# Patient Record
Sex: FEMALE | Race: BLACK OR AFRICAN AMERICAN | Employment: FULL TIME | URBAN - METROPOLITAN AREA
[De-identification: names, ages, dates, MRNs, and addresses within clinical notes are randomized per-mention and may not be internally consistent; named-entity substitution may affect disease eponyms.]

---

## 2018-08-16 PROBLEM — D64.9 SYMPTOMATIC ANEMIA: Status: ACTIVE | Noted: 2018-08-16

## 2018-08-16 PROBLEM — D50.9 MICROCYTIC ANEMIA: Status: ACTIVE | Noted: 2018-08-16

## 2018-08-16 PROBLEM — I10 ESSENTIAL HYPERTENSION: Status: ACTIVE | Noted: 2018-08-16

## 2018-10-05 ENCOUNTER — HOSPITAL ENCOUNTER (EMERGENCY)
Age: 43
Discharge: HOME | End: 2018-10-05

## 2018-10-05 ENCOUNTER — APPOINTMENT (OUTPATIENT)
Dept: GENERAL RADIOLOGY | Age: 43
End: 2018-10-05

## 2018-10-05 VITALS
BODY MASS INDEX: 42.44 KG/M2 | DIASTOLIC BLOOD PRESSURE: 89 MMHG | WEIGHT: 280 LBS | HEART RATE: 62 BPM | SYSTOLIC BLOOD PRESSURE: 146 MMHG | OXYGEN SATURATION: 98 % | HEIGHT: 68 IN | RESPIRATION RATE: 19 BRPM | TEMPERATURE: 98.4 F

## 2018-10-05 DIAGNOSIS — R05.9 COUGH: ICD-10-CM

## 2018-10-05 DIAGNOSIS — R04.2 HEMOPTYSIS: ICD-10-CM

## 2018-10-05 DIAGNOSIS — J06.9 ACUTE UPPER RESPIRATORY INFECTION: Primary | ICD-10-CM

## 2018-10-05 LAB
ALBUMIN SERPL-MCNC: 3.8 GM/DL (ref 3.4–5)
ALP BLD-CCNC: 93 IU/L (ref 40–129)
ALT SERPL-CCNC: 10 U/L (ref 10–40)
ANION GAP SERPL CALCULATED.3IONS-SCNC: 11 MMOL/L (ref 4–16)
ANISOCYTOSIS: ABNORMAL
AST SERPL-CCNC: 18 IU/L (ref 15–37)
BASOPHILS ABSOLUTE: 0.1 K/CU MM
BASOPHILS RELATIVE PERCENT: 1 % (ref 0–1)
BILIRUB SERPL-MCNC: 0.2 MG/DL (ref 0–1)
BUN BLDV-MCNC: 14 MG/DL (ref 6–23)
CALCIUM SERPL-MCNC: 8.6 MG/DL (ref 8.3–10.6)
CHLORIDE BLD-SCNC: 103 MMOL/L (ref 99–110)
CO2: 21 MMOL/L (ref 21–32)
CREAT SERPL-MCNC: 0.9 MG/DL (ref 0.6–1.1)
D DIMER: <200 NG/ML(DDU)
DIFFERENTIAL TYPE: ABNORMAL
DIFFERENTIAL TYPE: ABNORMAL
EOSINOPHILS ABSOLUTE: 0.5 K/CU MM
EOSINOPHILS RELATIVE PERCENT: 5.5 % (ref 0–3)
GFR AFRICAN AMERICAN: >60 ML/MIN/1.73M2
GFR NON-AFRICAN AMERICAN: >60 ML/MIN/1.73M2
GLUCOSE BLD-MCNC: 87 MG/DL (ref 70–99)
HCG QUALITATIVE: NEGATIVE
HCT VFR BLD CALC: 38.2 % (ref 37–47)
HEMOGLOBIN: 10.8 GM/DL (ref 12.5–16)
IMMATURE NEUTROPHIL %: 0.2 % (ref 0–0.43)
LYMPHOCYTES ABSOLUTE: 1.4 K/CU MM
LYMPHOCYTES RELATIVE PERCENT: 16 % (ref 24–44)
MCH RBC QN AUTO: 22.1 PG (ref 27–31)
MCHC RBC AUTO-ENTMCNC: 28.3 % (ref 32–36)
MCV RBC AUTO: 78.3 FL (ref 78–100)
MICROCYTES: ABNORMAL
MONOCYTES ABSOLUTE: 0.8 K/CU MM
MONOCYTES RELATIVE PERCENT: 9.2 % (ref 0–4)
OVALOCYTES: ABNORMAL
PLATELET # BLD: 338 K/CU MM (ref 140–440)
PLT MORPHOLOGY: ABNORMAL
PMV BLD AUTO: 10.4 FL (ref 7.5–11.1)
POLYCHROMASIA: ABNORMAL
POTASSIUM SERPL-SCNC: 4.2 MMOL/L (ref 3.5–5.1)
RBC # BLD: 4.88 M/CU MM (ref 4.2–5.4)
RBC # BLD: ABNORMAL 10*6/UL
SEGMENTED NEUTROPHILS ABSOLUTE COUNT: 6 K/CU MM
SEGMENTED NEUTROPHILS RELATIVE PERCENT: 68.1 % (ref 36–66)
SODIUM BLD-SCNC: 135 MMOL/L (ref 135–145)
TOTAL IMMATURE NEUTOROPHIL: 0.02 K/CU MM
TOTAL PROTEIN: 7.3 GM/DL (ref 6.4–8.2)
WBC # BLD: 8.8 K/CU MM (ref 4–10.5)

## 2018-10-05 PROCEDURE — 85379 FIBRIN DEGRADATION QUANT: CPT

## 2018-10-05 PROCEDURE — 85025 COMPLETE CBC W/AUTO DIFF WBC: CPT

## 2018-10-05 PROCEDURE — 80053 COMPREHEN METABOLIC PANEL: CPT

## 2018-10-05 PROCEDURE — 99283 EMERGENCY DEPT VISIT LOW MDM: CPT

## 2018-10-05 PROCEDURE — 84703 CHORIONIC GONADOTROPIN ASSAY: CPT

## 2018-10-05 PROCEDURE — 87081 CULTURE SCREEN ONLY: CPT

## 2018-10-05 PROCEDURE — 87430 STREP A AG IA: CPT

## 2018-10-05 PROCEDURE — 6370000000 HC RX 637 (ALT 250 FOR IP): Performed by: PHYSICIAN ASSISTANT

## 2018-10-05 PROCEDURE — 71046 X-RAY EXAM CHEST 2 VIEWS: CPT

## 2018-10-05 RX ORDER — ACETAMINOPHEN 500 MG
1000 TABLET ORAL ONCE
Status: COMPLETED | OUTPATIENT
Start: 2018-10-05 | End: 2018-10-05

## 2018-10-05 RX ADMIN — ACETAMINOPHEN 1000 MG: 500 TABLET, FILM COATED ORAL at 09:56

## 2018-10-05 ASSESSMENT — PAIN DESCRIPTION - LOCATION: LOCATION: THROAT;EAR

## 2018-10-05 ASSESSMENT — PAIN DESCRIPTION - PAIN TYPE: TYPE: ACUTE PAIN

## 2018-10-05 ASSESSMENT — PAIN SCALES - GENERAL
PAINLEVEL_OUTOF10: 10
PAINLEVEL_OUTOF10: 10

## 2018-10-05 ASSESSMENT — PAIN DESCRIPTION - ORIENTATION: ORIENTATION: RIGHT;LEFT

## 2018-10-05 NOTE — ED PROVIDER NOTES
cause. Patient denies any recent travel or surgery. Patient is not on blood thinners at this time. With patient's history additional workup was initiated. D-dimer is less than 200. Chest x-ray shows no acute cardiopulmonary abnormalities. Strep is negative. Pregnancy is negative. CMP within normal limits. CBC shows hemoglobin of 10.8 otherwise within normal limits. I discussed imaging and lab results with patient today. I suspect patient's symptoms are viral in nature. I recommend symptomatically treatment with increased rest, fluids. Recommend warm salt water gargles, T with honey. Recommend follow-up with primary care provider in 3-5 days for recheck. Clinical  IMPRESSION    1. Acute upper respiratory infection    2. Cough    3. Hemoptysis          Diagnosis and plan discussed in detail with patient who understands and agrees. Patient agrees to return emergency department if symptoms worsen or any new symptoms develop. Comment: Please note this report has been produced using speech recognition software and may contain errors related to that system including errors in grammar, punctuation, and spelling, as well as words and phrases that may be inappropriate. If there are any questions or concerns please feel free to contact the dictating provider for clarification.      Robert Morales PA-C  10/05/18 [id number]

## 2018-10-07 LAB
CULTURE: NORMAL
Lab: NORMAL
REPORT STATUS: NORMAL
SPECIMEN: NORMAL
STREP A DIRECT SCREEN: NEGATIVE

## 2018-10-25 ENCOUNTER — TELEPHONE (OUTPATIENT)
Dept: INTERNAL MEDICINE CLINIC | Age: 43
End: 2018-10-25

## 2018-10-25 NOTE — TELEPHONE ENCOUNTER
Medical records request received from [address] [place] [address] and was sent to [facility] for processing.

## 2019-03-02 ENCOUNTER — APPOINTMENT (OUTPATIENT)
Dept: CT IMAGING | Age: 44
End: 2019-03-02

## 2019-03-02 ENCOUNTER — HOSPITAL ENCOUNTER (OUTPATIENT)
Age: 44
Setting detail: OBSERVATION
Discharge: HOME | End: 2019-03-04
Attending: EMERGENCY MEDICINE | Admitting: INTERNAL MEDICINE

## 2019-03-02 ENCOUNTER — APPOINTMENT (OUTPATIENT)
Dept: GENERAL RADIOLOGY | Age: 44
End: 2019-03-02

## 2019-03-02 DIAGNOSIS — R07.9 CHEST PAIN, UNSPECIFIED TYPE: Primary | ICD-10-CM

## 2019-03-02 LAB
ALBUMIN SERPL-MCNC: 3.8 GM/DL (ref 3.4–5)
ALP BLD-CCNC: 93 IU/L (ref 40–129)
ALT SERPL-CCNC: 12 U/L (ref 10–40)
AMPHETAMINES: NEGATIVE
ANION GAP SERPL CALCULATED.3IONS-SCNC: 10 MMOL/L (ref 4–16)
AST SERPL-CCNC: 14 IU/L (ref 15–37)
BACTERIA: NEGATIVE /HPF
BARBITURATE SCREEN URINE: NEGATIVE
BASOPHILS ABSOLUTE: 0.1 K/CU MM
BASOPHILS RELATIVE PERCENT: 0.6 % (ref 0–1)
BENZODIAZEPINE SCREEN, URINE: NEGATIVE
BILIRUB SERPL-MCNC: 0.1 MG/DL (ref 0–1)
BILIRUBIN URINE: NEGATIVE MG/DL
BLOOD, URINE: ABNORMAL
BUN BLDV-MCNC: 11 MG/DL (ref 6–23)
CALCIUM SERPL-MCNC: 8 MG/DL (ref 8.3–10.6)
CANNABINOID SCREEN URINE: NEGATIVE
CHLORIDE BLD-SCNC: 108 MMOL/L (ref 99–110)
CLARITY: ABNORMAL
CO2: 22 MMOL/L (ref 21–32)
COCAINE METABOLITE: NEGATIVE
COLOR: YELLOW
CREAT SERPL-MCNC: 0.9 MG/DL (ref 0.6–1.1)
DIFFERENTIAL TYPE: ABNORMAL
EKG ATRIAL RATE: 77 BPM
EKG DIAGNOSIS: NORMAL
EKG P AXIS: 49 DEGREES
EKG P-R INTERVAL: 146 MS
EKG Q-T INTERVAL: 394 MS
EKG QRS DURATION: 88 MS
EKG QTC CALCULATION (BAZETT): 445 MS
EKG R AXIS: 58 DEGREES
EKG T AXIS: 24 DEGREES
EKG VENTRICULAR RATE: 77 BPM
EOSINOPHILS ABSOLUTE: 0.2 K/CU MM
EOSINOPHILS RELATIVE PERCENT: 2.1 % (ref 0–3)
GFR AFRICAN AMERICAN: >60 ML/MIN/1.73M2
GFR NON-AFRICAN AMERICAN: >60 ML/MIN/1.73M2
GLUCOSE BLD-MCNC: 118 MG/DL (ref 70–99)
GLUCOSE, URINE: NEGATIVE MG/DL
GONADOTROPIN, CHORIONIC (HCG) QUANT: <0.5 UIU/ML
HCT VFR BLD CALC: 34.9 % (ref 37–47)
HEMOGLOBIN: 10.2 GM/DL (ref 12.5–16)
IMMATURE NEUTROPHIL %: 0.2 % (ref 0–0.43)
KETONES, URINE: NEGATIVE MG/DL
LEUKOCYTE ESTERASE, URINE: NEGATIVE
LYMPHOCYTES ABSOLUTE: 1.8 K/CU MM
LYMPHOCYTES RELATIVE PERCENT: 20.5 % (ref 24–44)
MCH RBC QN AUTO: 22.5 PG (ref 27–31)
MCHC RBC AUTO-ENTMCNC: 29.2 % (ref 32–36)
MCV RBC AUTO: 77 FL (ref 78–100)
MONOCYTES ABSOLUTE: 0.7 K/CU MM
MONOCYTES RELATIVE PERCENT: 8 % (ref 0–4)
NITRITE URINE, QUANTITATIVE: NEGATIVE
NUCLEATED RBC %: 0 %
OPIATES, URINE: NEGATIVE
OXYCODONE: NEGATIVE
PDW BLD-RTO: 16.1 % (ref 11.7–14.9)
PH, URINE: 6 (ref 5–8)
PHENCYCLIDINE, URINE: NEGATIVE
PLATELET # BLD: 412 K/CU MM (ref 140–440)
PMV BLD AUTO: 10.5 FL (ref 7.5–11.1)
POTASSIUM SERPL-SCNC: 3.9 MMOL/L (ref 3.5–5.1)
PROTEIN UA: 100 MG/DL
RBC # BLD: 4.53 M/CU MM (ref 4.2–5.4)
RBC URINE: ABNORMAL /HPF (ref 0–6)
SEGMENTED NEUTROPHILS ABSOLUTE COUNT: 6 K/CU MM
SEGMENTED NEUTROPHILS RELATIVE PERCENT: 68.6 % (ref 36–66)
SODIUM BLD-SCNC: 140 MMOL/L (ref 135–145)
SPECIFIC GRAVITY UA: >1.06 (ref 1–1.03)
SQUAMOUS EPITHELIAL: 78 /HPF
TOTAL IMMATURE NEUTOROPHIL: 0.02 K/CU MM
TOTAL NUCLEATED RBC: 0 K/CU MM
TOTAL PROTEIN: 6.9 GM/DL (ref 6.4–8.2)
TRICHOMONAS: ABNORMAL /HPF
TROPONIN T: <0.01 NG/ML
UROBILINOGEN, URINE: NORMAL MG/DL (ref 0.2–1)
WBC # BLD: 8.7 K/CU MM (ref 4–10.5)
WBC UA: ABNORMAL /HPF (ref 0–5)

## 2019-03-02 PROCEDURE — 6360000002 HC RX W HCPCS: Performed by: NURSE PRACTITIONER

## 2019-03-02 PROCEDURE — 6370000000 HC RX 637 (ALT 250 FOR IP): Performed by: EMERGENCY MEDICINE

## 2019-03-02 PROCEDURE — 6370000000 HC RX 637 (ALT 250 FOR IP): Performed by: NURSE PRACTITIONER

## 2019-03-02 PROCEDURE — G0378 HOSPITAL OBSERVATION PER HR: HCPCS

## 2019-03-02 PROCEDURE — 93005 ELECTROCARDIOGRAM TRACING: CPT | Performed by: EMERGENCY MEDICINE

## 2019-03-02 PROCEDURE — 81001 URINALYSIS AUTO W/SCOPE: CPT

## 2019-03-02 PROCEDURE — 71275 CT ANGIOGRAPHY CHEST: CPT

## 2019-03-02 PROCEDURE — 99285 EMERGENCY DEPT VISIT HI MDM: CPT

## 2019-03-02 PROCEDURE — 93005 ELECTROCARDIOGRAM TRACING: CPT | Performed by: NURSE PRACTITIONER

## 2019-03-02 PROCEDURE — 6360000004 HC RX CONTRAST MEDICATION: Performed by: EMERGENCY MEDICINE

## 2019-03-02 PROCEDURE — 71046 X-RAY EXAM CHEST 2 VIEWS: CPT

## 2019-03-02 PROCEDURE — 80053 COMPREHEN METABOLIC PANEL: CPT

## 2019-03-02 PROCEDURE — 6360000002 HC RX W HCPCS: Performed by: EMERGENCY MEDICINE

## 2019-03-02 PROCEDURE — 96375 TX/PRO/DX INJ NEW DRUG ADDON: CPT

## 2019-03-02 PROCEDURE — 93010 ELECTROCARDIOGRAM REPORT: CPT | Performed by: INTERNAL MEDICINE

## 2019-03-02 PROCEDURE — 96374 THER/PROPH/DIAG INJ IV PUSH: CPT

## 2019-03-02 PROCEDURE — 93005 ELECTROCARDIOGRAM TRACING: CPT

## 2019-03-02 PROCEDURE — 85025 COMPLETE CBC W/AUTO DIFF WBC: CPT

## 2019-03-02 PROCEDURE — 80307 DRUG TEST PRSMV CHEM ANLYZR: CPT

## 2019-03-02 PROCEDURE — 84484 ASSAY OF TROPONIN QUANT: CPT

## 2019-03-02 PROCEDURE — 84702 CHORIONIC GONADOTROPIN TEST: CPT

## 2019-03-02 PROCEDURE — 96372 THER/PROPH/DIAG INJ SC/IM: CPT

## 2019-03-02 PROCEDURE — 2500000003 HC RX 250 WO HCPCS: Performed by: NURSE PRACTITIONER

## 2019-03-02 PROCEDURE — 2580000003 HC RX 258: Performed by: NURSE PRACTITIONER

## 2019-03-02 PROCEDURE — 36415 COLL VENOUS BLD VENIPUNCTURE: CPT

## 2019-03-02 RX ORDER — SODIUM CHLORIDE 0.9 % (FLUSH) 0.9 %
10 SYRINGE (ML) INJECTION EVERY 12 HOURS SCHEDULED
Status: DISCONTINUED | OUTPATIENT
Start: 2019-03-02 | End: 2019-03-04 | Stop reason: HOSPADM

## 2019-03-02 RX ORDER — SODIUM CHLORIDE 0.9 % (FLUSH) 0.9 %
10 SYRINGE (ML) INJECTION PRN
Status: DISCONTINUED | OUTPATIENT
Start: 2019-03-02 | End: 2019-03-02

## 2019-03-02 RX ORDER — ACETAMINOPHEN 500 MG
1000 TABLET ORAL ONCE
Status: COMPLETED | OUTPATIENT
Start: 2019-03-02 | End: 2019-03-02

## 2019-03-02 RX ORDER — SODIUM CHLORIDE 9 MG/ML
INJECTION, SOLUTION INTRAVENOUS CONTINUOUS
Status: DISCONTINUED | OUTPATIENT
Start: 2019-03-02 | End: 2019-03-02

## 2019-03-02 RX ORDER — SODIUM CHLORIDE 9 MG/ML
INJECTION, SOLUTION INTRAVENOUS CONTINUOUS
Status: DISCONTINUED | OUTPATIENT
Start: 2019-03-02 | End: 2019-03-04 | Stop reason: HOSPADM

## 2019-03-02 RX ORDER — LORAZEPAM 2 MG/ML
1 INJECTION INTRAMUSCULAR ONCE
Status: COMPLETED | OUTPATIENT
Start: 2019-03-02 | End: 2019-03-02

## 2019-03-02 RX ORDER — ONDANSETRON 2 MG/ML
4 INJECTION INTRAMUSCULAR; INTRAVENOUS EVERY 6 HOURS PRN
Status: DISCONTINUED | OUTPATIENT
Start: 2019-03-02 | End: 2019-03-04 | Stop reason: HOSPADM

## 2019-03-02 RX ORDER — MORPHINE SULFATE 4 MG/ML
4 INJECTION, SOLUTION INTRAMUSCULAR; INTRAVENOUS EVERY 30 MIN PRN
Status: DISCONTINUED | OUTPATIENT
Start: 2019-03-02 | End: 2019-03-02

## 2019-03-02 RX ORDER — ATORVASTATIN CALCIUM 20 MG/1
20 TABLET, FILM COATED ORAL NIGHTLY
Status: DISCONTINUED | OUTPATIENT
Start: 2019-03-02 | End: 2019-03-04 | Stop reason: HOSPADM

## 2019-03-02 RX ORDER — NITROGLYCERIN 0.4 MG/1
0.4 TABLET SUBLINGUAL EVERY 5 MIN PRN
Status: DISCONTINUED | OUTPATIENT
Start: 2019-03-02 | End: 2019-03-04 | Stop reason: HOSPADM

## 2019-03-02 RX ORDER — CARVEDILOL 12.5 MG/1
12.5 TABLET ORAL 2 TIMES DAILY WITH MEALS
Status: CANCELLED | OUTPATIENT
Start: 2019-03-02

## 2019-03-02 RX ORDER — SODIUM CHLORIDE 0.9 % (FLUSH) 0.9 %
10 SYRINGE (ML) INJECTION PRN
Status: DISCONTINUED | OUTPATIENT
Start: 2019-03-02 | End: 2019-03-04 | Stop reason: HOSPADM

## 2019-03-02 RX ADMIN — NITROGLYCERIN 0.4 MG: 0.4 TABLET, ORALLY DISINTEGRATING SUBLINGUAL at 22:57

## 2019-03-02 RX ADMIN — METOPROLOL TARTRATE 25 MG: 25 TABLET ORAL at 20:23

## 2019-03-02 RX ADMIN — ATORVASTATIN CALCIUM 20 MG: 20 TABLET, FILM COATED ORAL at 20:23

## 2019-03-02 RX ADMIN — FAMOTIDINE 20 MG: 10 INJECTION, SOLUTION INTRAVENOUS at 20:24

## 2019-03-02 RX ADMIN — SODIUM CHLORIDE, PRESERVATIVE FREE 10 ML: 5 INJECTION INTRAVENOUS at 20:25

## 2019-03-02 RX ADMIN — ACETAMINOPHEN 1000 MG: 500 TABLET ORAL at 13:52

## 2019-03-02 RX ADMIN — NITROGLYCERIN 0.5 INCH: 20 OINTMENT TOPICAL at 20:24

## 2019-03-02 RX ADMIN — ENOXAPARIN SODIUM 40 MG: 40 INJECTION SUBCUTANEOUS at 20:24

## 2019-03-02 RX ADMIN — LORAZEPAM 1 MG: 2 INJECTION INTRAMUSCULAR; INTRAVENOUS at 16:06

## 2019-03-02 RX ADMIN — SODIUM CHLORIDE: 9 INJECTION, SOLUTION INTRAVENOUS at 20:24

## 2019-03-02 RX ADMIN — IOPAMIDOL 100 ML: 755 INJECTION, SOLUTION INTRAVENOUS at 17:53

## 2019-03-02 ASSESSMENT — PAIN SCALES - GENERAL
PAINLEVEL_OUTOF10: 8

## 2019-03-02 ASSESSMENT — PAIN DESCRIPTION - LOCATION
LOCATION: CHEST
LOCATION: CHEST

## 2019-03-02 ASSESSMENT — PAIN DESCRIPTION - PAIN TYPE: TYPE: ACUTE PAIN

## 2019-03-02 ASSESSMENT — PAIN DESCRIPTION - DESCRIPTORS: DESCRIPTORS: SQUEEZING;PRESSURE

## 2019-03-03 LAB
ALBUMIN SERPL-MCNC: 4 GM/DL (ref 3.4–5)
ALP BLD-CCNC: 92 IU/L (ref 40–128)
ALT SERPL-CCNC: 12 U/L (ref 10–40)
ANION GAP SERPL CALCULATED.3IONS-SCNC: 11 MMOL/L (ref 4–16)
AST SERPL-CCNC: 14 IU/L (ref 15–37)
BILIRUB SERPL-MCNC: 0.2 MG/DL (ref 0–1)
BUN BLDV-MCNC: 12 MG/DL (ref 6–23)
CALCIUM SERPL-MCNC: 8.8 MG/DL (ref 8.3–10.6)
CHLORIDE BLD-SCNC: 109 MMOL/L (ref 99–110)
CHOLESTEROL: 140 MG/DL
CO2: 19 MMOL/L (ref 21–32)
CREAT SERPL-MCNC: 0.9 MG/DL (ref 0.6–1.1)
EKG ATRIAL RATE: 73 BPM
EKG DIAGNOSIS: NORMAL
EKG P AXIS: 13 DEGREES
EKG P-R INTERVAL: 162 MS
EKG Q-T INTERVAL: 400 MS
EKG QRS DURATION: 84 MS
EKG QTC CALCULATION (BAZETT): 440 MS
EKG R AXIS: 57 DEGREES
EKG T AXIS: -16 DEGREES
EKG VENTRICULAR RATE: 73 BPM
GFR AFRICAN AMERICAN: >60 ML/MIN/1.73M2
GFR NON-AFRICAN AMERICAN: >60 ML/MIN/1.73M2
GLUCOSE BLD-MCNC: 99 MG/DL (ref 70–99)
HCT VFR BLD CALC: 37.8 % (ref 37–47)
HDLC SERPL-MCNC: 50 MG/DL
HEMOGLOBIN: 10.8 GM/DL (ref 12.5–16)
LDL CHOLESTEROL DIRECT: 89 MG/DL
MCH RBC QN AUTO: 22.6 PG (ref 27–31)
MCHC RBC AUTO-ENTMCNC: 28.6 % (ref 32–36)
MCV RBC AUTO: 79.2 FL (ref 78–100)
PDW BLD-RTO: 16.3 % (ref 11.7–14.9)
PLATELET # BLD: 418 K/CU MM (ref 140–440)
PMV BLD AUTO: 10.9 FL (ref 7.5–11.1)
POTASSIUM SERPL-SCNC: 4.7 MMOL/L (ref 3.5–5.1)
RBC # BLD: 4.77 M/CU MM (ref 4.2–5.4)
SODIUM BLD-SCNC: 139 MMOL/L (ref 135–145)
TOTAL PROTEIN: 6.9 GM/DL (ref 6.4–8.2)
TRIGL SERPL-MCNC: 78 MG/DL
WBC # BLD: 8.5 K/CU MM (ref 4–10.5)

## 2019-03-03 PROCEDURE — 96375 TX/PRO/DX INJ NEW DRUG ADDON: CPT

## 2019-03-03 PROCEDURE — 6360000002 HC RX W HCPCS: Performed by: NURSE PRACTITIONER

## 2019-03-03 PROCEDURE — 6370000000 HC RX 637 (ALT 250 FOR IP)

## 2019-03-03 PROCEDURE — 36415 COLL VENOUS BLD VENIPUNCTURE: CPT

## 2019-03-03 PROCEDURE — 85027 COMPLETE CBC AUTOMATED: CPT

## 2019-03-03 PROCEDURE — 96376 TX/PRO/DX INJ SAME DRUG ADON: CPT

## 2019-03-03 PROCEDURE — 83721 ASSAY OF BLOOD LIPOPROTEIN: CPT

## 2019-03-03 PROCEDURE — G0378 HOSPITAL OBSERVATION PER HR: HCPCS

## 2019-03-03 PROCEDURE — 6360000002 HC RX W HCPCS: Performed by: HOSPITALIST

## 2019-03-03 PROCEDURE — 99253 IP/OBS CNSLTJ NEW/EST LOW 45: CPT | Performed by: INTERNAL MEDICINE

## 2019-03-03 PROCEDURE — 80053 COMPREHEN METABOLIC PANEL: CPT

## 2019-03-03 PROCEDURE — 80061 LIPID PANEL: CPT

## 2019-03-03 PROCEDURE — 6370000000 HC RX 637 (ALT 250 FOR IP): Performed by: NURSE PRACTITIONER

## 2019-03-03 PROCEDURE — 93010 ELECTROCARDIOGRAM REPORT: CPT | Performed by: INTERNAL MEDICINE

## 2019-03-03 PROCEDURE — 93005 ELECTROCARDIOGRAM TRACING: CPT

## 2019-03-03 PROCEDURE — 96372 THER/PROPH/DIAG INJ SC/IM: CPT

## 2019-03-03 PROCEDURE — 2580000003 HC RX 258: Performed by: NURSE PRACTITIONER

## 2019-03-03 PROCEDURE — 2500000003 HC RX 250 WO HCPCS: Performed by: NURSE PRACTITIONER

## 2019-03-03 RX ORDER — ACETAMINOPHEN 325 MG/1
TABLET ORAL
Status: COMPLETED
Start: 2019-03-03 | End: 2019-03-03

## 2019-03-03 RX ORDER — MORPHINE SULFATE 4 MG/ML
2 INJECTION, SOLUTION INTRAMUSCULAR; INTRAVENOUS EVERY 6 HOURS PRN
Status: DISCONTINUED | OUTPATIENT
Start: 2019-03-03 | End: 2019-03-04 | Stop reason: HOSPADM

## 2019-03-03 RX ORDER — ACETAMINOPHEN 325 MG/1
650 TABLET ORAL EVERY 4 HOURS PRN
Status: DISCONTINUED | OUTPATIENT
Start: 2019-03-03 | End: 2019-03-04 | Stop reason: HOSPADM

## 2019-03-03 RX ADMIN — FAMOTIDINE 20 MG: 10 INJECTION, SOLUTION INTRAVENOUS at 10:18

## 2019-03-03 RX ADMIN — SODIUM CHLORIDE: 9 INJECTION, SOLUTION INTRAVENOUS at 10:18

## 2019-03-03 RX ADMIN — NITROGLYCERIN 0.5 INCH: 20 OINTMENT TOPICAL at 01:56

## 2019-03-03 RX ADMIN — ENOXAPARIN SODIUM 40 MG: 40 INJECTION SUBCUTANEOUS at 21:40

## 2019-03-03 RX ADMIN — ACETAMINOPHEN 325 MG: 325 TABLET ORAL at 15:53

## 2019-03-03 RX ADMIN — METOPROLOL TARTRATE 25 MG: 25 TABLET ORAL at 21:40

## 2019-03-03 RX ADMIN — MORPHINE SULFATE 2 MG: 4 INJECTION INTRAVENOUS at 02:44

## 2019-03-03 RX ADMIN — METOPROLOL TARTRATE 25 MG: 25 TABLET ORAL at 10:18

## 2019-03-03 RX ADMIN — ATORVASTATIN CALCIUM 20 MG: 20 TABLET, FILM COATED ORAL at 21:40

## 2019-03-03 RX ADMIN — FAMOTIDINE 20 MG: 10 INJECTION, SOLUTION INTRAVENOUS at 21:40

## 2019-03-03 RX ADMIN — SODIUM CHLORIDE, PRESERVATIVE FREE 10 ML: 5 INJECTION INTRAVENOUS at 10:19

## 2019-03-03 ASSESSMENT — PAIN SCALES - GENERAL
PAINLEVEL_OUTOF10: 8
PAINLEVEL_OUTOF10: 9
PAINLEVEL_OUTOF10: 3
PAINLEVEL_OUTOF10: 8
PAINLEVEL_OUTOF10: 8
PAINLEVEL_OUTOF10: 0
PAINLEVEL_OUTOF10: 0

## 2019-03-03 ASSESSMENT — PAIN DESCRIPTION - LOCATION
LOCATION: CHEST

## 2019-03-03 ASSESSMENT — PAIN DESCRIPTION - PAIN TYPE
TYPE: ACUTE PAIN

## 2019-03-04 ENCOUNTER — APPOINTMENT (OUTPATIENT)
Dept: NUCLEAR MEDICINE | Age: 44
End: 2019-03-04

## 2019-03-04 VITALS
BODY MASS INDEX: 44.41 KG/M2 | TEMPERATURE: 98.1 F | SYSTOLIC BLOOD PRESSURE: 140 MMHG | WEIGHT: 293 LBS | HEART RATE: 73 BPM | HEIGHT: 68 IN | RESPIRATION RATE: 18 BRPM | DIASTOLIC BLOOD PRESSURE: 99 MMHG | OXYGEN SATURATION: 99 %

## 2019-03-04 LAB
LV EF: 43 %
LV EF: 53 %
LVEF MODALITY: NORMAL
LVEF MODALITY: NORMAL

## 2019-03-04 PROCEDURE — 3430000000 HC RX DIAGNOSTIC RADIOPHARMACEUTICAL: Performed by: INTERNAL MEDICINE

## 2019-03-04 PROCEDURE — 94150 VITAL CAPACITY TEST: CPT

## 2019-03-04 PROCEDURE — G0378 HOSPITAL OBSERVATION PER HR: HCPCS

## 2019-03-04 PROCEDURE — 93306 TTE W/DOPPLER COMPLETE: CPT

## 2019-03-04 PROCEDURE — 94761 N-INVAS EAR/PLS OXIMETRY MLT: CPT

## 2019-03-04 PROCEDURE — 78452 HT MUSCLE IMAGE SPECT MULT: CPT

## 2019-03-04 PROCEDURE — 99233 SBSQ HOSP IP/OBS HIGH 50: CPT | Performed by: INTERNAL MEDICINE

## 2019-03-04 PROCEDURE — 94010 BREATHING CAPACITY TEST: CPT

## 2019-03-04 PROCEDURE — 93017 CV STRESS TEST TRACING ONLY: CPT

## 2019-03-04 PROCEDURE — 96376 TX/PRO/DX INJ SAME DRUG ADON: CPT

## 2019-03-04 PROCEDURE — 6360000002 HC RX W HCPCS: Performed by: INTERNAL MEDICINE

## 2019-03-04 PROCEDURE — 2500000003 HC RX 250 WO HCPCS: Performed by: NURSE PRACTITIONER

## 2019-03-04 PROCEDURE — 2580000003 HC RX 258: Performed by: NURSE PRACTITIONER

## 2019-03-04 PROCEDURE — A9500 TC99M SESTAMIBI: HCPCS | Performed by: INTERNAL MEDICINE

## 2019-03-04 RX ORDER — AMLODIPINE BESYLATE 5 MG/1
5 TABLET ORAL DAILY
Qty: 30 TABLET | Refills: 1 | Status: ON HOLD | OUTPATIENT
Start: 2019-03-04 | End: 2019-11-17 | Stop reason: ALTCHOICE

## 2019-03-04 RX ORDER — PANTOPRAZOLE SODIUM 40 MG/1
40 TABLET, DELAYED RELEASE ORAL DAILY
Qty: 30 TABLET | Refills: 1 | Status: SHIPPED | OUTPATIENT
Start: 2019-03-04 | End: 2019-03-04 | Stop reason: HOSPADM

## 2019-03-04 RX ADMIN — SODIUM CHLORIDE, PRESERVATIVE FREE 10 ML: 5 INJECTION INTRAVENOUS at 11:14

## 2019-03-04 RX ADMIN — Medication 10 MILLICURIE: at 07:30

## 2019-03-04 RX ADMIN — SODIUM CHLORIDE: 9 INJECTION, SOLUTION INTRAVENOUS at 02:24

## 2019-03-04 RX ADMIN — REGADENOSON 0.4 MG: 0.08 INJECTION, SOLUTION INTRAVENOUS at 09:28

## 2019-03-04 RX ADMIN — Medication 30 MILLICURIE: at 09:00

## 2019-03-04 RX ADMIN — FAMOTIDINE 20 MG: 10 INJECTION, SOLUTION INTRAVENOUS at 11:14

## 2019-03-04 ASSESSMENT — PAIN DESCRIPTION - DESCRIPTORS
DESCRIPTORS: PRESSURE
DESCRIPTORS: PRESSURE

## 2019-03-04 ASSESSMENT — PAIN DESCRIPTION - PAIN TYPE
TYPE: ACUTE PAIN
TYPE: ACUTE PAIN

## 2019-03-04 ASSESSMENT — PAIN DESCRIPTION - LOCATION
LOCATION: CHEST
LOCATION: CHEST

## 2019-03-04 ASSESSMENT — PAIN DESCRIPTION - ORIENTATION
ORIENTATION: MID
ORIENTATION: MID

## 2019-03-04 ASSESSMENT — PAIN SCALES - GENERAL
PAINLEVEL_OUTOF10: 8
PAINLEVEL_OUTOF10: 7

## 2019-07-14 ENCOUNTER — APPOINTMENT (OUTPATIENT)
Dept: CT IMAGING | Age: 44
End: 2019-07-14

## 2019-07-14 ENCOUNTER — HOSPITAL ENCOUNTER (EMERGENCY)
Age: 44
Discharge: HOME | End: 2019-07-14
Attending: EMERGENCY MEDICINE

## 2019-07-14 VITALS
OXYGEN SATURATION: 96 % | SYSTOLIC BLOOD PRESSURE: 180 MMHG | BODY MASS INDEX: 44.85 KG/M2 | DIASTOLIC BLOOD PRESSURE: 92 MMHG | TEMPERATURE: 98 F | HEART RATE: 62 BPM | RESPIRATION RATE: 15 BRPM | WEIGHT: 293 LBS

## 2019-07-14 DIAGNOSIS — R03.0 ELEVATED BLOOD PRESSURE READING: ICD-10-CM

## 2019-07-14 DIAGNOSIS — L03.213 PRESEPTAL CELLULITIS: Primary | ICD-10-CM

## 2019-07-14 LAB
ALBUMIN SERPL-MCNC: 4.1 G/DL (ref 3.5–5.2)
ALP BLD-CCNC: 95 U/L (ref 35–104)
ALT SERPL-CCNC: 12 U/L (ref 0–32)
ANION GAP SERPL CALCULATED.3IONS-SCNC: 10 MMOL/L (ref 7–16)
AST SERPL-CCNC: 15 U/L (ref 0–31)
BASOPHILS ABSOLUTE: 0.05 E9/L (ref 0–0.2)
BASOPHILS RELATIVE PERCENT: 0.7 % (ref 0–2)
BILIRUB SERPL-MCNC: 0.2 MG/DL (ref 0–1.2)
BUN BLDV-MCNC: 10 MG/DL (ref 6–20)
CALCIUM SERPL-MCNC: 9.1 MG/DL (ref 8.6–10.2)
CHLORIDE BLD-SCNC: 105 MMOL/L (ref 98–107)
CO2: 25 MMOL/L (ref 22–29)
CREAT SERPL-MCNC: 1 MG/DL (ref 0.5–1)
EKG ATRIAL RATE: 68 BPM
EKG P AXIS: 43 DEGREES
EKG P-R INTERVAL: 126 MS
EKG Q-T INTERVAL: 430 MS
EKG QRS DURATION: 78 MS
EKG QTC CALCULATION (BAZETT): 457 MS
EKG R AXIS: 48 DEGREES
EKG T AXIS: 36 DEGREES
EKG VENTRICULAR RATE: 68 BPM
EOSINOPHILS ABSOLUTE: 0.17 E9/L (ref 0.05–0.5)
EOSINOPHILS RELATIVE PERCENT: 2.4 % (ref 0–6)
GFR AFRICAN AMERICAN: >60
GFR NON-AFRICAN AMERICAN: >60 ML/MIN/1.73
GLUCOSE BLD-MCNC: 98 MG/DL (ref 74–99)
HCG(URINE) PREGNANCY TEST: NEGATIVE
HCT VFR BLD CALC: 39.6 % (ref 34–48)
HEMOGLOBIN: 12 G/DL (ref 11.5–15.5)
IMMATURE GRANULOCYTES #: 0.03 E9/L
IMMATURE GRANULOCYTES %: 0.4 % (ref 0–5)
LACTIC ACID: 0.8 MMOL/L (ref 0.5–2.2)
LYMPHOCYTES ABSOLUTE: 1.3 E9/L (ref 1.5–4)
LYMPHOCYTES RELATIVE PERCENT: 18.1 % (ref 20–42)
MCH RBC QN AUTO: 23 PG (ref 26–35)
MCHC RBC AUTO-ENTMCNC: 30.3 % (ref 32–34.5)
MCV RBC AUTO: 76 FL (ref 80–99.9)
MONOCYTES ABSOLUTE: 0.64 E9/L (ref 0.1–0.95)
MONOCYTES RELATIVE PERCENT: 8.9 % (ref 2–12)
NEUTROPHILS ABSOLUTE: 5.01 E9/L (ref 1.8–7.3)
NEUTROPHILS RELATIVE PERCENT: 69.5 % (ref 43–80)
PDW BLD-RTO: 19.3 FL (ref 11.5–15)
PLATELET # BLD: 345 E9/L (ref 130–450)
PMV BLD AUTO: 11.6 FL (ref 7–12)
POTASSIUM SERPL-SCNC: 3.5 MMOL/L (ref 3.5–5)
RBC # BLD: 5.21 E12/L (ref 3.5–5.5)
REASON FOR REJECTION: NORMAL
REJECTED TEST: NORMAL
SEDIMENTATION RATE, ERYTHROCYTE: 6 MM/HR (ref 0–20)
SODIUM BLD-SCNC: 140 MMOL/L (ref 132–146)
TOTAL PROTEIN: 7.8 G/DL (ref 6.4–8.3)
TROPONIN: <0.01 NG/ML (ref 0–0.03)
WBC # BLD: 7.2 E9/L (ref 4.5–11.5)

## 2019-07-14 PROCEDURE — 83605 ASSAY OF LACTIC ACID: CPT

## 2019-07-14 PROCEDURE — 84484 ASSAY OF TROPONIN QUANT: CPT

## 2019-07-14 PROCEDURE — 96375 TX/PRO/DX INJ NEW DRUG ADDON: CPT

## 2019-07-14 PROCEDURE — 2500000003 HC RX 250 WO HCPCS: Performed by: EMERGENCY MEDICINE

## 2019-07-14 PROCEDURE — 70450 CT HEAD/BRAIN W/O DYE: CPT

## 2019-07-14 PROCEDURE — 70481 CT ORBIT/EAR/FOSSA W/DYE: CPT

## 2019-07-14 PROCEDURE — 85651 RBC SED RATE NONAUTOMATED: CPT

## 2019-07-14 PROCEDURE — 6360000004 HC RX CONTRAST MEDICATION: Performed by: RADIOLOGY

## 2019-07-14 PROCEDURE — 81025 URINE PREGNANCY TEST: CPT

## 2019-07-14 PROCEDURE — 96374 THER/PROPH/DIAG INJ IV PUSH: CPT

## 2019-07-14 PROCEDURE — 6370000000 HC RX 637 (ALT 250 FOR IP): Performed by: EMERGENCY MEDICINE

## 2019-07-14 PROCEDURE — 93005 ELECTROCARDIOGRAM TRACING: CPT | Performed by: PHYSICIAN ASSISTANT

## 2019-07-14 PROCEDURE — 93010 ELECTROCARDIOGRAM REPORT: CPT | Performed by: INTERNAL MEDICINE

## 2019-07-14 PROCEDURE — 99284 EMERGENCY DEPT VISIT MOD MDM: CPT

## 2019-07-14 PROCEDURE — 85025 COMPLETE CBC W/AUTO DIFF WBC: CPT

## 2019-07-14 PROCEDURE — 80053 COMPREHEN METABOLIC PANEL: CPT

## 2019-07-14 PROCEDURE — 36415 COLL VENOUS BLD VENIPUNCTURE: CPT

## 2019-07-14 PROCEDURE — 6360000002 HC RX W HCPCS: Performed by: EMERGENCY MEDICINE

## 2019-07-14 RX ORDER — DIPHENHYDRAMINE HYDROCHLORIDE 50 MG/ML
25 INJECTION INTRAMUSCULAR; INTRAVENOUS ONCE
Status: COMPLETED | OUTPATIENT
Start: 2019-07-14 | End: 2019-07-14

## 2019-07-14 RX ORDER — CEPHALEXIN 500 MG/1
500 CAPSULE ORAL ONCE
Status: COMPLETED | OUTPATIENT
Start: 2019-07-14 | End: 2019-07-14

## 2019-07-14 RX ORDER — CEPHALEXIN 500 MG/1
500 CAPSULE ORAL 4 TIMES DAILY
Qty: 56 CAPSULE | Refills: 0 | Status: SHIPPED | OUTPATIENT
Start: 2019-07-14 | End: 2019-07-28

## 2019-07-14 RX ORDER — MORPHINE SULFATE 4 MG/ML
6 INJECTION, SOLUTION INTRAMUSCULAR; INTRAVENOUS ONCE
Status: COMPLETED | OUTPATIENT
Start: 2019-07-14 | End: 2019-07-14

## 2019-07-14 RX ORDER — METHYLPREDNISOLONE SODIUM SUCCINATE 125 MG/2ML
125 INJECTION, POWDER, LYOPHILIZED, FOR SOLUTION INTRAMUSCULAR; INTRAVENOUS ONCE
Status: COMPLETED | OUTPATIENT
Start: 2019-07-14 | End: 2019-07-14

## 2019-07-14 RX ORDER — ONDANSETRON 2 MG/ML
4 INJECTION INTRAMUSCULAR; INTRAVENOUS ONCE
Status: COMPLETED | OUTPATIENT
Start: 2019-07-14 | End: 2019-07-14

## 2019-07-14 RX ADMIN — FLUORESCEIN SODIUM 1 MG: 1 STRIP OPHTHALMIC at 14:54

## 2019-07-14 RX ADMIN — CEPHALEXIN 500 MG: 500 CAPSULE ORAL at 14:54

## 2019-07-14 RX ADMIN — IOPAMIDOL 90 ML: 755 INJECTION, SOLUTION INTRAVENOUS at 13:16

## 2019-07-14 RX ADMIN — DIPHENHYDRAMINE HYDROCHLORIDE 25 MG: 50 INJECTION, SOLUTION INTRAMUSCULAR; INTRAVENOUS at 12:34

## 2019-07-14 RX ADMIN — METHYLPREDNISOLONE SODIUM SUCCINATE 125 MG: 125 INJECTION, POWDER, FOR SOLUTION INTRAMUSCULAR; INTRAVENOUS at 12:34

## 2019-07-14 RX ADMIN — ONDANSETRON HYDROCHLORIDE 4 MG: 2 SOLUTION INTRAMUSCULAR; INTRAVENOUS at 12:34

## 2019-07-14 RX ADMIN — MORPHINE SULFATE 6 MG: 4 INJECTION, SOLUTION INTRAMUSCULAR; INTRAVENOUS at 12:35

## 2019-07-14 RX ADMIN — FAMOTIDINE 20 MG: 10 INJECTION INTRAVENOUS at 13:50

## 2019-07-14 ASSESSMENT — VISUAL ACUITY
OS: 20/30
OD: 20/13

## 2019-07-14 ASSESSMENT — PAIN SCALES - GENERAL
PAINLEVEL_OUTOF10: 9
PAINLEVEL_OUTOF10: 8
PAINLEVEL_OUTOF10: 4

## 2019-07-14 NOTE — ED PROVIDER NOTES
right 11. Eyes: Pupils are equal, round, and reactive to light. EOM are normal. No scleral icterus. Neck: Normal range of motion. Neck supple. No JVD present. Cardiovascular: Normal rate, regular rhythm, S1 normal, S2 normal and normal heart sounds. No murmur heard. Pulmonary/Chest: Effort normal and breath sounds normal. No accessory muscle usage. No respiratory distress. She has no wheezes. She has no rhonchi. She has no rales. Abdominal: Soft. Normal appearance and bowel sounds are normal. She exhibits no distension. There is no tenderness. Musculoskeletal: Normal range of motion. She exhibits no edema. Lymphadenopathy:     She has no cervical adenopathy. Neurological: She is alert and oriented to person, place, and time. Skin: Skin is warm and dry. No rash noted. She is not diaphoretic. No pallor. Nursing note and vitals reviewed. Procedures    MDM    ED Course as of Jul 14 1553   Sun Jul 14, 2019   1315 Patient reassessed while awaiting CT, notes significant improvement in her symptoms. Discussed prior reaction to contrast, confirmed no edema, sob, states she has tolerated contrast previously with premedication. [RU]      ED Course User Index  [RU] Kemar Carey, DO       --------------------------------------------- PAST HISTORY ---------------------------------------------  Past Medical History:  has a past medical history of Anxiety, Blood transfusion, Chronic fatigue, Hypertension, Iron deficiency anemia due to chronic blood loss, Knee pain, bilateral, Low back pain, Migraine headache without aura, Morbid obesity (HCC), Muscle cramps, Perennial allergic rhinitis, Superficial thrombophlebitis of right leg, Ulcerative colitis ([address] and Vertigo. Past Surgical History:  has a past surgical history that includes Adenoidectomy. Social History:  reports that she has never smoked. She has never used smokeless tobacco. She reports that she drinks alcohol.  She reports that she does not use drugs. Family History: family history includes Cancer (age of onset: 35) in her sister; High Blood Pressure in her father and mother; Stroke in her mother. The patients home medications have been reviewed. Allergies: Aspirin; Coconut oil; Iodides; Motrin [ibuprofen micronized]; Ibuprofen; Robitussin (alcohol free) [guaifenesin]; Codeine;  Iodine; and Tramadol    -------------------------------------------------- RESULTS -------------------------------------------------    Lab  Results for orders placed or performed during the hospital encounter of 07/14/19   CBC Auto Differential   Result Value Ref Range    WBC 7.2 4.5 - 11.5 E9/L    RBC 5.21 3.50 - 5.50 E12/L    Hemoglobin 12.0 11.5 - 15.5 g/dL    Hematocrit 39.6 34.0 - 48.0 %    MCV 76.0 (L) 80.0 - 99.9 fL    MCH 23.0 (L) 26.0 - 35.0 pg    MCHC 30.3 (L) 32.0 - 34.5 %    RDW 19.3 (H) 11.5 - 15.0 fL    Platelets 154 340 - 959 E9/L    MPV 11.6 7.0 - 12.0 fL    Neutrophils % 69.5 43.0 - 80.0 %    Immature Granulocytes % 0.4 0.0 - 5.0 %    Lymphocytes % 18.1 (L) 20.0 - 42.0 %    Monocytes % 8.9 2.0 - 12.0 %    Eosinophils % 2.4 0.0 - 6.0 %    Basophils % 0.7 0.0 - 2.0 %    Neutrophils # 5.01 1.80 - 7.30 E9/L    Immature Granulocytes # 0.03 E9/L    Lymphocytes # 1.30 (L) 1.50 - 4.00 E9/L    Monocytes # 0.64 0.10 - 0.95 E9/L    Eosinophils # 0.17 0.05 - 0.50 E9/L    Basophils # 0.05 0.00 - 0.20 E9/L   Comprehensive Metabolic Panel   Result Value Ref Range    Sodium 140 132 - 146 mmol/L    Potassium 3.5 3.5 - 5.0 mmol/L    Chloride 105 98 - 107 mmol/L    CO2 25 22 - 29 mmol/L    Anion Gap 10 7 - 16 mmol/L    Glucose 98 74 - 99 mg/dL    BUN 10 6 - 20 mg/dL    CREATININE 1.0 0.5 - 1.0 mg/dL    GFR Non-African American >60 >=60 mL/min/1.73    GFR African American >60     Calcium 9.1 8.6 - 10.2 mg/dL    Total Protein 7.8 6.4 - 8.3 g/dL    Alb 4.1 3.5 - 5.2 g/dL    Total Bilirubin 0.2 0.0 - 1.2 mg/dL    Alkaline Phosphatase 95 35 - 104 U/L    ALT 12 0 -

## 2019-07-14 NOTE — ED NOTES
Bed: 18B-18  Expected date:   Expected time:   Means of arrival:   Comments:  El Lennox, RN  07/14/19 9311

## 2019-07-16 ASSESSMENT — ENCOUNTER SYMPTOMS
VOMITING: 0
COUGH: 0
SHORTNESS OF BREATH: 0
NAUSEA: 0
EYE PAIN: 1
EYE DISCHARGE: 0
BLOOD IN STOOL: 0
PHOTOPHOBIA: 1
COLOR CHANGE: 0
EYE ITCHING: 0
CHEST TIGHTNESS: 0
BACK PAIN: 0
EYE REDNESS: 1
DIARRHEA: 0
ABDOMINAL PAIN: 0

## 2019-08-07 ENCOUNTER — HOSPITAL ENCOUNTER (EMERGENCY)
Age: 44
Discharge: HOME | End: 2019-08-07

## 2019-08-07 ENCOUNTER — APPOINTMENT (OUTPATIENT)
Dept: ULTRASOUND IMAGING | Age: 44
End: 2019-08-07

## 2019-08-07 VITALS
HEART RATE: 74 BPM | OXYGEN SATURATION: 98 % | SYSTOLIC BLOOD PRESSURE: 181 MMHG | DIASTOLIC BLOOD PRESSURE: 99 MMHG | HEIGHT: 68 IN | BODY MASS INDEX: 44.41 KG/M2 | TEMPERATURE: 97.6 F | RESPIRATION RATE: 14 BRPM | WEIGHT: 293 LBS

## 2019-08-07 DIAGNOSIS — M79.604 RIGHT LEG PAIN: Primary | ICD-10-CM

## 2019-08-07 PROCEDURE — 6360000002 HC RX W HCPCS: Performed by: PHYSICIAN ASSISTANT

## 2019-08-07 PROCEDURE — 99283 EMERGENCY DEPT VISIT LOW MDM: CPT

## 2019-08-07 PROCEDURE — 6370000000 HC RX 637 (ALT 250 FOR IP): Performed by: PHYSICIAN ASSISTANT

## 2019-08-07 PROCEDURE — 96372 THER/PROPH/DIAG INJ SC/IM: CPT

## 2019-08-07 PROCEDURE — 93971 EXTREMITY STUDY: CPT

## 2019-08-07 RX ORDER — CYCLOBENZAPRINE HCL 10 MG
10 TABLET ORAL 3 TIMES DAILY PRN
Qty: 15 TABLET | Refills: 0 | Status: SHIPPED | OUTPATIENT
Start: 2019-08-07 | End: 2019-08-12

## 2019-08-07 RX ORDER — ONDANSETRON 4 MG/1
4 TABLET, ORALLY DISINTEGRATING ORAL ONCE
Status: COMPLETED | OUTPATIENT
Start: 2019-08-07 | End: 2019-08-07

## 2019-08-07 RX ORDER — MORPHINE SULFATE 4 MG/ML
4 INJECTION, SOLUTION INTRAMUSCULAR; INTRAVENOUS ONCE
Status: COMPLETED | OUTPATIENT
Start: 2019-08-07 | End: 2019-08-07

## 2019-08-07 RX ORDER — METHYLPREDNISOLONE 4 MG/1
TABLET ORAL
Qty: 1 KIT | Status: SHIPPED | OUTPATIENT
Start: 2019-08-07 | End: 2019-08-13

## 2019-08-07 RX ADMIN — ONDANSETRON 4 MG: 4 TABLET, ORALLY DISINTEGRATING ORAL at 17:00

## 2019-08-07 RX ADMIN — MORPHINE SULFATE 4 MG: 4 INJECTION, SOLUTION INTRAMUSCULAR; INTRAVENOUS at 17:01

## 2019-08-07 ASSESSMENT — PAIN SCALES - GENERAL: PAINLEVEL_OUTOF10: 8

## 2019-11-16 ENCOUNTER — HOSPITAL ENCOUNTER (OUTPATIENT)
Age: 44
Setting detail: OBSERVATION
Discharge: HOME | End: 2019-11-21
Attending: EMERGENCY MEDICINE | Admitting: INTERNAL MEDICINE

## 2019-11-16 ENCOUNTER — APPOINTMENT (OUTPATIENT)
Dept: ULTRASOUND IMAGING | Age: 44
End: 2019-11-16

## 2019-11-16 ENCOUNTER — APPOINTMENT (OUTPATIENT)
Dept: GENERAL RADIOLOGY | Age: 44
End: 2019-11-16

## 2019-11-16 DIAGNOSIS — I10 HYPERTENSION, UNSPECIFIED TYPE: ICD-10-CM

## 2019-11-16 DIAGNOSIS — R06.00 DYSPNEA, UNSPECIFIED TYPE: Primary | ICD-10-CM

## 2019-11-16 DIAGNOSIS — R60.0 BILATERAL EDEMA OF LOWER EXTREMITY: ICD-10-CM

## 2019-11-16 PROCEDURE — 93970 EXTREMITY STUDY: CPT

## 2019-11-16 PROCEDURE — 71045 X-RAY EXAM CHEST 1 VIEW: CPT

## 2019-11-16 PROCEDURE — 99285 EMERGENCY DEPT VISIT HI MDM: CPT

## 2019-11-16 PROCEDURE — 93005 ELECTROCARDIOGRAM TRACING: CPT | Performed by: NURSE PRACTITIONER

## 2019-11-16 ASSESSMENT — PAIN DESCRIPTION - PAIN TYPE: TYPE: ACUTE PAIN

## 2019-11-16 ASSESSMENT — ENCOUNTER SYMPTOMS
CONSTIPATION: 0
VOMITING: 0
ABDOMINAL PAIN: 0
WHEEZING: 0
NAUSEA: 0
SHORTNESS OF BREATH: 1
SORE THROAT: 0
PHOTOPHOBIA: 0
BACK PAIN: 0
COUGH: 0
APNEA: 0
TROUBLE SWALLOWING: 0
RHINORRHEA: 0
EYE PAIN: 0
DIARRHEA: 0
CHEST TIGHTNESS: 0

## 2019-11-16 ASSESSMENT — PAIN DESCRIPTION - ORIENTATION: ORIENTATION: LEFT;RIGHT

## 2019-11-16 ASSESSMENT — PAIN DESCRIPTION - LOCATION: LOCATION: LEG

## 2019-11-17 PROBLEM — R06.00 DYSPNEA: Status: ACTIVE | Noted: 2019-11-17

## 2019-11-17 PROBLEM — R06.09 EXERTIONAL DYSPNEA: Status: ACTIVE | Noted: 2019-11-17

## 2019-11-17 LAB
ALBUMIN SERPL-MCNC: 3.7 G/DL (ref 3.5–5.2)
ALP BLD-CCNC: 88 U/L (ref 35–104)
ALT SERPL-CCNC: 12 U/L (ref 0–32)
ANION GAP SERPL CALCULATED.3IONS-SCNC: 9 MMOL/L (ref 7–16)
AST SERPL-CCNC: 18 U/L (ref 0–31)
BASOPHILS ABSOLUTE: 0.05 E9/L (ref 0–0.2)
BASOPHILS RELATIVE PERCENT: 0.9 % (ref 0–2)
BILIRUB SERPL-MCNC: <0.2 MG/DL (ref 0–1.2)
BILIRUBIN URINE: NEGATIVE
BLOOD, URINE: NEGATIVE
BUN BLDV-MCNC: 10 MG/DL (ref 6–20)
CALCIUM SERPL-MCNC: 8.5 MG/DL (ref 8.6–10.2)
CHLORIDE BLD-SCNC: 104 MMOL/L (ref 98–107)
CHLORIDE URINE RANDOM: 164 MMOL/L
CK MB: 3.6 NG/ML (ref 0–4.3)
CK MB: 3.7 NG/ML (ref 0–4.3)
CK MB: 3.7 NG/ML (ref 0–4.3)
CK MB: 3.9 NG/ML (ref 0–4.3)
CLARITY: CLEAR
CO2: 24 MMOL/L (ref 22–29)
COLOR: YELLOW
CREAT SERPL-MCNC: 1.1 MG/DL (ref 0.5–1)
CREATININE URINE: 52 MG/DL (ref 29–226)
EKG ATRIAL RATE: 84 BPM
EKG P AXIS: 66 DEGREES
EKG P-R INTERVAL: 164 MS
EKG Q-T INTERVAL: 402 MS
EKG QRS DURATION: 86 MS
EKG QTC CALCULATION (BAZETT): 475 MS
EKG R AXIS: 43 DEGREES
EKG T AXIS: 84 DEGREES
EKG VENTRICULAR RATE: 84 BPM
EOSINOPHILS ABSOLUTE: 0.12 E9/L (ref 0.05–0.5)
EOSINOPHILS RELATIVE PERCENT: 2.2 % (ref 0–6)
GFR AFRICAN AMERICAN: >60
GFR NON-AFRICAN AMERICAN: >60 ML/MIN/1.73
GLUCOSE BLD-MCNC: 111 MG/DL (ref 74–99)
GLUCOSE URINE: NEGATIVE MG/DL
HBA1C MFR BLD: 5.3 % (ref 4–5.6)
HCG, URINE, POC: NEGATIVE
HCT VFR BLD CALC: 35.6 % (ref 34–48)
HEMOGLOBIN: 10.4 G/DL (ref 11.5–15.5)
IMMATURE GRANULOCYTES #: 0.03 E9/L
IMMATURE GRANULOCYTES %: 0.5 % (ref 0–5)
KETONES, URINE: NEGATIVE MG/DL
LEUKOCYTE ESTERASE, URINE: NEGATIVE
LYMPHOCYTES ABSOLUTE: 1.38 E9/L (ref 1.5–4)
LYMPHOCYTES RELATIVE PERCENT: 24.9 % (ref 20–42)
Lab: NORMAL
MCH RBC QN AUTO: 22.3 PG (ref 26–35)
MCHC RBC AUTO-ENTMCNC: 29.2 % (ref 32–34.5)
MCV RBC AUTO: 76.4 FL (ref 80–99.9)
MONOCYTES ABSOLUTE: 0.62 E9/L (ref 0.1–0.95)
MONOCYTES RELATIVE PERCENT: 11.2 % (ref 2–12)
NEGATIVE QC PASS/FAIL: NORMAL
NEUTROPHILS ABSOLUTE: 3.35 E9/L (ref 1.8–7.3)
NEUTROPHILS RELATIVE PERCENT: 60.3 % (ref 43–80)
NITRITE, URINE: NEGATIVE
PDW BLD-RTO: 17.3 FL (ref 11.5–15)
PH UA: 6 (ref 5–9)
PLATELET # BLD: 330 E9/L (ref 130–450)
PMV BLD AUTO: 10.8 FL (ref 7–12)
POSITIVE QC PASS/FAIL: NORMAL
POTASSIUM SERPL-SCNC: 3.4 MMOL/L (ref 3.5–5)
POTASSIUM, UR: 21.6 MMOL/L
PRO-BNP: 95 PG/ML (ref 0–125)
PROCALCITONIN: 0.04 NG/ML (ref 0–0.08)
PROTEIN UA: NEGATIVE MG/DL
RBC # BLD: 4.66 E12/L (ref 3.5–5.5)
SODIUM BLD-SCNC: 137 MMOL/L (ref 132–146)
SODIUM URINE: 194 MMOL/L
SPECIFIC GRAVITY UA: 1.02 (ref 1–1.03)
TOTAL CK: 332 U/L (ref 20–180)
TOTAL PROTEIN: 7 G/DL (ref 6.4–8.3)
TROPONIN: <0.01 NG/ML (ref 0–0.03)
TROPONIN: <0.01 NG/ML (ref 0–0.03)
UROBILINOGEN, URINE: 0.2 E.U./DL
WBC # BLD: 5.6 E9/L (ref 4.5–11.5)

## 2019-11-17 PROCEDURE — 93010 ELECTROCARDIOGRAM REPORT: CPT | Performed by: INTERNAL MEDICINE

## 2019-11-17 PROCEDURE — 6360000002 HC RX W HCPCS: Performed by: INTERNAL MEDICINE

## 2019-11-17 PROCEDURE — 82553 CREATINE MB FRACTION: CPT

## 2019-11-17 PROCEDURE — 84484 ASSAY OF TROPONIN QUANT: CPT

## 2019-11-17 PROCEDURE — 96366 THER/PROPH/DIAG IV INF ADDON: CPT

## 2019-11-17 PROCEDURE — 6370000000 HC RX 637 (ALT 250 FOR IP): Performed by: INTERNAL MEDICINE

## 2019-11-17 PROCEDURE — G0378 HOSPITAL OBSERVATION PER HR: HCPCS

## 2019-11-17 PROCEDURE — 83036 HEMOGLOBIN GLYCOSYLATED A1C: CPT

## 2019-11-17 PROCEDURE — 82550 ASSAY OF CK (CPK): CPT

## 2019-11-17 PROCEDURE — 82570 ASSAY OF URINE CREATININE: CPT

## 2019-11-17 PROCEDURE — 84300 ASSAY OF URINE SODIUM: CPT

## 2019-11-17 PROCEDURE — 83880 ASSAY OF NATRIURETIC PEPTIDE: CPT

## 2019-11-17 PROCEDURE — 99222 1ST HOSP IP/OBS MODERATE 55: CPT | Performed by: INTERNAL MEDICINE

## 2019-11-17 PROCEDURE — 96365 THER/PROPH/DIAG IV INF INIT: CPT

## 2019-11-17 PROCEDURE — 96372 THER/PROPH/DIAG INJ SC/IM: CPT

## 2019-11-17 PROCEDURE — 2580000003 HC RX 258: Performed by: INTERNAL MEDICINE

## 2019-11-17 PROCEDURE — 85025 COMPLETE CBC W/AUTO DIFF WBC: CPT

## 2019-11-17 PROCEDURE — 36415 COLL VENOUS BLD VENIPUNCTURE: CPT

## 2019-11-17 PROCEDURE — 82436 ASSAY OF URINE CHLORIDE: CPT

## 2019-11-17 PROCEDURE — 81003 URINALYSIS AUTO W/O SCOPE: CPT

## 2019-11-17 PROCEDURE — 84133 ASSAY OF URINE POTASSIUM: CPT

## 2019-11-17 PROCEDURE — 84145 PROCALCITONIN (PCT): CPT

## 2019-11-17 PROCEDURE — 80053 COMPREHEN METABOLIC PANEL: CPT

## 2019-11-17 PROCEDURE — 87040 BLOOD CULTURE FOR BACTERIA: CPT

## 2019-11-17 RX ORDER — ONDANSETRON 2 MG/ML
4 INJECTION INTRAMUSCULAR; INTRAVENOUS EVERY 6 HOURS PRN
Status: DISCONTINUED | OUTPATIENT
Start: 2019-11-17 | End: 2019-11-21 | Stop reason: HOSPADM

## 2019-11-17 RX ORDER — POLYETHYLENE GLYCOL 3350 17 G/17G
17 POWDER, FOR SOLUTION ORAL DAILY PRN
Status: DISCONTINUED | OUTPATIENT
Start: 2019-11-17 | End: 2019-11-21 | Stop reason: HOSPADM

## 2019-11-17 RX ORDER — BUMETANIDE 1 MG/1
2 TABLET ORAL ONCE
Status: COMPLETED | OUTPATIENT
Start: 2019-11-17 | End: 2019-11-17

## 2019-11-17 RX ORDER — TRAMADOL HYDROCHLORIDE 50 MG/1
25 TABLET ORAL ONCE
Status: DISCONTINUED | OUTPATIENT
Start: 2019-11-17 | End: 2019-11-21 | Stop reason: HOSPADM

## 2019-11-17 RX ORDER — AMLODIPINE BESYLATE 5 MG/1
5 TABLET ORAL DAILY
Status: DISCONTINUED | OUTPATIENT
Start: 2019-11-17 | End: 2019-11-17

## 2019-11-17 RX ORDER — SODIUM CHLORIDE 0.9 % (FLUSH) 0.9 %
10 SYRINGE (ML) INJECTION EVERY 12 HOURS SCHEDULED
Status: DISCONTINUED | OUTPATIENT
Start: 2019-11-17 | End: 2019-11-21 | Stop reason: HOSPADM

## 2019-11-17 RX ORDER — SULFAMETHOXAZOLE AND TRIMETHOPRIM 800; 160 MG/1; MG/1
1 TABLET ORAL EVERY 12 HOURS SCHEDULED
Status: DISCONTINUED | OUTPATIENT
Start: 2019-11-17 | End: 2019-11-17

## 2019-11-17 RX ORDER — SODIUM CHLORIDE 0.9 % (FLUSH) 0.9 %
10 SYRINGE (ML) INJECTION PRN
Status: DISCONTINUED | OUTPATIENT
Start: 2019-11-17 | End: 2019-11-21 | Stop reason: HOSPADM

## 2019-11-17 RX ORDER — HYDROCHLOROTHIAZIDE 12.5 MG/1
12.5 TABLET ORAL DAILY
Status: DISCONTINUED | OUTPATIENT
Start: 2019-11-17 | End: 2019-11-18

## 2019-11-17 RX ORDER — LISINOPRIL 10 MG/1
10 TABLET ORAL DAILY
Status: DISCONTINUED | OUTPATIENT
Start: 2019-11-17 | End: 2019-11-17

## 2019-11-17 RX ORDER — POTASSIUM CHLORIDE 20 MEQ/1
40 TABLET, EXTENDED RELEASE ORAL ONCE
Status: COMPLETED | OUTPATIENT
Start: 2019-11-17 | End: 2019-11-17

## 2019-11-17 RX ORDER — FERROUS SULFATE 325(65) MG
325 TABLET ORAL 2 TIMES DAILY WITH MEALS
Status: DISCONTINUED | OUTPATIENT
Start: 2019-11-17 | End: 2019-11-21 | Stop reason: HOSPADM

## 2019-11-17 RX ORDER — LISINOPRIL 10 MG/1
10 TABLET ORAL DAILY
Status: DISCONTINUED | OUTPATIENT
Start: 2019-11-17 | End: 2019-11-18

## 2019-11-17 RX ORDER — ACETAMINOPHEN 325 MG/1
650 TABLET ORAL EVERY 6 HOURS PRN
Status: DISCONTINUED | OUTPATIENT
Start: 2019-11-17 | End: 2019-11-21 | Stop reason: HOSPADM

## 2019-11-17 RX ADMIN — SODIUM CHLORIDE 3 G: 900 INJECTION INTRAVENOUS at 21:45

## 2019-11-17 RX ADMIN — FERROUS SULFATE TAB 325 MG (65 MG ELEMENTAL FE) 325 MG: 325 (65 FE) TAB at 08:09

## 2019-11-17 RX ADMIN — FERROUS SULFATE TAB 325 MG (65 MG ELEMENTAL FE) 325 MG: 325 (65 FE) TAB at 16:21

## 2019-11-17 RX ADMIN — Medication 10 ML: at 21:45

## 2019-11-17 RX ADMIN — SODIUM CHLORIDE 3 G: 900 INJECTION INTRAVENOUS at 10:08

## 2019-11-17 RX ADMIN — LISINOPRIL 10 MG: 10 TABLET ORAL at 11:21

## 2019-11-17 RX ADMIN — Medication 10 ML: at 08:09

## 2019-11-17 RX ADMIN — SULFAMETHOXAZOLE AND TRIMETHOPRIM 1 TABLET: 800; 160 TABLET ORAL at 08:08

## 2019-11-17 RX ADMIN — ENOXAPARIN SODIUM 40 MG: 40 INJECTION SUBCUTANEOUS at 08:09

## 2019-11-17 RX ADMIN — AMLODIPINE BESYLATE 5 MG: 5 TABLET ORAL at 08:09

## 2019-11-17 RX ADMIN — Medication 10 ML: at 16:21

## 2019-11-17 RX ADMIN — POTASSIUM CHLORIDE 40 MEQ: 20 TABLET, EXTENDED RELEASE ORAL at 08:09

## 2019-11-17 RX ADMIN — BUMETANIDE 2 MG: 1 TABLET ORAL at 05:38

## 2019-11-17 RX ADMIN — HYDROCHLOROTHIAZIDE 12.5 MG: 12.5 TABLET ORAL at 11:21

## 2019-11-17 RX ADMIN — ACETAMINOPHEN 650 MG: 325 TABLET, FILM COATED ORAL at 18:02

## 2019-11-17 RX ADMIN — POTASSIUM BICARBONATE 40 MEQ: 782 TABLET, EFFERVESCENT ORAL at 08:08

## 2019-11-17 RX ADMIN — SODIUM CHLORIDE 3 G: 900 INJECTION INTRAVENOUS at 16:21

## 2019-11-17 RX ADMIN — Medication 10 ML: at 10:08

## 2019-11-17 ASSESSMENT — PAIN SCALES - GENERAL
PAINLEVEL_OUTOF10: 0
PAINLEVEL_OUTOF10: 0
PAINLEVEL_OUTOF10: 4

## 2019-11-18 LAB
ANION GAP SERPL CALCULATED.3IONS-SCNC: 15 MMOL/L (ref 7–16)
BASOPHILS ABSOLUTE: 0.03 E9/L (ref 0–0.2)
BASOPHILS RELATIVE PERCENT: 0.5 % (ref 0–2)
BUN BLDV-MCNC: 10 MG/DL (ref 6–20)
C-REACTIVE PROTEIN: 2.3 MG/DL (ref 0–0.4)
CALCIUM SERPL-MCNC: 8.7 MG/DL (ref 8.6–10.2)
CHLORIDE BLD-SCNC: 102 MMOL/L (ref 98–107)
CO2: 21 MMOL/L (ref 22–29)
CREAT SERPL-MCNC: 1 MG/DL (ref 0.5–1)
EOSINOPHILS ABSOLUTE: 0.19 E9/L (ref 0.05–0.5)
EOSINOPHILS RELATIVE PERCENT: 3 % (ref 0–6)
FERRITIN: 26 NG/ML
GFR AFRICAN AMERICAN: >60
GFR NON-AFRICAN AMERICAN: >60 ML/MIN/1.73
GLUCOSE BLD-MCNC: 108 MG/DL (ref 74–99)
HCT VFR BLD CALC: 40.4 % (ref 34–48)
HEMOGLOBIN: 12 G/DL (ref 11.5–15.5)
IMMATURE GRANULOCYTES #: 0.02 E9/L
IMMATURE GRANULOCYTES %: 0.3 % (ref 0–5)
IRON SATURATION: 7 % (ref 15–50)
IRON: 23 MCG/DL (ref 37–145)
LYMPHOCYTES ABSOLUTE: 1.2 E9/L (ref 1.5–4)
LYMPHOCYTES RELATIVE PERCENT: 18.9 % (ref 20–42)
MCH RBC QN AUTO: 22.4 PG (ref 26–35)
MCHC RBC AUTO-ENTMCNC: 29.7 % (ref 32–34.5)
MCV RBC AUTO: 75.5 FL (ref 80–99.9)
MONOCYTES ABSOLUTE: 0.65 E9/L (ref 0.1–0.95)
MONOCYTES RELATIVE PERCENT: 10.2 % (ref 2–12)
NEUTROPHILS ABSOLUTE: 4.26 E9/L (ref 1.8–7.3)
NEUTROPHILS RELATIVE PERCENT: 67.1 % (ref 43–80)
PDW BLD-RTO: 17.3 FL (ref 11.5–15)
PLATELET # BLD: 367 E9/L (ref 130–450)
PMV BLD AUTO: 11 FL (ref 7–12)
POTASSIUM REFLEX MAGNESIUM: 4 MMOL/L (ref 3.5–5)
PROCALCITONIN: 0.05 NG/ML (ref 0–0.08)
RBC # BLD: 5.35 E12/L (ref 3.5–5.5)
SEDIMENTATION RATE, ERYTHROCYTE: 10 MM/HR (ref 0–20)
SODIUM BLD-SCNC: 138 MMOL/L (ref 132–146)
T4 TOTAL: 6 MCG/DL (ref 4.5–11.7)
TOTAL CK: 278 U/L (ref 20–180)
TOTAL IRON BINDING CAPACITY: 332 MCG/DL (ref 250–450)
TSH SERPL DL<=0.05 MIU/L-ACNC: 1.48 UIU/ML (ref 0.27–4.2)
URIC ACID, SERUM: 5.4 MG/DL (ref 2.4–5.7)
WBC # BLD: 6.4 E9/L (ref 4.5–11.5)

## 2019-11-18 PROCEDURE — 86039 ANTINUCLEAR ANTIBODIES (ANA): CPT

## 2019-11-18 PROCEDURE — 99232 SBSQ HOSP IP/OBS MODERATE 35: CPT | Performed by: INTERNAL MEDICINE

## 2019-11-18 PROCEDURE — 6370000000 HC RX 637 (ALT 250 FOR IP): Performed by: INTERNAL MEDICINE

## 2019-11-18 PROCEDURE — 96372 THER/PROPH/DIAG INJ SC/IM: CPT

## 2019-11-18 PROCEDURE — 85651 RBC SED RATE NONAUTOMATED: CPT

## 2019-11-18 PROCEDURE — 84436 ASSAY OF TOTAL THYROXINE: CPT

## 2019-11-18 PROCEDURE — 86140 C-REACTIVE PROTEIN: CPT

## 2019-11-18 PROCEDURE — 84550 ASSAY OF BLOOD/URIC ACID: CPT

## 2019-11-18 PROCEDURE — 84443 ASSAY THYROID STIM HORMONE: CPT

## 2019-11-18 PROCEDURE — 6360000002 HC RX W HCPCS: Performed by: INTERNAL MEDICINE

## 2019-11-18 PROCEDURE — 36415 COLL VENOUS BLD VENIPUNCTURE: CPT

## 2019-11-18 PROCEDURE — 80048 BASIC METABOLIC PNL TOTAL CA: CPT

## 2019-11-18 PROCEDURE — 83550 IRON BINDING TEST: CPT

## 2019-11-18 PROCEDURE — 82550 ASSAY OF CK (CPK): CPT

## 2019-11-18 PROCEDURE — 2580000003 HC RX 258: Performed by: INTERNAL MEDICINE

## 2019-11-18 PROCEDURE — G0378 HOSPITAL OBSERVATION PER HR: HCPCS

## 2019-11-18 PROCEDURE — 84145 PROCALCITONIN (PCT): CPT

## 2019-11-18 PROCEDURE — 82728 ASSAY OF FERRITIN: CPT

## 2019-11-18 PROCEDURE — 96366 THER/PROPH/DIAG IV INF ADDON: CPT

## 2019-11-18 PROCEDURE — 85025 COMPLETE CBC W/AUTO DIFF WBC: CPT

## 2019-11-18 PROCEDURE — 83540 ASSAY OF IRON: CPT

## 2019-11-18 PROCEDURE — 86038 ANTINUCLEAR ANTIBODIES: CPT

## 2019-11-18 RX ORDER — LISINOPRIL 20 MG/1
20 TABLET ORAL DAILY
Status: DISCONTINUED | OUTPATIENT
Start: 2019-11-19 | End: 2019-11-21 | Stop reason: HOSPADM

## 2019-11-18 RX ORDER — HYDROCHLOROTHIAZIDE 25 MG/1
25 TABLET ORAL DAILY
Status: DISCONTINUED | OUTPATIENT
Start: 2019-11-19 | End: 2019-11-21 | Stop reason: HOSPADM

## 2019-11-18 RX ORDER — HYDRALAZINE HYDROCHLORIDE 20 MG/ML
10 INJECTION INTRAMUSCULAR; INTRAVENOUS EVERY 6 HOURS PRN
Status: DISCONTINUED | OUTPATIENT
Start: 2019-11-18 | End: 2019-11-21 | Stop reason: HOSPADM

## 2019-11-18 RX ADMIN — FERROUS SULFATE TAB 325 MG (65 MG ELEMENTAL FE) 325 MG: 325 (65 FE) TAB at 08:23

## 2019-11-18 RX ADMIN — Medication 10 ML: at 04:35

## 2019-11-18 RX ADMIN — FERROUS SULFATE TAB 325 MG (65 MG ELEMENTAL FE) 325 MG: 325 (65 FE) TAB at 17:15

## 2019-11-18 RX ADMIN — ENOXAPARIN SODIUM 40 MG: 40 INJECTION SUBCUTANEOUS at 08:23

## 2019-11-18 RX ADMIN — SODIUM CHLORIDE 3 G: 900 INJECTION INTRAVENOUS at 21:07

## 2019-11-18 RX ADMIN — Medication 10 ML: at 21:07

## 2019-11-18 RX ADMIN — SODIUM CHLORIDE 3 G: 900 INJECTION INTRAVENOUS at 10:44

## 2019-11-18 RX ADMIN — Medication 10 ML: at 08:23

## 2019-11-18 RX ADMIN — ACETAMINOPHEN 650 MG: 325 TABLET, FILM COATED ORAL at 17:15

## 2019-11-18 RX ADMIN — Medication 10 ML: at 17:15

## 2019-11-18 RX ADMIN — HYDROCHLOROTHIAZIDE 12.5 MG: 12.5 TABLET ORAL at 08:33

## 2019-11-18 RX ADMIN — ACETAMINOPHEN 650 MG: 325 TABLET, FILM COATED ORAL at 02:51

## 2019-11-18 RX ADMIN — LISINOPRIL 10 MG: 10 TABLET ORAL at 08:33

## 2019-11-18 RX ADMIN — SODIUM CHLORIDE 3 G: 900 INJECTION INTRAVENOUS at 17:15

## 2019-11-18 RX ADMIN — SODIUM CHLORIDE 3 G: 900 INJECTION INTRAVENOUS at 04:35

## 2019-11-18 ASSESSMENT — PAIN DESCRIPTION - LOCATION
LOCATION: HEAD
LOCATION: HEAD

## 2019-11-18 ASSESSMENT — PAIN DESCRIPTION - ONSET: ONSET: ON-GOING

## 2019-11-18 ASSESSMENT — PAIN DESCRIPTION - PROGRESSION: CLINICAL_PROGRESSION: GRADUALLY WORSENING

## 2019-11-18 ASSESSMENT — PAIN SCALES - GENERAL
PAINLEVEL_OUTOF10: 8
PAINLEVEL_OUTOF10: 4
PAINLEVEL_OUTOF10: 3
PAINLEVEL_OUTOF10: 0
PAINLEVEL_OUTOF10: 6
PAINLEVEL_OUTOF10: 0
PAINLEVEL_OUTOF10: 0

## 2019-11-18 ASSESSMENT — PAIN DESCRIPTION - PAIN TYPE
TYPE: ACUTE PAIN

## 2019-11-18 ASSESSMENT — PAIN DESCRIPTION - FREQUENCY
FREQUENCY: INTERMITTENT
FREQUENCY: INTERMITTENT

## 2019-11-18 ASSESSMENT — PAIN DESCRIPTION - DESCRIPTORS
DESCRIPTORS: ACHING;HEADACHE;NAGGING
DESCRIPTORS: ACHING;HEADACHE;SORE

## 2019-11-18 ASSESSMENT — PAIN DESCRIPTION - ORIENTATION: ORIENTATION: POSTERIOR

## 2019-11-19 ENCOUNTER — APPOINTMENT (OUTPATIENT)
Dept: GENERAL RADIOLOGY | Age: 44
End: 2019-11-19

## 2019-11-19 LAB
ANION GAP SERPL CALCULATED.3IONS-SCNC: 14 MMOL/L (ref 7–16)
BASOPHILS ABSOLUTE: 0.05 E9/L (ref 0–0.2)
BASOPHILS RELATIVE PERCENT: 0.8 % (ref 0–2)
BUN BLDV-MCNC: 13 MG/DL (ref 6–20)
CALCIUM SERPL-MCNC: 9 MG/DL (ref 8.6–10.2)
CHLORIDE BLD-SCNC: 101 MMOL/L (ref 98–107)
CO2: 22 MMOL/L (ref 22–29)
CREAT SERPL-MCNC: 0.9 MG/DL (ref 0.5–1)
EOSINOPHILS ABSOLUTE: 0.17 E9/L (ref 0.05–0.5)
EOSINOPHILS RELATIVE PERCENT: 2.6 % (ref 0–6)
GFR AFRICAN AMERICAN: >60
GFR NON-AFRICAN AMERICAN: >60 ML/MIN/1.73
GLUCOSE BLD-MCNC: 105 MG/DL (ref 74–99)
HCT VFR BLD CALC: 39.9 % (ref 34–48)
HEMOGLOBIN: 12.1 G/DL (ref 11.5–15.5)
IMMATURE GRANULOCYTES #: 0.01 E9/L
IMMATURE GRANULOCYTES %: 0.2 % (ref 0–5)
LYMPHOCYTES ABSOLUTE: 1.2 E9/L (ref 1.5–4)
LYMPHOCYTES RELATIVE PERCENT: 18.5 % (ref 20–42)
MCH RBC QN AUTO: 22.9 PG (ref 26–35)
MCHC RBC AUTO-ENTMCNC: 30.3 % (ref 32–34.5)
MCV RBC AUTO: 75.6 FL (ref 80–99.9)
MONOCYTES ABSOLUTE: 0.83 E9/L (ref 0.1–0.95)
MONOCYTES RELATIVE PERCENT: 12.8 % (ref 2–12)
NEUTROPHILS ABSOLUTE: 4.24 E9/L (ref 1.8–7.3)
NEUTROPHILS RELATIVE PERCENT: 65.1 % (ref 43–80)
PDW BLD-RTO: 17.1 FL (ref 11.5–15)
PLATELET # BLD: 379 E9/L (ref 130–450)
PMV BLD AUTO: 10.9 FL (ref 7–12)
POTASSIUM REFLEX MAGNESIUM: 4 MMOL/L (ref 3.5–5)
RBC # BLD: 5.28 E12/L (ref 3.5–5.5)
SODIUM BLD-SCNC: 137 MMOL/L (ref 132–146)
WBC # BLD: 6.5 E9/L (ref 4.5–11.5)

## 2019-11-19 PROCEDURE — 6360000002 HC RX W HCPCS: Performed by: INTERNAL MEDICINE

## 2019-11-19 PROCEDURE — 96372 THER/PROPH/DIAG INJ SC/IM: CPT

## 2019-11-19 PROCEDURE — 80048 BASIC METABOLIC PNL TOTAL CA: CPT

## 2019-11-19 PROCEDURE — 96366 THER/PROPH/DIAG IV INF ADDON: CPT

## 2019-11-19 PROCEDURE — 2580000003 HC RX 258: Performed by: INTERNAL MEDICINE

## 2019-11-19 PROCEDURE — 73610 X-RAY EXAM OF ANKLE: CPT

## 2019-11-19 PROCEDURE — G0378 HOSPITAL OBSERVATION PER HR: HCPCS

## 2019-11-19 PROCEDURE — 36415 COLL VENOUS BLD VENIPUNCTURE: CPT

## 2019-11-19 PROCEDURE — 6370000000 HC RX 637 (ALT 250 FOR IP): Performed by: INTERNAL MEDICINE

## 2019-11-19 PROCEDURE — 96375 TX/PRO/DX INJ NEW DRUG ADDON: CPT

## 2019-11-19 PROCEDURE — 99233 SBSQ HOSP IP/OBS HIGH 50: CPT | Performed by: INTERNAL MEDICINE

## 2019-11-19 PROCEDURE — 85025 COMPLETE CBC W/AUTO DIFF WBC: CPT

## 2019-11-19 RX ORDER — METHYLPREDNISOLONE SODIUM SUCCINATE 125 MG/2ML
40 INJECTION, POWDER, LYOPHILIZED, FOR SOLUTION INTRAMUSCULAR; INTRAVENOUS ONCE
Status: COMPLETED | OUTPATIENT
Start: 2019-11-19 | End: 2019-11-19

## 2019-11-19 RX ORDER — COLCHICINE 0.6 MG/1
1.2 TABLET ORAL ONCE
Status: COMPLETED | OUTPATIENT
Start: 2019-11-19 | End: 2019-11-19

## 2019-11-19 RX ORDER — AMOXICILLIN AND CLAVULANATE POTASSIUM 875; 125 MG/1; MG/1
1 TABLET, FILM COATED ORAL EVERY 12 HOURS SCHEDULED
Status: DISCONTINUED | OUTPATIENT
Start: 2019-11-19 | End: 2019-11-19

## 2019-11-19 RX ORDER — AMOXICILLIN AND CLAVULANATE POTASSIUM 875; 125 MG/1; MG/1
1 TABLET, FILM COATED ORAL EVERY 12 HOURS SCHEDULED
Status: COMPLETED | OUTPATIENT
Start: 2019-11-19 | End: 2019-11-20

## 2019-11-19 RX ADMIN — ACETAMINOPHEN 650 MG: 325 TABLET, FILM COATED ORAL at 23:25

## 2019-11-19 RX ADMIN — ACETAMINOPHEN 650 MG: 325 TABLET, FILM COATED ORAL at 15:48

## 2019-11-19 RX ADMIN — METHYLPREDNISOLONE SODIUM SUCCINATE 40 MG: 125 INJECTION, POWDER, FOR SOLUTION INTRAMUSCULAR; INTRAVENOUS at 14:14

## 2019-11-19 RX ADMIN — AMOXICILLIN AND CLAVULANATE POTASSIUM 1 TABLET: 875; 125 TABLET, FILM COATED ORAL at 20:43

## 2019-11-19 RX ADMIN — HYDROCHLOROTHIAZIDE 25 MG: 25 TABLET ORAL at 09:55

## 2019-11-19 RX ADMIN — SODIUM CHLORIDE 3 G: 900 INJECTION INTRAVENOUS at 09:55

## 2019-11-19 RX ADMIN — FERROUS SULFATE TAB 325 MG (65 MG ELEMENTAL FE) 325 MG: 325 (65 FE) TAB at 09:55

## 2019-11-19 RX ADMIN — SODIUM CHLORIDE 3 G: 900 INJECTION INTRAVENOUS at 04:37

## 2019-11-19 RX ADMIN — Medication 10 ML: at 09:55

## 2019-11-19 RX ADMIN — Medication 10 ML: at 14:14

## 2019-11-19 RX ADMIN — ENOXAPARIN SODIUM 40 MG: 40 INJECTION SUBCUTANEOUS at 09:55

## 2019-11-19 RX ADMIN — COLCHICINE 1.2 MG: 0.6 TABLET, FILM COATED ORAL at 15:48

## 2019-11-19 RX ADMIN — LISINOPRIL 20 MG: 20 TABLET ORAL at 09:55

## 2019-11-19 RX ADMIN — Medication 10 ML: at 20:43

## 2019-11-19 RX ADMIN — ACETAMINOPHEN 650 MG: 325 TABLET, FILM COATED ORAL at 04:41

## 2019-11-19 RX ADMIN — FERROUS SULFATE TAB 325 MG (65 MG ELEMENTAL FE) 325 MG: 325 (65 FE) TAB at 18:06

## 2019-11-19 ASSESSMENT — PAIN DESCRIPTION - LOCATION: LOCATION: ABDOMEN

## 2019-11-19 ASSESSMENT — PAIN SCALES - GENERAL
PAINLEVEL_OUTOF10: 0
PAINLEVEL_OUTOF10: 6
PAINLEVEL_OUTOF10: 0
PAINLEVEL_OUTOF10: 5
PAINLEVEL_OUTOF10: 0
PAINLEVEL_OUTOF10: 6

## 2019-11-19 ASSESSMENT — PAIN DESCRIPTION - PROGRESSION: CLINICAL_PROGRESSION: GRADUALLY IMPROVING

## 2019-11-19 ASSESSMENT — PAIN DESCRIPTION - DESCRIPTORS: DESCRIPTORS: CRAMPING;ACHING;DISCOMFORT

## 2019-11-19 ASSESSMENT — PAIN DESCRIPTION - PAIN TYPE: TYPE: ACUTE PAIN

## 2019-11-20 LAB
ANA PATTERN: ABNORMAL
ANA TITER: 1.32
ANION GAP SERPL CALCULATED.3IONS-SCNC: 16 MMOL/L (ref 7–16)
ANTI-NUCLEAR ANTIBODY (ANA): POSITIVE
BASOPHILS ABSOLUTE: 0.01 E9/L (ref 0–0.2)
BASOPHILS RELATIVE PERCENT: 0.1 % (ref 0–2)
BUN BLDV-MCNC: 16 MG/DL (ref 6–20)
CALCIUM SERPL-MCNC: 9.6 MG/DL (ref 8.6–10.2)
CHLORIDE BLD-SCNC: 97 MMOL/L (ref 98–107)
CO2: 21 MMOL/L (ref 22–29)
CREAT SERPL-MCNC: 0.8 MG/DL (ref 0.5–1)
EOSINOPHILS ABSOLUTE: 0 E9/L (ref 0.05–0.5)
EOSINOPHILS RELATIVE PERCENT: 0 % (ref 0–6)
GFR AFRICAN AMERICAN: >60
GFR NON-AFRICAN AMERICAN: >60 ML/MIN/1.73
GLUCOSE BLD-MCNC: 127 MG/DL (ref 74–99)
HCT VFR BLD CALC: 41.8 % (ref 34–48)
HEMOGLOBIN: 12.3 G/DL (ref 11.5–15.5)
IMMATURE GRANULOCYTES #: 0.06 E9/L
IMMATURE GRANULOCYTES %: 0.5 % (ref 0–5)
LYMPHOCYTES ABSOLUTE: 1.08 E9/L (ref 1.5–4)
LYMPHOCYTES RELATIVE PERCENT: 8.7 % (ref 20–42)
MCH RBC QN AUTO: 22.2 PG (ref 26–35)
MCHC RBC AUTO-ENTMCNC: 29.4 % (ref 32–34.5)
MCV RBC AUTO: 75.6 FL (ref 80–99.9)
MONOCYTES ABSOLUTE: 0.77 E9/L (ref 0.1–0.95)
MONOCYTES RELATIVE PERCENT: 6.2 % (ref 2–12)
NEUTROPHILS ABSOLUTE: 10.43 E9/L (ref 1.8–7.3)
NEUTROPHILS RELATIVE PERCENT: 84.5 % (ref 43–80)
PDW BLD-RTO: 17.4 FL (ref 11.5–15)
PLATELET # BLD: 450 E9/L (ref 130–450)
PMV BLD AUTO: 11.1 FL (ref 7–12)
POTASSIUM REFLEX MAGNESIUM: 4.2 MMOL/L (ref 3.5–5)
RBC # BLD: 5.53 E12/L (ref 3.5–5.5)
SODIUM BLD-SCNC: 134 MMOL/L (ref 132–146)
WBC # BLD: 12.4 E9/L (ref 4.5–11.5)

## 2019-11-20 PROCEDURE — 96376 TX/PRO/DX INJ SAME DRUG ADON: CPT

## 2019-11-20 PROCEDURE — 6370000000 HC RX 637 (ALT 250 FOR IP): Performed by: INTERNAL MEDICINE

## 2019-11-20 PROCEDURE — 6360000002 HC RX W HCPCS: Performed by: ANESTHESIOLOGY

## 2019-11-20 PROCEDURE — 96372 THER/PROPH/DIAG INJ SC/IM: CPT

## 2019-11-20 PROCEDURE — 6360000002 HC RX W HCPCS: Performed by: INTERNAL MEDICINE

## 2019-11-20 PROCEDURE — 36415 COLL VENOUS BLD VENIPUNCTURE: CPT

## 2019-11-20 PROCEDURE — 80048 BASIC METABOLIC PNL TOTAL CA: CPT

## 2019-11-20 PROCEDURE — 85025 COMPLETE CBC W/AUTO DIFF WBC: CPT

## 2019-11-20 PROCEDURE — 99233 SBSQ HOSP IP/OBS HIGH 50: CPT | Performed by: INTERNAL MEDICINE

## 2019-11-20 PROCEDURE — G0378 HOSPITAL OBSERVATION PER HR: HCPCS

## 2019-11-20 PROCEDURE — 2580000003 HC RX 258: Performed by: INTERNAL MEDICINE

## 2019-11-20 RX ORDER — METHYLPREDNISOLONE SODIUM SUCCINATE 125 MG/2ML
40 INJECTION, POWDER, LYOPHILIZED, FOR SOLUTION INTRAMUSCULAR; INTRAVENOUS ONCE
Status: COMPLETED | OUTPATIENT
Start: 2019-11-20 | End: 2019-11-20

## 2019-11-20 RX ADMIN — FERROUS SULFATE TAB 325 MG (65 MG ELEMENTAL FE) 325 MG: 325 (65 FE) TAB at 16:23

## 2019-11-20 RX ADMIN — FERROUS SULFATE TAB 325 MG (65 MG ELEMENTAL FE) 325 MG: 325 (65 FE) TAB at 09:19

## 2019-11-20 RX ADMIN — ENOXAPARIN SODIUM 40 MG: 40 INJECTION SUBCUTANEOUS at 09:19

## 2019-11-20 RX ADMIN — LISINOPRIL 20 MG: 20 TABLET ORAL at 09:19

## 2019-11-20 RX ADMIN — ACETAMINOPHEN 650 MG: 325 TABLET, FILM COATED ORAL at 19:40

## 2019-11-20 RX ADMIN — AMOXICILLIN AND CLAVULANATE POTASSIUM 1 TABLET: 875; 125 TABLET, FILM COATED ORAL at 09:19

## 2019-11-20 RX ADMIN — HYDROCHLOROTHIAZIDE 25 MG: 25 TABLET ORAL at 09:20

## 2019-11-20 RX ADMIN — Medication 10 ML: at 21:14

## 2019-11-20 RX ADMIN — METHYLPREDNISOLONE SODIUM SUCCINATE 40 MG: 125 INJECTION, POWDER, FOR SOLUTION INTRAMUSCULAR; INTRAVENOUS at 21:14

## 2019-11-20 RX ADMIN — METHYLPREDNISOLONE SODIUM SUCCINATE 40 MG: 125 INJECTION, POWDER, FOR SOLUTION INTRAMUSCULAR; INTRAVENOUS at 13:05

## 2019-11-20 RX ADMIN — ACETAMINOPHEN 650 MG: 325 TABLET, FILM COATED ORAL at 09:19

## 2019-11-20 ASSESSMENT — PAIN DESCRIPTION - DESCRIPTORS: DESCRIPTORS: CRAMPING

## 2019-11-20 ASSESSMENT — PAIN SCALES - GENERAL
PAINLEVEL_OUTOF10: 5
PAINLEVEL_OUTOF10: 4
PAINLEVEL_OUTOF10: 0

## 2019-11-20 ASSESSMENT — PAIN DESCRIPTION - PAIN TYPE
TYPE: ACUTE PAIN
TYPE: ACUTE PAIN

## 2019-11-20 ASSESSMENT — PAIN DESCRIPTION - LOCATION
LOCATION: ABDOMEN
LOCATION: ABDOMEN;HEAD

## 2019-11-20 ASSESSMENT — PAIN DESCRIPTION - ORIENTATION: ORIENTATION: LOWER

## 2019-11-20 ASSESSMENT — PAIN DESCRIPTION - PROGRESSION: CLINICAL_PROGRESSION: GRADUALLY IMPROVING

## 2019-11-20 ASSESSMENT — PAIN DESCRIPTION - FREQUENCY: FREQUENCY: INTERMITTENT

## 2019-11-20 ASSESSMENT — PAIN - FUNCTIONAL ASSESSMENT: PAIN_FUNCTIONAL_ASSESSMENT: ACTIVITIES ARE NOT PREVENTED

## 2019-11-21 VITALS
HEIGHT: 68 IN | TEMPERATURE: 98.2 F | DIASTOLIC BLOOD PRESSURE: 91 MMHG | WEIGHT: 293 LBS | RESPIRATION RATE: 18 BRPM | BODY MASS INDEX: 44.41 KG/M2 | OXYGEN SATURATION: 95 % | HEART RATE: 90 BPM | SYSTOLIC BLOOD PRESSURE: 146 MMHG

## 2019-11-21 LAB
ANION GAP SERPL CALCULATED.3IONS-SCNC: 17 MMOL/L (ref 7–16)
BASOPHILS ABSOLUTE: 0.02 E9/L (ref 0–0.2)
BASOPHILS RELATIVE PERCENT: 0.1 % (ref 0–2)
BUN BLDV-MCNC: 23 MG/DL (ref 6–20)
CALCIUM SERPL-MCNC: 9.4 MG/DL (ref 8.6–10.2)
CHLORIDE BLD-SCNC: 102 MMOL/L (ref 98–107)
CO2: 21 MMOL/L (ref 22–29)
CREAT SERPL-MCNC: 0.9 MG/DL (ref 0.5–1)
EKG ATRIAL RATE: 81 BPM
EKG P AXIS: 54 DEGREES
EKG P-R INTERVAL: 136 MS
EKG Q-T INTERVAL: 356 MS
EKG QRS DURATION: 82 MS
EKG QTC CALCULATION (BAZETT): 413 MS
EKG R AXIS: 18 DEGREES
EKG T AXIS: 84 DEGREES
EKG VENTRICULAR RATE: 81 BPM
EOSINOPHILS ABSOLUTE: 0 E9/L (ref 0.05–0.5)
EOSINOPHILS RELATIVE PERCENT: 0 % (ref 0–6)
GFR AFRICAN AMERICAN: >60
GFR NON-AFRICAN AMERICAN: >60 ML/MIN/1.73
GLUCOSE BLD-MCNC: 125 MG/DL (ref 74–99)
HCT VFR BLD CALC: 39.9 % (ref 34–48)
HEMOGLOBIN: 11.7 G/DL (ref 11.5–15.5)
IMMATURE GRANULOCYTES #: 0.12 E9/L
IMMATURE GRANULOCYTES %: 0.6 % (ref 0–5)
LYMPHOCYTES ABSOLUTE: 1.43 E9/L (ref 1.5–4)
LYMPHOCYTES RELATIVE PERCENT: 7.7 % (ref 20–42)
MCH RBC QN AUTO: 22.1 PG (ref 26–35)
MCHC RBC AUTO-ENTMCNC: 29.3 % (ref 32–34.5)
MCV RBC AUTO: 75.4 FL (ref 80–99.9)
MONOCYTES ABSOLUTE: 0.46 E9/L (ref 0.1–0.95)
MONOCYTES RELATIVE PERCENT: 2.5 % (ref 2–12)
NEUTROPHILS ABSOLUTE: 16.51 E9/L (ref 1.8–7.3)
NEUTROPHILS RELATIVE PERCENT: 89.1 % (ref 43–80)
PDW BLD-RTO: 17.5 FL (ref 11.5–15)
PLATELET # BLD: 443 E9/L (ref 130–450)
PMV BLD AUTO: 11 FL (ref 7–12)
POTASSIUM REFLEX MAGNESIUM: 4.2 MMOL/L (ref 3.5–5)
RBC # BLD: 5.29 E12/L (ref 3.5–5.5)
SODIUM BLD-SCNC: 140 MMOL/L (ref 132–146)
WBC # BLD: 18.5 E9/L (ref 4.5–11.5)

## 2019-11-21 PROCEDURE — 99238 HOSP IP/OBS DSCHRG MGMT 30/<: CPT | Performed by: INTERNAL MEDICINE

## 2019-11-21 PROCEDURE — 36415 COLL VENOUS BLD VENIPUNCTURE: CPT

## 2019-11-21 PROCEDURE — 93005 ELECTROCARDIOGRAM TRACING: CPT | Performed by: INTERNAL MEDICINE

## 2019-11-21 PROCEDURE — G0378 HOSPITAL OBSERVATION PER HR: HCPCS

## 2019-11-21 PROCEDURE — 6360000002 HC RX W HCPCS: Performed by: INTERNAL MEDICINE

## 2019-11-21 PROCEDURE — 93010 ELECTROCARDIOGRAM REPORT: CPT | Performed by: INTERNAL MEDICINE

## 2019-11-21 PROCEDURE — 85025 COMPLETE CBC W/AUTO DIFF WBC: CPT

## 2019-11-21 PROCEDURE — 96372 THER/PROPH/DIAG INJ SC/IM: CPT

## 2019-11-21 PROCEDURE — 80048 BASIC METABOLIC PNL TOTAL CA: CPT

## 2019-11-21 PROCEDURE — 6370000000 HC RX 637 (ALT 250 FOR IP): Performed by: INTERNAL MEDICINE

## 2019-11-21 PROCEDURE — 2580000003 HC RX 258: Performed by: INTERNAL MEDICINE

## 2019-11-21 RX ORDER — LISINOPRIL 20 MG/1
20 TABLET ORAL DAILY
Qty: 30 TABLET | Refills: 2 | Status: SHIPPED | OUTPATIENT
Start: 2019-11-22 | End: 2020-09-03

## 2019-11-21 RX ORDER — AMOXICILLIN AND CLAVULANATE POTASSIUM 875; 125 MG/1; MG/1
1 TABLET, FILM COATED ORAL EVERY 12 HOURS SCHEDULED
Status: DISCONTINUED | OUTPATIENT
Start: 2019-11-21 | End: 2019-11-21 | Stop reason: HOSPADM

## 2019-11-21 RX ORDER — HYDROCHLOROTHIAZIDE 25 MG/1
25 TABLET ORAL DAILY
Qty: 30 TABLET | Refills: 2 | Status: SHIPPED | OUTPATIENT
Start: 2019-11-22 | End: 2020-09-03

## 2019-11-21 RX ORDER — AMOXICILLIN AND CLAVULANATE POTASSIUM 875; 125 MG/1; MG/1
1 TABLET, FILM COATED ORAL EVERY 12 HOURS SCHEDULED
Qty: 6 TABLET | Refills: 0 | Status: SHIPPED | OUTPATIENT
Start: 2019-11-21 | End: 2019-11-24

## 2019-11-21 RX ORDER — PREDNISONE 1 MG/1
5 TABLET ORAL DAILY
Qty: 7 TABLET | Refills: 0 | Status: SHIPPED | OUTPATIENT
Start: 2019-11-21 | End: 2020-11-14 | Stop reason: SDUPTHER

## 2019-11-21 RX ADMIN — FERROUS SULFATE TAB 325 MG (65 MG ELEMENTAL FE) 325 MG: 325 (65 FE) TAB at 09:46

## 2019-11-21 RX ADMIN — ACETAMINOPHEN 650 MG: 325 TABLET, FILM COATED ORAL at 09:49

## 2019-11-21 RX ADMIN — Medication 10 ML: at 09:46

## 2019-11-21 RX ADMIN — LISINOPRIL 20 MG: 20 TABLET ORAL at 09:45

## 2019-11-21 RX ADMIN — ENOXAPARIN SODIUM 40 MG: 40 INJECTION SUBCUTANEOUS at 09:46

## 2019-11-21 RX ADMIN — HYDROCHLOROTHIAZIDE 25 MG: 25 TABLET ORAL at 09:45

## 2019-11-21 ASSESSMENT — PAIN DESCRIPTION - LOCATION: LOCATION: ABDOMEN;HEAD

## 2019-11-21 ASSESSMENT — PAIN SCALES - GENERAL
PAINLEVEL_OUTOF10: 0
PAINLEVEL_OUTOF10: 0
PAINLEVEL_OUTOF10: 6
PAINLEVEL_OUTOF10: 6

## 2019-11-21 ASSESSMENT — PAIN DESCRIPTION - FREQUENCY: FREQUENCY: INTERMITTENT

## 2019-11-21 ASSESSMENT — PAIN DESCRIPTION - DESCRIPTORS: DESCRIPTORS: HEADACHE;CRAMPING

## 2019-11-21 ASSESSMENT — PAIN DESCRIPTION - PAIN TYPE: TYPE: ACUTE PAIN

## 2019-11-21 ASSESSMENT — PAIN DESCRIPTION - PROGRESSION: CLINICAL_PROGRESSION: GRADUALLY IMPROVING

## 2019-11-21 ASSESSMENT — PAIN DESCRIPTION - ORIENTATION: ORIENTATION: RIGHT;LEFT

## 2019-11-22 LAB
BLOOD CULTURE, ROUTINE: NORMAL
CULTURE, BLOOD 2: NORMAL

## 2019-12-04 ENCOUNTER — OFFICE VISIT (OUTPATIENT)
Dept: INTERNAL MEDICINE | Age: 44
End: 2019-12-04

## 2019-12-04 VITALS
HEIGHT: 68 IN | TEMPERATURE: 98.3 F | SYSTOLIC BLOOD PRESSURE: 129 MMHG | WEIGHT: 293 LBS | HEART RATE: 79 BPM | BODY MASS INDEX: 44.41 KG/M2 | RESPIRATION RATE: 16 BRPM | DIASTOLIC BLOOD PRESSURE: 86 MMHG

## 2019-12-04 DIAGNOSIS — M19.90 INFLAMMATORY ARTHRITIS: ICD-10-CM

## 2019-12-04 DIAGNOSIS — R06.09 DOE (DYSPNEA ON EXERTION): ICD-10-CM

## 2019-12-04 DIAGNOSIS — Z12.4 SCREENING FOR CERVICAL CANCER: ICD-10-CM

## 2019-12-04 DIAGNOSIS — E61.1 IRON DEFICIENCY: ICD-10-CM

## 2019-12-04 DIAGNOSIS — I10 ESSENTIAL HYPERTENSION: Primary | ICD-10-CM

## 2019-12-04 DIAGNOSIS — R60.1 GENERALIZED EDEMA: ICD-10-CM

## 2019-12-04 PROCEDURE — 99212 OFFICE O/P EST SF 10 MIN: CPT | Performed by: INTERNAL MEDICINE

## 2019-12-04 PROCEDURE — 99213 OFFICE O/P EST LOW 20 MIN: CPT | Performed by: INTERNAL MEDICINE

## 2019-12-18 ENCOUNTER — TELEPHONE (OUTPATIENT)
Dept: OBGYN | Age: 44
End: 2019-12-18

## 2020-04-05 ENCOUNTER — TELEPHONE (OUTPATIENT)
Dept: PRIMARY CARE CLINIC | Age: 45
End: 2020-04-05

## 2020-09-03 ENCOUNTER — HOSPITAL ENCOUNTER (EMERGENCY)
Age: 45
Discharge: HOME | End: 2020-09-03
Attending: EMERGENCY MEDICINE

## 2020-09-03 VITALS
HEART RATE: 65 BPM | RESPIRATION RATE: 17 BRPM | DIASTOLIC BLOOD PRESSURE: 93 MMHG | TEMPERATURE: 97.5 F | SYSTOLIC BLOOD PRESSURE: 147 MMHG | OXYGEN SATURATION: 98 %

## 2020-09-03 PROCEDURE — 6360000002 HC RX W HCPCS: Performed by: STUDENT IN AN ORGANIZED HEALTH CARE EDUCATION/TRAINING PROGRAM

## 2020-09-03 PROCEDURE — 99283 EMERGENCY DEPT VISIT LOW MDM: CPT

## 2020-09-03 PROCEDURE — 93005 ELECTROCARDIOGRAM TRACING: CPT | Performed by: EMERGENCY MEDICINE

## 2020-09-03 PROCEDURE — 96374 THER/PROPH/DIAG INJ IV PUSH: CPT

## 2020-09-03 PROCEDURE — 96375 TX/PRO/DX INJ NEW DRUG ADDON: CPT

## 2020-09-03 PROCEDURE — 99284 EMERGENCY DEPT VISIT MOD MDM: CPT

## 2020-09-03 RX ORDER — METOCLOPRAMIDE HYDROCHLORIDE 5 MG/ML
10 INJECTION INTRAMUSCULAR; INTRAVENOUS ONCE
Status: COMPLETED | OUTPATIENT
Start: 2020-09-03 | End: 2020-09-03

## 2020-09-03 RX ORDER — DIPHENHYDRAMINE HYDROCHLORIDE 50 MG/ML
25 INJECTION INTRAMUSCULAR; INTRAVENOUS ONCE
Status: COMPLETED | OUTPATIENT
Start: 2020-09-03 | End: 2020-09-03

## 2020-09-03 RX ORDER — MORPHINE SULFATE 4 MG/ML
4 INJECTION, SOLUTION INTRAMUSCULAR; INTRAVENOUS ONCE
Status: COMPLETED | OUTPATIENT
Start: 2020-09-03 | End: 2020-09-03

## 2020-09-03 RX ORDER — ACETAMINOPHEN 500 MG
1000 TABLET ORAL EVERY 6 HOURS PRN
Status: ON HOLD | COMMUNITY
End: 2022-10-20

## 2020-09-03 RX ADMIN — MORPHINE SULFATE 4 MG: 4 INJECTION, SOLUTION INTRAMUSCULAR; INTRAVENOUS at 13:35

## 2020-09-03 RX ADMIN — DIPHENHYDRAMINE HYDROCHLORIDE 25 MG: 50 INJECTION, SOLUTION INTRAMUSCULAR; INTRAVENOUS at 12:42

## 2020-09-03 RX ADMIN — METOCLOPRAMIDE HYDROCHLORIDE 10 MG: 5 INJECTION INTRAMUSCULAR; INTRAVENOUS at 12:42

## 2020-09-03 ASSESSMENT — ENCOUNTER SYMPTOMS
SHORTNESS OF BREATH: 0
COUGH: 0
ABDOMINAL PAIN: 0
PHOTOPHOBIA: 1
COLOR CHANGE: 0
EYE PAIN: 1
EYE DISCHARGE: 0

## 2020-09-03 ASSESSMENT — PAIN SCALES - GENERAL
PAINLEVEL_OUTOF10: 10
PAINLEVEL_OUTOF10: 10

## 2020-09-03 ASSESSMENT — PAIN DESCRIPTION - LOCATION: LOCATION: HEAD

## 2020-09-03 ASSESSMENT — PAIN DESCRIPTION - PAIN TYPE: TYPE: ACUTE PAIN

## 2020-09-03 NOTE — ED PROVIDER NOTES
Negative for agitation, behavioral problems and confusion.       --------------------------------------------- PAST HISTORY ---------------------------------------------  Past Medical History:  has a past medical history of Anxiety, Blood transfusion, Chronic fatigue, Hypertension, Iron deficiency anemia due to chronic blood loss, Knee pain, bilateral, Low back pain, Migraine headache without aura, Morbid obesity (HCC), Muscle cramps, Perennial allergic rhinitis, Superficial thrombophlebitis of right leg, Ulcerative colitis ([facility] [address] and Vertigo. Past Surgical History:  has a past surgical history that includes Adenoidectomy. Social History:  reports that she has never smoked. She has never used smokeless tobacco. She reports current alcohol use. She reports that she does not use drugs. Family History: family history includes Cancer (age of onset: 35) in her sister; High Blood Pressure in her father and mother; Stroke in her mother. . Unless otherwise noted, family history is non contributory    The patients home medications have been reviewed. Allergies: Aspirin; Coconut oil; Ibuprofen; Iodides; Motrin [ibuprofen micronized]; Robitussin (alcohol free) [guaifenesin]; Codeine; Iodine; and Tramadol    ---------------------------------------------------PHYSICAL EXAM--------------------------------------  Physical Exam  Constitutional:       Appearance: Normal appearance. HENT:      Head: Normocephalic and atraumatic. Nose: Nose normal.      Mouth/Throat:      Mouth: Mucous membranes are moist.   Eyes:      Extraocular Movements: Extraocular movements intact. Conjunctiva/sclera: Conjunctivae normal.      Pupils: Pupils are equal, round, and reactive to light. Neck:      Musculoskeletal: Normal range of motion and neck supple. No neck rigidity or muscular tenderness. Vascular: No carotid bruit. Cardiovascular:      Rate and Rhythm: Normal rate and regular rhythm. Pulses: Normal pulses. Heart sounds: Normal heart sounds. Pulmonary:      Effort: Pulmonary effort is normal.      Breath sounds: Normal breath sounds. Abdominal:      General: Bowel sounds are normal. There is no distension. Palpations: Abdomen is soft. Musculoskeletal: Normal range of motion. General: No swelling. Skin:     General: Skin is warm. Coloration: Skin is not pale. Findings: No rash. Neurological:      General: No focal deficit present. Mental Status: She is alert and oriented to person, place, and time. Cranial Nerves: No cranial nerve deficit. Sensory: No sensory deficit. Motor: No weakness. Deep Tendon Reflexes: Reflexes normal.   Psychiatric:         Mood and Affect: Mood normal.       ------------------------------------------------- RESULTS -------------------------------------------------  I have personally reviewed all laboratory and imaging results for this patient. Results are listed below. LABS: (Lab results interpreted by me)  No results found for this visit on 09/03/20.,       RADIOLOGY:  Interpreted by Radiologist unless otherwise specified  No orders to display       ------------------------- NURSING NOTES AND VITALS REVIEWED ---------------------------   The nursing notes within the ED encounter and vital signs as below have been reviewed by myself  BP (!) 158/105   Pulse 64   Temp 97.5 °F (36.4 °C) (Temporal)   Resp 18   LMP 09/02/2020   SpO2 95%     Oxygen Saturation Interpretation: Normal    The patients available past medical records and past encounters were reviewed.         ------------------------------ ED COURSE/MEDICAL DECISION MAKING----------------------  Medications   morphine sulfate (PF) injection 4 mg (has no administration in time range)   diphenhydrAMINE (BENADRYL) injection 25 mg (25 mg Intravenous Given 9/3/20 1242)   metoclopramide (REGLAN) injection 10 mg (10 mg Intravenous Given 9/3/20 1242)        Medical Decision Making:   Re-Evaluations:     - patient reports improvement   - BP went down    This patient's ED course included: a personal history and physicial examination, re-evaluation prior to disposition, IV medications and continuous pulse oximetry. Received 10 mg Reglan, 25 mg Benadryl and 4 mg Morphine    This patient has improved during their ED course. Consultations:  None    Counseling: The emergency provider has spoken with the patient and discussed todays results, in addition to providing specific details for the plan of care and counseling regarding the diagnosis and prognosis. Questions are answered at this time and they are agreeable with the plan.       --------------------------------- IMPRESSION AND DISPOSITION ---------------------------------    IMPRESSION  1. Intractable migraine without aura and without status migrainosus    2. Essential hypertension        DISPOSITION  Disposition: Discharge to home  Patient condition is stable  Follow up with PCP for HTN       Patient seen and examined with Dr. Pina Boothe and discussed plan of care. NOTE: This report was transcribed using voice recognition software.  Every effort was made to ensure accuracy; however, inadvertent computerized transcription errors may be present      Yunier Mcqueen MD  Resident  09/03/20 [address], [place]  Resident  09/03/20 4560

## 2020-09-03 NOTE — ED PROVIDER NOTES
ATTENDING PROVIDER ATTESTATION:     Kota Mullen presented to the emergency department for evaluation of Migraine (migraine that goes around top of head for several days.)   and was initially evaluated by the Medical Resident. See Original ED Note for H&P and ED course above. I have reviewed and discussed the case, including pertinent history (medical, surgical, family and social) and exam findings with the Medical Resident assigned to Kota Mullen. I have personally performed and/or participated in the history, exam, medical decision making, and procedures and agree with all pertinent clinical information and any additional changes or corrections are noted below in my assessment and plan. I have discussed this patient in detail with the resident, and provided the instruction and education,       I have reviewed my findings and recommendations with the assigned Medical Resident, Kota Mullen and members of family present at the time of disposition. I have performed a history and physical examination of this patient and directly supervised the resident caring for this patient       Department of Emergency Medicine   ED  Provider Note  Admit Date/RoomTime: 9/3/2020 12:14 PM  ED Room: 01/01                  History of Present Illness:  9/3/20, Time: 2:13 PM EDT         Kota Mullen is a 39 y.o. female presenting to the ED for headache , beginning a few days ago. The complaint has been persistent, moderate in severity, and worsened by light. Presents with c/o headache. Has a history of migraines. Headache is similar to prior headaches. was gradual in onset, not worst headache of life, not maximal at onset, not thunderclap. No fever, chills, neck pain, neck stiffness, trauma, recent illness, speech changes, or any neurologic symptoms or deficits. No household contacts with headache. Headache, similar to prior migraines. Unable to get relief at home. Nothing new or different with this headache. No infectious sx. and counseling regarding the diagnosis and prognosis. Questions are answered at this time and they are agreeable with the plan.       --------------------------------- IMPRESSION AND DISPOSITION ---------------------------------    IMPRESSION  1. Intractable migraine without aura and without status migrainosus    2. Essential hypertension        DISPOSITION  Disposition: Discharge to home  Patient condition is stable        NOTE: This report was transcribed using voice recognition software.  Every effort was made to ensure accuracy; however, inadvertent computerized transcription errors may be present         Caleb Vizcaino MD  09/03/20 5415

## 2020-09-05 LAB
EKG ATRIAL RATE: 73 BPM
EKG P AXIS: 52 DEGREES
EKG P-R INTERVAL: 140 MS
EKG Q-T INTERVAL: 426 MS
EKG QRS DURATION: 76 MS
EKG QTC CALCULATION (BAZETT): 469 MS
EKG R AXIS: 10 DEGREES
EKG T AXIS: 69 DEGREES
EKG VENTRICULAR RATE: 73 BPM

## 2020-09-05 PROCEDURE — 93010 ELECTROCARDIOGRAM REPORT: CPT | Performed by: INTERNAL MEDICINE

## 2020-10-07 ENCOUNTER — HOSPITAL ENCOUNTER (EMERGENCY)
Age: 45
Discharge: HOME | End: 2020-10-08
Attending: EMERGENCY MEDICINE

## 2020-10-07 ENCOUNTER — APPOINTMENT (OUTPATIENT)
Dept: GENERAL RADIOLOGY | Age: 45
End: 2020-10-07

## 2020-10-07 LAB
ALBUMIN SERPL-MCNC: 4 G/DL (ref 3.5–5.2)
ALP BLD-CCNC: 108 U/L (ref 35–104)
ALT SERPL-CCNC: 15 U/L (ref 0–32)
ANION GAP SERPL CALCULATED.3IONS-SCNC: 11 MMOL/L (ref 7–16)
AST SERPL-CCNC: 18 U/L (ref 0–31)
BASOPHILS ABSOLUTE: 0.05 E9/L (ref 0–0.2)
BASOPHILS RELATIVE PERCENT: 0.6 % (ref 0–2)
BILIRUB SERPL-MCNC: <0.2 MG/DL (ref 0–1.2)
BUN BLDV-MCNC: 13 MG/DL (ref 6–20)
CALCIUM SERPL-MCNC: 9.1 MG/DL (ref 8.6–10.2)
CHLORIDE BLD-SCNC: 101 MMOL/L (ref 98–107)
CO2: 25 MMOL/L (ref 22–29)
CREAT SERPL-MCNC: 1.3 MG/DL (ref 0.5–1)
EOSINOPHILS ABSOLUTE: 0.1 E9/L (ref 0.05–0.5)
EOSINOPHILS RELATIVE PERCENT: 1.1 % (ref 0–6)
GFR AFRICAN AMERICAN: 53
GFR NON-AFRICAN AMERICAN: 53 ML/MIN/1.73
GLUCOSE BLD-MCNC: 163 MG/DL (ref 74–99)
HCT VFR BLD CALC: 35.7 % (ref 34–48)
HEMOGLOBIN: 11.1 G/DL (ref 11.5–15.5)
IMMATURE GRANULOCYTES #: 0.04 E9/L
IMMATURE GRANULOCYTES %: 0.4 % (ref 0–5)
INFLUENZA A BY PCR: NOT DETECTED
INFLUENZA B BY PCR: NOT DETECTED
LYMPHOCYTES ABSOLUTE: 0.89 E9/L (ref 1.5–4)
LYMPHOCYTES RELATIVE PERCENT: 9.8 % (ref 20–42)
MCH RBC QN AUTO: 23.6 PG (ref 26–35)
MCHC RBC AUTO-ENTMCNC: 31.1 % (ref 32–34.5)
MCV RBC AUTO: 75.8 FL (ref 80–99.9)
MONOCYTES ABSOLUTE: 0.82 E9/L (ref 0.1–0.95)
MONOCYTES RELATIVE PERCENT: 9.1 % (ref 2–12)
NEUTROPHILS ABSOLUTE: 7.15 E9/L (ref 1.8–7.3)
NEUTROPHILS RELATIVE PERCENT: 79 % (ref 43–80)
PDW BLD-RTO: 15.8 FL (ref 11.5–15)
PLATELET # BLD: 382 E9/L (ref 130–450)
PMV BLD AUTO: 10.6 FL (ref 7–12)
POTASSIUM SERPL-SCNC: 3.7 MMOL/L (ref 3.5–5)
RBC # BLD: 4.71 E12/L (ref 3.5–5.5)
SARS-COV-2, NAAT: DETECTED
SODIUM BLD-SCNC: 137 MMOL/L (ref 132–146)
STREP GRP A PCR: NEGATIVE
TOTAL PROTEIN: 7.5 G/DL (ref 6.4–8.3)
WBC # BLD: 9.1 E9/L (ref 4.5–11.5)

## 2020-10-07 PROCEDURE — 87880 STREP A ASSAY W/OPTIC: CPT

## 2020-10-07 PROCEDURE — 99283 EMERGENCY DEPT VISIT LOW MDM: CPT

## 2020-10-07 PROCEDURE — 36415 COLL VENOUS BLD VENIPUNCTURE: CPT

## 2020-10-07 PROCEDURE — 71045 X-RAY EXAM CHEST 1 VIEW: CPT

## 2020-10-07 PROCEDURE — 80053 COMPREHEN METABOLIC PANEL: CPT

## 2020-10-07 PROCEDURE — 87502 INFLUENZA DNA AMP PROBE: CPT

## 2020-10-07 PROCEDURE — 85025 COMPLETE CBC W/AUTO DIFF WBC: CPT

## 2020-10-07 PROCEDURE — 6370000000 HC RX 637 (ALT 250 FOR IP): Performed by: EMERGENCY MEDICINE

## 2020-10-07 PROCEDURE — U0002 COVID-19 LAB TEST NON-CDC: HCPCS

## 2020-10-07 PROCEDURE — 2580000003 HC RX 258: Performed by: EMERGENCY MEDICINE

## 2020-10-07 RX ORDER — 0.9 % SODIUM CHLORIDE 0.9 %
1000 INTRAVENOUS SOLUTION INTRAVENOUS ONCE
Status: COMPLETED | OUTPATIENT
Start: 2020-10-07 | End: 2020-10-08

## 2020-10-07 RX ORDER — ACETAMINOPHEN 500 MG
1000 TABLET ORAL ONCE
Status: COMPLETED | OUTPATIENT
Start: 2020-10-07 | End: 2020-10-07

## 2020-10-07 RX ADMIN — ACETAMINOPHEN 1000 MG: 500 TABLET ORAL at 22:54

## 2020-10-07 RX ADMIN — SODIUM CHLORIDE 1000 ML: 9 INJECTION, SOLUTION INTRAVENOUS at 22:30

## 2020-10-07 ASSESSMENT — PAIN DESCRIPTION - PAIN TYPE: TYPE: ACUTE PAIN

## 2020-10-07 ASSESSMENT — PAIN DESCRIPTION - DESCRIPTORS: DESCRIPTORS: ACHING

## 2020-10-07 ASSESSMENT — PAIN SCALES - GENERAL
PAINLEVEL_OUTOF10: 10
PAINLEVEL_OUTOF10: 10

## 2020-10-07 ASSESSMENT — PAIN DESCRIPTION - LOCATION: LOCATION: GENERALIZED

## 2020-10-08 VITALS
SYSTOLIC BLOOD PRESSURE: 134 MMHG | RESPIRATION RATE: 18 BRPM | HEART RATE: 86 BPM | HEIGHT: 68 IN | DIASTOLIC BLOOD PRESSURE: 83 MMHG | BODY MASS INDEX: 44.41 KG/M2 | TEMPERATURE: 98.8 F | WEIGHT: 293 LBS | OXYGEN SATURATION: 98 %

## 2020-10-08 LAB
BILIRUBIN URINE: NEGATIVE
BLOOD, URINE: NEGATIVE
CLARITY: CLEAR
COLOR: YELLOW
GLUCOSE URINE: NEGATIVE MG/DL
KETONES, URINE: NEGATIVE MG/DL
LEUKOCYTE ESTERASE, URINE: NEGATIVE
NITRITE, URINE: NEGATIVE
PH UA: 7.5 (ref 5–9)
PROTEIN UA: NEGATIVE MG/DL
SPECIFIC GRAVITY UA: 1.02 (ref 1–1.03)
UROBILINOGEN, URINE: 0.2 E.U./DL

## 2020-10-08 PROCEDURE — 81003 URINALYSIS AUTO W/O SCOPE: CPT

## 2020-10-08 NOTE — ED PROVIDER NOTES
Department of Emergency Medicine   ED  Provider Note  Admit Date/RoomTime: 10/7/2020  9:28 PM  ED Room: 05/05  Chief Complaint:   Generalized Body Aches (body wide aches and pains with URI symptoms (nasal congestion, headache, dry cough), since yesterday. no PCP. attempted \"some\" tylenol with no relief)    History of Present Illness   Source of history provided by:  patient. History/Exam Limitations: none. Neto Sinha is a 39 y.o. old female with a past medical history of:   Past Medical History:   Diagnosis Date    Anxiety 2009    Blood transfusion     Chronic fatigue 2007    Hypertension 2009    not treated    Iron deficiency anemia due to chronic blood loss 2007    non-compliant with iron replacement    Knee pain, bilateral 2011    Low back pain 2015    Pain is getting worse    Migraine headache without aura 2009    Morbid obesity ([address] 2007    Muscle cramps 2011    Perennial allergic rhinitis 2007    Superficial thrombophlebitis of right leg 2009 and 2011    Ulcerative colitis ([address]     Vertigo      Patient presents to the ED for a 2-day history of generalized body aches, URI symptoms including nasal congestion, dry cough and headache. She has had some mild shortness of breath and chest tightness. She denies nausea, vomiting, diarrhea or abdominal pain. She states she took Tylenol but it did not help. She did have a recent sick contact but states they had a negative COVID-19 test.  She denies any known COVID-19 exposures. ROS   Pertinent positives and negatives are stated within HPI, all other systems reviewed and are negative. Past Surgical History:  has a past surgical history that includes Adenoidectomy. Social History:  reports that she has never smoked. She has never used smokeless tobacco. She reports current alcohol use. She reports that she does not use drugs.   Family History: family history includes Cancer (age of onset: 35) in her sister; High Blood Pressure in her father and mother; Stroke in her mother. Allergies: Aspirin; Coconut oil; Ibuprofen; Iodides; Motrin [ibuprofen micronized]; Robitussin (alcohol free) [guaifenesin]; Codeine; Iodine; and Tramadol    Physical Exam           ED Triage Vitals   BP Temp Temp Source Pulse Resp SpO2 Height Weight   10/07/20 2125 10/07/20 2105 10/07/20 2105 10/07/20 2105 10/07/20 2125 10/07/20 2105 10/07/20 2125 10/07/20 2125   (!) 195/111 98.2 °F (36.8 °C) Infrared 106 18 96 % 5' 8\" (1.727 m) (!) 305 lb (138.3 kg)      Oxygen Saturation Interpretation: Normal.    · Constitutional:  Alert, development consistent with age. · Ears:  External Ears: Bilateral normal.    · Nose:   There is clear rhinorrhea. · Sinuses: no Bilateral maxillary sinus tenderness. no Bilateral frontal sinus tenderness. · Mouth:  normal tongue and buccal mucosa. · Throat: moderate erythema. Airway Patent. · Neck:  Supple. There is no  anterior cervical node tenderness. · Respiratory:   Breath sounds: Bilateral normal.  Lung sounds: normal.   · CV:  Regular rhythm, mildly tachycardic, normal heart sounds, without pathological murmurs, ectopy, gallops, or rubs. · GI:  Abdomen Soft, nontender, good bowel sounds. No firm or pulsatile mass. · Integument:  Normal turgor. Warm, dry, without visible rash. · Neurological:  Oriented. Motor functions intact.        Lab / Imaging Results   (All laboratory and radiology results have been personally reviewed by myself)  Labs:  Results for orders placed or performed during the hospital encounter of 10/07/20   Strep Screen Group A Throat    Specimen: Throat   Result Value Ref Range    Strep Grp A PCR Negative Negative   Rapid influenza A/B antigens    Specimen: Nares   Result Value Ref Range    Influenza A by PCR Not Detected Not Detected    Influenza B by PCR Not Detected Not Detected   CBC Auto Differential   Result Value Ref Range    WBC 9.1 4.5 - 11.5 E9/L    RBC 4.71 3.50 - 5.50 E12/L Hemoglobin 11.1 (L) 11.5 - 15.5 g/dL    Hematocrit 35.7 34.0 - 48.0 %    MCV 75.8 (L) 80.0 - 99.9 fL    MCH 23.6 (L) 26.0 - 35.0 pg    MCHC 31.1 (L) 32.0 - 34.5 %    RDW 15.8 (H) 11.5 - 15.0 fL    Platelets 187 587 - 427 E9/L    MPV 10.6 7.0 - 12.0 fL    Neutrophils % 79.0 43.0 - 80.0 %    Immature Granulocytes % 0.4 0.0 - 5.0 %    Lymphocytes % 9.8 (L) 20.0 - 42.0 %    Monocytes % 9.1 2.0 - 12.0 %    Eosinophils % 1.1 0.0 - 6.0 %    Basophils % 0.6 0.0 - 2.0 %    Neutrophils Absolute 7.15 1.80 - 7.30 E9/L    Immature Granulocytes # 0.04 E9/L    Lymphocytes Absolute 0.89 (L) 1.50 - 4.00 E9/L    Monocytes Absolute 0.82 0.10 - 0.95 E9/L    Eosinophils Absolute 0.10 0.05 - 0.50 E9/L    Basophils Absolute 0.05 0.00 - 0.20 E9/L   Comprehensive Metabolic Panel   Result Value Ref Range    Sodium 137 132 - 146 mmol/L    Potassium 3.7 3.5 - 5.0 mmol/L    Chloride 101 98 - 107 mmol/L    CO2 25 22 - 29 mmol/L    Anion Gap 11 7 - 16 mmol/L    Glucose 163 (H) 74 - 99 mg/dL    BUN 13 6 - 20 mg/dL    CREATININE 1.3 (H) 0.5 - 1.0 mg/dL    GFR Non-African American 53 >=60 mL/min/1.73    GFR African American 53     Calcium 9.1 8.6 - 10.2 mg/dL    Total Protein 7.5 6.4 - 8.3 g/dL    Alb 4.0 3.5 - 5.2 g/dL    Total Bilirubin <0.2 0.0 - 1.2 mg/dL    Alkaline Phosphatase 108 (H) 35 - 104 U/L    ALT 15 0 - 32 U/L    AST 18 0 - 31 U/L   Urinalysis   Result Value Ref Range    Color, UA Yellow Straw/Yellow    Clarity, UA Clear Clear    Glucose, Ur Negative Negative mg/dL    Bilirubin Urine Negative Negative    Ketones, Urine Negative Negative mg/dL    Specific Gravity, UA 1.020 1.005 - 1.030    Blood, Urine Negative Negative    pH, UA 7.5 5.0 - 9.0    Protein, UA Negative Negative mg/dL    Urobilinogen, Urine 0.2 <2.0 E.U./dL    Nitrite, Urine Negative Negative    Leukocyte Esterase, Urine Negative Negative   COVID-19   Result Value Ref Range    SARS-CoV-2, NAAT DETECTED (A) Not Detected       Imaging:   All Radiology results interpreted by Radiologist unless otherwise noted. XR CHEST PORTABLE   Final Result   No evidence of pneumonia or pleural effusion. ED Course / Medical Decision Making     Medications   0.9 % sodium chloride bolus (0 mLs Intravenous Stopped 10/8/20 0040)   acetaminophen (TYLENOL) tablet 1,000 mg (1,000 mg Oral Given 10/7/20 6817)        Re-examination:  10/7/20        Patients symptoms are improving. Consults:   None    Procedures:   none    Medical Decision Making: Patient presents to the ED for evaluation of URI symptoms. She was given fluids and Tylenol with improvement of her symptoms. Chest x-ray was clear. She was found to be positive for COVID-19 infection. She was counseled on isolation and return precautions. She will take over-the-counter medications as needed. Discharged home in stable condition. Plan of Care: Normal progression of disease discussed. All questions answered. Explained the rationale for symptomatic treatment rather than use of an antibiotic. Instruction provided in the use of fluids, vaporizer, acetaminophen, and other OTC medication for symptom control. Extra fluids  Analgesics as needed, dose reviewed. Follow up as needed should symptoms fail to improve. Counseling: The emergency provider has spoken with the patient and discussed todays results, in addition to providing specific details for the plan of care and counseling regarding the diagnosis and prognosis. Questions are answered at this time and they are agreeable with the plan. Assessment     1. COVID-19 virus infection      Plan   Discharge to home  Patient condition is stable    New Medications     Discharge Medication List as of 10/8/2020  1:00 AM        Electronically signed by Grabiel Beth DO   DD: 10/7/20  **This report was transcribed using voice recognition software. Every effort was made to ensure accuracy; however, inadvertent computerized transcription errors may be present.   END OF ED PROVIDER NOTE          Shalini Adame,   10/08/20 1544

## 2020-10-08 NOTE — ED NOTES
Pt alert oriented skin warm dry resp easy lungs cta pt c/o body aches     Masoud Lipscomb RN  10/08/20 [id number]

## 2020-10-08 NOTE — ED NOTES
Bed: 05  Expected date:   Expected time:   Means of arrival:   Comments:  triage     Nikky Cheung RN  10/07/20 2964

## 2020-10-08 NOTE — ED NOTES
Pt alert oriented skin warm dry resp easy  Discharge instructions given to pt verbalized understanding ambulates with steady gait     Cj Lara RN  10/08/20 [id number]

## 2020-10-09 ENCOUNTER — CARE COORDINATION (OUTPATIENT)
Dept: CARE COORDINATION | Age: 45
End: 2020-10-09

## 2020-10-09 NOTE — CARE COORDINATION
Unable to leave a message for patient to return call re: ED Follow-up for Initial Screening COVID-19  Plan to offer Loop Enrollment     Invalid number.

## 2020-10-12 ENCOUNTER — VIRTUAL VISIT (OUTPATIENT)
Dept: INTERNAL MEDICINE | Age: 45
End: 2020-10-12

## 2020-10-12 PROCEDURE — 99442 PR PHYS/QHP TELEPHONE EVALUATION 11-20 MIN: CPT | Performed by: INTERNAL MEDICINE

## 2020-10-12 RX ORDER — ALBUTEROL SULFATE 90 MCG
2 HFA AEROSOL WITH ADAPTER (GRAM) INHALATION 4 TIMES DAILY PRN
Qty: 1 INHALER | Refills: 0 | COMMUNITY
Start: 2020-10-12 | End: 2020-10-20

## 2020-10-12 RX ORDER — ALBUTEROL SULFATE 90 UG/1
2 AEROSOL, METERED RESPIRATORY (INHALATION) 4 TIMES DAILY PRN
Qty: 1 INHALER | Refills: 0 | Status: SHIPPED
Start: 2020-10-12 | End: 2020-12-21 | Stop reason: ALTCHOICE

## 2020-10-12 RX ORDER — DEXAMETHASONE 6 MG/1
6 TABLET ORAL DAILY
Qty: 7 TABLET | Refills: 0 | Status: SHIPPED | OUTPATIENT
Start: 2020-10-12 | End: 2020-10-19

## 2020-10-12 RX ORDER — BENZONATATE 100 MG/1
100 CAPSULE ORAL 3 TIMES DAILY PRN
Qty: 30 CAPSULE | Refills: 0 | Status: SHIPPED | OUTPATIENT
Start: 2020-10-12 | End: 2020-10-19

## 2020-10-12 ASSESSMENT — ENCOUNTER SYMPTOMS
COUGH: 1
VOMITING: 0
RHINORRHEA: 1
WHEEZING: 1
NAUSEA: 0
SHORTNESS OF BREATH: 1
DIARRHEA: 0
CHEST TIGHTNESS: 1
VOICE CHANGE: 1
GASTROINTESTINAL NEGATIVE: 1
SORE THROAT: 1

## 2020-10-12 NOTE — PROGRESS NOTES
[phone]    Attending Physician Statement:  Arnaud Fragoso M.D., F.A.C.P. I have discussed the case, including pertinent history and exam findings with the resident. I agree with the assessment, plan and orders as documented by the resident. Patient--for fu visit today. -- telephone fu visit  Last office notes reviewed, relative labs and imaging. Health maintenance issues of vaccinations, depression screening, tobacco cessation etc... covered  ER visit  +URI symptoms cough severe  Onset 10/6--body wide aches and pains with URI symptoms (nasal congestion, headache, dry cough  Wbc 9.1 with lymphopenia 9.8%  Cr. 1.3  cxr-- No evidence of pneumonia or pleural effusion. + covid testing  +bronchospasm, hx of asthma- plan steroids, albuterol, ?tessalon perles  Anemia hgb 11.1, mcv 76, rdw 15.8    Telephone 11-20min  Also iron def -- ferritin 26- high UIBC   Low vit D  Remainder of medical problems as per resident note.

## 2020-10-12 NOTE — PATIENT INSTRUCTIONS
Thank you for coming to your appointment today. Please make sure to do the following:  - Take DECADRON 6 mg daily for 7 days  - Take Tessalon Perles 100 mg 3 times a day as needed for cough  - Use your Albuterol inhaler 2 puffs 4 times a day as needed for wheezing or shortness of breath      Follow up on 10/20/2020 at 10 AM.     If your symptoms worsen or do not improve, call [phone] for a sooner appointment or visit the emergency department    James Luna MD      Patient Education        Learning About Coronavirus (COVID-19)  Coronavirus (COVID-19): Overview  What is coronavirus (IYRYN-42)? The coronavirus disease (COVID-19) is caused by a virus. It is an illness that was first found in December 2019. It has since spread worldwide. The virus can cause fever, cough, and trouble breathing. In severe cases, it can cause pneumonia and make it hard to breathe without help. It can cause death. This virus spreads person-to-person through droplets from coughing and sneezing. It can also spread when you are close to someone who is infected. And it can spread when you touch something that has the virus on it, such as a doorknob or a tabletop. Coronaviruses are a large group of viruses. They cause the common cold. They also cause more serious illnesses like Middle East respiratory syndrome (MERS) and severe acute respiratory syndrome (SARS). COVID-19 is caused by a novel coronavirus. That means it's a new type that has not been seen in people before. How is COVID-19 treated? Mild illness can be treated at home, but more serious illness needs to be treated in the hospital. Treatment may include medicines to reduce symptoms, plus breathing support such as oxygen therapy or a ventilator. Other treatments, such as antiviral medicines, may help people who have COVID-19. What can you do to protect yourself from COVID-19?   The best way to protect yourself from getting sick is to:  · Avoid areas where there is an outbreak. · Avoid contact with people who may be infected. · Avoid crowds and try to stay at least 6 feet away from other people. · Wash your hands often, especially after you cough or sneeze. Use soap and water, and scrub for at least 20 seconds. If soap and water aren't available, use an alcohol-based hand . · Avoid touching your mouth, nose, and eyes. What can you do to avoid spreading the virus to others? To help avoid spreading the virus to others:  · Wash your hands often with soap or alcohol-based hand sanitizers. · Cover your mouth with a tissue when you cough or sneeze. Then throw the tissue in the trash. · Use a disinfectant to clean things that you touch often. These include doorknobs, remote controls, phones, and handles on your refrigerator and microwave. And don't forget countertops, tabletops, bathrooms, and computer keyboards. · Wear a cloth face cover if you have to go to public areas. If you know or suspect that you have COVID-19:  · Stay home. Don't go to school, work, or public areas. And don't use public transportation, ride-shares, or taxis unless you have no choice. · Leave your home only if you need to get medical care or testing. But call the doctor's office first so they know you're coming. And wear a face cover. · Limit contact with people in your home. If possible, stay in a separate bedroom and use a separate bathroom. · Wear a face cover whenever you're around other people. It can help stop the spread of the virus when you cough or sneeze. · Clean and disinfect your home every day. Use household  and disinfectant wipes or sprays. Take special care to clean things that you grab with your hands. · Self-isolate until it's safe to be around others again. ? If you have symptoms, it's safe when you haven't had a fever for 3 days and your symptoms have improved and it's been at least 10 days since your symptoms started.   ? If you were exposed to the virus but don't have symptoms, it's safe to be around others 14 days after exposure. ? Talk to your doctor about whether you also need testing, especially if you have a weakened immune system. When to call for help  Call 911 anytime you think you may need emergency care. For example, call if:  · You have severe trouble breathing. (You can't talk at all.)  · You have constant chest pain or pressure. · You are severely dizzy or lightheaded. · You are confused or can't think clearly. · Your face and lips have a blue color. · You passed out (lost consciousness) or are very hard to wake up. Call your doctor now if you develop symptoms such as:  · Shortness of breath. · Fever. · Cough. If you need to get care, call ahead to the doctor's office for instructions before you go. Make sure you wear a face cover to prevent exposing other people to the virus. Where can you get the latest information? The following health organizations are tracking and studying this virus. Their websites contain the most up-to-date information. Silas Sepulveda also learn what to do if you think you may have been exposed to the virus. · U.S. Centers for Disease Control and Prevention (CDC): The CDC provides updated news about the disease and travel advice. The website also tells you how to prevent the spread of infection. www.cdc.gov  · World Health Organization [facility]): WHO offers information about the virus outbreaks. WHO also has travel advice. www.who.int  Current as of: July 10, 2020               Content Version: 12.6  © 2006-2020 [employer]. Care instructions adapted under license by [facility]. If you have questions about a medical condition or this instruction, always ask your healthcare professional. [place] [address] any warranty or liability for your use of this information.

## 2020-10-12 NOTE — PROGRESS NOTES
Skylar Hoover is a 39 y.o. female evaluated via telephone on 10/12/2020. Consent:  She and/or health care decision maker is aware that that she may receive a bill for this telephone service, depending on her insurance coverage, and has provided verbal consent to proceed: Yes      Documentation:  I communicated with the patient and/or health care decision maker about patient's recent diagnosis of COVID-19 and change in symptoms. Details of this discussion including any medical advice provided:     Patient is a 40year old female with PMH of ulcerative colitis, SAH in 2014, HTN, and history of DVT. Patient was seen in ED on 10/7/2020 for 2-day history of generalized body aches, URI symptoms including nasal congestion, dry cough and headache. Workup in ED show patient to be positive for COVID-19 infection. Patient states that she has been having increase in cough, blood-tinged sputum production, and diaphoresis. She is also experiencing shortness of breath and wheezing. She states she has history of mild intermittent asthma and does have an inhaler at home but unsure where. She states she does not carry a pulse oximeter at home. Review of Systems   Constitutional: Positive for appetite change, diaphoresis and fatigue. Negative for chills and fever. HENT: Positive for rhinorrhea, sore throat and voice change. Respiratory: Positive for cough, chest tightness, shortness of breath and wheezing. Cardiovascular: Negative for chest pain. Gastrointestinal: Negative. Negative for diarrhea, nausea and vomiting. Genitourinary: Negative. Skin: Negative for rash. Neurological: Positive for dizziness and headaches. COVID-19 infection  - start dexamethasone 6 mg daily for 7 days  - tessalon perles 100 mg TID as needed for cough  - proventil sample provided to patient.  Instructed to use 2 puffs 4 times daily as needed for wheezing and SOB  - informed patient to visit ED right away if difficulty breathing or wheezing worsens    Follow up on 10/20/2020 (telephone encounter)      I affirm this is a Patient Initiated Episode with a Patient who has not had a related appointment within my department in the past 7 days or scheduled within the next 24 hours.     Patient identification was verified at the start of the visit: Yes    Total Time: minutes: 11-20 minutes    Note: not billable if this call serves to triage the patient into an appointment for the relevant concern      [facility] PGY-3  Internal medicine resident

## 2020-10-20 ENCOUNTER — HOSPITAL ENCOUNTER (INPATIENT)
Age: 45
LOS: 2 days | Discharge: HOME | DRG: 871 | End: 2020-10-22
Attending: EMERGENCY MEDICINE | Admitting: HOSPITALIST

## 2020-10-20 ENCOUNTER — APPOINTMENT (OUTPATIENT)
Dept: GENERAL RADIOLOGY | Age: 45
DRG: 871 | End: 2020-10-20

## 2020-10-20 ENCOUNTER — APPOINTMENT (OUTPATIENT)
Dept: CT IMAGING | Age: 45
DRG: 871 | End: 2020-10-20

## 2020-10-20 ENCOUNTER — VIRTUAL VISIT (OUTPATIENT)
Dept: INTERNAL MEDICINE | Age: 45
End: 2020-10-20

## 2020-10-20 PROBLEM — U07.1 COVID-19 VIRUS INFECTION: Status: ACTIVE | Noted: 2020-10-20

## 2020-10-20 LAB
ALBUMIN SERPL-MCNC: 3.4 G/DL (ref 3.5–5.2)
ALP BLD-CCNC: 65 U/L (ref 35–104)
ALT SERPL-CCNC: 22 U/L (ref 0–32)
ANION GAP SERPL CALCULATED.3IONS-SCNC: 14 MMOL/L (ref 7–16)
APTT: 24.2 SEC (ref 24.5–35.1)
AST SERPL-CCNC: 18 U/L (ref 0–31)
BASOPHILS ABSOLUTE: 0.02 E9/L (ref 0–0.2)
BASOPHILS RELATIVE PERCENT: 0.1 % (ref 0–2)
BILIRUB SERPL-MCNC: <0.2 MG/DL (ref 0–1.2)
BUN BLDV-MCNC: 16 MG/DL (ref 6–20)
CALCIUM SERPL-MCNC: 8.9 MG/DL (ref 8.6–10.2)
CHLORIDE BLD-SCNC: 101 MMOL/L (ref 98–107)
CO2: 25 MMOL/L (ref 22–29)
CREAT SERPL-MCNC: 0.9 MG/DL (ref 0.5–1)
D DIMER: 325 NG/ML DDU
EOSINOPHILS ABSOLUTE: 0 E9/L (ref 0.05–0.5)
EOSINOPHILS RELATIVE PERCENT: 0 % (ref 0–6)
FERRITIN: 100 NG/ML
FIBRINOGEN: 426 MG/DL (ref 225–540)
GFR AFRICAN AMERICAN: >60
GFR NON-AFRICAN AMERICAN: >60 ML/MIN/1.73
GLUCOSE BLD-MCNC: 106 MG/DL (ref 74–99)
HCG, URINE, POC: NEGATIVE
HCT VFR BLD CALC: 39.1 % (ref 34–48)
HEMOGLOBIN: 12 G/DL (ref 11.5–15.5)
IMMATURE GRANULOCYTES #: 0.4 E9/L
IMMATURE GRANULOCYTES %: 2.8 % (ref 0–5)
INR BLD: 1.1
LACTIC ACID: 2.4 MMOL/L (ref 0.5–2.2)
LYMPHOCYTES ABSOLUTE: 1.6 E9/L (ref 1.5–4)
LYMPHOCYTES RELATIVE PERCENT: 11.4 % (ref 20–42)
Lab: NORMAL
MCH RBC QN AUTO: 23 PG (ref 26–35)
MCHC RBC AUTO-ENTMCNC: 30.7 % (ref 32–34.5)
MCV RBC AUTO: 74.9 FL (ref 80–99.9)
MONOCYTES ABSOLUTE: 0.92 E9/L (ref 0.1–0.95)
MONOCYTES RELATIVE PERCENT: 6.5 % (ref 2–12)
NEGATIVE QC PASS/FAIL: NORMAL
NEUTROPHILS ABSOLUTE: 11.15 E9/L (ref 1.8–7.3)
NEUTROPHILS RELATIVE PERCENT: 79.2 % (ref 43–80)
PDW BLD-RTO: 15.5 FL (ref 11.5–15)
PLATELET # BLD: 510 E9/L (ref 130–450)
PMV BLD AUTO: 11.4 FL (ref 7–12)
POSITIVE QC PASS/FAIL: NORMAL
POTASSIUM SERPL-SCNC: 3.9 MMOL/L (ref 3.5–5)
PRO-BNP: 524 PG/ML (ref 0–125)
PROTHROMBIN TIME: 12.4 SEC (ref 9.3–12.4)
RBC # BLD: 5.22 E12/L (ref 3.5–5.5)
SODIUM BLD-SCNC: 140 MMOL/L (ref 132–146)
TOTAL PROTEIN: 6.9 G/DL (ref 6.4–8.3)
TROPONIN: <0.01 NG/ML (ref 0–0.03)
WBC # BLD: 14.1 E9/L (ref 4.5–11.5)

## 2020-10-20 PROCEDURE — 71275 CT ANGIOGRAPHY CHEST: CPT

## 2020-10-20 PROCEDURE — 83605 ASSAY OF LACTIC ACID: CPT

## 2020-10-20 PROCEDURE — 2140000000 HC CCU INTERMEDIATE R&B

## 2020-10-20 PROCEDURE — 85610 PROTHROMBIN TIME: CPT

## 2020-10-20 PROCEDURE — 93005 ELECTROCARDIOGRAM TRACING: CPT | Performed by: EMERGENCY MEDICINE

## 2020-10-20 PROCEDURE — 36415 COLL VENOUS BLD VENIPUNCTURE: CPT

## 2020-10-20 PROCEDURE — 87077 CULTURE AEROBIC IDENTIFY: CPT

## 2020-10-20 PROCEDURE — 85378 FIBRIN DEGRADE SEMIQUANT: CPT

## 2020-10-20 PROCEDURE — 99443 PR PHYS/QHP TELEPHONE EVALUATION 21-30 MIN: CPT | Performed by: INTERNAL MEDICINE

## 2020-10-20 PROCEDURE — 86850 RBC ANTIBODY SCREEN: CPT

## 2020-10-20 PROCEDURE — 85025 COMPLETE CBC W/AUTO DIFF WBC: CPT

## 2020-10-20 PROCEDURE — 80053 COMPREHEN METABOLIC PANEL: CPT

## 2020-10-20 PROCEDURE — 6360000002 HC RX W HCPCS: Performed by: FAMILY MEDICINE

## 2020-10-20 PROCEDURE — 85730 THROMBOPLASTIN TIME PARTIAL: CPT

## 2020-10-20 PROCEDURE — 2580000003 HC RX 258: Performed by: RADIOLOGY

## 2020-10-20 PROCEDURE — 83615 LACTATE (LD) (LDH) ENZYME: CPT

## 2020-10-20 PROCEDURE — 87040 BLOOD CULTURE FOR BACTERIA: CPT

## 2020-10-20 PROCEDURE — 87150 DNA/RNA AMPLIFIED PROBE: CPT

## 2020-10-20 PROCEDURE — 6360000002 HC RX W HCPCS: Performed by: EMERGENCY MEDICINE

## 2020-10-20 PROCEDURE — 86900 BLOOD TYPING SEROLOGIC ABO: CPT

## 2020-10-20 PROCEDURE — 2580000003 HC RX 258: Performed by: FAMILY MEDICINE

## 2020-10-20 PROCEDURE — 6370000000 HC RX 637 (ALT 250 FOR IP): Performed by: FAMILY MEDICINE

## 2020-10-20 PROCEDURE — 86901 BLOOD TYPING SEROLOGIC RH(D): CPT

## 2020-10-20 PROCEDURE — 84145 PROCALCITONIN (PCT): CPT

## 2020-10-20 PROCEDURE — 6360000004 HC RX CONTRAST MEDICATION: Performed by: RADIOLOGY

## 2020-10-20 PROCEDURE — 87186 SC STD MICRODIL/AGAR DIL: CPT

## 2020-10-20 PROCEDURE — 85384 FIBRINOGEN ACTIVITY: CPT

## 2020-10-20 PROCEDURE — 83880 ASSAY OF NATRIURETIC PEPTIDE: CPT

## 2020-10-20 PROCEDURE — 6360000002 HC RX W HCPCS: Performed by: HOSPITALIST

## 2020-10-20 PROCEDURE — 96374 THER/PROPH/DIAG INJ IV PUSH: CPT

## 2020-10-20 PROCEDURE — 99285 EMERGENCY DEPT VISIT HI MDM: CPT

## 2020-10-20 PROCEDURE — 86140 C-REACTIVE PROTEIN: CPT

## 2020-10-20 PROCEDURE — 96375 TX/PRO/DX INJ NEW DRUG ADDON: CPT

## 2020-10-20 PROCEDURE — 2580000003 HC RX 258: Performed by: HOSPITALIST

## 2020-10-20 PROCEDURE — 71045 X-RAY EXAM CHEST 1 VIEW: CPT

## 2020-10-20 PROCEDURE — 82728 ASSAY OF FERRITIN: CPT

## 2020-10-20 PROCEDURE — 2580000003 HC RX 258: Performed by: EMERGENCY MEDICINE

## 2020-10-20 PROCEDURE — 84484 ASSAY OF TROPONIN QUANT: CPT

## 2020-10-20 PROCEDURE — 2500000003 HC RX 250 WO HCPCS: Performed by: EMERGENCY MEDICINE

## 2020-10-20 RX ORDER — DEXAMETHASONE 4 MG/1
6 TABLET ORAL DAILY
Status: DISCONTINUED | OUTPATIENT
Start: 2020-10-20 | End: 2020-10-22 | Stop reason: HOSPADM

## 2020-10-20 RX ORDER — ALBUTEROL SULFATE 2.5 MG/3ML
2.5 SOLUTION RESPIRATORY (INHALATION) EVERY 4 HOURS PRN
Status: DISCONTINUED | OUTPATIENT
Start: 2020-10-20 | End: 2020-10-22 | Stop reason: HOSPADM

## 2020-10-20 RX ORDER — POLYETHYLENE GLYCOL 3350 17 G/17G
17 POWDER, FOR SOLUTION ORAL DAILY PRN
Status: DISCONTINUED | OUTPATIENT
Start: 2020-10-20 | End: 2020-10-22 | Stop reason: HOSPADM

## 2020-10-20 RX ORDER — ACETAMINOPHEN 325 MG/1
650 TABLET ORAL EVERY 6 HOURS PRN
Status: DISCONTINUED | OUTPATIENT
Start: 2020-10-20 | End: 2020-10-22 | Stop reason: HOSPADM

## 2020-10-20 RX ORDER — SODIUM CHLORIDE 0.9 % (FLUSH) 0.9 %
10 SYRINGE (ML) INJECTION PRN
Status: DISCONTINUED | OUTPATIENT
Start: 2020-10-20 | End: 2020-10-22 | Stop reason: HOSPADM

## 2020-10-20 RX ORDER — ONDANSETRON 2 MG/ML
4 INJECTION INTRAMUSCULAR; INTRAVENOUS EVERY 6 HOURS PRN
Status: DISCONTINUED | OUTPATIENT
Start: 2020-10-20 | End: 2020-10-22 | Stop reason: HOSPADM

## 2020-10-20 RX ORDER — ACETAMINOPHEN 650 MG/1
650 SUPPOSITORY RECTAL EVERY 6 HOURS PRN
Status: DISCONTINUED | OUTPATIENT
Start: 2020-10-20 | End: 2020-10-22 | Stop reason: HOSPADM

## 2020-10-20 RX ORDER — SODIUM CHLORIDE 9 MG/ML
INJECTION, SOLUTION INTRAVENOUS CONTINUOUS
Status: DISCONTINUED | OUTPATIENT
Start: 2020-10-20 | End: 2020-10-20

## 2020-10-20 RX ORDER — PROMETHAZINE HYDROCHLORIDE 25 MG/1
12.5 TABLET ORAL EVERY 6 HOURS PRN
Status: DISCONTINUED | OUTPATIENT
Start: 2020-10-20 | End: 2020-10-22 | Stop reason: HOSPADM

## 2020-10-20 RX ORDER — SODIUM CHLORIDE 9 MG/ML
INJECTION, SOLUTION INTRAVENOUS CONTINUOUS
Status: DISCONTINUED | OUTPATIENT
Start: 2020-10-20 | End: 2020-10-21

## 2020-10-20 RX ORDER — ASCORBIC ACID 500 MG
500 TABLET ORAL 2 TIMES DAILY
Status: DISCONTINUED | OUTPATIENT
Start: 2020-10-20 | End: 2020-10-22 | Stop reason: HOSPADM

## 2020-10-20 RX ORDER — METHYLPREDNISOLONE SODIUM SUCCINATE 125 MG/2ML
125 INJECTION, POWDER, LYOPHILIZED, FOR SOLUTION INTRAMUSCULAR; INTRAVENOUS ONCE
Status: COMPLETED | OUTPATIENT
Start: 2020-10-20 | End: 2020-10-20

## 2020-10-20 RX ORDER — DIPHENHYDRAMINE HYDROCHLORIDE 50 MG/ML
25 INJECTION INTRAMUSCULAR; INTRAVENOUS ONCE
Status: COMPLETED | OUTPATIENT
Start: 2020-10-20 | End: 2020-10-20

## 2020-10-20 RX ORDER — FERROUS SULFATE 325(65) MG
325 TABLET ORAL 2 TIMES DAILY WITH MEALS
Status: DISCONTINUED | OUTPATIENT
Start: 2020-10-20 | End: 2020-10-22 | Stop reason: HOSPADM

## 2020-10-20 RX ORDER — ZINC SULFATE 50(220)MG
50 CAPSULE ORAL DAILY
Status: DISCONTINUED | OUTPATIENT
Start: 2020-10-20 | End: 2020-10-22 | Stop reason: HOSPADM

## 2020-10-20 RX ORDER — SODIUM CHLORIDE 0.9 % (FLUSH) 0.9 %
10 SYRINGE (ML) INJECTION EVERY 12 HOURS SCHEDULED
Status: DISCONTINUED | OUTPATIENT
Start: 2020-10-20 | End: 2020-10-22 | Stop reason: HOSPADM

## 2020-10-20 RX ORDER — ACETAMINOPHEN 500 MG
1000 TABLET ORAL EVERY 6 HOURS PRN
Status: DISCONTINUED | OUTPATIENT
Start: 2020-10-20 | End: 2020-10-20

## 2020-10-20 RX ADMIN — METHYLPREDNISOLONE SODIUM SUCCINATE 125 MG: 125 INJECTION, POWDER, FOR SOLUTION INTRAMUSCULAR; INTRAVENOUS at 12:56

## 2020-10-20 RX ADMIN — DOXYCYCLINE 100 MG: 100 INJECTION, POWDER, LYOPHILIZED, FOR SOLUTION INTRAVENOUS at 16:26

## 2020-10-20 RX ADMIN — CEFTRIAXONE SODIUM 1 G: 1 INJECTION, POWDER, FOR SOLUTION INTRAMUSCULAR; INTRAVENOUS at 16:26

## 2020-10-20 RX ADMIN — Medication 500 MG: at 21:50

## 2020-10-20 RX ADMIN — DEXAMETHASONE 6 MG: 4 TABLET ORAL at 21:50

## 2020-10-20 RX ADMIN — SODIUM CHLORIDE: 9 INJECTION, SOLUTION INTRAVENOUS at 12:56

## 2020-10-20 RX ADMIN — ENOXAPARIN SODIUM 40 MG: 40 INJECTION SUBCUTANEOUS at 18:44

## 2020-10-20 RX ADMIN — Medication 10 ML: at 15:56

## 2020-10-20 RX ADMIN — Medication 50 MG: at 21:50

## 2020-10-20 RX ADMIN — SODIUM CHLORIDE: 9 INJECTION, SOLUTION INTRAVENOUS at 18:45

## 2020-10-20 RX ADMIN — DIPHENHYDRAMINE HYDROCHLORIDE 25 MG: 50 INJECTION, SOLUTION INTRAMUSCULAR; INTRAVENOUS at 12:56

## 2020-10-20 RX ADMIN — IOPAMIDOL 75 ML: 755 INJECTION, SOLUTION INTRAVENOUS at 15:55

## 2020-10-20 RX ADMIN — FAMOTIDINE 20 MG: 10 INJECTION, SOLUTION INTRAVENOUS at 12:56

## 2020-10-20 RX ADMIN — AZITHROMYCIN DIHYDRATE 500 MG: 500 INJECTION, POWDER, LYOPHILIZED, FOR SOLUTION INTRAVENOUS at 21:50

## 2020-10-20 RX ADMIN — HYDROCODONE BITARTRATE AND HOMATROPINE METHYLBROMIDE 5 ML: 5; 1.5 SOLUTION ORAL at 21:50

## 2020-10-20 ASSESSMENT — ENCOUNTER SYMPTOMS
SORE THROAT: 0
ANAL BLEEDING: 0
EYE ITCHING: 0
SHORTNESS OF BREATH: 1
COUGH: 1
ABDOMINAL DISTENTION: 0
FACIAL SWELLING: 0
BACK PAIN: 0
EYE DISCHARGE: 0
ABDOMINAL PAIN: 0
CHEST TIGHTNESS: 1
TROUBLE SWALLOWING: 0
WHEEZING: 1

## 2020-10-20 ASSESSMENT — PAIN DESCRIPTION - LOCATION: LOCATION: CHEST

## 2020-10-20 ASSESSMENT — PAIN SCALES - GENERAL
PAINLEVEL_OUTOF10: 0
PAINLEVEL_OUTOF10: 7

## 2020-10-20 ASSESSMENT — PAIN DESCRIPTION - PAIN TYPE: TYPE: ACUTE PAIN

## 2020-10-20 ASSESSMENT — PAIN DESCRIPTION - DESCRIPTORS: DESCRIPTORS: ACHING;DISCOMFORT

## 2020-10-20 ASSESSMENT — PAIN DESCRIPTION - PROGRESSION: CLINICAL_PROGRESSION: NOT CHANGED

## 2020-10-20 ASSESSMENT — PAIN DESCRIPTION - ONSET: ONSET: ON-GOING

## 2020-10-20 ASSESSMENT — PAIN DESCRIPTION - FREQUENCY: FREQUENCY: CONTINUOUS

## 2020-10-20 ASSESSMENT — PAIN - FUNCTIONAL ASSESSMENT: PAIN_FUNCTIONAL_ASSESSMENT: PREVENTS OR INTERFERES SOME ACTIVE ACTIVITIES AND ADLS

## 2020-10-20 NOTE — ACP (ADVANCE CARE PLANNING)
Advance Care Planning     Advance Care Planning Activator (Inpatient)  Conversation Note      Date of ACP Conversation: 10/20/2020    Conversation Conducted with: Patient with Decision Making Capacity    ACP Activator: Denice Valdez    *When Decision Maker makes decisions on behalf of the incapacitated patient: Decision Maker is asked to consider and make decisions based on patient values, known preferences, or best interests. Health Care Decision Maker:     Current Designated Health Care Decision Maker:   (If there is a valid [place] named in the [address] [place] [address] Makers\" box in the ACP activity, but it is not visible above, be sure to open that field and then select the health care decision maker relationship (ie \"primary\") in the blank space to the right of the name.) Validate  this information as still accurate & up-to-date; edit [place] field as needed.)    Note: Assess and validate information in current ACP documents, as indicated. If no Decision Maker listed above or available through scanned documents, then:    If no Authorized Decision Maker has previously been identified, then patient chooses [place]:  \"Who would you like to name as your primary health care decision-maker? \"               Name: Napoleon Mckinney        Relationship: naninikko          Phone number: [phone]  Arammiquel Boys this person be reached easily? \" Yes  \"Who would you like to name as your back-up decision maker? \"     Note: If the relationship of these Decision-Makers to the patient does NOT follow your state's Next of Kin hierarchy, recommend that patient complete ACP document that meets state-specific requirements to allow them to act on the patient's behalf when appropriate. Care Preferences    Ventilation:   \"If you were in your present state of health and suddenly became very ill and were unable to breathe on your own, what would your preference be about the use of a to continue planning  [] Referred individual to Provider for additional questions/concerns   [] Advised patient/agent/surrogate to review completed ACP document and update if needed with changes in condition, patient preferences or care setting    [x] This note routed to one or more involved healthcare providers

## 2020-10-20 NOTE — PROGRESS NOTES
I discussed the patient with Dr. Marvin Turner. Patient evaluated by telephone. Was in ED 10 days ago and was + for COVID. Was sent home. Was given steroids last week after being evaluated with a virtual visit. These have not helped. Can not ambulate very well and SOB with short sentences. Assessment/Plan:  1. COVID - ongoing and worsening symptoms   Agree with sending patient to the ED.      Jimmy Mills MD  10/23/2020

## 2020-10-20 NOTE — ED NOTES
..Blood cultures obtained from right AC, per policy. Set two of two drawn at this time.                Collin Devlin RN  10/20/20 4572

## 2020-10-20 NOTE — ED NOTES
Name: Ariel Vuong  : 1975  MRN: [id number]    Date: 10/20/2020    Benefits of immediately proceeding with Radiology exam outweigh the risks and therefore the following is being waived:      [] Pregnancy test    [x] Protocol for Iodine allergy    [] MRI questionnaire    [] BUN/Creatinine        MD Patricia Carcamo MD  10/20/20 4800

## 2020-10-20 NOTE — ED NOTES
..Blood cultures obtained from left AC, per policy. Set one of two drawn at this time.                Marcelina Baum RN  10/20/20 9809

## 2020-10-20 NOTE — PROGRESS NOTES
10/20/2020    TELEHEALTH EVALUATION -- Audio/Visual (During NLOFY-09 public health emergency)    HPI:    [place] Dry (:  1975) has requested an audio/video evaluation for the following concern(s):    Pt seen 2 weeks ago in ED for SOB and tested positive for COVID-19, she was sent home to self quarantine but her breathing got worse. She had a virtual visit last week and was given albuterol, dexamethasone and tesslon pearls. She continues to be short of breath. Cannot finish a sentence and she can barely walk across her room. She and her boyfriend were instructed to call 911 and come to ED with EMS to be admitted. I spoke to Dr. Davie Mcdonald in ED and updated him about patient's status. Review of Systems   Constitutional: Positive for activity change, appetite change, diaphoresis and fatigue. Negative for chills and fever. HENT: Positive for congestion and sneezing. Negative for facial swelling, sore throat and trouble swallowing. Eyes: Negative for discharge and itching. Respiratory: Positive for cough, chest tightness, shortness of breath and wheezing. Cardiovascular: Positive for chest pain. Negative for palpitations and leg swelling. Gastrointestinal: Negative for abdominal distention, abdominal pain and anal bleeding. Endocrine: Negative for cold intolerance and heat intolerance. Genitourinary: Negative for difficulty urinating, dyspareunia and dysuria. Musculoskeletal: Negative for arthralgias and back pain. Neurological: Positive for light-headedness. Negative for dizziness, facial asymmetry, numbness and headaches. Psychiatric/Behavioral: Negative for agitation. Prior to Visit Medications    Medication Sig Taking?  Authorizing Provider   PROVENTIL  (90 Base) MCG/ACT inhaler Inhale 2 puffs into the lungs 4 times daily as needed for Wheezing LOT# [id number], EXP  Yes Dara Flores MD   acetaminophen (TYLENOL) 500 MG tablet Take 1,000 mg by mouth every 6 hours as needed for Pain Yes Historical Provider, MD   ferrous sulfate 325 (65 Fe) MG tablet Take 1 tablet by mouth 2 times daily (with meals) Yes Gordon Rojo MD   albuterol sulfate HFA (VENTOLIN HFA) 108 (90 Base) MCG/ACT inhaler Inhale 2 puffs into the lungs 4 times daily as needed for Wheezing  Brenda Richardson MD       Social History     Tobacco Use    Smoking status: Never Smoker    Smokeless tobacco: Never Used   Substance Use Topics    Alcohol use: Yes     Comment: occasional wine coolers    Drug use: No        Allergies   Allergen Reactions    Aspirin Shortness Of Breath and Nausea And Vomiting    Coconut Oil Anaphylaxis    Ibuprofen Other (See Comments)     Trouble breathing      Iodides Shortness Of Breath    Motrin [Ibuprofen Micronized] Shortness Of Breath    Robitussin (Alcohol Free) [Guaifenesin] Other (See Comments)     States causes worse cough    Codeine Nausea And Vomiting    Iodine Rash    Tramadol Nausea Only   ,   Past Medical History:   Diagnosis Date    Anxiety 2009    Blood transfusion     Chronic fatigue 2007    Hypertension 2009    not treated    Iron deficiency anemia due to chronic blood loss 2007    non-compliant with iron replacement    Knee pain, bilateral 2011    Low back pain 2015    Pain is getting worse    Migraine headache without aura 2009    Morbid obesity ([address] 2007    Muscle cramps 2011    Perennial allergic rhinitis 2007    Superficial thrombophlebitis of right leg 2009 and 2011    Ulcerative colitis ([address]     Vertigo    ,   Past Surgical History:   Procedure Laterality Date    ADENOIDECTOMY     ,   Social History     Tobacco Use    Smoking status: Never Smoker    Smokeless tobacco: Never Used   Substance Use Topics    Alcohol use: Yes     Comment: occasional wine coolers    Drug use: No   ,   Family History   Problem Relation Age of Onset    High Blood Pressure Mother     Stroke Mother     High Blood Pressure Father     Cancer Sister 35   ,   Immunization History   Administered Date(s) Administered    Influenza Virus Vaccine 10/15/2015    Influenza, Sadie Spies, IM, PF (6 mo and older Fluzone, Flulaval, Fluarix, and 3 yrs and older Afluria) 03/13/2017    Td, unspecified formulation 10/15/2015       PHYSICAL EXAMINATION:  [ INSTRUCTIONS:  \"[x]\" Indicates a positive item  \"[]\" Indicates a negative item  -- DELETE ALL ITEMS NOT EXAMINED]  Vital Signs: (As obtained by patient/caregiver or practitioner observation)    Blood pressure-  Heart rate-    Respiratory rate- tachypnic   Temperature-  Pulse oximetry-     Constitutional: [] Appears well-developed and well-nourished [] No apparent distress      [x] Abnormal- in respiratory distress  Mental status  [x] Alert and awake  [x] Oriented to person/place/time [x]Able to follow commands      Eyes:  EOM    []  Normal  [] Abnormal-  Sclera  []  Normal  [] Abnormal -         Discharge []  None visible  [] Abnormal -    HENT:   [] Normocephalic, atraumatic. [] Abnormal   [] Mouth/Throat: Mucous membranes are moist.     External Ears [] Normal  [] Abnormal-     Neck: [] No visualized mass     Pulmonary/Chest: [] Respiratory effort normal.  [] No visualized signs of difficulty breathing or respiratory distress        [x] Abnormal- severe resp distress   Musculoskeletal:   [] Normal gait with no signs of ataxia         [] Normal range of motion of neck        [] Abnormal-       Neurological:        [] No Facial Asymmetry (Cranial nerve 7 motor function) (limited exam to video visit)          [] No gaze palsy        [] Abnormal-         Skin:        [] No significant exanthematous lesions or discoloration noted on facial skin         [] Abnormal-            Psychiatric:       [] Normal Affect [] No Hallucinations        [] Abnormal-     Other pertinent observable physical exam findings-     ASSESSMENT/PLAN:  1. COVID-19  Patient was instructed to call 911 and refer to ED to be admitted    2.  Respiratory distress  As above      No follow-ups on file. Ariel Vuong is a 39 y.o. female being evaluated by a Virtual Visit (video visit) encounter to address concerns as mentioned above. A caregiver was present when appropriate. Due to this being a TeleHealth encounter (During XXUFF-34 public health emergency), evaluation of the following organ systems was limited: Vitals/Constitutional/EENT/Resp/CV/GI//MS/Neuro/Skin/Heme-Lymph-Imm. Pursuant to the emergency declaration under the [address] [place] [address], [address] [place] [address] and the [employer] Act, this Virtual Visit was conducted with patient's (and/or legal guardian's) consent, to reduce the patient's risk of exposure to COVID-19 and provide necessary medical care. The patient (and/or legal guardian) has also been advised to contact this office for worsening conditions or problems, and seek emergency medical treatment and/or call 911 if deemed necessary. Patient identification was verified at the start of the visit: Yes    Total time spent on this encounter: 25 minutes    Services were provided through a video synchronous discussion virtually to substitute for in-person clinic visit. Patient and provider were located at their individual homes. --Patricia Dukes MD on 10/20/2020 at 11:27 AM    An electronic signature was used to authenticate this note.

## 2020-10-20 NOTE — ED PROVIDER NOTES
mother. . Unless otherwise noted, family history is non contributory    The patients home medications have been reviewed. Allergies: Aspirin; Coconut oil; Ibuprofen; Iodides; Motrin [ibuprofen micronized]; Robitussin (alcohol free) [guaifenesin]; Codeine; Iodine; and Tramadol        ---------------------------------------------------PHYSICAL EXAM--------------------------------------    Constitutional/General: Alert and oriented x3  Head: Normocephalic and atraumatic  Eyes: PERRL, EOMI, sclera non icteric  Mouth: Oropharynx clear, handling secretions, no trismus, no asymmetry of the posterior oropharynx or uvular edema  Neck: Supple, full ROM, no stridor, no meningeal signs  Respiratory: Coarse diffusely  Cardiovascular:  Regular rate. Regular rhythm. 2+ distal pulses. Equal extremity pulses. Chest: No chest wall tenderness  GI:  Abdomen Soft, Non tender, Non distended. No rebound, guarding, or rigidity. No pulsatile masses. Musculoskeletal: Moves all extremities x 4. Warm and well perfused, no clubbing, cyanosis, or edema. Capillary refill <3 seconds  Integument: skin warm and dry. No rashes. Neurologic: GCS 15, no focal deficits, symmetric strength 5/5 in the upper and lower extremities bilaterally  Psychiatric: Normal Affect          -------------------------------------------------- RESULTS -------------------------------------------------  I have personally reviewed all laboratory and imaging results for this patient. Results are listed below.      LABS: (Lab results interpreted by me)  Results for orders placed or performed during the hospital encounter of 10/20/20   CBC Auto Differential   Result Value Ref Range    WBC 14.1 (H) 4.5 - 11.5 E9/L    RBC 5.22 3.50 - 5.50 E12/L    Hemoglobin 12.0 11.5 - 15.5 g/dL    Hematocrit 39.1 34.0 - 48.0 %    MCV 74.9 (L) 80.0 - 99.9 fL    MCH 23.0 (L) 26.0 - 35.0 pg    MCHC 30.7 (L) 32.0 - 34.5 %    RDW 15.5 (H) 11.5 - 15.0 fL    Platelets 253 (H) 985 - 450 E9/L MPV 11.4 7.0 - 12.0 fL    Neutrophils % 79.2 43.0 - 80.0 %    Immature Granulocytes % 2.8 0.0 - 5.0 %    Lymphocytes % 11.4 (L) 20.0 - 42.0 %    Monocytes % 6.5 2.0 - 12.0 %    Eosinophils % 0.0 0.0 - 6.0 %    Basophils % 0.1 0.0 - 2.0 %    Neutrophils Absolute 11.15 (H) 1.80 - 7.30 E9/L    Immature Granulocytes # 0.40 E9/L    Lymphocytes Absolute 1.60 1.50 - 4.00 E9/L    Monocytes Absolute 0.92 0.10 - 0.95 E9/L    Eosinophils Absolute 0.00 (L) 0.05 - 0.50 E9/L    Basophils Absolute 0.02 0.00 - 0.20 E9/L   Comprehensive Metabolic Panel   Result Value Ref Range    Sodium 140 132 - 146 mmol/L    Potassium 3.9 3.5 - 5.0 mmol/L    Chloride 101 98 - 107 mmol/L    CO2 25 22 - 29 mmol/L    Anion Gap 14 7 - 16 mmol/L    Glucose 106 (H) 74 - 99 mg/dL    BUN 16 6 - 20 mg/dL    CREATININE 0.9 0.5 - 1.0 mg/dL    GFR Non-African American >60 >=60 mL/min/1.73    GFR African American >60     Calcium 8.9 8.6 - 10.2 mg/dL    Total Protein 6.9 6.4 - 8.3 g/dL    Alb 3.4 (L) 3.5 - 5.2 g/dL    Total Bilirubin <0.2 0.0 - 1.2 mg/dL    Alkaline Phosphatase 65 35 - 104 U/L    ALT 22 0 - 32 U/L    AST 18 0 - 31 U/L   Troponin   Result Value Ref Range    Troponin <0.01 0.00 - 0.03 ng/mL   Brain Natriuretic Peptide   Result Value Ref Range    Pro- (H) 0 - 125 pg/mL   Lactic Acid, Plasma   Result Value Ref Range    Lactic Acid 2.4 (H) 0.5 - 2.2 mmol/L   FERRITIN   Result Value Ref Range    Ferritin 100 ng/mL   POC Pregnancy Urine Qual   Result Value Ref Range    HCG, Urine, POC Negative Negative    Lot Number [id number]     Positive QC Pass/Fail Pass     Negative QC Pass/Fail Pass    EKG 12 Lead   Result Value Ref Range    Ventricular Rate 51 BPM    Atrial Rate 51 BPM    P-R Interval 154 ms    QRS Duration 84 ms    Q-T Interval 484 ms    QTc Calculation (Bazett) 446 ms    P Axis 30 degrees    R Axis 3 degrees    T Axis 35 degrees   ,       RADIOLOGY:  Interpreted by Radiologist unless otherwise specified  CTA PULMONARY W CONTRAST   Final Result   1. Multifocal bilateral patchy ground-glass infiltrates suspicious for   atypical pneumonia or COVID-19.   2. There is no evidence of a pulmonary embolus. 3. Cardiomegaly. XR CHEST PORTABLE   Final Result   Findings suspicious for early left midlung pulmonary infiltrate. EKG Interpretation  Interpreted by emergency department physician, Dr. Anna Gaming   Sinus rhythm, rate 51, no STEMI        ------------------------- NURSING NOTES AND VITALS REVIEWED ---------------------------   The nursing notes within the ED encounter and vital signs as below have been reviewed by myself  /70   Pulse 65   Temp 98.4 °F (36.9 °C) (Oral)   Resp 16   Ht 5' 8\" (1.727 m)   LMP 09/28/2020   SpO2 96%   BMI 46.38 kg/m²     Oxygen Saturation Interpretation: Normal    The patients available past medical records and past encounters were reviewed. ------------------------------ ED COURSE/MEDICAL DECISION MAKING----------------------  Medications   0.9 % sodium chloride infusion ( Intravenous New Bag 10/20/20 1256)   sodium chloride flush 0.9 % injection 10 mL (10 mLs Intravenous Given 10/20/20 1556)   doxycycline (VIBRAMYCIN) 100 mg in dextrose 5 % 100 mL IVPB (100 mg Intravenous New Bag 10/20/20 1626)   methylPREDNISolone sodium (SOLU-MEDROL) injection 125 mg (125 mg Intravenous Given 10/20/20 1256)   famotidine (PEPCID) injection 20 mg (20 mg Intravenous Given 10/20/20 1256)   diphenhydrAMINE (BENADRYL) injection 25 mg (25 mg Intravenous Given 10/20/20 1256)   iopamidol (ISOVUE-370) 76 % injection 75 mL (75 mLs Intravenous Given 10/20/20 1555)   cefTRIAXone (ROCEPHIN) 1 g in sterile water 10 mL IV syringe (0 g Intravenous Stopped 10/20/20 1626)           The cardiac monitor revealed sinus with a heart rate in the 60s as interpreted by me. The cardiac monitor was ordered secondary to the patient's SOB and to monitor the patient for dysrhythmia.    CPT [id number]         Medical Decision Making: Patient placed on nasal cannula. Labs and imaging reviewed. Reevaluation: Patient is resting comfortably. Does continue to complain of shortness of breath. Blood cultures were obtained. She was on Rocephin and doxycycline. Discussed with the hospitalist, patient will be admitted. Counseling: The emergency provider has spoken with the patient and discussed todays results, in addition to providing specific details for the plan of care and counseling regarding the diagnosis and prognosis. Questions are answered at this time and they are agreeable with the plan.       --------------------------------- IMPRESSION AND DISPOSITION ---------------------------------    IMPRESSION  1. COVID-19    2. Pneumonia due to infectious organism, unspecified laterality, unspecified part of lung        DISPOSITION  Disposition: Admit to telemetry  Patient condition is stable        NOTE: This report was transcribed using voice recognition software.  Every effort was made to ensure accuracy; however, inadvertent computerized transcription errors may be present        Sami Betancourt MD  10/20/20 2524

## 2020-10-21 PROBLEM — R10.9 ABDOMINAL PAIN: Status: ACTIVE | Noted: 2020-10-21

## 2020-10-21 PROBLEM — J96.01 ACUTE RESPIRATORY FAILURE WITH HYPOXIA (HCC): Status: ACTIVE | Noted: 2020-10-21

## 2020-10-21 PROBLEM — E87.20 LACTIC ACIDOSIS: Status: ACTIVE | Noted: 2020-10-21

## 2020-10-21 PROBLEM — J12.82 PNEUMONIA DUE TO COVID-19 VIRUS: Status: ACTIVE | Noted: 2020-10-20

## 2020-10-21 PROBLEM — R00.1 BRADYCARDIA: Status: ACTIVE | Noted: 2020-10-21

## 2020-10-21 PROBLEM — A41.9 SEPSIS (HCC): Status: ACTIVE | Noted: 2020-10-21

## 2020-10-21 LAB
ABO/RH: NORMAL
ACINETOBACTER BAUMANNII BY PCR: NOT DETECTED
ALBUMIN SERPL-MCNC: 3.3 G/DL (ref 3.5–5.2)
ALP BLD-CCNC: 68 U/L (ref 35–104)
ALT SERPL-CCNC: 23 U/L (ref 0–32)
ANION GAP SERPL CALCULATED.3IONS-SCNC: 17 MMOL/L (ref 7–16)
ANTIBODY SCREEN: NORMAL
AST SERPL-CCNC: 12 U/L (ref 0–31)
BASOPHILS ABSOLUTE: 0 E9/L (ref 0–0.2)
BASOPHILS RELATIVE PERCENT: 0.1 % (ref 0–2)
BILIRUB SERPL-MCNC: <0.2 MG/DL (ref 0–1.2)
BOTTLE TYPE: ABNORMAL
BUN BLDV-MCNC: 18 MG/DL (ref 6–20)
BURR CELLS: ABNORMAL
C-REACTIVE PROTEIN: 0.7 MG/DL (ref 0–0.4)
CALCIUM SERPL-MCNC: 8.7 MG/DL (ref 8.6–10.2)
CANDIDA ALBICANS BY PCR: NOT DETECTED
CANDIDA GLABRATA BY PCR: NOT DETECTED
CANDIDA KRUSEI BY PCR: NOT DETECTED
CANDIDA PARAPSILOSIS BY PCR: NOT DETECTED
CANDIDA TROPICALIS BY PCR: NOT DETECTED
CHLORIDE BLD-SCNC: 104 MMOL/L (ref 98–107)
CO2: 17 MMOL/L (ref 22–29)
CREAT SERPL-MCNC: 0.8 MG/DL (ref 0.5–1)
EKG ATRIAL RATE: 51 BPM
EKG P AXIS: 30 DEGREES
EKG P-R INTERVAL: 154 MS
EKG Q-T INTERVAL: 484 MS
EKG QRS DURATION: 84 MS
EKG QTC CALCULATION (BAZETT): 446 MS
EKG R AXIS: 3 DEGREES
EKG T AXIS: 35 DEGREES
EKG VENTRICULAR RATE: 51 BPM
ENTEROBACTER CLOACAE COMPLEX BY PCR: NOT DETECTED
ENTEROBACTERALES BY PCR: NOT DETECTED
ENTEROCOCCUS BY PCR: NOT DETECTED
EOSINOPHILS ABSOLUTE: 0 E9/L (ref 0.05–0.5)
EOSINOPHILS RELATIVE PERCENT: 0 % (ref 0–6)
ESCHERICHIA COLI BY PCR: NOT DETECTED
GFR AFRICAN AMERICAN: >60
GFR NON-AFRICAN AMERICAN: >60 ML/MIN/1.73
GLUCOSE BLD-MCNC: 204 MG/DL (ref 74–99)
HAEMOPHILUS INFLUENZAE BY PCR: NOT DETECTED
HCT VFR BLD CALC: 37.5 % (ref 34–48)
HEMOGLOBIN: 11.6 G/DL (ref 11.5–15.5)
HYPOCHROMIA: ABNORMAL
KLEBSIELLA OXYTOCA BY PCR: NOT DETECTED
KLEBSIELLA PNEUMONIAE GROUP BY PCR: NOT DETECTED
LACTATE DEHYDROGENASE: 198 U/L (ref 135–214)
LISTERIA MONOCYTOGENES BY PCR: NOT DETECTED
LYMPHOCYTES ABSOLUTE: 1.05 E9/L (ref 1.5–4)
LYMPHOCYTES RELATIVE PERCENT: 6.1 % (ref 20–42)
MCH RBC QN AUTO: 23.2 PG (ref 26–35)
MCHC RBC AUTO-ENTMCNC: 30.9 % (ref 32–34.5)
MCV RBC AUTO: 75 FL (ref 80–99.9)
METAMYELOCYTES RELATIVE PERCENT: 0.9 % (ref 0–1)
METHICILLIN RESISTANCE MECA/C  BY PCR: NOT DETECTED
MONOCYTES ABSOLUTE: 0.7 E9/L (ref 0.1–0.95)
MONOCYTES RELATIVE PERCENT: 4.3 % (ref 2–12)
MYELOCYTE PERCENT: 0.9 % (ref 0–0)
NEISSERIA MENINGITIDIS BY PCR: NOT DETECTED
NEUTROPHILS ABSOLUTE: 15.75 E9/L (ref 1.8–7.3)
NEUTROPHILS RELATIVE PERCENT: 87.8 % (ref 43–80)
ORDER NUMBER: ABNORMAL
OVALOCYTES: ABNORMAL
PDW BLD-RTO: 15.6 FL (ref 11.5–15)
PLATELET # BLD: 513 E9/L (ref 130–450)
PMV BLD AUTO: 11.7 FL (ref 7–12)
POIKILOCYTES: ABNORMAL
POLYCHROMASIA: ABNORMAL
POTASSIUM REFLEX MAGNESIUM: 4.2 MMOL/L (ref 3.5–5)
PROCALCITONIN: <0.02 NG/ML (ref 0–0.08)
PROTEUS BY PCR: NOT DETECTED
PSEUDOMONAS AERUGINOSA BY PCR: NOT DETECTED
RBC # BLD: 5 E12/L (ref 3.5–5.5)
REASON FOR REJECTION: NORMAL
REJECTED TEST: NORMAL
SCHISTOCYTES: ABNORMAL
SERRATIA MARCESCENS BY PCR: NOT DETECTED
SODIUM BLD-SCNC: 138 MMOL/L (ref 132–146)
SOURCE OF BLOOD CULTURE: ABNORMAL
STAPHYLOCOCCUS AUREUS BY PCR: NOT DETECTED
STAPHYLOCOCCUS SPECIES BY PCR: DETECTED
STREPTOCOCCUS AGALACTIAE BY PCR: NOT DETECTED
STREPTOCOCCUS PNEUMONIAE BY PCR: NOT DETECTED
STREPTOCOCCUS PYOGENES  BY PCR: NOT DETECTED
STREPTOCOCCUS SPECIES BY PCR: NOT DETECTED
TARGET CELLS: ABNORMAL
TOTAL PROTEIN: 6.7 G/DL (ref 6.4–8.3)
TROPONIN: <0.01 NG/ML (ref 0–0.03)
WBC # BLD: 17.5 E9/L (ref 4.5–11.5)

## 2020-10-21 PROCEDURE — 6370000000 HC RX 637 (ALT 250 FOR IP): Performed by: FAMILY MEDICINE

## 2020-10-21 PROCEDURE — 2140000000 HC CCU INTERMEDIATE R&B

## 2020-10-21 PROCEDURE — 87450 HC DIRECT STREP B ANTIGEN: CPT

## 2020-10-21 PROCEDURE — 93010 ELECTROCARDIOGRAM REPORT: CPT | Performed by: INTERNAL MEDICINE

## 2020-10-21 PROCEDURE — 2580000003 HC RX 258: Performed by: HOSPITALIST

## 2020-10-21 PROCEDURE — 6360000002 HC RX W HCPCS: Performed by: FAMILY MEDICINE

## 2020-10-21 PROCEDURE — 87449 NOS EACH ORGANISM AG IA: CPT

## 2020-10-21 PROCEDURE — 36415 COLL VENOUS BLD VENIPUNCTURE: CPT

## 2020-10-21 PROCEDURE — 85025 COMPLETE CBC W/AUTO DIFF WBC: CPT

## 2020-10-21 PROCEDURE — 6370000000 HC RX 637 (ALT 250 FOR IP): Performed by: HOSPITALIST

## 2020-10-21 PROCEDURE — 2500000003 HC RX 250 WO HCPCS: Performed by: FAMILY MEDICINE

## 2020-10-21 RX ORDER — AMLODIPINE BESYLATE 5 MG/1
5 TABLET ORAL DAILY
Status: DISCONTINUED | OUTPATIENT
Start: 2020-10-21 | End: 2020-10-22 | Stop reason: HOSPADM

## 2020-10-21 RX ADMIN — Medication 500 MG: at 09:09

## 2020-10-21 RX ADMIN — SODIUM CHLORIDE: 9 INJECTION, SOLUTION INTRAVENOUS at 18:02

## 2020-10-21 RX ADMIN — FAMOTIDINE 20 MG: 10 INJECTION INTRAVENOUS at 00:14

## 2020-10-21 RX ADMIN — Medication 50 MG: at 09:09

## 2020-10-21 RX ADMIN — LIDOCAINE HYDROCHLORIDE: 20 SOLUTION ORAL; TOPICAL at 00:14

## 2020-10-21 RX ADMIN — FERROUS SULFATE TAB 325 MG (65 MG ELEMENTAL FE) 325 MG: 325 (65 FE) TAB at 18:02

## 2020-10-21 RX ADMIN — SODIUM CHLORIDE: 9 INJECTION, SOLUTION INTRAVENOUS at 09:13

## 2020-10-21 RX ADMIN — DEXAMETHASONE 6 MG: 4 TABLET ORAL at 09:09

## 2020-10-21 RX ADMIN — ENOXAPARIN SODIUM 30 MG: 30 INJECTION SUBCUTANEOUS at 06:02

## 2020-10-21 RX ADMIN — HYDROCODONE BITARTRATE AND HOMATROPINE METHYLBROMIDE 5 ML: 5; 1.5 SOLUTION ORAL at 10:22

## 2020-10-21 RX ADMIN — AMLODIPINE BESYLATE 5 MG: 5 TABLET ORAL at 23:43

## 2020-10-21 RX ADMIN — Medication 500 MG: at 19:59

## 2020-10-21 RX ADMIN — FAMOTIDINE 20 MG: 10 INJECTION INTRAVENOUS at 09:10

## 2020-10-21 RX ADMIN — FERROUS SULFATE TAB 325 MG (65 MG ELEMENTAL FE) 325 MG: 325 (65 FE) TAB at 10:23

## 2020-10-21 RX ADMIN — ENOXAPARIN SODIUM 30 MG: 30 INJECTION SUBCUTANEOUS at 18:02

## 2020-10-21 ASSESSMENT — PAIN SCALES - GENERAL
PAINLEVEL_OUTOF10: 0

## 2020-10-21 NOTE — H&P
Hospital Medicine History & Physical      PCP: Kye More MD    Date of Admission: 10/20/2020    Date of Service: Pt seen/examined on 10/20/2020 and Admitted to Inpatient with expected LOS greater than two midnights due to medical therapy. Chief Complaint: SOB and chest pain      History Of Present Illness:      39 y.o. female who presented to [facility] with PMH of asthma, hypertension, migraines, ulcerative colitis, subarachnoid hemorrhage in 2014, DVT, iron deficiency anemia and anxiety. Patient was diagnosed with COVID-19 viral infection on 10/7/2020. She had a negative chest x-ray at the time has been on steroid and inhalers. She came back today with shortness of breath that has been ongoing for the past few days, this is worse with talking and minimal exertion. Also having chest pain. Chest pain is bilateral, sharp, worse with breathing and coughing and associated with episode of hemoptysis early on. She reports left upper abdomen pain that is sharp. She had one episode of diarrhea. No leg swelling. No nausea or vomiting. She denies any sick contacts. Vital signs notable for respiratory rate of 22. She required 5 L of oxygen in the ER. Labs showed white count of 14, platelet count 652, negative troponin, normal chemistry, lactic acid level 2.4, proBNP 524, ferritin 100. Chest x-ray shows left lung infiltrate. CTA of the chest is negative for PE, showed cardiomegaly and multifocal groundglass opacities bilaterally. EKG shows sinus bradycardia rate of 51. She is being admitted for further management.     Past Medical History:          Diagnosis Date    Anxiety 2009    Blood transfusion     Chronic fatigue 2007    Hypertension 2009    not treated    Iron deficiency anemia due to chronic blood loss 2007    non-compliant with iron replacement    Knee pain, bilateral 2011    Low back pain 2015    Pain is getting worse    Migraine headache without aura 2009    Morbid obesity ([address] 2007    Muscle cramps 2011    Perennial allergic rhinitis 2007    Superficial thrombophlebitis of right leg 2009 and 2011    Ulcerative colitis ([address]     Vertigo        Past Surgical History:          Procedure Laterality Date    ADENOIDECTOMY         Medications Prior to Admission:      Prior to Admission medications    Medication Sig Start Date End Date Taking? Authorizing Provider   albuterol sulfate HFA (VENTOLIN HFA) 108 (90 Base) MCG/ACT inhaler Inhale 2 puffs into the lungs 4 times daily as needed for Wheezing 10/12/20  Yes Beck Hanley MD   acetaminophen (TYLENOL) 500 MG tablet Take 1,000 mg by mouth every 6 hours as needed for Pain   Yes Historical Provider, MD   ferrous sulfate 325 (65 Fe) MG tablet Take 1 tablet by mouth 2 times daily (with meals) 8/18/18  Yes Thomas Rodriguez MD       Allergies:  Aspirin; Coconut oil; Ibuprofen; Iodides; Motrin [ibuprofen micronized]; Robitussin (alcohol free) [guaifenesin]; Codeine; Iodine; and Tramadol    Social History:      The patient currently lives at home. TOBACCO:   reports that she has never smoked. She has never used smokeless tobacco.  ETOH:   reports current alcohol use. Socially. No drug use. Family History:     Reviewed in detail Positive as follows:        Problem Relation Age of Onset    High Blood Pressure Mother     Stroke Mother     High Blood Pressure Father     Cancer Sister 35       REVIEW OF SYSTEMS:   Pertinent positives as noted in the HPI. All other systems reviewed and negative. PHYSICAL EXAM:    BP (!) 151/99   Pulse 79   Temp 97.9 °F (36.6 °C) (Temporal)   Resp 20   Ht 5' 8\" (1.727 m)   Wt (!) 305 lb (138.3 kg)   LMP 09/28/2020   SpO2 94%   BMI 46.38 kg/m²     General appearance: Young female, mild respiratory distress, appears stated age and cooperative. Afebrile. HEENT:  Normal cephalic, atraumatic without obvious deformity. Pupils equal, round, and reactive to light. Extra ocular muscles intact. Conjunctivae/corneas clear. Neck: Supple, with full range of motion. No jugular venous distention. Trachea midline. Respiratory: Increased work of breathing with conversational dyspnea. Few rhonchi. Cardiovascular:  Regular rate and rhythm with normal S1/S2 without murmurs, rubs or gallops. Abdomen: Soft, non-tender, non-distended with normal bowel sounds. Musculoskeletal:  No clubbing, cyanosis or edema bilaterally. Full range of motion without deformity. Skin: Skin color, texture, turgor normal.  No rashes or lesions. Neurologic:  Neurovascularly intact without any focal sensory/motor deficits. Cranial nerves: II-XII intact, grossly non-focal.  Psychiatric:  Alert and oriented, thought content appropriate, normal insight  Capillary Refill: Brisk,< 3 seconds   Peripheral Pulses: +2 palpable, equal bilaterally       Labs:     Recent Labs     10/20/20  1300   WBC 14.1*   HGB 12.0   HCT 39.1   *     Recent Labs     10/20/20  1300      K 3.9      CO2 25   BUN 16   CREATININE 0.9   CALCIUM 8.9     Recent Labs     10/20/20  1300   AST 18   ALT 22   BILITOT <0.2   ALKPHOS 65     Recent Labs     10/20/20  2310   INR 1.1     Recent Labs     10/20/20  1300 10/20/20  2310   TROPONINI <0.01 <0.01       Urinalysis:      Lab Results   Component Value Date    NITRU Negative 10/08/2020    NITRU NEGATIVE 11/24/2012    WBCUA NONE SEEN 03/02/2019    BACTERIA NEGATIVE 03/02/2019    RBCUA 31,038 03/02/2019    BLOODU Negative 10/08/2020    SPECGRAV 1.020 10/08/2020    GLUCOSEU Negative 10/08/2020       Radiology:   Reviewed and documented    CTA PULMONARY W CONTRAST   Final Result   1. Multifocal bilateral patchy ground-glass infiltrates suspicious for   atypical pneumonia or COVID-19.   2. There is no evidence of a pulmonary embolus. 3. Cardiomegaly. XR CHEST PORTABLE   Final Result   Findings suspicious for early left midlung pulmonary infiltrate.              ASSESSMENT:    C/Shayna Mendez 1104 Problems    Diagnosis Date Noted    Acute respiratory failure with hypoxia ([facility] [J96.01] 10/21/2020    Lactic acidosis [E87.2] 10/21/2020    Sepsis ([facility] [A41.9] 10/21/2020    Bradycardia [R00.1] 10/21/2020    Abdominal pain [R10.9] 10/21/2020    Pneumonia due to COVID-19 virus [U07.1, J12.89] 10/20/2020    Essential hypertension [I10] 10/01/2015    Morbid obesity due to excess calories ([facility] [E66.01] 10/01/2015     PLAN:  1. Pneumonia secondary to COVID-19 viral infection. IV Rocephin and azithromycin to cover for bacterial superinfection. Blood cultures. 2.  COVID-19 viral infection, ID consult, zinc and vitamin C. Decadron given edition hypoxemia in the ER. Patient has been on steroid outpatient. 3.  Sepsis secondary to above. 4.  Acute respiratory failure with hypoxia secondary to above. Oxygen to keep sat above 92%. 5.  Abdominal pain possibly steroid related/PNA. Provide GI cocktail and Pepcid. Pain control  6. Lactic acidosis, IV fluids, recheck  7. Morbid obesity, dietary and lifestyle modification. 8. Hypertension, blood pressure is elevated. No home BP meds. Monitor. DVT Prophylaxis: Lovenox  Diet: DIET GENERAL;  Code Status: Full Code    PT/OT Eval Status: As needed    Dispo -inpatient/telemetry       Abraham Kayser, MD    Thank you Doug Abbott MD for the opportunity to be involved in this patient's care.

## 2020-10-21 NOTE — PROGRESS NOTES
Covering for sound today       Subjective:    Chief complaint:    Patient feeling okay  Some chest pain mostly musculoskeletal in nature  Does have some nasal congestion    Objective:    BP (!) 144/97   Pulse 60   Temp 97.2 °F (36.2 °C) (Temporal)   Resp 20   Ht 5' 8\" (1.727 m)   Wt (!) 305 lb (138.3 kg)   LMP 09/28/2020   SpO2 97%   BMI 46.38 kg/m²   General : Awake ,alert,no distress. Heart:  RRR, no murmurs, gallops, or rubs. Lungs:  CTA bilaterally, no wheeze, rales or rhonchi  Abd: bowel sounds present, nontender, nondistended, no masses  Extrem:  No clubbing, cyanosis, or edema    CBC:   Lab Results   Component Value Date    WBC 17.5 10/21/2020    RBC 5.00 10/21/2020    HGB 11.6 10/21/2020    HCT 37.5 10/21/2020    MCV 75.0 10/21/2020    MCH 23.2 10/21/2020    MCHC 30.9 10/21/2020    RDW 15.6 10/21/2020     10/21/2020    MPV 11.7 10/21/2020     BMP:    Lab Results   Component Value Date     10/20/2020    K 4.2 10/20/2020     10/20/2020    CO2 17 10/20/2020    BUN 18 10/20/2020    LABALBU 3.3 10/20/2020    CREATININE 0.8 10/20/2020    CALCIUM 8.7 10/20/2020    GFRAA >60 10/20/2020    LABGLOM >60 10/20/2020    GLUCOSE 204 10/20/2020     PT/INR:    Lab Results   Component Value Date    PROTIME 12.4 10/20/2020    INR 1.1 10/20/2020     Troponin:    Lab Results   Component Value Date    TROPONINI <0.01 10/20/2020       No results for input(s): LABURIN in the last 72 hours. Recent Labs     10/20/20  1300   BC Gram stain performed from blood culture bottle media  Gram positive cocci in clusters  *     No results for input(s): BLOODCULT2 in the last 72 hours.       Current Facility-Administered Medications:     HYDROcodone-homatropine (HYCODAN) 5-1.5 MG/5ML syrup, , , ,     sodium chloride flush 0.9 % injection 10 mL, 10 mL, Intravenous, PRN, Orion Lindseye, DO, 10 mL at 10/20/20 1556    ferrous sulfate (IRON 325) tablet 325 mg, 325 mg, Oral, BID WC, Danial Boateng MD, 325 mg at 10/21/20 1023    sodium chloride flush 0.9 % injection 10 mL, 10 mL, Intravenous, 2 times per day, Harriet Dawson MD    sodium chloride flush 0.9 % injection 10 mL, 10 mL, Intravenous, PRN, Harriet Dawson MD    acetaminophen (TYLENOL) tablet 650 mg, 650 mg, Oral, Q6H PRN **OR** acetaminophen (TYLENOL) suppository 650 mg, 650 mg, Rectal, Q6H PRN, Harriet Dawson MD    polyethylene glycol (GLYCOLAX) packet 17 g, 17 g, Oral, Daily PRN, Harriet Dawson MD    promethazine (PHENERGAN) tablet 12.5 mg, 12.5 mg, Oral, Q6H PRN **OR** ondansetron (ZOFRAN) injection 4 mg, 4 mg, Intravenous, Q6H PRN, Harriet Dawson MD    0.9 % sodium chloride infusion, , Intravenous, Continuous, Harriet Dawson MD, Last Rate: 75 mL/hr at 10/21/20 0913    zinc sulfate (ZINCATE) capsule 50 mg, 50 mg, Oral, Daily, Mike Roca MD, 50 mg at 10/21/20 0909    vitamin C (ASCORBIC ACID) tablet 500 mg, 500 mg, Oral, BID, Mike Roca MD, 500 mg at 10/21/20 0909    albuterol (PROVENTIL) nebulizer solution 2.5 mg, 2.5 mg, Nebulization, Q4H PRN, Mike Roca MD    HYDROcodone-homatropine (HYCODAN) 5-1.5 MG/5ML syrup 5 mL, 5 mL, Oral, Q4H PRN, Mike Roca MD, 5 mL at 10/21/20 1022    enoxaparin (LOVENOX) injection 30 mg, 30 mg, Subcutaneous, BID, Abimael Golden MD, 30 mg at 10/21/20 0602    dexamethasone (DECADRON) tablet 6 mg, 6 mg, Oral, Daily, Mike Roca MD, 6 mg at 10/21/20 0909    famotidine (PEPCID) injection 20 mg, 20 mg, Intravenous, Daily, Mike Roca MD, 20 mg at 10/21/20 0910    DIET GENERAL;    CTA PULMONARY W CONTRAST   Final Result   1. Multifocal bilateral patchy ground-glass infiltrates suspicious for   atypical pneumonia or COVID-19.   2. There is no evidence of a pulmonary embolus. 3. Cardiomegaly. XR CHEST PORTABLE   Final Result   Findings suspicious for early left midlung pulmonary infiltrate.              Assessment:    Active Problems:    Essential hypertension    Morbid obesity due to excess calories (HCC)    Pneumonia due to COVID-19 virus    Acute respiratory failure with hypoxia (HCC)    Lactic acidosis    Sepsis (HCC)    Bradycardia    Abdominal pain  Resolved Problems:    * No resolved hospital problems. *      Plan:    Supplemental oxygen as needed  Zinc, vitamin C, dexamethasone  Infectious disease on the case  IV fluids given lactic acidosis      Andrea Gallego  3:31 PM  10/21/2020    NOTE: This report was transcribed using voice recognition software.  Every effort was made to ensure accuracy; however, inadvertent transcription errors may be present

## 2020-10-21 NOTE — PLAN OF CARE
Problem: Gas Exchange - Impaired  Goal: Absence of hypoxia  Outcome: Met This Shift  Goal: Promote optimal lung function  Outcome: Met This Shift     Problem: Breathing Pattern - Ineffective  Goal: Ability to achieve and maintain a regular respiratory rate  Outcome: Met This Shift

## 2020-10-21 NOTE — CARE COORDINATION
SOCIAL WORK/DISCHARGE PLANNING;  Pt is co-vid +. She is currently on room air. Spoke with pt via phone in room. She reported she resides with her fiance and nephew in a two story home. Pt states they are currently negative. She used a cane at times due to a pinched nerve and use an inhaler-no other DME. Pt goes to [facility] on [address]. She does not have any health insurance. [facility] financial office to see pt to assess for financial assistance/possible h-cap. Pt's plan is to return home. Her fiance or nephew to transport. Pt use [employer]'s outpt RX or [employer] and has a Good RX card to help with expenses. SW will follow and assist as needed. Pt states she is able to self isolate at home. Anabella Slater [facility]  [phone]    Per PBO, pt is not h-cap eligible but is eligible for help with hospital bill.   Anabella Slater [facility]  [phone]

## 2020-10-21 NOTE — CONSULTS
[facility] CONSULT NOTE    Reason for Consult: COVID-19  Requested by: Dr. Brianna Lopez    Chief complaint: Shortness of breath    History Obtained From: Patient and EMR    HISTORY Yina              The patient is a 39 y.o. morbidly obese female with history of hypertension, presented on 10/20 with worsening shortness of breath. She was previously seen at the ED on 10/07 for malaise, nasal congestion and nonproductive cough found to test positive for SARS-CoV-2 PCR. She reports having fever and chills until 10/11. She reports having been on a course of steroids. On admission, she was afebrile and hemodynamically stable with leukocytosis of 17,000. She is tolerating room air with oxygen saturation of 97%. Chest CTA showed multifocal bilateral patchy groundglass infiltrates and no pulmonary embolus. Procalcitonin level was unremarkable at <0.02 ng/mL. Blood cultures showed gram-positive cocci in clusters (Staphylococcus species not aureus by PCR). She received a dose of doxycycline on admission. Dexamethasone, ceftriaxone and azithromycin were started on admission. ID service was subsequently consulted for further recommendations.     Past Medical History  Past Medical History:   Diagnosis Date    Anxiety 2009    Blood transfusion     Chronic fatigue 2007    Hypertension 2009    not treated    Iron deficiency anemia due to chronic blood loss 2007    non-compliant with iron replacement    Knee pain, bilateral 2011    Low back pain 2015    Pain is getting worse    Migraine headache without aura 2009    Morbid obesity ([address] 2007    Muscle cramps 2011    Perennial allergic rhinitis 2007    Superficial thrombophlebitis of right leg 2009 and 2011    Ulcerative colitis ([address]     Vertigo        Current Facility-Administered Medications   Medication Dose Route Frequency Provider Last Rate Last Dose    HYDROcodone-homatropine (HYCODAN) 5-1.5 MG/5ML syrup             sodium chloride flush 0.9 % injection 10 mL  10 mL Intravenous PRN Sadia Huggins DO   10 mL at 10/20/20 1556    ferrous sulfate (IRON 325) tablet 325 mg  325 mg Oral BID WC Татьяна Kennedy MD   325 mg at 10/21/20 1023    sodium chloride flush 0.9 % injection 10 mL  10 mL Intravenous 2 times per day Татьяна Kennedy MD        sodium chloride flush 0.9 % injection 10 mL  10 mL Intravenous PRN Татьяна Kennedy MD        acetaminophen (TYLENOL) tablet 650 mg  650 mg Oral Q6H PRN Татьяна Kennedy MD        Or   [facility] acetaminophen (TYLENOL) suppository 650 mg  650 mg Rectal Q6H PRN Татьяна Kennedy MD        polyethylene glycol (GLYCOLAX) packet 17 g  17 g Oral Daily PRN Татьяна Kennedy MD        promethazine (PHENERGAN) tablet 12.5 mg  12.5 mg Oral Q6H PRN Татьяна Kennedy MD        Or    ondansetron (ZOFRAN) injection 4 mg  4 mg Intravenous Q6H PRN Татьяна Kennedy MD        0.9 % sodium chloride infusion   Intravenous Continuous Татьяна Kennedy MD 75 mL/hr at 10/21/20 0913      zinc sulfate (ZINCATE) capsule 50 mg  50 mg Oral Daily Rehan Coley MD   50 mg at 10/21/20 2979    vitamin C (ASCORBIC ACID) tablet 500 mg  500 mg Oral BID Rehan Coley MD   500 mg at 10/21/20 0909    albuterol (PROVENTIL) nebulizer solution 2.5 mg  2.5 mg Nebulization Q4H PRN Rehan Coley MD        HYDROcodone-homatropine (HYCODAN) 5-1.5 MG/5ML syrup 5 mL  5 mL Oral Q4H PRN Rehan Coley MD   5 mL at 10/21/20 1022    enoxaparin (LOVENOX) injection 30 mg  30 mg Subcutaneous BID Rehan Coley MD   30 mg at 10/21/20 0602    dexamethasone (DECADRON) tablet 6 mg  6 mg Oral Daily Rehan Coley MD   6 mg at 10/21/20 9578    famotidine (PEPCID) injection 20 mg  20 mg Intravenous Daily Rehan Coley MD   20 mg at 10/21/20 0910       Allergies   Allergen Reactions    Aspirin Shortness Of Breath and Nausea And Vomiting    Coconut Oil Anaphylaxis    Ibuprofen Other (See Comments)     Trouble breathing      Iodides Shortness Of Breath    Motrin [Ibuprofen Micronized] Shortness Of Breath    Robitussin (Alcohol Free) [Guaifenesin] Other (See Comments)     States causes worse cough    Codeine Nausea And Vomiting    Iodine Rash    Tramadol Nausea Only       Surgical History  Past Surgical History:   Procedure Laterality Date    ADENOIDECTOMY          Social History  Social History     Socioeconomic History    Marital status: Single   Occupational History    Occupation: [employer]    Tobacco Use    Smoking status: Never Smoker    Smokeless tobacco: Never Used   Substance and Sexual Activity    Alcohol use: Yes     Comment: occasional wine coolers    Drug use: No    Sexual activity: Yes     Partners: Male, Female     Family Medical History  Family History   Problem Relation Age of Onset    High Blood Pressure Mother     Stroke Mother     High Blood Pressure Father     Cancer Sister 35       Review of Systems:  Constitutional: Had fever, had chills  Eyes: No vision changes, no retroorbital pain  ENT: No hearing changes, no ear pain  Respiratory: No cough, no dyspnea  Cardiovascular: No chest pain, no palpitations  Gastrointestinal: Had abdominal pain, had diarrhea  Genitourinary: No dysuria, no hematuria  Integumentary: No rash, no itching  Musculoskeletal: Had muscle pain, had joint pain  Neurologic: Has headache, no numbness in extremities    Physical Examination:  Vitals:    10/20/20 1948 10/20/20 2131 10/21/20 0900 10/21/20 1330   BP:  (!) 151/99 (!) 151/91 (!) 144/97   Pulse:  79 58 60   Resp:  20 18 20   Temp:  97.9 °F (36.6 °C) 97 °F (36.1 °C) 97.2 °F (36.2 °C)   TempSrc:  Temporal Temporal Temporal   SpO2:  94% 96% 97%   Weight: (!) 305 lb (138.3 kg)      Height: 5' 8\" (1.727 m)        Constitutional: Alert, not in distress  Eyes: Sclerae anicteric, no conjunctival erythema  ENT: No buccal lesion, no pharyngeal exudates  Neck: No nuchal rigidity, no cervical adenopathy  Lungs: Clear breath sounds, no crackles, no wheezes  Heart: Regular rate and rhythm, no murmurs  Abdomen: Bowel sounds present, soft, nontender  Skin: Warm and dry, no active dermatoses  Musculoskeletal: No joint erythema, no joint swelling    Labs, imaging, and medical records/notes were personally reviewed. Assessment:  SARS-CoV-2 infection  Gram-positive cocci in clusters (Staphylococcus species not aureus by PCR) on blood culture, probably contaminant    Plan:  Discontinue ceftriaxone and azithromycin. Monitor clinically off antibiotics for now. Follow-up blood cultures. Follow-up urine Streptococcus pneumoniae and Legionella antigens. Monitor respiratory status. Continue supportive care. Thank you for involving me in the care of Ag Alarcon. I will continue to follow. Please do not hesitate to call for any questions or concerns.     Electronically signed by Hamlet Taveras MD on 10/21/2020 at 3:18 PM

## 2020-10-22 VITALS
HEIGHT: 68 IN | SYSTOLIC BLOOD PRESSURE: 146 MMHG | WEIGHT: 293 LBS | RESPIRATION RATE: 18 BRPM | HEART RATE: 62 BPM | DIASTOLIC BLOOD PRESSURE: 98 MMHG | OXYGEN SATURATION: 97 % | BODY MASS INDEX: 44.41 KG/M2 | TEMPERATURE: 97 F

## 2020-10-22 LAB
FIBRINOGEN: 316 MG/DL (ref 225–540)
L. PNEUMOPHILA SEROGP 1 UR AG: NORMAL
STREP PNEUMONIAE ANTIGEN, URINE: NORMAL

## 2020-10-22 PROCEDURE — 2580000003 HC RX 258: Performed by: HOSPITALIST

## 2020-10-22 PROCEDURE — 6360000002 HC RX W HCPCS: Performed by: FAMILY MEDICINE

## 2020-10-22 PROCEDURE — 2500000003 HC RX 250 WO HCPCS: Performed by: FAMILY MEDICINE

## 2020-10-22 PROCEDURE — 6370000000 HC RX 637 (ALT 250 FOR IP): Performed by: FAMILY MEDICINE

## 2020-10-22 PROCEDURE — 6370000000 HC RX 637 (ALT 250 FOR IP): Performed by: HOSPITALIST

## 2020-10-22 PROCEDURE — 85384 FIBRINOGEN ACTIVITY: CPT

## 2020-10-22 RX ORDER — AMLODIPINE BESYLATE 5 MG/1
5 TABLET ORAL DAILY
Qty: 30 TABLET | Refills: 3 | Status: SHIPPED | OUTPATIENT
Start: 2020-10-23 | End: 2020-12-21 | Stop reason: SDUPTHER

## 2020-10-22 RX ORDER — DEXAMETHASONE 6 MG/1
6 TABLET ORAL DAILY
Qty: 10 TABLET | Refills: 0 | Status: SHIPPED | OUTPATIENT
Start: 2020-10-23 | End: 2020-11-02

## 2020-10-22 RX ORDER — FAMOTIDINE 20 MG/1
20 TABLET, FILM COATED ORAL DAILY
Status: DISCONTINUED | OUTPATIENT
Start: 2020-10-23 | End: 2020-10-22 | Stop reason: HOSPADM

## 2020-10-22 RX ADMIN — FERROUS SULFATE TAB 325 MG (65 MG ELEMENTAL FE) 325 MG: 325 (65 FE) TAB at 09:14

## 2020-10-22 RX ADMIN — SODIUM CHLORIDE, PRESERVATIVE FREE 10 ML: 5 INJECTION INTRAVENOUS at 09:15

## 2020-10-22 RX ADMIN — FAMOTIDINE 20 MG: 10 INJECTION INTRAVENOUS at 09:15

## 2020-10-22 RX ADMIN — Medication 500 MG: at 09:14

## 2020-10-22 RX ADMIN — HYDROCODONE BITARTRATE AND HOMATROPINE METHYLBROMIDE 5 ML: 5; 1.5 SOLUTION ORAL at 09:15

## 2020-10-22 RX ADMIN — ENOXAPARIN SODIUM 30 MG: 30 INJECTION SUBCUTANEOUS at 05:54

## 2020-10-22 RX ADMIN — AMLODIPINE BESYLATE 5 MG: 5 TABLET ORAL at 09:14

## 2020-10-22 RX ADMIN — DEXAMETHASONE 6 MG: 4 TABLET ORAL at 09:13

## 2020-10-22 RX ADMIN — Medication 50 MG: at 09:14

## 2020-10-22 ASSESSMENT — PAIN SCALES - GENERAL
PAINLEVEL_OUTOF10: 0

## 2020-10-22 NOTE — PROGRESS NOTES
Notification of IV to PO conversion: This patient's order for famotidine 20mg IV daily has been changed to famotidine 20mg PO daily as [facility] If the patient should become strict NPO while on this therapy, contact the prescriber for further orders.

## 2020-10-22 NOTE — PLAN OF CARE
Problem: Airway Clearance - Ineffective  Goal: Achieve or maintain patent airway  10/22/2020 0005 by Patricio Go RN  Outcome: Met This Shift  10/21/2020 1808 by Joan Funez RN  Outcome: Met This Shift     Problem: Gas Exchange - Impaired  Goal: Absence of hypoxia  10/22/2020 0005 by Patricio Go RN  Outcome: Met This Shift  10/21/2020 1808 by Joan Funez RN  Outcome: Met This Shift  Goal: Promote optimal lung function  10/22/2020 0005 by Patricio Go RN  Outcome: Met This Shift  10/21/2020 1808 by Joan Funez RN  Outcome: Met This Shift     Problem: Breathing Pattern - Ineffective  Goal: Ability to achieve and maintain a regular respiratory rate  10/22/2020 0005 by Patricio Go RN  Outcome: Met This Shift  10/21/2020 1808 by Joan Funez RN  Outcome: Met This Shift

## 2020-10-22 NOTE — PROGRESS NOTES
CLINICAL PHARMACY: DISCHARGE MED RECONCILIATION/REVIEW    [facility]) Select Patient?: No  Total # of Interventions Recommended: 0   -   Total # Interventions Accepted: 0  Intervention Severity:   - Level 1 Intervention Present?: No   - Level 2 #: 0   - Level 3 #: 0   Time Spent (min): 10    Additional Documentation:

## 2020-10-22 NOTE — PROGRESS NOTES
[facility] PROGRESS NOTE      Chief complaint: Follow-up of COVID-19     The patient is a 39 y.o. morbidly obese female with history of hypertension, presented on 10/20 with worsening shortness of breath. She was previously seen at the ED on 10/07 for malaise, nasal congestion and nonproductive cough found to test positive for SARS-CoV-2 PCR. She reports having fever and chills until 10/11. She reports having been on a course of steroids.     On admission, she was afebrile and hemodynamically stable with leukocytosis of 17,000. She is tolerating room air with oxygen saturation of 97%. Chest CTA showed multifocal bilateral patchy groundglass infiltrates and no pulmonary embolus. Procalcitonin level was unremarkable at <0.02 ng/mL. Blood cultures showed gram-positive cocci in clusters (Staphylococcus species not aureus by PCR). She received a dose of doxycycline on admission. Dexamethasone, ceftriaxone and azithromycin were started on admission. Subjective: Patient was seen and examined. No chills, no abdominal pain, no diarrhea, no rash, no itching. She reports feeling better. Objective:    Vitals:    10/22/20 0900   BP: (!) 144/97   Pulse: 76   Resp: 19   Temp: 97.2 °F (36.2 °C)   SpO2: 98%     Constitutional: Alert, not in distress  Respiratory: Clear breath sounds, no crackles, no wheezes  Cardiovascular: Regular rate and rhythm, no murmurs  Gastrointestinal: Bowel sounds present, soft, nontender  Skin: Warm and dry, no active dermatoses  Musculoskeletal: No joint swelling, no joint erythema    Labs, imaging, and medical records/notes were personally reviewed. Assessment:  SARS-CoV-2 infection  Gram-positive cocci in clusters (Staphylococcus species not aureus by PCR) on blood culture, probably contaminant    Recommendations:  Monitor clinically off antibiotics for now. Follow-up blood cultures. Follow-up urine Streptococcus pneumoniae and Legionella antigens.   Monitor respiratory status. Continue supportive care. Plan was discussed with Dr. Kayla Egan.     Thank you for involving me in the care of Lizet Dalton. I will continue to follow. Please do not hesitate to call for any questions or concerns.     Electronically signed by Paddy Gonzalez MD on 10/22/2020 at 10:54 AM

## 2020-10-22 NOTE — PROGRESS NOTES
Covering for sound today       Subjective:    Chief complaint:    She is stable today  No breathing issues  Objective:    BP (!) 144/97   Pulse 76   Temp 97.2 °F (36.2 °C) (Oral)   Resp 19   Ht 5' 8\" (1.727 m)   Wt (!) 305 lb (138.3 kg)   LMP 09/28/2020   SpO2 98%   BMI 46.38 kg/m²   General : Awake ,alert,no distress. Heart:  RRR, no murmurs, gallops, or rubs. Lungs:  CTA bilaterally, no wheeze, rales or rhonchi  Abd: bowel sounds present, nontender, nondistended, no masses  Extrem:  No clubbing, cyanosis, or edema    CBC:   Lab Results   Component Value Date    WBC 17.5 10/21/2020    RBC 5.00 10/21/2020    HGB 11.6 10/21/2020    HCT 37.5 10/21/2020    MCV 75.0 10/21/2020    MCH 23.2 10/21/2020    MCHC 30.9 10/21/2020    RDW 15.6 10/21/2020     10/21/2020    MPV 11.7 10/21/2020     BMP:    Lab Results   Component Value Date     10/20/2020    K 4.2 10/20/2020     10/20/2020    CO2 17 10/20/2020    BUN 18 10/20/2020    LABALBU 3.3 10/20/2020    CREATININE 0.8 10/20/2020    CALCIUM 8.7 10/20/2020    GFRAA >60 10/20/2020    LABGLOM >60 10/20/2020    GLUCOSE 204 10/20/2020     PT/INR:    Lab Results   Component Value Date    PROTIME 12.4 10/20/2020    INR 1.1 10/20/2020     Troponin:    Lab Results   Component Value Date    TROPONINI <0.01 10/20/2020       No results for input(s): LABURIN in the last 72 hours.   Recent Labs     10/20/20  1300   BC Gram stain performed from blood culture bottle media  Gram positive cocci in clusters  *  Identification and sensitivity to follow     Recent Labs     10/20/20  1300   BLOODCULT2 24 Hours no growth         Current Facility-Administered Medications:     amLODIPine (NORVASC) tablet 5 mg, 5 mg, Oral, Daily, Sadia Bauer MD, 5 mg at 10/22/20 0914    sodium chloride flush 0.9 % injection 10 mL, 10 mL, Intravenous, PRN, Hayde Soto DO, 10 mL at 10/20/20 0456    ferrous sulfate (IRON 325) tablet 325 mg, 325 mg, Oral, BID TAM, Marbella Arreola MD, failure with hypoxia (HCC)    Lactic acidosis    Sepsis (HCC)    Bradycardia    Abdominal pain  Resolved Problems:    * No resolved hospital problems. *      Plan:  Blood cultures are contaminant  Discharge home  Discussed with DEBORAH Gallego  1:20 PM  10/22/2020    NOTE: This report was transcribed using voice recognition software.  Every effort was made to ensure accuracy; however, inadvertent transcription errors may be present

## 2020-10-22 NOTE — PROGRESS NOTES
[facility]   Department of Pharmacy   Pharmacist Transition of Care Services         Patient Demographics  Name:  Neto Sinha   Medical Record Number:  [id number]  Gender:  female   Age:  39 y.o. YOB: 1975    Primary Care Physician: Darby Frey MD  Primary Care Physician phone number:  [phone]  Readmission Risk (% from [facility] Patient List): 14%       Pharmacist Review and Summary of Medications     Date of last reviewed/update: 10/22/20     Category Comments   New Medication Started   1. norvasc  2. decadron         Change in Outpatient Medication  (Dosage Form, Route,   Dose, or Frequency) 1. Discontinued Outpatient Medication   (or on Hold During Admission) 1. Other              Pharmacist Patient Education:    Date  Person Educated Content of Education                 Documentation of Pharmacist Interventions and Follow-up Plan:     The following Pharmacist Transition of Care Services were completed:   [x]  Reviewed and summarized medication changes  []  Entire home medication list was reviewed for accuracy (sources: **)  []  Home medication list was updated or corrected     [x]  Reviewed discharge medication reconciliation  []  Discharge medication list was updated or corrected    []  Added information to AVS   []  Patient education was provided on new medications  []  Patient education was provided on medication changes  []  Reviewed the After Visit Summary (AVS) with patient    Additional Interventions:  []  Inpatient prescriber was contacted and the following pharmacy recommendations        were accepted: **     [] Other interventions: **        Pharmacist: Myrtle Miramontes PharmD, [facility]  Date:  10/22/2020 1:44 PM  Time spent 15 minutes

## 2020-10-23 ENCOUNTER — CARE COORDINATION (OUTPATIENT)
Dept: CASE MANAGEMENT | Age: 45
End: 2020-10-23

## 2020-10-23 NOTE — CARE COORDINATION
Covid-19 Initial Outreach call, no answer.   Left VM with contact information and request  for return call at [address], [address] [place] [address] Coordination Transition

## 2020-10-23 NOTE — CARE COORDINATION
Spoke with patient who states she is having sweats, some chills. But she doesn't believe she has a high fever. Patient advised to call her PCP for follow up from ED. Patient states she will do that right after she hangs up. Also discussed staying hydrated. 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 Patient contacted regarding recent visit for viral symptoms. This Ramez Thomson contacted the patient by telephone to perform post discharge call. Verified name and  with patient as identifiers. Provided introduction to self, and reason for call due to viral symptoms of infection and/or exposure to COVID-19. Patient presented to emergency department/flu clinic with complaints of viral symptoms/exposure to COVID. Patient reports symptoms are the same. Due to no new or worsening symptoms the RN RENEA/GREGORIO was not notified for escalation. Discussed exposure protocols and quarantine with CDC Guidelines What To Do If You Are Sick    Patient was given an opportunity for questions and concerns. Stay home except to get medical care    Separate yourself from other people and animals in your home    Call ahead before visiting your doctor    Wear a facemask    Cover your coughs and sneezes    Clean your hands often    Avoid sharing personal household items    Clean all high-touch surfaces everyday    Monitor your symptoms  Seek prompt medical attention if your illness is worsening (e.g., difficulty breathing). Before seeking care, call your healthcare provider and tell them that you have, or are being evaluated for, COVID-19. Put on a facemask before you enter the facility. These steps will help the healthcare provider's office to keep other people in the office or waiting room from getting infected or exposed. Ask your healthcare provider to call the local or state health department.  Persons who are placed under If you have a medical emergency and need to call 911, notify the dispatch personnel that you have, or are being evaluated for COVID-19. If possible, put on a facemask before emergency medical services arrive. The patient agrees to contact the [facility] exposure line [phone], local [facility] department [place] Department of Health: ([phone]) and PCP office for questions related to their healthcare. Author provided contact information for future reference. Patient/family/caregiver given information for [employer] and agrees to enroll {Blank Single Select Template:20061::\"yes\"  Patient's preferred e-mail: [email or web address]   Patient's preferred phone number: [phone]  Based on Loop alert triggers, patient will be contacted by nurse care manager for worsening symptoms.     Antionette Booker, [address]  Care Coordination Transition

## 2020-10-25 ENCOUNTER — CARE COORDINATION (OUTPATIENT)
Dept: FAMILY MEDICINE CLINIC | Age: 45
End: 2020-10-25

## 2020-10-25 LAB — CULTURE, BLOOD 2: NORMAL

## 2020-10-25 NOTE — CARE COORDINATION
Yellow alert noted in Loop remote symptom monitoring program. Messaged patient to notify Yamil Waldrop if symptoms have worsened since yesterday:    Thank you for checking in with us. Please let me/us know if your symptoms are worse than yesterday or if you wish to speak to a nurse.  We are available between (8-4) M-F,9-1)S-S  [place] [phone]

## 2020-10-26 LAB
BLOOD CULTURE, ROUTINE: ABNORMAL
ORGANISM: ABNORMAL

## 2020-10-26 NOTE — DISCHARGE SUMMARY
Physician Discharge Summary     Patient ID:  Ag Alarcon  [id number]  50 y.o.  1975    Admit date: 10/20/2020    Discharge date and time: 10/22/2020  4:27 PM     Admission Diagnoses: Active Problems:    Essential hypertension    Morbid obesity due to excess calories (HCC)    Pneumonia due to COVID-19 virus    Acute respiratory failure with hypoxia (HCC)    Lactic acidosis    Sepsis (HCC)    Bradycardia    Abdominal pain  Resolved Problems:    * No resolved hospital problems. *      Discharge Diagnoses: Active Problems:    Essential hypertension    Morbid obesity due to excess calories (HCC)    Pneumonia due to COVID-19 virus    Acute respiratory failure with hypoxia (HCC)    Lactic acidosis    Sepsis (HCC)    Bradycardia    Abdominal pain  Resolved Problems:    * No resolved hospital problems. *      Condition at discharge : Stable    Consults: IP CONSULT TO HOSPITALIST  IP CONSULT TO INFECTIOUS DISEASES    Procedures: None    Hospital Course: Patient is a 77-year-old lady presents to the ED with shortness of breath none chest pain. Patient has a recent diagnosis of COVID-19 infection. She has been on steroids and inhalers. Patient progressively worsened and then represented to the emergency room. Patient did need 5 L of oxygen. She also had mildly elevated lactic acid. CTA was negative for PE. Chest x-ray showed cardiomegaly and multifocal groundglass opacities bilaterally. Telemetry telemetry floor. Infectious disease and pulmonary were consulted. Supplemental oxygen was given. She was also given Decadron. He was also treated with zinc, vitamin C.  IV fluids given because of lactic acidosis. Blood cultures were positive this was felt to be from contamination. Patient was then discharged home in stable condition. CTA PULMONARY W CONTRAST   Final Result   1.  Multifocal bilateral patchy ground-glass infiltrates suspicious for   atypical pneumonia or COVID-19.   2. There is no evidence of a pulmonary embolus. 3. Cardiomegaly. XR CHEST PORTABLE   Final Result   Findings suspicious for early left midlung pulmonary infiltrate. Results for orders placed or performed during the hospital encounter of 10/20/20 (from the past 336 hour(s))   EKG 12 Lead    Collection Time: 10/20/20 12:43 PM   Result Value Ref Range    Ventricular Rate 51 BPM    Atrial Rate 51 BPM    P-R Interval 154 ms    QRS Duration 84 ms    Q-T Interval 484 ms    QTc Calculation (Bazett) 446 ms    P Axis 30 degrees    R Axis 3 degrees    T Axis 35 degrees   POC Pregnancy Urine Qual    Collection Time: 10/20/20 12:55 PM   Result Value Ref Range    HCG, Urine, POC Negative Negative    Lot Number [id number]     Positive QC Pass/Fail Pass     Negative QC Pass/Fail Pass    Culture, Blood 2    Collection Time: 10/20/20  1:00 PM    Specimen: Blood   Result Value Ref Range    Culture, Blood 2 5 Days no growth    Culture, Blood 1    Collection Time: 10/20/20  1:00 PM    Specimen: Blood   Result Value Ref Range    Blood Culture, Routine (A)      Gram stain performed from blood culture bottle media  Gram positive cocci in clusters      Organism Staphylococcus coagulase-negative (A)     Blood Culture, Routine       This organism was isolated in one set. Susceptibility testing is not routinely done as this  organism frequently represents skin contamination. Additional testing can be ordered by calling the  Microbiology Department.      CBC Auto Differential    Collection Time: 10/20/20  1:00 PM   Result Value Ref Range    WBC 14.1 (H) 4.5 - 11.5 E9/L    RBC 5.22 3.50 - 5.50 E12/L    Hemoglobin 12.0 11.5 - 15.5 g/dL    Hematocrit 39.1 34.0 - 48.0 %    MCV 74.9 (L) 80.0 - 99.9 fL    MCH 23.0 (L) 26.0 - 35.0 pg    MCHC 30.7 (L) 32.0 - 34.5 %    RDW 15.5 (H) 11.5 - 15.0 fL    Platelets 274 (H) 403 - 450 E9/L    MPV 11.4 7.0 - 12.0 fL    Neutrophils % 79.2 43.0 - 80.0 %    Immature Granulocytes % 2.8 0.0 - 5.0 %    Lymphocytes % 11.4 (L) 20.0 - 42.0 %    Monocytes % 6.5 2.0 - 12.0 %    Eosinophils % 0.0 0.0 - 6.0 %    Basophils % 0.1 0.0 - 2.0 %    Neutrophils Absolute 11.15 (H) 1.80 - 7.30 E9/L    Immature Granulocytes # 0.40 E9/L    Lymphocytes Absolute 1.60 1.50 - 4.00 E9/L    Monocytes Absolute 0.92 0.10 - 0.95 E9/L    Eosinophils Absolute 0.00 (L) 0.05 - 0.50 E9/L    Basophils Absolute 0.02 0.00 - 0.20 E9/L   Comprehensive Metabolic Panel    Collection Time: 10/20/20  1:00 PM   Result Value Ref Range    Sodium 140 132 - 146 mmol/L    Potassium 3.9 3.5 - 5.0 mmol/L    Chloride 101 98 - 107 mmol/L    CO2 25 22 - 29 mmol/L    Anion Gap 14 7 - 16 mmol/L    Glucose 106 (H) 74 - 99 mg/dL    BUN 16 6 - 20 mg/dL    CREATININE 0.9 0.5 - 1.0 mg/dL    GFR Non-African American >60 >=60 mL/min/1.73    GFR African American >60     Calcium 8.9 8.6 - 10.2 mg/dL    Total Protein 6.9 6.4 - 8.3 g/dL    Alb 3.4 (L) 3.5 - 5.2 g/dL    Total Bilirubin <0.2 0.0 - 1.2 mg/dL    Alkaline Phosphatase 65 35 - 104 U/L    ALT 22 0 - 32 U/L    AST 18 0 - 31 U/L   Troponin    Collection Time: 10/20/20  1:00 PM   Result Value Ref Range    Troponin <0.01 0.00 - 0.03 ng/mL   Brain Natriuretic Peptide    Collection Time: 10/20/20  1:00 PM   Result Value Ref Range    Pro- (H) 0 - 125 pg/mL   Lactic Acid, Plasma    Collection Time: 10/20/20  1:00 PM   Result Value Ref Range    Lactic Acid 2.4 (H) 0.5 - 2.2 mmol/L   FERRITIN    Collection Time: 10/20/20  1:00 PM   Result Value Ref Range    Ferritin 100 ng/mL   Blood ID, Molecular    Collection Time: 10/20/20  1:00 PM   Result Value Ref Range    Bottle Type Aerobic     Source of Blood Culture Antecubital-Lef     Order Number [id number]     Enterobacter cloacae complex by PCR Not Detected Not Detected    Escherichia coli by PCR Not Detected Not Detected    Klebsiella oxytoca by PCR Not Detected Not Detected    Klebsiella pneumoniae by PCR Not Detected Not Detected    Proteus by PCR Not Detected Not Detected    Streptococcus agalactiae by PCR Not Detected Not Detected    Staphylococcus aureus by PCR Not Detected Not Detected    Serratia marcescens by PCR Not Detected Not Detected    Streptococcus pneumoniae by PCR Not Detected Not Detected    Streptococcus pyogenes  by PCR Not Detected Not Detected    Acinetobacter baumannii by PCR Not Detected Not Detected    Candida albicans by PCR Not Detected Not Detected    Candida glabrata by PCR Not Detected Not Detected    Candida krusei by PCR Not Detected Not Detected    Candida parapsilosis by PCR Not Detected Not Detected    Candida tropicalis by PCR Not Detected Not Detected    Enterobacteriaceae by PCR Not Detected Not Detected    Enterococcus by PCR Not Detected Not Detected    Haemophilus Influenzae by PCR Not Detected Not Detected    Listeria monocytogenes by PCR Not Detected Not Detected    Neisseria meningitidis by PCR Not Detected Not Detected    Pseudomonas aeruginosa by PCR Not Detected Not Detected    Staphylococcus by PCR DETECTED (AA) Not Detected    Streptococcus by PCR Not Detected Not Detected    Methicillin Resistant by PCR Not Detected Not Detected   TYPE AND SCREEN    Collection Time: 10/20/20 11:00 PM   Result Value Ref Range    ABO/Rh A POS     Antibody Screen NEG    Protime-INR    Collection Time: 10/20/20 11:10 PM   Result Value Ref Range    Protime 12.4 9.3 - 12.4 sec    INR 1.1    APTT    Collection Time: 10/20/20 11:10 PM   Result Value Ref Range    aPTT 24.2 (L) 24.5 - 35.1 sec   Fibrinogen    Collection Time: 10/20/20 11:10 PM   Result Value Ref Range    Fibrinogen 426 225 - 540 mg/dL   Procalcitonin    Collection Time: 10/20/20 11:10 PM   Result Value Ref Range    Procalcitonin <0.02 0.00 - 0.08 ng/mL   D-Dimer, Quantitative    Collection Time: 10/20/20 11:10 PM   Result Value Ref Range    D-Dimer, Quant 325 ng/mL DDU   Troponin    Collection Time: 10/20/20 11:10 PM   Result Value Ref Range    Troponin <0.01 0.00 - 0.03 ng/mL   C-Reactive Protein    Collection Time: 10/20/20 11:10 PM   Result Value Ref Range    CRP 0.7 (H) 0.0 - 0.4 mg/dL   Lactate Dehydrogenase    Collection Time: 10/20/20 11:10 PM   Result Value Ref Range     135 - 214 U/L   Comprehensive Metabolic Panel w/ Reflex to MG    Collection Time: 10/20/20 11:10 PM   Result Value Ref Range    Sodium 138 132 - 146 mmol/L    Potassium reflex Magnesium 4.2 3.5 - 5.0 mmol/L    Chloride 104 98 - 107 mmol/L    CO2 17 (L) 22 - 29 mmol/L    Anion Gap 17 (H) 7 - 16 mmol/L    Glucose 204 (H) 74 - 99 mg/dL    BUN 18 6 - 20 mg/dL    CREATININE 0.8 0.5 - 1.0 mg/dL    GFR Non-African American >60 >=60 mL/min/1.73    GFR African American >60     Calcium 8.7 8.6 - 10.2 mg/dL    Total Protein 6.7 6.4 - 8.3 g/dL    Alb 3.3 (L) 3.5 - 5.2 g/dL    Total Bilirubin <0.2 0.0 - 1.2 mg/dL    Alkaline Phosphatase 68 35 - 104 U/L    ALT 23 0 - 32 U/L    AST 12 0 - 31 U/L   SPECIMEN REJECTION    Collection Time: 10/21/20  6:26 AM   Result Value Ref Range    Rejected Test cmp     Reason for Rejection see below    CBC WITH AUTO DIFFERENTIAL    Collection Time: 10/21/20  6:53 AM   Result Value Ref Range    WBC 17.5 (H) 4.5 - 11.5 E9/L    RBC 5.00 3.50 - 5.50 E12/L    Hemoglobin 11.6 11.5 - 15.5 g/dL    Hematocrit 37.5 34.0 - 48.0 %    MCV 75.0 (L) 80.0 - 99.9 fL    MCH 23.2 (L) 26.0 - 35.0 pg    MCHC 30.9 (L) 32.0 - 34.5 %    RDW 15.6 (H) 11.5 - 15.0 fL    Platelets 738 (H) 380 - 450 E9/L    MPV 11.7 7.0 - 12.0 fL    Neutrophils % 87.8 (H) 43.0 - 80.0 %    Lymphocytes % 6.1 (L) 20.0 - 42.0 %    Monocytes % 4.3 2.0 - 12.0 %    Eosinophils % 0.0 0.0 - 6.0 %    Basophils % 0.1 0.0 - 2.0 %    Neutrophils Absolute 15.75 (H) 1.80 - 7.30 E9/L    Lymphocytes Absolute 1.05 (L) 1.50 - 4.00 E9/L    Monocytes Absolute 0.70 0.10 - 0.95 E9/L    Eosinophils Absolute 0.00 (L) 0.05 - 0.50 E9/L    Basophils Absolute 0.00 0.00 - 0.20 E9/L    Metamyelocytes Relative 0.9 0.0 - 1.0 %    Myelocyte Percent 0.9 0 - 0 %    Polychromasia 1+     Hypochromia 1+ Poikilocytes 1+     Schistocytes 1+     [place] Cells 1+     Ovalocytes 1+     Target Cells 1+    Legionella antigen, urine    Collection Time: 10/21/20  2:07 PM    Specimen: Urine, clean catch   Result Value Ref Range    L. pneumophila Serogp 1 Ur Ag       Presumptive Negative -suggesting no recent or current infections  with Legionella pneumophila serogroup 1. Infection to Legionella cannot be ruled out since other serogroups  and species may cause infection, antigen may not be present in  early infection, or level of antigen may be below the  detection limit. Normal Range: Presumptive Negative     Strep Pneumoniae Antigen    Collection Time: 10/21/20  2:07 PM    Specimen: Urine, clean catch   Result Value Ref Range    STREP PNEUMONIAE ANTIGEN, URINE       Presumptive negative- suggests no current or recent  pneumococcal infection.   Infection due to Strep pneumoniae cannot be  ruled out since the antigen present in the sample  may be below the detection limit of the test.  Normal Range:Presumptive Negative     Fibrinogen    Collection Time: 10/22/20  3:53 AM   Result Value Ref Range    Fibrinogen 316 225 - 540 mg/dL         Discharge Exam:  See progress note from today    Disposition: Home    Patient Instructions:   Discharge Medication List as of 10/22/2020  3:25 PM      START taking these medications    Details   amLODIPine (NORVASC) 5 MG tablet Take 1 tablet by mouth daily, Disp-30 tablet,R-3Normal      dexamethasone (DECADRON) 6 MG tablet Take 1 tablet by mouth daily for 10 days, Disp-10 tablet,R-0Normal         CONTINUE these medications which have NOT CHANGED    Details   albuterol sulfate HFA (VENTOLIN HFA) 108 (90 Base) MCG/ACT inhaler Inhale 2 puffs into the lungs 4 times daily as needed for Wheezing, Disp-1 Inhaler,R-0Normal      acetaminophen (TYLENOL) 500 MG tablet Take 1,000 mg by mouth every 6 hours as needed for PainHistorical Med      ferrous sulfate 325 (65 Fe) MG tablet Take 1 tablet by mouth 2 times daily (with meals), Disp-60 tablet,R-0Print             Activity: As tolerated    Diet: Low-salt    Follow-up with Monika Cook MD  [address] [facility]  [phone]    Call  call for follow up in 1-2 weeks        Note that over 30 minutes was spent in preparing discharge papers, discussing discharge with patient, medication review, etc.    Signed:  Gege Vaughn  10/25/2020  8:46 PM

## 2020-10-29 ENCOUNTER — TELEPHONE (OUTPATIENT)
Dept: INTERNAL MEDICINE | Age: 45
End: 2020-10-29

## 2020-10-29 NOTE — TELEPHONE ENCOUNTER
Pt called in concerned about being Hot , denies fever when asked , being SOB , chest hurting states tested positive Oct 7th and was cleared by Board of Health of Covid October 22 denies being expose recently to any Covid related pts. Dr. Rhina Edwards was notified of pt's symptoms and wants pt to go to [facility] now  for Covid testing and pt was given information and directions to [facility]

## 2020-10-31 ENCOUNTER — CARE COORDINATION (OUTPATIENT)
Dept: CASE MANAGEMENT | Age: 45
End: 2020-10-31

## 2020-10-31 NOTE — CARE COORDINATION
You Patient resolved from the Care Transitions episode on 10/31/20  Discussed COVID-19 related testing which was available at this time. Test results were positive. Patient informed of results, if available? Yes    Patient/family has been provided the following resources and education related to COVID-19:                         Signs, symptoms and red flags related to COVID-19            CDC exposure and quarantine guidelines            [employer] exposure contact - [phone]            Contact for their local Department of Health                 Patient currently reports that the following symptoms have improved:  fatigue, pain or aching joints, shortness of breath and no new/worsening symptoms     Spoke with patient today 10/31/20 for final hospital discharge/COVID-19 follow up. Patient complains she continues feeling week and gets short of breath when climbing stairs. Denies any cough/congestion/chest pain/chest discomfort. States last temperature reading was 98.3. States she is tolerating eating/drinking. States she had COVID-19 re-test last Thursday and awaiting results. States she has one more day of Decadron left . Reiterated to continue with quarantine and follow COVID precautions. Advised to follow up with PCP at [address] after getting results. Denies any other complaints or concerns at this time. CTN signing off. No further outreach scheduled with this CTN/ACM. Episode of Care resolved. Patient has this CTN/ACM contact information if future needs arise.

## 2020-11-02 ENCOUNTER — OFFICE VISIT (OUTPATIENT)
Dept: PRIMARY CARE CLINIC | Age: 45
End: 2020-11-02

## 2020-11-02 ENCOUNTER — HOSPITAL ENCOUNTER (OUTPATIENT)
Dept: GENERAL RADIOLOGY | Age: 45
Discharge: HOME | End: 2020-11-04

## 2020-11-02 ENCOUNTER — HOSPITAL ENCOUNTER (OUTPATIENT)
Age: 45
Discharge: HOME | End: 2020-11-04

## 2020-11-02 VITALS
HEIGHT: 68 IN | DIASTOLIC BLOOD PRESSURE: 88 MMHG | OXYGEN SATURATION: 98 % | SYSTOLIC BLOOD PRESSURE: 148 MMHG | BODY MASS INDEX: 41.68 KG/M2 | HEART RATE: 83 BPM | WEIGHT: 275 LBS | TEMPERATURE: 98.3 F

## 2020-11-02 PROCEDURE — 99213 OFFICE O/P EST LOW 20 MIN: CPT | Performed by: NURSE PRACTITIONER

## 2020-11-02 PROCEDURE — 71046 X-RAY EXAM CHEST 2 VIEWS: CPT

## 2020-11-02 NOTE — PROGRESS NOTES
Chief Complaint   Shortness of Breath (Started a month ago ); Chest Pain; and Fatigue      History of Present Illness   Source of history provided by:  patient. Paddy Fish is a 39 y.o. old female who presents to the flu clinic with complaints of Shortness of breath, Fatigue and Chest (Rib) pain x 1 month. States symptoms have stayed the same since onset. Patient tested positive for COVID on 10/7. She was admitted to the hospital on 10/20-10/22. She had an uneventful admission and was discharged home with Decadron for 10 days. She reports she has been having continued weakness and shortness of breath. She is not on any supplemental oxygen. She does have a history of asthma and does have Ventolin inhaler. ROS   Pertinent positives and negatives are stated within HPI, all other systems reviewed and are negative. Past Medical History:  has a past medical history of Anxiety, Blood transfusion, Chronic fatigue, Hypertension, Iron deficiency anemia due to chronic blood loss, Knee pain, bilateral, Low back pain, Migraine headache without aura, Morbid obesity (HCC), Muscle cramps, Perennial allergic rhinitis, Superficial thrombophlebitis of right leg, Ulcerative colitis ([address] and Vertigo. Past Surgical History:  has a past surgical history that includes Adenoidectomy. Social History:  reports that she has never smoked. She has never used smokeless tobacco. She reports current alcohol use. She reports that she does not use drugs. Family History: family history includes Cancer (age of onset: 35) in her sister; High Blood Pressure in her father and mother; Stroke in her mother. Allergies: Aspirin; Coconut oil; Ibuprofen; Iodides; Motrin [ibuprofen micronized]; Robitussin (alcohol free) [guaifenesin]; Codeine;  Iodine; and Tramadol    Physical Exam   Vital Signs:  BP (!) 148/88   Pulse 83   Temp 98.3 °F (36.8 °C)   Ht 5' 8\" (1.727 m)   Wt 275 lb (124.7 kg)   SpO2 98%   BMI 41.81 kg/m²    Oxygen Saturation Interpretation: Normal.    Constitutional:  Alert, development consistent with age. NAD. Head:  NC/NT. Airway patent. Ears: TMs clear bilaterally. Canals without exudate or swelling bilaterally. Mouth: Posterior pharynx with mild erythema and clear postnasal drip. There is no tonsillar hypertrophy or exudate. Neck:  Normal ROM. Supple. There is no anterior cervical adenopathy noted. Lungs: CTAB without wheezes, rales, or rhonchi. CV:  Regular rate and rhythm, normal heart sounds, without pathological murmurs, ectopy, gallops, or rubs. Skin:  Normal turgor. Warm, dry, without visible rash. Lymphatic: No lymphangitis or adenopathy noted. Neurological:  Oriented. Motor functions intact. Lab / Imaging Results   (All laboratory and radiology results have been personally reviewed by myself)  Labs:  No results found for this visit on 11/02/20. Imaging: All Radiology results interpreted by Radiologist unless otherwise noted. Xr Chest (2 Vw)    Result Date: 11/2/2020  EXAMINATION: TWO XRAY VIEWS OF THE CHEST 11/2/2020 1:47 pm COMPARISON: 10/20/2020 HISTORY: ORDERING SYSTEM PROVIDED HISTORY: Shortness of breath TECHNOLOGIST PROVIDED HISTORY: Reason for exam:->hx COVID, persistent SOB What reading provider will be dictating this exam?->CRC FINDINGS: Patient body habitus somewhat limits evaluation. Previously questioned bilateral perihilar infiltrates appear to have resolved. There are no effusions. Heart size is normal.  On the lateral view no definite infiltrates are noted. No obvious infiltrates are identified       Medical Decision Making   Pt non-toxic, in no apparent distress and stable at time of discharge. Assessment/Plan   Rosina was seen today for shortness of breath, chest pain and fatigue. Diagnoses and all orders for this visit:    Shortness of breath  -     XR CHEST STANDARD (2 VW); Future  -     ECHO Complete 2D W Doppler W Color;  Future  - Continue Decadron as prescribed  - Continue inhaler as needed    History of 2019 novel coronavirus disease (COVID-19)  -     ECHO Complete 2D W Doppler W Color; Future    Patient presents today for persistent shortness of breath and fatigue after COVID-19 virus infection. We did receive a call today from Dr. Lv Singh, PCP, requesting evaluation of patient, pulse oximetry, chest x-ray and ECHO for her symptoms. Patients pulse ox was 98% on room air after ambulating to the room and at rest. Patient does look tired in the room, but is in no obvious respiratory distress. I do not recommend another COVID swab at this time as she was positive less than 1 month ago. Chest x-ray was obtained and showed resolution of previous questionable infiltrates, patient was advised of results. ECHO was ordered and will be scheduled with patient. Patient is to continue Decadron as prescribed by hospital and can use inhaler as needed for shortness of breath. Patient was advised to schedule f/u with PCP after testing to review all tests and continue evaluation and treatment. ED sooner if symptoms worsen or change. ED immediately with high or refractory fever, progressive SOB, CP, calf pain/swelling, shaking chills, vomiting, abdominal pain, lethargy, flank pain, or decreased urinary output. Pt verbalizes understanding and is in agreement with plan of care. All questions answered. Michelle Lopez, SURJIT - CNP    This visit was provided as a focused evaluation during the COVID -19 pandemic/national emergency. A comprehensive review of all previous patient history and testing was not conducted. Pertinent findings were elicited during the visit.

## 2020-11-02 NOTE — TELEPHONE ENCOUNTER
Called patient. COVID + on 10/7/2020. Was admitted for 2 days on 10/20/2020 - 10/22/2020. +history of asthma. Patient states that her breathing worsens with talking for a while. Difficulty ambulating steps due to difficulty breathing. Not sent home on oxygen. Feels hot, but temperature not above 98.5.  + sweats and headache with stabbing pain. I have advised the patient to proceed to the [facility] for further assessment. I told Ms. Morales that further evaluation with a pulse ox and repeat chest x-ray is necessary to evaluate her ongoing symptoms. I called [facility] to update on the patient.       Tigist Hyman MD  11/2/2020

## 2020-11-04 ENCOUNTER — CARE COORDINATION (OUTPATIENT)
Dept: CARE COORDINATION | Age: 45
End: 2020-11-04

## 2020-11-04 NOTE — CARE COORDINATION
Yellow alert noted in [employer] remote symptom monitoring program. Messaged patient to notify Celeste Morgan if symptoms have worsened since yesterday. Skylar Elaine LPN, 6:64 PM  Hi, I'm Skylar Elaine LPN from the [place] [id number] Team. I see you triggered an Alert. Thank you for checking in with us. Please let us know if your symptoms are worse than yesterday or if you wish to speak to a nurse. You can also send your message to us. We are available between 8am to 4 pm Mon-Fri. and 9am to 1pm weekends. If your symptoms are severe - Please call your doctor, call 911 and/or go to the nearest Emergency Room.

## 2020-11-14 ENCOUNTER — OFFICE VISIT (OUTPATIENT)
Dept: PRIMARY CARE CLINIC | Age: 45
End: 2020-11-14

## 2020-11-14 VITALS
OXYGEN SATURATION: 97 % | DIASTOLIC BLOOD PRESSURE: 70 MMHG | HEART RATE: 99 BPM | BODY MASS INDEX: 41.68 KG/M2 | WEIGHT: 275 LBS | TEMPERATURE: 98.4 F | HEIGHT: 68 IN | SYSTOLIC BLOOD PRESSURE: 126 MMHG

## 2020-11-14 PROCEDURE — 99213 OFFICE O/P EST LOW 20 MIN: CPT | Performed by: NURSE PRACTITIONER

## 2020-11-14 RX ORDER — ALBUTEROL SULFATE 90 MCG
2 HFA AEROSOL WITH ADAPTER (GRAM) INHALATION 4 TIMES DAILY PRN
Qty: 1 INHALER | Refills: 0 | Status: SHIPPED | OUTPATIENT
Start: 2020-11-14 | End: 2021-01-11 | Stop reason: SDUPTHER

## 2020-11-14 RX ORDER — MONTELUKAST SODIUM 10 MG/1
10 TABLET ORAL NIGHTLY
Qty: 30 TABLET | Refills: 0 | Status: SHIPPED | OUTPATIENT
Start: 2020-11-14 | End: 2020-12-21 | Stop reason: SDUPTHER

## 2020-11-14 RX ORDER — PREDNISONE 10 MG/1
10 TABLET ORAL DAILY
Qty: 3 TABLET | Refills: 0 | Status: SHIPPED | OUTPATIENT
Start: 2020-11-14 | End: 2020-11-17

## 2020-11-14 RX ORDER — DOXYCYCLINE HYCLATE 100 MG
100 TABLET ORAL 2 TIMES DAILY
Qty: 10 TABLET | Refills: 0 | Status: SHIPPED | OUTPATIENT
Start: 2020-11-14 | End: 2020-11-19

## 2020-11-14 ASSESSMENT — PATIENT HEALTH QUESTIONNAIRE - PHQ9
2. FEELING DOWN, DEPRESSED OR HOPELESS: 0
SUM OF ALL RESPONSES TO PHQ QUESTIONS 1-9: 0
SUM OF ALL RESPONSES TO PHQ QUESTIONS 1-9: 0
SUM OF ALL RESPONSES TO PHQ9 QUESTIONS 1 & 2: 0
SUM OF ALL RESPONSES TO PHQ QUESTIONS 1-9: 0
1. LITTLE INTEREST OR PLEASURE IN DOING THINGS: 0

## 2020-11-14 NOTE — PATIENT INSTRUCTIONS
[address]  [address] [place] [address]  Dept: [phone]  Dept Fax: [phone]    SURJIT Dinh CNP      11/14/2020     Patient: Kam Valderrama   YOB: 1975       To Whom It May Concern: It is my medical opinion that Kam Valderrama should remain out of work while acutely ill and awaiting COVID-19 test results. Return to work with no retesting should be followed if test is negative AND meets these 3 criteria as outlined by CDC/ODH:   a. No fever without the use of fever reducers for 24 hours  b. Improvement in symptoms  c. At least 7 days since the onset of symptoms     If tests positive for COVID-19, needs minimum of 10 days strict quarantine, improvement of symptoms and 24 hours fever free without fever reducing medications. If you have any questions or concerns, please don't hesitate to call. Sincerely,        SURJIT Dinh CNP           Advance Care Planning  People with COVID-19 may have no symptoms, mild symptoms, such as fever, cough, and shortness of breath or they may have more severe illness, developing severe and fatal pneumonia. As a result, Advance Care Planning with attention to naming a health care decision maker (someone you trust to make healthcare decisions for you if you could not speak for yourself) and sharing other health care preferences is important BEFORE a possible health crisis. Please contact your Primary Care Provider to discuss Advance Care Planning.     Preventing the Spread of Coronavirus Disease 2019 in Homes and Residential Communities  For the most recent information go to [employer]    Prevention steps for People with confirmed or suspected COVID-19 (including persons under investigation) who do not need to be hospitalized  and   People with confirmed COVID-19 who were hospitalized and determined to be medically stable to go home    Your healthcare provider and public health staff will evaluate whether you can be cared for at home. If it is determined that you do not need to be hospitalized and can be isolated at home, you will be monitored by staff from your local or state health department. You should follow the prevention steps below until a healthcare provider or local or state health department says you can return to your normal activities. Stay home except to get medical care  People who are mildly ill with COVID-19 are able to isolate at home during their illness. You should restrict activities outside your home, except for getting medical care. Do not go to work, school, or public areas. Avoid using public transportation, ride-sharing, or taxis. Separate yourself from other people and animals in your home  People: As much as possible, you should stay in a specific room and away from other people in your home. Also, you should use a separate bathroom, if available. Animals: You should restrict contact with pets and other animals while you are sick with COVID-19, just like you would around other people. Although there have not been reports of pets or other animals becoming sick with COVID-19, it is still recommended that people sick with COVID-19 limit contact with animals until more information is known about the virus. When possible, have another member of your household care for your animals while you are sick. If you are sick with COVID-19, avoid contact with your pet, including petting, snuggling, being kissed or licked, and sharing food. If you must care for your pet or be around animals while you are sick, wash your hands before and after you interact with pets and wear a facemask. Call ahead before visiting your doctor  If you have a medical appointment, call the healthcare provider and tell them that you have or may have COVID-19.  This will help the healthcare providers office take steps to keep other people from getting infected or exposed. Wear a facemask  You should wear a facemask when you are around other people (e.g., sharing a room or vehicle) or pets and before you enter a healthcare providers office. If you are not able to wear a facemask (for example, because it causes trouble breathing), then people who live with you should not stay in the same room with you, or they should wear a facemask if they enter your room. Cover your coughs and sneezes  Cover your mouth and nose with a tissue when you cough or sneeze. Throw used tissues in a lined trash can. Immediately wash your hands with soap and water for at least 20 seconds or, if soap and water are not available, clean your hands with an alcohol-based hand  that contains at least 60% alcohol. Clean your hands often  Wash your hands often with soap and water for at least 20 seconds, especially after blowing your nose, coughing, or sneezing; going to the bathroom; and before eating or preparing food. If soap and water are not readily available, use an alcohol-based hand  with at least 60% alcohol, covering all surfaces of your hands and rubbing them together until they feel dry. Soap and water are the best option if hands are visibly dirty. Avoid touching your eyes, nose, and mouth with unwashed hands. Avoid sharing personal household items  You should not share dishes, drinking glasses, cups, eating utensils, towels, or bedding with other people or pets in your home. After using these items, they should be washed thoroughly with soap and water. Clean all high-touch surfaces everyday  High touch surfaces include counters, tabletops, doorknobs, bathroom fixtures, toilets, phones, keyboards, tablets, and bedside tables. Also, clean any surfaces that may have blood, stool, or body fluids on them. Use a household cleaning spray or wipe, according to the label instructions.  Labels contain instructions for safe and effective use of the cleaning product including precautions you should take when applying the product, such as wearing gloves and making sure you have good ventilation during use of the product. Monitor your symptoms  Seek prompt medical attention if your illness is worsening (e.g., difficulty breathing). Before seeking care, call your healthcare provider and tell them that you have, or are being evaluated for, COVID-19. Put on a facemask before you enter the facility. These steps will help the healthcare providers office to keep other people in the office or waiting room from getting infected or exposed. Ask your healthcare provider to call the local or state health department. Persons who are placed under active monitoring or facilitated self-monitoring should follow instructions provided by their local health department or occupational health professionals, as appropriate. When working with your local health department check their available hours. If you have a medical emergency and need to call 911, notify the dispatch personnel that you have, or are being evaluated for COVID-19. If possible, put on a facemask before emergency medical services arrive. Discontinuing home isolation  Patients with confirmed COVID-19 should remain under home isolation precautions until the risk of secondary transmission to others is thought to be low. The decision to discontinue home isolation precautions should be made on a case-by-case basis, in consultation with healthcare providers and state and local health departments.

## 2020-11-14 NOTE — PROGRESS NOTES
Vitals:    11/14/20 1119   BP: 126/70   Pulse: 99   Temp: 98.4 °F (36.9 °C)   TempSrc: Oral   SpO2: 97%   Weight: 275 lb (124.7 kg)   Height: 5' 8\" (1.727 m)      Chief Complaint   Patient presents with    Other     tested pos 10/7    Shortness of Breath       Manages asthma with rescue inhaler, using 2x/day, morning and evening, previously 3-4x/day. SOB while exerting & sitting still while speaking to us today. Does not have pulse ox at home, advised of where to purchase. Works at [employer], currently from home. Was diagnosed & hospitalized with ECHO, Xr chest standard ordered through flu clinic on 11/2,     SOB: yes   Fever: subjective chills  Cough: yes  Nasal congestion/rhinorrhea: minor  Sinus pressure: no  Myalgias: yes  Sore throat: no  Recent known exposure to COVID-19:  Unknown, self was diagnosed 1 month ago  Nausea & Vomiting: no   Diarrhea & bloody stools: no    Physical Exam  Vitals signs and nursing note reviewed. Constitutional:       General: She is not in acute distress. Appearance: Normal appearance. She is well-developed and normal weight. She is ill-appearing. She is not toxic-appearing or diaphoretic. HENT:      Head: Normocephalic and atraumatic. Right Ear: Tympanic membrane, ear canal and external ear normal. There is no impacted cerumen. Left Ear: Tympanic membrane, ear canal and external ear normal. There is no impacted cerumen. Nose: Congestion present. No rhinorrhea. Mouth/Throat:      Mouth: Mucous membranes are moist.      Pharynx: Oropharynx is clear. No oropharyngeal exudate or posterior oropharyngeal erythema. Eyes:      Extraocular Movements: Extraocular movements intact. Conjunctiva/sclera: Conjunctivae normal.      Pupils: Pupils are equal, round, and reactive to light. Neck:      Musculoskeletal: Normal range of motion and neck supple. Thyroid: No thyromegaly.    Cardiovascular:      Rate and Rhythm: Normal rate and regular rhythm. Pulses: Normal pulses. Heart sounds: Normal heart sounds. No murmur. Pulmonary:      Effort: Pulmonary effort is normal. No respiratory distress. Breath sounds: Wheezing present. Chest:      Chest wall: Tenderness present. Abdominal:      General: Bowel sounds are normal.      Palpations: Abdomen is soft. Tenderness: There is no abdominal tenderness. There is no guarding or rebound. Genitourinary:     Vagina: Normal.   Musculoskeletal: Normal range of motion. Lymphadenopathy:      Cervical: No cervical adenopathy. Skin:     General: Skin is warm and dry. Capillary Refill: Capillary refill takes less than 2 seconds. Findings: No bruising, erythema or rash. Neurological:      General: No focal deficit present. Mental Status: She is alert and oriented to person, place, and time. Mental status is at baseline. Cranial Nerves: No cranial nerve deficit. Sensory: No sensory deficit. Motor: No weakness. Coordination: Coordination normal.      Gait: Gait normal.   Psychiatric:         Mood and Affect: Mood normal.         Behavior: Behavior normal.         Thought Content: Thought content normal.         Judgment: Judgment normal.     Assessment/Plan:    1. Suspected COVID-19 virus infection    -  COVID-19 Ambulatory; Future (required for RTW)  - patient remains symptomatic with increased SOB & wheezing     2. SOB (shortness of breath)    - predniSONE (DELTASONE) 10 MG tablet; Take 1 tablet by mouth daily for 3 days  Dispense: 3 tablet; Refill: 0  - PROVENTIL  (90 Base) MCG/ACT inhaler; Inhale 2 puffs into the lungs 4 times daily as needed for Wheezing LOT# [id number], EXP 7/21  Dispense: 1 Inhaler; Refill: 0  - doxycycline hyclate (VIBRA-TABS) 100 MG tablet; Take 1 tablet by mouth 2 times daily for 5 days  Dispense: 10 tablet; Refill: 0    3. Moderate persistent asthma with exacerbation    - montelukast (SINGULAIR) 10 MG tablet;  Take 1 tablet by mouth nightly  Dispense: 30 tablet; Refill: 0  - mometasone-formoterol (DULERA) 100-5 MCG/ACT inhaler; Inhale 2 puffs into the lungs 2 times daily  Dispense: 1 Inhaler; Refill: 5    -  Printed scripts so patient may use [employer] for discounts  - Follow up with PCP for evaluation of asthma  - Conservative methods discussed       SURJIT Lopez - ILAN  11/14/20     This visit was provided as a focused evaluation during the COVID -19 pandemic/national emergency. A comprehensive review of all previous patient history and testing was not conducted. Pertinent findings were elicited during the visit.

## 2020-11-15 DIAGNOSIS — Z20.822 SUSPECTED COVID-19 VIRUS INFECTION: ICD-10-CM

## 2020-11-17 LAB
SARS-COV-2: NOT DETECTED
SOURCE: NORMAL

## 2020-11-18 ENCOUNTER — VIRTUAL VISIT (OUTPATIENT)
Dept: INTERNAL MEDICINE | Age: 45
End: 2020-11-18

## 2020-11-18 PROCEDURE — 99442 PR PHYS/QHP TELEPHONE EVALUATION 11-20 MIN: CPT | Performed by: INTERNAL MEDICINE

## 2020-11-18 RX ORDER — PREDNISONE 10 MG/1
TABLET ORAL
Qty: 50 TABLET | Refills: 0 | Status: SHIPPED | OUTPATIENT
Start: 2020-11-18 | End: 2020-11-28

## 2020-11-18 RX ORDER — PREDNISONE 20 MG/1
20 TABLET ORAL DAILY
Qty: 5 TABLET | Refills: 0 | Status: SHIPPED
Start: 2020-11-18 | End: 2020-11-18

## 2020-11-18 ASSESSMENT — ENCOUNTER SYMPTOMS
DIARRHEA: 0
COUGH: 1
FACIAL SWELLING: 0
ABDOMINAL DISTENTION: 0
VOMITING: 0
CHEST TIGHTNESS: 1
NAUSEA: 0
CONSTIPATION: 0
ABDOMINAL PAIN: 0
WHEEZING: 0
SHORTNESS OF BREATH: 1

## 2020-11-18 NOTE — PROGRESS NOTES
[facility]    Attending Physician Statement:  Aniyah Munson M.D., F.A.C.P. I have discussed the case, including pertinent history and exam findings with the resident. I agree with the assessment, plan and orders as documented by the resident. Patient is seen for fu visit today. -- telephone visit  Last office notes reviewed, relative labs and imaging. Health maintenance issues of vaccinations, depression screening, tobacco cessation etc... covered  Hospitalized sp covid \"pneumonia\"  UTI   CTA +ground glass patchy-- no PE    Less likely PE occurred since  No fever, no sorethroat, no N/v/D, no mylagia. Post viral bronchospasm flare vs post covid BATISTA  Persistent but Some improvements in BATISTA, cough, chest tightness  Hx of asthma- heaviness, tightness- ran out of dulera  Now covid negative on 14th- ok to  sample  Prednisone oral taper  Defer azithro etc at this time  Telephone 15min  Remainder of medical problems as per resident note.

## 2020-11-18 NOTE — PATIENT INSTRUCTIONS
Patient Education        Asthma Attack: Care Instructions  Your Care Instructions     During an asthma attack, the airways swell and narrow. This makes it hard to breathe. Severe asthma attacks can be life-threatening, but you can help prevent them by keeping your asthma under control and treating symptoms before they get bad. Symptoms include being short of breath, having chest tightness, coughing, and wheezing. Noting and treating these symptoms can also help you avoid future trips to the emergency room. The doctor has checked you carefully, but problems can develop later. If you notice any problems or new symptoms, get medical treatment right away. Follow-up care is a key part of your treatment and safety. Be sure to make and go to all appointments, and call your doctor if you are having problems. It's also a good idea to know your test results and keep a list of the medicines you take. How can you care for yourself at home? · Follow your asthma action plan to prevent and treat attacks. If you don't have an asthma action plan, work with your doctor to create one. · Take your asthma medicines exactly as prescribed. Talk to your doctor right away if you have any questions about how to take them. ? Use your quick-relief medicine when you have symptoms of an attack. Quick-relief medicine is usually an albuterol inhaler. Some people need to use quick-relief medicine before they exercise. ? Take your controller medicine every day, not just when you have symptoms. Controller medicine is usually an inhaled corticosteroid. The goal is to prevent problems before they occur. Don't use your controller medicine to treat an attack that has already started. It doesn't work fast enough to help. ? If your doctor prescribed corticosteroid pills to use during an attack, take them exactly as prescribed. It may take hours for the pills to work, but they may make the episode shorter and help you breathe better. ?  Keep your quick-relief medicine with you at all times. · Talk to your doctor before using other medicines. Some medicines, such as aspirin, can cause asthma attacks in some people. · If you have a peak flow meter, use it to check how well you are breathing. This can help you predict when an asthma attack is going to occur. Then you can take medicine to prevent the asthma attack or make it less severe. · Do not smoke or allow others to smoke around you. Avoid smoky places. Smoking makes asthma worse. If you need help quitting, talk to your doctor about stop-smoking programs and medicines. These can increase your chances of quitting for good. · Learn what triggers an asthma attack for you, and avoid the triggers when you can. Common triggers include colds, smoke, air pollution, dust, pollen, mold, pets, cockroaches, stress, and cold air. · Avoid colds and the flu. Get a pneumococcal vaccine shot. If you have had one before, ask your doctor if you need a second dose. Get a flu vaccine every fall. If you must be around people with colds or the flu, wash your hands often. When should you call for help? Call 911 anytime you think you may need emergency care. For example, call if:    · You have severe trouble breathing. Call your doctor now or seek immediate medical care if:    · Your symptoms do not get better after you have followed your asthma action plan.     · You have new or worse trouble breathing.     · Your coughing and wheezing get worse.     · You cough up dark brown or bloody mucus (sputum).     · You have a new or higher fever. Watch closely for changes in your health, and be sure to contact your doctor if:    · You need to use quick-relief medicine on more than 2 days a week (unless it is just for exercise).     · You cough more deeply or more often, especially if you notice more mucus or a change in the color of your mucus.     · You are not getting better as expected. Where can you learn more?   Go to [email or web address] and sign in to your [employer] account. Enter D524 in the [facility] box to learn more about \"Asthma Attack: Care Instructions. \"     If you do not have an account, please click on the \"Sign Up Now\" link. Current as of: February 24, 2020               Content Version: 12.6  © 5792-9754 [employer], [employer]. Care instructions adapted under license by [facility] ([facility]). If you have questions about a medical condition or this instruction, always ask your healthcare professional. [address] any warranty or liability for your use of this information. Lab Tests to get prior to next Visit  1. None     Changes in Medications  1. Prednisone taper   2. inhaler    Referrals   1. None     Please follow up 5 weeks with our clinic. Please call if your symptoms worsen or fail to improve.

## 2020-11-18 NOTE — PROGRESS NOTES
Zeenat Thompson is a 39 y.o. female evaluated via telephone on 11/18/2020. Consent:  She and/or health care decision maker is aware that that she may receive a bill for this telephone service, depending on her insurance coverage, and has provided verbal consent to proceed: Yes      Documentation:  I communicated with the patient and/or health care decision maker about COVID and Obstructive Lung Disease . Details of this discussion including any medical advice provided: as below     Leana Valdez [facility]  InternalMedicine Residency Program  [facility] Note      SUBJECTIVE:  CC: had no chief complaint listed for this encounter. HPI:Rosina Copeland presented to the [facility] for a hospital follow-up for Covid pneumonia. Patient tested positive on October 20 and received 10 days of Decadron. She was retested October 30 and was positive her last test on 11/14/2020 was negative. She states that she continues to be short of breath, fatigue, feeling dizzy when she gets up or walks around, feels chest tightness whenever she talks for a long period of time or speaks/moves around. She states that the abdominal heaviness has resolved that she had during the hospitalization. She denies any nausea/vomiting/diarrhea. We discussed that she is likely an asthma exacerbation due to not having her Dulera inhaler for the past few weeks. It was determined that she would start taking prednisone taper as well as be given a sample of Breo Ellipta. She verbalized understanding and stated that she would come into the office to get them. Review of Systems   Constitutional: Positive for fatigue. Negative for diaphoresis and fever. HENT: Positive for congestion. Negative for ear discharge, ear pain and facial swelling. Eyes: Positive for visual disturbance. Respiratory: Positive for cough, chest tightness and shortness of breath. Negative for wheezing. Cardiovascular: Positive for palpitations.  Negative for chest pain and leg swelling. Gastrointestinal: Negative for abdominal distention, abdominal pain, constipation, diarrhea, nausea and vomiting. Abdominal heaviness which has resolved    Endocrine: Negative for polydipsia, polyphagia and polyuria. Genitourinary: Negative for difficulty urinating, dyspareunia and dysuria. Musculoskeletal: Negative for arthralgias and myalgias. Neurological: Positive for dizziness and light-headedness. Negative for headaches. No outpatient medications have been marked as taking for the 11/18/20 encounter (Appointment) with Christo Aaron DO. I have reviewed all pertinent PMHx, PSHx, FamHx, SocialHx, medications, and allergiesand updated history as appropriate. OBJECTIVE:      PLAN:    Acute hypoxic respiratory failure  -Patient has history of asthma/COPD, and most recent exacerbation was due to Covid pneumonia infection.  -Patient continues to be symptomatically short of breath. She currently takes her albuterol inhaler every 6 each. She has not been taking her Dulera and therefore free samples of Fadumo [employer] were provided for the patient. Prednisone taper was ordered  -No antibiotics at this point in time due to nonproductive cough no sputum production or fever  -If patient continues to be symptomatic consider getting repeat CTA of chest to evaluate for PE/post Covid shortness of breath syndrome versus echocardiogram to evaluate for heart failure  -Patient should be seen in the office at her next appointment      I affirm this is a Patient Initiated Episode with a Patient who has not had a related appointment within my department in the past 7 days or scheduled within the next 24 hours.     Patient identification was verified at the start of the visit: Yes    Total Time: minutes: 11-20 minutes    Note: not billable if this call serves to triage the patient into an appointment for the relevant concern      Christo Chun

## 2020-11-18 NOTE — LETTER
NOTIFICATION RETURN TO WORK / SCHOOL    11/19/2020    Ms. Varela 21      To Whom It May Concern:    Matthew Gamboa was tested for COVID-19 on 11/14, and the result was negative. She may return to work tomorrow. I recommend:return without restrictions  no lifting more than 20 pounds, no pushing and no pulling  no climbing  The above restrictions are in effect for 2-3 week(s). Patient should also perform light work until symptomatically feeling improved. If there are questions or concerns, please have the patient contact our office.         Sincerely,      Christo Chun, DO

## 2020-11-19 RX ORDER — FLUTICASONE FUROATE AND VILANTEROL TRIFENATATE 100; 25 UG/1; UG/1
2 POWDER RESPIRATORY (INHALATION) DAILY
Qty: 2 EACH | Refills: 0 | COMMUNITY
Start: 2020-11-19 | End: 2020-12-21 | Stop reason: ALTCHOICE

## 2020-11-19 NOTE — PROGRESS NOTES
Special Virtual Visit done per Dr. Bruner El   Patients questions were addressed and answered Printed AVS  Will be given to pt. 2 Samples of Breo Ellipta 100/25mcg take 1 puff twice daily are to be given to pt. Will  tomorrow at hospital along with printed AVS Lot no.  x92u exp. 3/21

## 2020-12-12 ENCOUNTER — APPOINTMENT (OUTPATIENT)
Dept: ULTRASOUND IMAGING | Age: 45
End: 2020-12-12

## 2020-12-12 VITALS
TEMPERATURE: 96.9 F | HEART RATE: 85 BPM | DIASTOLIC BLOOD PRESSURE: 131 MMHG | SYSTOLIC BLOOD PRESSURE: 196 MMHG | RESPIRATION RATE: 18 BRPM | OXYGEN SATURATION: 97 %

## 2020-12-12 PROCEDURE — 4500000002 HC ER NO CHARGE

## 2020-12-12 PROCEDURE — 93970 EXTREMITY STUDY: CPT | Performed by: RADIOLOGY

## 2020-12-12 PROCEDURE — 93970 EXTREMITY STUDY: CPT

## 2020-12-13 ENCOUNTER — HOSPITAL ENCOUNTER (EMERGENCY)
Age: 45
Discharge: LEFT BEFORE DISCHARGE | End: 2020-12-13

## 2020-12-13 NOTE — ED TRIAGE NOTES
FIRST PROVIDER CONTACT ASSESSMENT NOTE      Department of Emergency Medicine   ED  First Provider Note   12/13/20  1:30 AM EST    Chief Complaint: Leg Swelling (Dong legs up to knees, pt states legs/feet have discoloration to them too )      History of Present Illness:    Aniceto Whyte is a 39 y.o. female who presents to the ED by private car for BLE SWELLING  Focused Screening Exam:  Constitutional:  Alert, appears stated age and is in no distress.       *ALLERGIES*     Aspirin, Coconut oil, Ibuprofen, Iodides, Motrin [ibuprofen micronized], Robitussin (alcohol free) [guaifenesin], Codeine, Iodine, and Tramadol     ED Triage Vitals   BP Temp Temp src Pulse Resp SpO2 Height Weight   12/12/20 2242 12/12/20 2227 -- 12/12/20 2227 12/12/20 2242 12/12/20 2227 -- --   (!) 196/131 96.9 °F (36.1 °C)  86 18 95 %          Initial Plan of Care:  Initiate Treatment-Testing, Proceed toTreatment Area When Bed Available for ED Attending/MLP to Continue Care    -----------------END OF FIRST PROVIDER CONTACT ASSESSMENT NOTE--------------  Electronically signed by SURJIT Guillen CNP   DD: 12/13/20

## 2020-12-13 NOTE — ED NOTES
Pt came to desk and advised staff that the wait was too long and that she was leaving. Pt left department.      Aimee Renae Kanner  12/13/20 0121

## 2020-12-13 NOTE — LETTER
[facility] Internal Medicine   [address]  ' [place], [address]      December 14, 2020    [place] [id number]      Dear Belinda Maxwell,    This letter is regarding your Emergency Department (ED) visit at [facility] on 12/13/20. David Dumas wanted to make sure that you understand your discharge instructions and that you were able to fill any prescriptions that may have been ordered for you. Please contact the office at \"[phone]  if the ED advised to you follow up with David Dumas, or if you have any further questions or needs. Also did you know -   *Visiting the ED for a non-emergency could result in higher co-pays than you would normally be subject to paying? *You can call your doctor even after hours so they can direct you to the most appropriate care. [facility]) practices can often offer you an appointment on the same day that you call. Many [address]  appointments; check our website for availability in your community , [email or web address]    Evisits are now available for patients for $36 through [employer] for certain conditions:  * Sinus, cold and or cough       * Diarrhea            * Headache  * Heartburn                                * Poison Vianca          * Back pain     * Urinary problems                         Sincerely,     Harry Garrett MD and your [facility]

## 2020-12-17 ENCOUNTER — APPOINTMENT (OUTPATIENT)
Dept: CT IMAGING | Age: 45
End: 2020-12-17

## 2020-12-17 ENCOUNTER — HOSPITAL ENCOUNTER (EMERGENCY)
Age: 45
Discharge: HOME | End: 2020-12-17
Attending: EMERGENCY MEDICINE

## 2020-12-17 ENCOUNTER — APPOINTMENT (OUTPATIENT)
Dept: ULTRASOUND IMAGING | Age: 45
End: 2020-12-17

## 2020-12-17 ENCOUNTER — APPOINTMENT (OUTPATIENT)
Dept: GENERAL RADIOLOGY | Age: 45
End: 2020-12-17

## 2020-12-17 VITALS
WEIGHT: 285 LBS | BODY MASS INDEX: 43.19 KG/M2 | HEART RATE: 75 BPM | OXYGEN SATURATION: 95 % | RESPIRATION RATE: 20 BRPM | SYSTOLIC BLOOD PRESSURE: 180 MMHG | DIASTOLIC BLOOD PRESSURE: 110 MMHG | HEIGHT: 68 IN | TEMPERATURE: 98.2 F

## 2020-12-17 LAB
ANION GAP SERPL CALCULATED.3IONS-SCNC: 8 MMOL/L (ref 7–16)
BASOPHILS ABSOLUTE: 0.06 E9/L (ref 0–0.2)
BASOPHILS RELATIVE PERCENT: 0.6 % (ref 0–2)
BUN BLDV-MCNC: 12 MG/DL (ref 6–20)
CALCIUM SERPL-MCNC: 8.9 MG/DL (ref 8.6–10.2)
CHLORIDE BLD-SCNC: 104 MMOL/L (ref 98–107)
CO2: 27 MMOL/L (ref 22–29)
CREAT SERPL-MCNC: 0.9 MG/DL (ref 0.5–1)
D DIMER: 273 NG/ML DDU
EOSINOPHILS ABSOLUTE: 0.17 E9/L (ref 0.05–0.5)
EOSINOPHILS RELATIVE PERCENT: 1.7 % (ref 0–6)
GFR AFRICAN AMERICAN: >60
GFR NON-AFRICAN AMERICAN: >60 ML/MIN/1.73
GLUCOSE BLD-MCNC: 118 MG/DL (ref 74–99)
HCG, URINE, POC: NEGATIVE
HCT VFR BLD CALC: 38.6 % (ref 34–48)
HEMOGLOBIN: 12.1 G/DL (ref 11.5–15.5)
IMMATURE GRANULOCYTES #: 0.04 E9/L
IMMATURE GRANULOCYTES %: 0.4 % (ref 0–5)
LYMPHOCYTES ABSOLUTE: 1.59 E9/L (ref 1.5–4)
LYMPHOCYTES RELATIVE PERCENT: 15.9 % (ref 20–42)
Lab: NORMAL
MAGNESIUM: 2 MG/DL (ref 1.6–2.6)
MCH RBC QN AUTO: 23.5 PG (ref 26–35)
MCHC RBC AUTO-ENTMCNC: 31.3 % (ref 32–34.5)
MCV RBC AUTO: 75 FL (ref 80–99.9)
MONOCYTES ABSOLUTE: 0.67 E9/L (ref 0.1–0.95)
MONOCYTES RELATIVE PERCENT: 6.7 % (ref 2–12)
NEGATIVE QC PASS/FAIL: NORMAL
NEUTROPHILS ABSOLUTE: 7.48 E9/L (ref 1.8–7.3)
NEUTROPHILS RELATIVE PERCENT: 74.7 % (ref 43–80)
PDW BLD-RTO: 18.3 FL (ref 11.5–15)
PLATELET # BLD: 381 E9/L (ref 130–450)
PMV BLD AUTO: 10.5 FL (ref 7–12)
POSITIVE QC PASS/FAIL: NORMAL
POTASSIUM REFLEX MAGNESIUM: 3.5 MMOL/L (ref 3.5–5)
PRO-BNP: 108 PG/ML (ref 0–125)
RBC # BLD: 5.15 E12/L (ref 3.5–5.5)
SODIUM BLD-SCNC: 139 MMOL/L (ref 132–146)
TROPONIN: <0.01 NG/ML (ref 0–0.03)
WBC # BLD: 10 E9/L (ref 4.5–11.5)

## 2020-12-17 PROCEDURE — 83880 ASSAY OF NATRIURETIC PEPTIDE: CPT

## 2020-12-17 PROCEDURE — 93970 EXTREMITY STUDY: CPT

## 2020-12-17 PROCEDURE — 85025 COMPLETE CBC W/AUTO DIFF WBC: CPT

## 2020-12-17 PROCEDURE — 85378 FIBRIN DEGRADE SEMIQUANT: CPT

## 2020-12-17 PROCEDURE — 99284 EMERGENCY DEPT VISIT MOD MDM: CPT

## 2020-12-17 PROCEDURE — 36415 COLL VENOUS BLD VENIPUNCTURE: CPT

## 2020-12-17 PROCEDURE — 6360000002 HC RX W HCPCS: Performed by: STUDENT IN AN ORGANIZED HEALTH CARE EDUCATION/TRAINING PROGRAM

## 2020-12-17 PROCEDURE — 84484 ASSAY OF TROPONIN QUANT: CPT

## 2020-12-17 PROCEDURE — 83735 ASSAY OF MAGNESIUM: CPT

## 2020-12-17 PROCEDURE — 96375 TX/PRO/DX INJ NEW DRUG ADDON: CPT

## 2020-12-17 PROCEDURE — 96374 THER/PROPH/DIAG INJ IV PUSH: CPT

## 2020-12-17 PROCEDURE — 93005 ELECTROCARDIOGRAM TRACING: CPT | Performed by: STUDENT IN AN ORGANIZED HEALTH CARE EDUCATION/TRAINING PROGRAM

## 2020-12-17 PROCEDURE — 80048 BASIC METABOLIC PNL TOTAL CA: CPT

## 2020-12-17 PROCEDURE — 93970 EXTREMITY STUDY: CPT | Performed by: RADIOLOGY

## 2020-12-17 PROCEDURE — 6360000004 HC RX CONTRAST MEDICATION: Performed by: RADIOLOGY

## 2020-12-17 PROCEDURE — 71045 X-RAY EXAM CHEST 1 VIEW: CPT

## 2020-12-17 PROCEDURE — 71275 CT ANGIOGRAPHY CHEST: CPT

## 2020-12-17 RX ORDER — SODIUM CHLORIDE 0.9 % (FLUSH) 0.9 %
10 SYRINGE (ML) INJECTION PRN
Status: DISCONTINUED | OUTPATIENT
Start: 2020-12-17 | End: 2020-12-17 | Stop reason: HOSPADM

## 2020-12-17 RX ORDER — DIPHENHYDRAMINE HYDROCHLORIDE 50 MG/ML
50 INJECTION INTRAMUSCULAR; INTRAVENOUS ONCE
Status: COMPLETED | OUTPATIENT
Start: 2020-12-17 | End: 2020-12-17

## 2020-12-17 RX ORDER — METHYLPREDNISOLONE SODIUM SUCCINATE 125 MG/2ML
60 INJECTION, POWDER, LYOPHILIZED, FOR SOLUTION INTRAMUSCULAR; INTRAVENOUS ONCE
Status: COMPLETED | OUTPATIENT
Start: 2020-12-17 | End: 2020-12-17

## 2020-12-17 RX ADMIN — DIPHENHYDRAMINE HYDROCHLORIDE 50 MG: 50 INJECTION, SOLUTION INTRAMUSCULAR; INTRAVENOUS at 16:13

## 2020-12-17 RX ADMIN — METHYLPREDNISOLONE SODIUM SUCCINATE 60 MG: 125 INJECTION, POWDER, FOR SOLUTION INTRAMUSCULAR; INTRAVENOUS at 16:13

## 2020-12-17 RX ADMIN — IOPAMIDOL 75 ML: 755 INJECTION, SOLUTION INTRAVENOUS at 16:55

## 2020-12-17 ASSESSMENT — PAIN SCALES - GENERAL: PAINLEVEL_OUTOF10: 9

## 2020-12-17 ASSESSMENT — ENCOUNTER SYMPTOMS
ABDOMINAL PAIN: 0
BACK PAIN: 0
DIARRHEA: 0
NAUSEA: 0
TROUBLE SWALLOWING: 0
SHORTNESS OF BREATH: 1
VOMITING: 0
PHOTOPHOBIA: 0
COUGH: 0

## 2020-12-17 ASSESSMENT — PAIN DESCRIPTION - ONSET: ONSET: ON-GOING

## 2020-12-17 ASSESSMENT — PAIN DESCRIPTION - FREQUENCY: FREQUENCY: CONTINUOUS

## 2020-12-17 ASSESSMENT — PAIN DESCRIPTION - LOCATION: LOCATION: LEG;FOOT

## 2020-12-17 ASSESSMENT — PAIN DESCRIPTION - DESCRIPTORS: DESCRIPTORS: ACHING;PINS AND NEEDLES

## 2020-12-17 ASSESSMENT — PAIN DESCRIPTION - PAIN TYPE: TYPE: ACUTE PAIN

## 2020-12-17 ASSESSMENT — PAIN DESCRIPTION - ORIENTATION: ORIENTATION: RIGHT;LEFT

## 2020-12-17 NOTE — ED NOTES
Patient reports that she has a hx of htn and has been out of meds     Familia Pedroza RN  12/17/20 1093

## 2020-12-17 NOTE — ED PROVIDER NOTES
reviewed and are negative. Physical Exam  Vitals signs and nursing note reviewed. Constitutional:       General: She is not in acute distress. Appearance: She is well-developed. She is obese. She is not ill-appearing, toxic-appearing or diaphoretic. HENT:      Head: Normocephalic and atraumatic. Nose: Nose normal.      Mouth/Throat:      Lips: Pink. No lesions. Mouth: Mucous membranes are moist.   Eyes:      General: Lids are normal.   Neck:      Musculoskeletal: Normal range of motion and neck supple. Cardiovascular:      Rate and Rhythm: Normal rate and regular rhythm. Heart sounds: Normal heart sounds, S1 normal and S2 normal. No murmur. Pulmonary:      Effort: Pulmonary effort is normal. No respiratory distress. Breath sounds: Normal breath sounds and air entry. No decreased breath sounds, wheezing, rhonchi or rales. Abdominal:      General: Bowel sounds are normal.      Palpations: Abdomen is soft. Tenderness: There is no abdominal tenderness. There is no guarding or rebound. Musculoskeletal:      Right lower leg: Edema present. Left lower leg: Edema present. Skin:     General: Skin is warm and dry. Neurological:      Mental Status: She is alert and oriented to person, place, and time. Motor: No tremor or abnormal muscle tone. Coordination: Coordination normal.          Procedures     MDM  Number of Diagnoses or Management Options  Elevated blood pressure reading  Leg swelling  Diagnosis management comments: The patient is a 51-year-old female who presents to the emergency department complaining of leg swelling. The patient is hemodynamically stable, nontoxic appearing, in no acute distress. Labs within normal limits, no evidence of acute kidney injury or CHF, troponin negative. D-dimer is slightly elevated did obtain CTA which was negative for PE. Ultrasound of the bilateral lower extremities was negative for DVT.   Recommended patient to use compression stockings at home and keep her legs elevated. She is to follow with her family doctor. She is to return here for worsening symptoms or other acute symptoms or concerns. Patient verbalized understanding agreement to treatment plan discharge instructions. EKG:  This EKG is signed and interpreted by me. Rate: 85  Rhythm: Sinus  Interpretation: Normal sinus rhythm, normal axis, no acute ST elevation or depression, intervals within normal limits, QTC is 456  Comparison: stable as compared to patient's most recent EKG             --------------------------------------------- PAST HISTORY ---------------------------------------------  Past Medical History:  has a past medical history of Anxiety, Blood transfusion, Chronic fatigue, Hypertension, Iron deficiency anemia due to chronic blood loss, Knee pain, bilateral, Low back pain, Migraine headache without aura, Morbid obesity (HCC), Muscle cramps, Perennial allergic rhinitis, Superficial thrombophlebitis of right leg, Ulcerative colitis ([address] and Vertigo. Past Surgical History:  has a past surgical history that includes Adenoidectomy. Social History:  reports that she has never smoked. She has never used smokeless tobacco. She reports current alcohol use. She reports that she does not use drugs. Family History: family history includes Cancer (age of onset: 35) in her sister; High Blood Pressure in her father and mother; Stroke in her mother. The patients home medications have been reviewed.     Allergies: Aspirin, Coconut oil, Ibuprofen, Iodides, Motrin [ibuprofen micronized], Robitussin (alcohol free) [guaifenesin], Codeine, Iodine, and Tramadol    -------------------------------------------------- RESULTS -------------------------------------------------  Labs:  Results for orders placed or performed during the hospital encounter of 12/17/20   CBC auto differential   Result Value Ref Range    WBC 10.0 4.5 - 11.5 E9/L    RBC 5. 15 3.50 - 5.50 E12/L    Hemoglobin 12.1 11.5 - 15.5 g/dL    Hematocrit 38.6 34.0 - 48.0 %    MCV 75.0 (L) 80.0 - 99.9 fL    MCH 23.5 (L) 26.0 - 35.0 pg    MCHC 31.3 (L) 32.0 - 34.5 %    RDW 18.3 (H) 11.5 - 15.0 fL    Platelets 287 836 - 847 E9/L    MPV 10.5 7.0 - 12.0 fL    Neutrophils % 74.7 43.0 - 80.0 %    Immature Granulocytes % 0.4 0.0 - 5.0 %    Lymphocytes % 15.9 (L) 20.0 - 42.0 %    Monocytes % 6.7 2.0 - 12.0 %    Eosinophils % 1.7 0.0 - 6.0 %    Basophils % 0.6 0.0 - 2.0 %    Neutrophils Absolute 7.48 (H) 1.80 - 7.30 E9/L    Immature Granulocytes # 0.04 E9/L    Lymphocytes Absolute 1.59 1.50 - 4.00 E9/L    Monocytes Absolute 0.67 0.10 - 0.95 E9/L    Eosinophils Absolute 0.17 0.05 - 0.50 E9/L    Basophils Absolute 0.06 0.00 - 0.20 V6/C   Basic Metabolic Panel w/ Reflex to MG   Result Value Ref Range    Sodium 139 132 - 146 mmol/L    Potassium reflex Magnesium 3.5 3.5 - 5.0 mmol/L    Chloride 104 98 - 107 mmol/L    CO2 27 22 - 29 mmol/L    Anion Gap 8 7 - 16 mmol/L    Glucose 118 (H) 74 - 99 mg/dL    BUN 12 6 - 20 mg/dL    CREATININE 0.9 0.5 - 1.0 mg/dL    GFR Non-African American >60 >=60 mL/min/1.73    GFR African American >60     Calcium 8.9 8.6 - 10.2 mg/dL   Troponin   Result Value Ref Range    Troponin <0.01 0.00 - 0.03 ng/mL   D-Dimer, Quantitative   Result Value Ref Range    D-Dimer, Quant 273 ng/mL DDU   Brain Natriuretic Peptide   Result Value Ref Range    Pro- 0 - 125 pg/mL   Magnesium   Result Value Ref Range    Magnesium 2.0 1.6 - 2.6 mg/dL   POC Pregnancy Urine Qual   Result Value Ref Range    HCG, Urine, POC Negative Negative    Lot Number [id number]     Positive QC Pass/Fail Pass     Negative QC Pass/Fail Pass        Radiology:  CTA CHEST W CONTRAST   Final Result   No evidence of pulmonary embolism or acute pulmonary abnormality. XR CHEST PORTABLE   Final Result   No acute process.       US DUP LOWER EXTREMITIES BILATERAL VENOUS   Final Result   Within the visualized vessels there is no evidence for deep venous   thrombosis. No significant change          ------------------------- NURSING NOTES AND VITALS REVIEWED ---------------------------  Date / Time Roomed:  12/17/2020  2:28 PM  ED Bed Assignment:  36/36    The nursing notes within the ED encounter and vital signs as below have been reviewed. BP (!) 180/110   Pulse 75   Temp 98.2 °F (36.8 °C)   Resp 20   Ht 5' 8\" (1.727 m)   Wt 285 lb (129.3 kg)   LMP 11/29/2020   SpO2 95%   BMI 43.33 kg/m²   Oxygen Saturation Interpretation: Normal      ------------------------------------------ PROGRESS NOTES ------------------------------------------  11:46 PM EST  I have spoken with the patient and discussed todays results, in addition to providing specific details for the plan of care and counseling regarding the diagnosis and prognosis. Their questions are answered at this time and they are agreeable with the plan. I discussed at length with them reasons for immediate return here for re evaluation. They will followup with their primary care physician by calling their office tomorrow. --------------------------------- ADDITIONAL PROVIDER NOTES ---------------------------------  At this time the patient is without objective evidence of an acute process requiring hospitalization or inpatient management. They have remained hemodynamically stable throughout their entire ED visit and are stable for discharge with outpatient follow-up. The plan has been discussed in detail and they are aware of the specific conditions for emergent return, as well as the importance of follow-up. Discharge Medication List as of 12/17/2020  5:26 PM          Diagnosis:  1. Leg swelling    2. Elevated blood pressure reading        Disposition:  Patient's disposition: Discharge to home  Patient's condition is stable.        Christianne Cruz,   Resident  12/17/20 3783

## 2020-12-18 LAB
EKG ATRIAL RATE: 85 BPM
EKG P AXIS: 61 DEGREES
EKG P-R INTERVAL: 140 MS
EKG Q-T INTERVAL: 384 MS
EKG QRS DURATION: 88 MS
EKG QTC CALCULATION (BAZETT): 456 MS
EKG R AXIS: 75 DEGREES
EKG T AXIS: 10 DEGREES
EKG VENTRICULAR RATE: 85 BPM

## 2020-12-18 PROCEDURE — 93010 ELECTROCARDIOGRAM REPORT: CPT | Performed by: INTERNAL MEDICINE

## 2020-12-21 ENCOUNTER — SOCIAL WORK (OUTPATIENT)
Dept: INTERNAL MEDICINE | Age: 45
End: 2020-12-21

## 2020-12-21 ENCOUNTER — VIRTUAL VISIT (OUTPATIENT)
Dept: INTERNAL MEDICINE | Age: 45
End: 2020-12-21

## 2020-12-21 PROCEDURE — 99442 PR PHYS/QHP TELEPHONE EVALUATION 11-20 MIN: CPT | Performed by: INTERNAL MEDICINE

## 2020-12-21 RX ORDER — FERROUS SULFATE 325(65) MG
325 TABLET ORAL 2 TIMES DAILY WITH MEALS
Qty: 60 TABLET | Refills: 0 | Status: SHIPPED
Start: 2020-12-21 | End: 2021-07-23 | Stop reason: SDUPTHER

## 2020-12-21 RX ORDER — MONTELUKAST SODIUM 10 MG/1
10 TABLET ORAL NIGHTLY
Qty: 30 TABLET | Refills: 0 | Status: SHIPPED
Start: 2020-12-21 | End: 2021-01-27 | Stop reason: SDUPTHER

## 2020-12-21 RX ORDER — AMLODIPINE BESYLATE 5 MG/1
5 TABLET ORAL DAILY
Qty: 30 TABLET | Refills: 3 | Status: SHIPPED
Start: 2020-12-21 | End: 2020-12-21

## 2020-12-21 RX ORDER — AMLODIPINE BESYLATE 5 MG/1
5 TABLET ORAL DAILY
Qty: 90 TABLET | Refills: 1 | Status: SHIPPED
Start: 2020-12-21 | End: 2021-02-19 | Stop reason: SINTOL

## 2020-12-21 ASSESSMENT — ENCOUNTER SYMPTOMS
VOMITING: 0
DIARRHEA: 0
NAUSEA: 0
ABDOMINAL DISTENTION: 0
CONSTIPATION: 0
FACIAL SWELLING: 0
SHORTNESS OF BREATH: 1
WHEEZING: 0
COUGH: 1
ABDOMINAL PAIN: 0
CHEST TIGHTNESS: 0

## 2020-12-21 NOTE — PROGRESS NOTES
Emilee Joshi is a 39 y.o. female evaluated via telephone on 12/21/2020. Consent:  She and/or health care decision maker is aware that that she may receive a bill for this telephone service, depending on her insurance coverage, and has provided verbal consent to proceed: Yes      Documentation:  I communicated with the patient and/or health care decision maker about COVID and Obstructive Lung Disease . Details of this discussion including any medical advice provided: as below     Leana Valdez 6  InternalMedicine Residency Program  [facility] Note      SUBJECTIVE:  CC: had concerns including Follow-up (emergency room follow up), Hypertension, Foot Swelling, and Results (CT +  ULTRASOUND). HPI:Rosina Villaseñor presented to the [facility] for a ER Followup. Patient was recently seen 2/2     Review of Systems   Constitutional: Negative for diaphoresis, fatigue and fever. HENT: Negative for congestion, ear discharge, ear pain and facial swelling. Respiratory: Positive for cough and shortness of breath. Negative for chest tightness and wheezing. Cardiovascular: Positive for palpitations and leg swelling. Negative for chest pain. Gastrointestinal: Negative for abdominal distention, abdominal pain, constipation, diarrhea, nausea and vomiting. Abdominal heaviness which has resolved    Endocrine: Negative for polydipsia, polyphagia and polyuria. Genitourinary: Negative for difficulty urinating, dyspareunia and dysuria. Musculoskeletal: Negative for arthralgias and myalgias. Neurological: Negative for dizziness, light-headedness and headaches.        Outpatient Medications Marked as Taking for the 12/21/20 encounter (Virtual Visit) with Pedrito Stephens., DO   Medication Sig Dispense Refill    fluticasone-vilanterol (BREO ELLIPTA) 100-25 MCG/INH AEPB inhaler Inhale 2 puffs into the lungs daily 2 each 0    montelukast (SINGULAIR) 10 MG tablet Take 1 tablet by mouth nightly 30 tablet 0  mometasone-formoterol (DULERA) 100-5 MCG/ACT inhaler Inhale 2 puffs into the lungs 2 times daily 1 Inhaler 5    albuterol sulfate HFA (VENTOLIN HFA) 108 (90 Base) MCG/ACT inhaler Inhale 2 puffs into the lungs 4 times daily as needed for Wheezing 1 Inhaler 0    ferrous sulfate 325 (65 Fe) MG tablet Take 1 tablet by mouth 2 times daily (with meals) 60 tablet 0       I have reviewed all pertinent PMHx, PSHx, FamHx, SocialHx, medications, and allergiesand updated history as appropriate. OBJECTIVE:      PLAN:    Moderate Persistent Asthma - uncontrolled  Patient has history of asthma/COPD, and most recent exacerbation was due to Covid pneumonia infection. Concerns at last appointment 2/2 family history of blood clots if continued SOB evaluated in ED when patient went for evaluation of leg swelling. CTA and BL LE DVT which were negative for acute pathology or sequela of COVID. Patient continues to have SOB but it was controlled when she was taking Breo-ellipta/Dulera; patient needs PAP program at this point in time and referral made to Social Work. Input appreciated. Patient should be seen in the office at her next appointment    Leg Swelling  -improved swelling; worsening pain; no DVT; no trauma; no cellulitis;   -bruising has been on legs for about a week and has progressively gotten worse over the last week; she states that it has reached its peak at this point; needs evaluated in person at next appointment. HTN  -has been out of amlodipine for about 3 weeks; blood pressure uncontrolled; will reorder amlodipine and discuss changes at next appointment     I affirm this is a Patient Initiated Episode with a Patient who has not had a related appointment within my department in the past 7 days or scheduled within the next 24 hours.     Patient identification was verified at the start of the visit: Yes    Total Time: minutes: 11-20 minutes Note: not billable if this call serves to triage the patient into an appointment for the relevant concern      Reynaldo Dietz

## 2020-12-21 NOTE — PROGRESS NOTES
Leana Valdez [facility]  Internal Medicine Clinic    Consent:  The Patient and/or health care decision maker is aware that that he or she may receive a bill for this telephone service, depending on his insurance coverage, and has provided verbal consent to proceed: Yes    Documentation:  I communicated with the patient and/or health care decision maker about     COPD vs asthma   - recent initiation of Dulera and needed PAP for medications  - Dulera inhaler did improve symptoms and reduced SHWETHA use    Hx of recent COVID-19 pneumonia    Bilateral lower extremity edema/BATISTA    - recent ER visit post-hospital discharge with CTA negative   - check 2D echo   - she's been off amlodipine and this didn't improve    Hypertension   - uncontrolled by home readings -- restart amlodipine    Details of this discussion including any medical advice provided: see above. I affirm this is a Patient Initiated Episode with a Patient who has not had a related appointment within my department in the past 7 days or scheduled within the next 24 hours. Patient identification was verified at the start of the visit: Yes    Patient's location: home address in [facility]  Physician  location other address in [facility] other people involved in call, Dr. Suzette Mejia. Total Time: minutes: 11-20 minutes    Gisell Vyas D.O.  12/21/2020 8:58 AM    Attending Physician Statement  I have discussed the case, including pertinent history and exam findings with the resident. I also have seen the patient and performed key portions of the examination. I agree with the documented assessment and plan. This visit was performed via Telemedicine during the DRGEF-90 public health crisis.

## 2020-12-21 NOTE — PROGRESS NOTES
Consult from Dr Jesica Malagon after virtual visit that pt is unable to afford inhalers due to being uninsured. 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 Application process and guidelines explained in detail. Pt to review YTD income and provided Medicaid phone number as welll as website to apply if under a guidelines  Pt stated she has albuterol inhaler and 1 dose of long acting; Dr. Contreras Host sigend out [facility] 200/5 and ARLETTE Hamilton called pt and instructed of difference in dose and to take 1 puff 2 times/day. LSW met pt outside doors and provided [facility] sample and finished PAP enrollment which D Larsen of PAP generated.   Pt aware medication to be delivered to her house and enrollment is good for 1 year and need to enroll next November for which she verbalized understanding; Provided LSW number to call as needed

## 2020-12-21 NOTE — PATIENT INSTRUCTIONS
Lab Tests to get prior to next Visit  1. Echocardiogram     Changes in Medications  1. None     Referrals   1. None     Please follow up 2 weeks with our clinic. Please call if your symptoms worsen or fail to improve.

## 2021-01-11 ENCOUNTER — HOSPITAL ENCOUNTER (OUTPATIENT)
Dept: GENERAL RADIOLOGY | Age: 46
Discharge: HOME | End: 2021-01-13

## 2021-01-11 ENCOUNTER — OFFICE VISIT (OUTPATIENT)
Dept: INTERNAL MEDICINE | Age: 46
End: 2021-01-11

## 2021-01-11 ENCOUNTER — HOSPITAL ENCOUNTER (OUTPATIENT)
Age: 46
Discharge: HOME | End: 2021-01-11

## 2021-01-11 ENCOUNTER — HOSPITAL ENCOUNTER (OUTPATIENT)
Age: 46
Discharge: HOME | End: 2021-01-13

## 2021-01-11 VITALS
SYSTOLIC BLOOD PRESSURE: 169 MMHG | DIASTOLIC BLOOD PRESSURE: 107 MMHG | TEMPERATURE: 98.3 F | HEART RATE: 102 BPM | BODY MASS INDEX: 44.41 KG/M2 | WEIGHT: 293 LBS | RESPIRATION RATE: 12 BRPM | HEIGHT: 68 IN

## 2021-01-11 DIAGNOSIS — M79.89 LEG SWELLING: ICD-10-CM

## 2021-01-11 DIAGNOSIS — R06.02 SOB (SHORTNESS OF BREATH): ICD-10-CM

## 2021-01-11 DIAGNOSIS — J45.40 MODERATE PERSISTENT ASTHMA WITHOUT COMPLICATION: ICD-10-CM

## 2021-01-11 DIAGNOSIS — G43.019 INTRACTABLE MIGRAINE WITHOUT AURA AND WITHOUT STATUS MIGRAINOSUS: ICD-10-CM

## 2021-01-11 DIAGNOSIS — I10 ESSENTIAL HYPERTENSION: Primary | ICD-10-CM

## 2021-01-11 DIAGNOSIS — D50.9 IRON DEFICIENCY ANEMIA, UNSPECIFIED IRON DEFICIENCY ANEMIA TYPE: Chronic | ICD-10-CM

## 2021-01-11 LAB
ALBUMIN SERPL-MCNC: 4 G/DL (ref 3.5–5.2)
ALP BLD-CCNC: 108 U/L (ref 35–104)
ALT SERPL-CCNC: 14 U/L (ref 0–32)
ANION GAP SERPL CALCULATED.3IONS-SCNC: 7 MMOL/L (ref 7–16)
AST SERPL-CCNC: 14 U/L (ref 0–31)
BASOPHILS ABSOLUTE: 0.04 E9/L (ref 0–0.2)
BASOPHILS RELATIVE PERCENT: 0.4 % (ref 0–2)
BILIRUB SERPL-MCNC: 0.2 MG/DL (ref 0–1.2)
BUN BLDV-MCNC: 12 MG/DL (ref 6–20)
CALCIUM SERPL-MCNC: 9.1 MG/DL (ref 8.6–10.2)
CHLORIDE BLD-SCNC: 103 MMOL/L (ref 98–107)
CO2: 29 MMOL/L (ref 22–29)
CREAT SERPL-MCNC: 0.9 MG/DL (ref 0.5–1)
EOSINOPHILS ABSOLUTE: 0.15 E9/L (ref 0.05–0.5)
EOSINOPHILS RELATIVE PERCENT: 1.5 % (ref 0–6)
GFR AFRICAN AMERICAN: >60
GFR NON-AFRICAN AMERICAN: >60 ML/MIN/1.73
GLUCOSE BLD-MCNC: 98 MG/DL (ref 74–99)
HCT VFR BLD CALC: 38.1 % (ref 34–48)
HEMOGLOBIN: 11.2 G/DL (ref 11.5–15.5)
IMMATURE GRANULOCYTES #: 0.03 E9/L
IMMATURE GRANULOCYTES %: 0.3 % (ref 0–5)
LYMPHOCYTES ABSOLUTE: 1.75 E9/L (ref 1.5–4)
LYMPHOCYTES RELATIVE PERCENT: 17.6 % (ref 20–42)
MCH RBC QN AUTO: 22 PG (ref 26–35)
MCHC RBC AUTO-ENTMCNC: 29.4 % (ref 32–34.5)
MCV RBC AUTO: 75 FL (ref 80–99.9)
MONOCYTES ABSOLUTE: 0.7 E9/L (ref 0.1–0.95)
MONOCYTES RELATIVE PERCENT: 7.1 % (ref 2–12)
NEUTROPHILS ABSOLUTE: 7.25 E9/L (ref 1.8–7.3)
NEUTROPHILS RELATIVE PERCENT: 73.1 % (ref 43–80)
PDW BLD-RTO: 17.7 FL (ref 11.5–15)
PLATELET # BLD: 479 E9/L (ref 130–450)
PMV BLD AUTO: 10.8 FL (ref 7–12)
POTASSIUM SERPL-SCNC: 3.6 MMOL/L (ref 3.5–5)
RBC # BLD: 5.08 E12/L (ref 3.5–5.5)
SEDIMENTATION RATE, ERYTHROCYTE: 18 MM/HR (ref 0–20)
SODIUM BLD-SCNC: 139 MMOL/L (ref 132–146)
TOTAL PROTEIN: 8 G/DL (ref 6.4–8.3)
URIC ACID, SERUM: 6.9 MG/DL (ref 2.4–5.7)
WBC # BLD: 9.9 E9/L (ref 4.5–11.5)

## 2021-01-11 PROCEDURE — 84550 ASSAY OF BLOOD/URIC ACID: CPT

## 2021-01-11 PROCEDURE — 73630 X-RAY EXAM OF FOOT: CPT

## 2021-01-11 PROCEDURE — 73610 X-RAY EXAM OF ANKLE: CPT

## 2021-01-11 PROCEDURE — 85651 RBC SED RATE NONAUTOMATED: CPT

## 2021-01-11 PROCEDURE — 80053 COMPREHEN METABOLIC PANEL: CPT

## 2021-01-11 PROCEDURE — 36415 COLL VENOUS BLD VENIPUNCTURE: CPT

## 2021-01-11 PROCEDURE — 86038 ANTINUCLEAR ANTIBODIES: CPT

## 2021-01-11 PROCEDURE — 99214 OFFICE O/P EST MOD 30 MIN: CPT | Performed by: INTERNAL MEDICINE

## 2021-01-11 PROCEDURE — 86039 ANTINUCLEAR ANTIBODIES (ANA): CPT

## 2021-01-11 PROCEDURE — 85025 COMPLETE CBC W/AUTO DIFF WBC: CPT

## 2021-01-11 PROCEDURE — 99212 OFFICE O/P EST SF 10 MIN: CPT | Performed by: INTERNAL MEDICINE

## 2021-01-11 RX ORDER — ACETAMINOPHEN 500 MG
500 TABLET ORAL 4 TIMES DAILY PRN
Qty: 120 TABLET | Refills: 5 | Status: SHIPPED
Start: 2021-01-11 | End: 2021-01-27 | Stop reason: SDUPTHER

## 2021-01-11 RX ORDER — ALBUTEROL SULFATE 90 MCG
2 HFA AEROSOL WITH ADAPTER (GRAM) INHALATION 4 TIMES DAILY PRN
Qty: 1 INHALER | Refills: 0 | Status: SHIPPED
Start: 2021-01-11 | End: 2021-07-23 | Stop reason: SDUPTHER

## 2021-01-11 ASSESSMENT — ENCOUNTER SYMPTOMS
GASTROINTESTINAL NEGATIVE: 1
ALLERGIC/IMMUNOLOGIC NEGATIVE: 1
COLOR CHANGE: 1
SHORTNESS OF BREATH: 1
EYES NEGATIVE: 1

## 2021-01-11 ASSESSMENT — PATIENT HEALTH QUESTIONNAIRE - PHQ9
1. LITTLE INTEREST OR PLEASURE IN DOING THINGS: 1
SUM OF ALL RESPONSES TO PHQ9 QUESTIONS 1 & 2: 2

## 2021-01-11 NOTE — PROGRESS NOTES
I examined and discussed the patient with Dr. Aiden Black. Patient here for ongoing issues with LE edema as well as ongoing SOB. Asthma - long-standing. Since COVID, has been SOB. 1 - 2 nighttime awakenings each week. Still taking Dulera and BREO inhaler. CT of chest was negative. Also with leg swelling from calves down bilaterally. This has been worsening since end of October prior to her hospitalization with COVID. Has also noticed discoloration of skin at ankles. +5/10 pain with worsening pain at night reaching up to a 9 - 10/10 in severity. Patient denies any fevers or chills. HTN - BP elevated here today, but patient did not take her BP meds this morning prior to coming. Vitals:    01/11/21 0840 01/11/21 0849   BP: (!) 176/104 (!) 169/107   Pulse: 102    Resp: 12    Temp: 98.3 °F (36.8 °C)    TempSrc: Oral    Weight: (!) 335 lb (152 kg)    Height: 5' 8\" (1.727 m)      Lungs:  CTA B, no wheezes/rhonchi  Extr:  2+ DP/PT pulses B, 2 + pitting edema B on LE up to mid-leg.  +tenderness to light touch over LE, most pronounced at the ankle. + erythema of lower extremities B more on the L LE than R. Decreased ROM at L ankle. Normal capillary refill in toes B. Assessment/Plan:  1. LE edema/pain/erythema - broad differential with previous negative work-up for DVT. Differential includes neuropathic pain as pain with light touch. Also with consideration for vascular process as well. Doubt infection given other lack of systemic symptoms and length of symptoms. Check X-ray of L ankle and foot. Check ESR to rule out vasculitic process    Check CBC and CMP as well     If all is normal, will ask vascular input     Acetaminophen for pain at this time. 2.  Asthma - long-standing, normal exam   Will need PFTs   Continue same medications for now.      3.  HTN - elevated here today and reassess once medications have been taken   BP cuff for ambulatory measurement Continue same and increase if still elevated    Nida Armenta MD  1/11/2021

## 2021-01-11 NOTE — PROGRESS NOTES
Wakes up in the morning with swelling -> walks with pain in achilles, and dorsum of foot and pain all day , 5/10 in morning -> 9/10 at night , cried hersellf to sleep at night since pain was so bad     5. Migraine with aura  - Managing with diet , trying to avoid meds    6. Sciatica   - Right sided back pain, no meds or PT         PMHx:  has a past medical history of Anxiety, Blood transfusion, Chronic fatigue, Hypertension, Iron deficiency anemia due to chronic blood loss, Knee pain, bilateral, Low back pain, Migraine headache without aura, Morbid obesity (HCC), Muscle cramps, Perennial allergic rhinitis, Superficial thrombophlebitis of right leg, Ulcerative colitis ([address] and Vertigo. PSHx:  has a past surgical history that includes Adenoidectomy. Allergies: Coconut     Fam Hx: Mother and Father had hx of blood clots and were on blood thinners for long duration  , Gout    OBYGN Hx: Regular , 5-7 days , no excessive pain or bleeding  Used OCP last from 4957-0284 , no children     Sexual Hx:  Sexually active with  , no hx of STI     Social Hx: Occupation - Reservation coordinator   Not insured by Social Work on case and pt given [facility] and registered for PAP program and Medicaid    Health Maintenance/Social Determinants: -      Med Refills: -    Key points to note:  -    Current Outpatient Medications   Medication Instructions    acetaminophen (TYLENOL) 1,000 mg, Oral, EVERY 6 HOURS PRN    acetaminophen (TYLENOL) 500 mg, Oral, 4 TIMES DAILY PRN    amLODIPine (NORVASC) 5 mg, Oral, DAILY    ferrous sulfate (IRON 325) 325 mg, Oral, 2 TIMES DAILY WITH MEALS    Misc.  Devices KIT 1 kit, Does not apply, DAILY PRN, Blood pressure cuff+ Kit (large size) to measure blood    mometasone-formoterol (DULERA) 100-5 MCG/ACT inhaler 2 puffs, Inhalation, 2 TIMES DAILY    montelukast (SINGULAIR) 10 mg, Oral, NIGHTLY  PROVENTIL  (90 Base) MCG/ACT inhaler 2 puffs, Inhalation, 4 TIMES DAILY PRN, LOT# [id number], EXP 7/21        Review of Systems   Constitutional: Negative. HENT: Negative. Eyes: Negative. Respiratory: Positive for shortness of breath. Cardiovascular: Positive for leg swelling. Gastrointestinal: Negative. Endocrine: Negative. Genitourinary: Negative. Musculoskeletal: Negative. Skin: Positive for color change. Swelling and darkening of both feet and tingling in right foot     Allergic/Immunologic: Negative. Neurological: Negative. Hematological: Negative. Psychiatric/Behavioral: Negative. Outpatient Medications Marked as Taking for the 1/11/21 encounter (Office Visit) with Rupa Vegas MD   Medication Sig Dispense Refill    PROVENTIL  (90 Base) MCG/ACT inhaler Inhale 2 puffs into the lungs 4 times daily as needed for Wheezing LOT# [id number], EXP 7/21 1 Inhaler 0    Misc. Devices KIT 1 kit by Does not apply route daily as needed (Kit to measure blood pressure) Blood pressure cuff+ Kit (large size) to measure blood 1 kit 0    acetaminophen (TYLENOL) 500 MG tablet Take 1 tablet by mouth 4 times daily as needed for Pain 120 tablet 5    montelukast (SINGULAIR) 10 MG tablet Take 1 tablet by mouth nightly 30 tablet 0    ferrous sulfate (IRON 325) 325 (65 Fe) MG tablet Take 1 tablet by mouth 2 times daily (with meals) 60 tablet 0    amLODIPine (NORVASC) 5 MG tablet Take 1 tablet by mouth daily 90 tablet 1    mometasone-formoterol (DULERA) 100-5 MCG/ACT inhaler Inhale 2 puffs into the lungs 2 times daily 1 Inhaler 5    acetaminophen (TYLENOL) 500 MG tablet Take 1,000 mg by mouth every 6 hours as needed for Pain         I have reviewed all pertinent PMHx, PSHx, FamHx, SocialHx, medications, and allergies and updated history as appropriate.     OBJECTIVE: VS: BP (!) 169/107   Pulse 102   Temp 98.3 °F (36.8 °C) (Oral)   Resp 12   Ht 5' 8\" (1.727 m)   Wt (!) 335 lb (152 kg)   LMP 12/23/2020   BMI 50.94 kg/m²   Physical Exam  Constitutional:       Appearance: She is obese. HENT:      Head: Normocephalic and atraumatic. Cardiovascular:      Rate and Rhythm: Normal rate and regular rhythm. Pulses: Normal pulses. Heart sounds: Normal heart sounds. Pulmonary:      Effort: Pulmonary effort is normal.      Breath sounds: Normal breath sounds. Abdominal:      Palpations: Abdomen is soft. Musculoskeletal:         General: Tenderness present. Right lower leg: Edema present. Left lower leg: Edema present. Comments: Bilateral feet warm , extremely tender to touch , peripheral pulses palpable 2+, skin discolored and bruising noted in bilateral ankles and feet ( Left > right )   Swelling 2+ up to mid calves   Skin:     General: Skin is dry. Capillary Refill: Capillary refill takes less than 2 seconds. Neurological:      General: No focal deficit present. Mental Status: She is alert and oriented to person, place, and time. Psychiatric:         Mood and Affect: Mood normal.         Behavior: Behavior normal.         Thought Content: Thought content normal.         Judgment: Judgment normal.         PLAN:  1. . Moderate persistent asthma without complication  Diagnosed as a child, never grew out of it, was taking albuterol PRN   Asthma more sensitive in warmer weather   Given Dulera and Breo Elipta at previous visit, using them   Currently using these only in morning daily , and taking proventil (albuterol) -  2-4 times/ week  1-2 nighttime awakenings / week not every week and not increased after COVID   Still feels SOB post COVID despite current regimen   Plan: Advised to take [facility] and Breo twice a day as previously advised, continue albuterol PRN . Would not consider to step up right now as per JOSI 2019. Will wait and watch and re-evaluate . Consider PFT and Pulm consult in the future. - PROVENTIL  (90 Base) MCG/ACT inhaler; Inhale 2 puffs into the lungs 4 times daily as needed for Wheezing LOT# [id number], EXP 7/21  Dispense: 1 Inhaler; Refill: 0    2. Essential hypertension  No BP readings at home, but usually well controlled with Amlodipine  Takes amlodipine 10mg  Today not taken BP med, and /107 in office . This high reading could be due to white coat HTN + severe pain in both feet   Plan: Prescribed BP cuff for home measurement, and will reevaluate BP in the office at next visit     3. Leg swelling  2013/14 swelling in legs due to blood clot in right leg and depending on left leg   Now sometimes legs swollen with weather change but otherwise ok  Not taking OCP currently ( took in [id number])  This current episode started end of Sept but was intermittent. Post COVID , since beginning of December had bruising of feet, skin discoloration,erythema darkening and tightness around ankles   Wakes up in the morning with swelling -> walks with pain in achilles, and dorsum of foot and pain all day + tingling , 5/10 in morning -> 9/10 at night , cried hersellf to sleep at night since pain was so bad     O/E: Bilateral ankles and feet, warm, extremely tender, peripheral pulses palpable , 2+ edema Left> right, with reddish black discoloration of both feet     Thoughts: This looks to be an inflammatory vasculitic process vs a drainage problem vs joint problem. Have ordered labs as below . Will review labs and advise in PM today or AM tomorrow at the latest. Tylenol given for pain as allergies to NSAIDs. May consider vascular surgery/ortho referral once labs are back    - COMPREHENSIVE METABOLIC PANEL; Future  - CBC WITH AUTO DIFFERENTIAL; Future  - SEDIMENTATION RATE; Future  - SANJIV; Future  - XR ANKLE LEFT (MIN 3 VIEWS); Future  - XR ANKLE RIGHT (MIN 3 VIEWS); Future  - XR FOOT LEFT (MIN 3 VIEWS);  Future - XR FOOT RIGHT (MIN 3 VIEWS); Future  - URIC ACID; Future      4. Iron deficiency anemia, unspecified iron deficiency anemia type  On iron pills, continue     6.  Intractable migraine without aura and without status migrainosus  Controlled with Diet continue        Will review labs and call for further advice    I have reviewed my findings and recommendations with Viv Sanders and Dr Rl Carpenter MD PGY-1   1/11/2021 10:16 AM

## 2021-01-11 NOTE — PROGRESS NOTES
Discharge instructions reviewed with patient. Patient verbalizes understanding. AVS given. Sent to hospital for xrays and labs.  Will schedule follow up after results review

## 2021-01-12 ENCOUNTER — TELEPHONE (OUTPATIENT)
Dept: INTERNAL MEDICINE | Age: 46
End: 2021-01-12

## 2021-01-12 RX ORDER — PREDNISONE 20 MG/1
TABLET ORAL
Qty: 25 TABLET | Refills: 0 | Status: SHIPPED
Start: 2021-01-12 | End: 2021-01-13

## 2021-01-12 NOTE — TELEPHONE ENCOUNTER
Discussed lab results and XRs of ankle with patients and elevated uric acid level. This could still be Acute gout flare vs other inflammatory /lymphatic process. Plan: Start steroid taper for 2 weeks and then return to clinic in 2 weeks for follow up . Plan discussed with Dr. Vinayak Flores    Pt is agreeable with above plan and will see us in 2 weeks or earlier if pain and swelling is worsening.

## 2021-01-13 LAB
ANA PATTERN: ABNORMAL
ANA TITER: ABNORMAL
ANTI-NUCLEAR ANTIBODY (ANA): POSITIVE

## 2021-01-13 RX ORDER — PREDNISONE 20 MG/1
TABLET ORAL
Qty: 18 TABLET | Refills: 0 | Status: SHIPPED | OUTPATIENT
Start: 2021-01-13 | End: 2021-01-28

## 2021-01-27 ENCOUNTER — OFFICE VISIT (OUTPATIENT)
Dept: INTERNAL MEDICINE | Age: 46
End: 2021-01-27

## 2021-01-27 VITALS
HEIGHT: 68 IN | WEIGHT: 293 LBS | TEMPERATURE: 98.3 F | BODY MASS INDEX: 44.41 KG/M2 | HEART RATE: 94 BPM | DIASTOLIC BLOOD PRESSURE: 90 MMHG | RESPIRATION RATE: 12 BRPM | SYSTOLIC BLOOD PRESSURE: 146 MMHG

## 2021-01-27 DIAGNOSIS — I10 ESSENTIAL HYPERTENSION: ICD-10-CM

## 2021-01-27 DIAGNOSIS — J45.41 MODERATE PERSISTENT ASTHMA WITH EXACERBATION: ICD-10-CM

## 2021-01-27 DIAGNOSIS — R06.02 SOB (SHORTNESS OF BREATH) ON EXERTION: ICD-10-CM

## 2021-01-27 DIAGNOSIS — I87.8 VENOUS STASIS: Primary | ICD-10-CM

## 2021-01-27 DIAGNOSIS — R60.0 BILATERAL LOWER EXTREMITY EDEMA: ICD-10-CM

## 2021-01-27 PROCEDURE — 99214 OFFICE O/P EST MOD 30 MIN: CPT | Performed by: INTERNAL MEDICINE

## 2021-01-27 PROCEDURE — 99213 OFFICE O/P EST LOW 20 MIN: CPT | Performed by: INTERNAL MEDICINE

## 2021-01-27 RX ORDER — MONTELUKAST SODIUM 10 MG/1
10 TABLET ORAL NIGHTLY
Qty: 90 TABLET | Refills: 0 | Status: SHIPPED
Start: 2021-01-27 | End: 2021-03-08 | Stop reason: ALTCHOICE

## 2021-01-27 ASSESSMENT — ENCOUNTER SYMPTOMS
ABDOMINAL PAIN: 0
ALLERGIC/IMMUNOLOGIC NEGATIVE: 1
COUGH: 0
EYES NEGATIVE: 1
CHEST TIGHTNESS: 1
DIARRHEA: 0
ABDOMINAL DISTENTION: 0

## 2021-01-27 NOTE — PROGRESS NOTES
[facility]    Attending Physician Statement  I have discussed the case, including pertinent history and exam findings with the resident physician. I have seen and examined the patient and the key elements of the encounter have been performed by me. I agree with the assessment, plan and orders as documented by the resident. I have reviewed all pertinent PMHx, PSHx, FamHx, SocialHx, medications, and allergies and updated history as appropriate. Patient here for routine follow up of medical problems. Bilateral leg swelling-- suspect chronic venous insufficiency (hyperpigmented skin/mild erythema). Check US vein mapping and vascular referral.    BATISTA -- worsening since post-COVID-19, BNP low but BMI 50, need 2D echo    Moderate persistent asthma -- controlled on ICS/LABA    Hypertensionborderline control in office today    Obesity class III (BMI 50) -- continued lifestyle mods    Hyperuricemia -- UA 6.9 -- seems less likely gout flair     Remainder of medical problems as per resident note.     Robbin Almonte DO  1/27/2021 1:47 PM

## 2021-01-27 NOTE — PROGRESS NOTES
[facility]  InternalMedicine Residency Program  ACC Note      SUBJECTIVE:  CC: had concerns including Weight Management (unable to loose weight), Nail Problem, and Leg Swelling (improved). HPI:Rosina Alfaro presented to the [facility] for Bilateral lower extremity edema, since Sep 2020    PMHx: asthma, HTN, b/l leg swelling since Sep 2020    Pt reports significant improvement compared to last visit which is on January 11, 2020, reduced pain level as well as swelling which reduced per patient estimation 40-60%, ROM is full for now. PE, swelling, erythema and hyperpigmentation of LE b/l. US r/o DVT, SANJIV elevated and Uric acid elevated, however, gout seems less likely, given the atypical presentation. Venous stasis seems a more likely cause. Asthma wise, pt is on Dulera, singulair, and albuterol rescuer, hx of recent COVID, doing fairly, using albuterol 3-5 times/wk. Has a remote hx of DVT, 2013/2014. Review of Systems   Constitutional: Negative for appetite change, fatigue and unexpected weight change. HENT: Negative. Eyes: Negative. Respiratory: Positive for chest tightness. Negative for cough. Cardiovascular: Negative for chest pain, palpitations and leg swelling. Gastrointestinal: Negative for abdominal distention, abdominal pain and diarrhea. Endocrine: Negative. Genitourinary: Negative. Musculoskeletal: Negative for gait problem, myalgias and neck stiffness. Skin: Negative for pallor, rash and wound. Allergic/Immunologic: Negative. Neurological: Negative for dizziness, tremors, syncope, facial asymmetry, weakness and headaches. Hematological: Negative. Psychiatric/Behavioral: Negative.         Outpatient Medications Marked as Taking for the 1/27/21 encounter (Office Visit) with Edwin Delarosa MD   Medication Sig Dispense Refill    montelukast (SINGULAIR) 10 MG tablet Take 1 tablet by mouth nightly 90 tablet 0  predniSONE (DELTASONE) 20 MG tablet Take 1 tablet by mouth 2 times daily for 5 days, THEN 1 tablet daily for 5 days, THEN 0.5 tablets daily for 5 days. 18 tablet 0    PROVENTIL  (90 Base) MCG/ACT inhaler Inhale 2 puffs into the lungs 4 times daily as needed for Wheezing LOT# [id number], EXP 7/21 1 Inhaler 0    ferrous sulfate (IRON 325) 325 (65 Fe) MG tablet Take 1 tablet by mouth 2 times daily (with meals) 60 tablet 0    amLODIPine (NORVASC) 5 MG tablet Take 1 tablet by mouth daily 90 tablet 1    mometasone-formoterol (DULERA) 100-5 MCG/ACT inhaler Inhale 2 puffs into the lungs 2 times daily 1 Inhaler 5    acetaminophen (TYLENOL) 500 MG tablet Take 1,000 mg by mouth every 6 hours as needed for Pain         I have reviewed all pertinent PMHx, PSHx, FamHx, SocialHx, medications, and allergiesand updated history as appropriate. OBJECTIVE:    VS: BP (!) 146/90   Pulse 94   Temp 98.3 °F (36.8 °C) (Oral)   Resp 12   Ht 5' 8\" (1.727 m)   Wt (!) 334 lb (151.5 kg)   BMI 50.78 kg/m²   Physical Exam  Vitals signs reviewed. Constitutional:       Appearance: Normal appearance. She is obese. HENT:      Head: Normocephalic and atraumatic. Right Ear: Tympanic membrane normal.      Left Ear: Tympanic membrane normal.      Nose: Nose normal.      Mouth/Throat:      Mouth: Mucous membranes are moist.   Eyes:      Extraocular Movements: Extraocular movements intact. Conjunctiva/sclera: Conjunctivae normal.      Pupils: Pupils are equal, round, and reactive to light. Neck:      Musculoskeletal: Normal range of motion and neck supple. No neck rigidity or muscular tenderness. Cardiovascular:      Rate and Rhythm: Normal rate and regular rhythm. Pulses: Normal pulses. Heart sounds: Normal heart sounds. No murmur. No friction rub. No gallop. Pulmonary:      Effort: Pulmonary effort is normal. No respiratory distress. Breath sounds: Normal breath sounds. No wheezing, rhonchi or rales. Abdominal:      General: Abdomen is flat. Bowel sounds are normal. There is no distension. Palpations: Abdomen is soft. Tenderness: There is no abdominal tenderness. Musculoskeletal: Normal range of motion. General: No deformity. Comments: B/l LE mild swelling, erythema and hyperpigmentation, moderate tenderness on palpation, particularly extending from ankle to middle level of lower legs   Skin:     General: Skin is warm. Capillary Refill: Capillary refill takes less than 2 seconds. Neurological:      Mental Status: She is alert and oriented to person, place, and time. Mental status is at baseline. Cranial Nerves: No cranial nerve deficit. Sensory: No sensory deficit. Psychiatric:         Mood and Affect: Mood normal.         Behavior: Behavior normal.         PLAN:  1. Moderate persistent asthma with exacerbation  - montelukast (SINGULAIR) 10 MG tablet; Take 1 tablet by mouth nightly  Dispense: 90 tablet; Refill: 0  - continue ICS, albuterol inhaler,  - pt has SOB (shortness of breath) on exertion, with persistent intermittent   - PE lung and heart ausculation is unremarkable. - Echocardiogram complete; Future    2. Bilateral lower extremity edema, Venous stasis, suspected vs gout vs vasculitis  - significant improvement compared to last visit which is on January 11, 2020.  -Patient reports significantly reduced pain level as well as swelling which reduced per patient estimation 40-60%, ROM is full for now  - PE swelling, erythema and hyperpigmentation, consider venous stasis  - US r/o DVT, SANJIV elevated and Uric acid elevated, however, gout seems less likely, given the atypical presentation.  - Will check US mapping result for venous insufficiency  - will complete PO steroid dose as previously planned    - [address] DVT; Future  - Compression [place] [facility]    3.  Essential hypertension - BP borderline controlled  - previous Trial off amlodipine for bilateral LE edema, appears to have no effect on edema  - continue amlodipine  - monitor BP at home      I have reviewed my findings and recommendations with Colton Benavides and Dr. Andrea Gutierrez MD PGY-1   1/27/2021 4:00 PM

## 2021-01-28 ENCOUNTER — TELEPHONE (OUTPATIENT)
Dept: VASCULAR SURGERY | Age: 46
End: 2021-01-28

## 2021-01-29 ENCOUNTER — TELEPHONE (OUTPATIENT)
Dept: INTERNAL MEDICINE | Age: 46
End: 2021-01-29

## 2021-01-29 NOTE — TELEPHONE ENCOUNTER
SW made call to pt related to referral for counseling. Pt verbalized doing okay right now and did not need further services. SW provided pt with contact information if pt needs additional support in the future.

## 2021-02-18 ENCOUNTER — TELEPHONE (OUTPATIENT)
Dept: INTERNAL MEDICINE | Age: 46
End: 2021-02-18

## 2021-02-18 NOTE — TELEPHONE ENCOUNTER
Patient called concerning bilateral swelling both lower extremities. Requesting to see Julio Oshea.   Patient notified that she has several appointments with vascular services regarding issue also   appointment scheduled for tomorrow

## 2021-02-19 ENCOUNTER — TELEPHONE (OUTPATIENT)
Dept: VASCULAR SURGERY | Age: 46
End: 2021-02-19

## 2021-02-19 ENCOUNTER — HOSPITAL ENCOUNTER (OUTPATIENT)
Dept: ULTRASOUND IMAGING | Age: 46
Discharge: HOME | End: 2021-02-21

## 2021-02-19 ENCOUNTER — OFFICE VISIT (OUTPATIENT)
Dept: INTERNAL MEDICINE | Age: 46
End: 2021-02-19

## 2021-02-19 ENCOUNTER — TELEPHONE (OUTPATIENT)
Dept: INTERNAL MEDICINE | Age: 46
End: 2021-02-19

## 2021-02-19 VITALS
RESPIRATION RATE: 18 BRPM | WEIGHT: 293 LBS | DIASTOLIC BLOOD PRESSURE: 76 MMHG | HEIGHT: 68 IN | HEART RATE: 95 BPM | BODY MASS INDEX: 44.41 KG/M2 | SYSTOLIC BLOOD PRESSURE: 151 MMHG | TEMPERATURE: 97.3 F

## 2021-02-19 DIAGNOSIS — I10 ESSENTIAL HYPERTENSION: Primary | ICD-10-CM

## 2021-02-19 DIAGNOSIS — D50.9 IRON DEFICIENCY ANEMIA, UNSPECIFIED IRON DEFICIENCY ANEMIA TYPE: Chronic | ICD-10-CM

## 2021-02-19 DIAGNOSIS — R60.0 BILATERAL LOWER EXTREMITY EDEMA: ICD-10-CM

## 2021-02-19 DIAGNOSIS — E66.01 MORBID OBESITY DUE TO EXCESS CALORIES (HCC): ICD-10-CM

## 2021-02-19 DIAGNOSIS — J45.41 MODERATE PERSISTENT ASTHMA WITH EXACERBATION: ICD-10-CM

## 2021-02-19 DIAGNOSIS — G43.019 INTRACTABLE MIGRAINE WITHOUT AURA AND WITHOUT STATUS MIGRAINOSUS: ICD-10-CM

## 2021-02-19 PROCEDURE — 99213 OFFICE O/P EST LOW 20 MIN: CPT | Performed by: STUDENT IN AN ORGANIZED HEALTH CARE EDUCATION/TRAINING PROGRAM

## 2021-02-19 PROCEDURE — 99214 OFFICE O/P EST MOD 30 MIN: CPT | Performed by: STUDENT IN AN ORGANIZED HEALTH CARE EDUCATION/TRAINING PROGRAM

## 2021-02-19 PROCEDURE — 93970 EXTREMITY STUDY: CPT | Performed by: RADIOLOGY

## 2021-02-19 PROCEDURE — 93970 EXTREMITY STUDY: CPT

## 2021-02-19 RX ORDER — HYDROCHLOROTHIAZIDE 25 MG/1
25 TABLET ORAL EVERY MORNING
Qty: 90 TABLET | Refills: 0 | Status: SHIPPED
Start: 2021-02-19 | End: 2021-04-09 | Stop reason: SDUPTHER

## 2021-02-19 ASSESSMENT — ENCOUNTER SYMPTOMS
EYES NEGATIVE: 1
COLOR CHANGE: 1
WHEEZING: 0
ABDOMINAL PAIN: 0
NAUSEA: 0
VOMITING: 0
CONSTIPATION: 0
SHORTNESS OF BREATH: 1
DIARRHEA: 0
COUGH: 0

## 2021-02-19 NOTE — PROGRESS NOTES
[facility]  Internal [facility]    Attending Physician Statement:  Hilma Kayser, M.D., F.A.C.P. I have discussed the case, including pertinent history and exam findings with the resident. I have seen and examined the patient and the key elements of the encounter have been performed by me. I agree with the assessment, plan and orders as documented by the resident. Patient is seen for fu visit today. Last office notes reviewed, relative labs and imaging. Morbid obesity  New recurrent L LE edema, worse if dependant  Hx of DVTs  -but last two US in mid DEC (5 days apart)- no DVT seen  Also CTA negative PE  But now L >R leg worse  Tender L LEG +2 pitting-- xrays just show edema    ? FERNANDA, no sleep test or treatment done  R heart strain likely +morbid obesity  Not sleeping in chair  For echo    Will move up US to today (scheduled already)  Stop amlodipine  Start low dose hctz  - discussed weight loss options  Workup FERNANDA at later date  For now referral to lymphedema center/PT  Compression stockinettes, massage, wrapping, pumps    Remainder of medical problems as per resident note.

## 2021-02-19 NOTE — PROGRESS NOTES
Stat ultrasound scheduled and pt instructed where to go   Patient given instructions. Understanding verbalized.  Fu appointment already scheduled no

## 2021-02-19 NOTE — PATIENT INSTRUCTIONS
Your Amlodipine was discontinued, we have started Hydrochlorothiazide instead, please take it as prescribed. Please elevate your legs and wear compressions socks as much as possible. You have been referred to the Lymphedema clinic as well. We will have you do an ultra sound of your legs today to rule out blood clots in your legs. If you have any questions or concerns, do not hesitate to call us.

## 2021-02-19 NOTE — PROGRESS NOTES
 ferrous sulfate (IRON 325) 325 (65 Fe) MG tablet Take 1 tablet by mouth 2 times daily (with meals) 60 tablet 0    mometasone-formoterol (DULERA) 100-5 MCG/ACT inhaler Inhale 2 puffs into the lungs 2 times daily 1 Inhaler 5    acetaminophen (TYLENOL) 500 MG tablet Take 1,000 mg by mouth every 6 hours as needed for Pain      Misc. Devices KIT 1 kit by Does not apply route daily as needed (Kit to measure blood pressure) Blood pressure cuff+ Kit (large size) to measure blood (Patient not taking: Reported on 2/19/2021) 1 kit 0     No current facility-administered medications on file prior to visit. OBJECTIVE:    VS:   Vitals:    02/19/21 1025   BP: (!) 151/76   Site: Left Upper Arm   Position: Sitting   Cuff Size: Medium Adult   Pulse: 95   Resp: 18   Temp: 97.3 °F (36.3 °C)   TempSrc: Oral   Weight: (!) 335 lb (152 kg)   Height: 5' 8\" (1.727 m)     Physical Exam  Constitutional:       General: She is not in acute distress. Appearance: Normal appearance. She is obese. She is not toxic-appearing. HENT:      Head: Normocephalic and atraumatic. Mouth/Throat:      Mouth: Mucous membranes are moist.      Pharynx: Oropharynx is clear. Eyes:      Extraocular Movements: Extraocular movements intact. Pupils: Pupils are equal, round, and reactive to light. Neck:      Musculoskeletal: Normal range of motion and neck supple. Cardiovascular:      Rate and Rhythm: Normal rate and regular rhythm. Pulses: Normal pulses. Heart sounds: Normal heart sounds. No murmur. No friction rub. No gallop. Pulmonary:      Effort: Pulmonary effort is normal. No respiratory distress. Breath sounds: Wheezing present. No rhonchi or rales. Abdominal:      General: Abdomen is flat. Bowel sounds are normal.      Palpations: Abdomen is soft. Tenderness: There is no abdominal tenderness. Musculoskeletal: Normal range of motion. General: Swelling and tenderness present.

## 2021-02-19 NOTE — PROGRESS NOTES
[facility] Internal Medicine      SUBJECTIVE:  Rl Edward (:  1975) is a 55 y.o. female here for evaluation of the following chief complaint(s): Increased swelling in b/l legs  No chief complaint on file. Previous Visit Imp Points: 2021- [facility] clinic visit with Dr. Ema De La Torre - Significant improvement since last visit with Dr. Luz Maria Mcpherson Bilateral leg swelling-- suspect chronic venous insufficiency (hyperpigmented skin/mild erythema). Check US vein mapping and vascular referral (Appt on 21) . BATISTA -- worsening since post-COVID-19, BNP low but BMI 50, need 2D echoModerate persistent asthma -- controlled on ICS/LABAHypertensionborderline control in office todayObesity class III (BMI 50) -- continued lifestyle mods. Hyperuricemia -- UA 6.9 -- seems less likely gout flare. 21- [address] visit with Me- See note   2020- VV with Dr. Rodriguez Loo -   Moderate persistent asthma uncontrolled- recent exacerbation due to COVID (Oct 2020), family hx of blood clots -> DVT +CTA negative, SOB only resolved on Dulera (mometasone-formoterol)/Breo Elipta(fluticasone-vilanterol) but could not afford,   Leg swelling - Bruising on legs worsening , improvement in swelling but worsening pain, needs to be seen in office  HTN- did not have Amlodipine for 3 weeks, needs BP check in office and amlodipine re order  Had insurance issues and had to register for PAP      HPI:     1.  Essential hypertension  ***    2. Moderate persistent asthma with exacerbation  ***    3. Morbid obesity due to excess calories (HCC)  ***    4. Bilateral lower extremity edema  ***    5. Iron deficiency anemia, unspecified iron deficiency anemia type  ***    6. Intractable migraine without aura and without status migrainosus  ***       Review of Systems PMHx:  has a past medical history of Anxiety, Blood transfusion, Chronic fatigue, Hypertension, Iron deficiency anemia due to chronic blood loss, Knee pain, bilateral, Low back pain, Migraine headache without aura, Morbid obesity (HCC), Muscle cramps, Perennial allergic rhinitis, Superficial thrombophlebitis of right leg, Ulcerative colitis ([address] and Vertigo. Allergies   Allergen Reactions    Aspirin Shortness Of Breath and Nausea And Vomiting    Coconut Oil Anaphylaxis    Ibuprofen Other (See Comments)     Trouble breathing      Iodides Shortness Of Breath    Motrin [Ibuprofen Micronized] Shortness Of Breath    Robitussin (Alcohol Free) [Guaifenesin] Other (See Comments)     States causes worse cough    Codeine Nausea And Vomiting    Iodine Rash    Tramadol Nausea Only        PSHx:  has a past surgical history that includes Adenoidectomy.      OBYGN Hx: Regular , 5-7 days , no excessive pain or bleeding  Used OCP last from 2609-3781 , no children     Sexual Hx: Sexually active with  , no hx of STI     Social Hx: Not insured by Social Work on case and pt given [facility] and registered for PAP program and Medicaid  Social History     Tobacco History     Smoking Status  Never Smoker    Smokeless Tobacco Use  Never Used          Alcohol History     Alcohol Use Status  Yes Comment  occasional wine coolers          Drug Use     Drug Use Status  No          Sexual Activity     Sexually Active  Yes Partners  Male, Female                 Fam Hx:   Family History   Problem Relation Age of Onset    High Blood Pressure Mother     Stroke Mother     Clotting Disorder Mother         Vague Hx of blood clots on blood thinners    High Blood Pressure Father     Clotting Disorder Father         Vague hx of blood clots on blood thinners    Cancer Sister 35        Health Maintenance/Social Determinants: ***    Med Refills: ***    Key points to note:  -***    Current Outpatient Medications on File Prior to Visit

## 2021-02-26 ENCOUNTER — HOSPITAL ENCOUNTER (OUTPATIENT)
Dept: ULTRASOUND IMAGING | Age: 46
Discharge: HOME | End: 2021-02-28

## 2021-02-26 ENCOUNTER — HOSPITAL ENCOUNTER (OUTPATIENT)
Dept: NON INVASIVE DIAGNOSTICS | Age: 46
Discharge: HOME | End: 2021-02-26

## 2021-02-26 DIAGNOSIS — R06.02 SOB (SHORTNESS OF BREATH) ON EXERTION: ICD-10-CM

## 2021-02-26 DIAGNOSIS — I87.8 VENOUS STASIS: ICD-10-CM

## 2021-02-26 LAB
LV EF: 60 %
LVEF MODALITY: NORMAL

## 2021-02-26 PROCEDURE — 93306 TTE W/DOPPLER COMPLETE: CPT

## 2021-02-26 PROCEDURE — 93970 EXTREMITY STUDY: CPT

## 2021-03-05 ENCOUNTER — TELEPHONE (OUTPATIENT)
Dept: VASCULAR SURGERY | Age: 46
End: 2021-03-05

## 2021-03-08 ENCOUNTER — OFFICE VISIT (OUTPATIENT)
Dept: VASCULAR SURGERY | Age: 46
End: 2021-03-08

## 2021-03-08 VITALS
SYSTOLIC BLOOD PRESSURE: 130 MMHG | BODY MASS INDEX: 43.4 KG/M2 | DIASTOLIC BLOOD PRESSURE: 80 MMHG | HEIGHT: 69 IN | WEIGHT: 293 LBS

## 2021-03-08 DIAGNOSIS — R60.0 BILATERAL LOWER EXTREMITY EDEMA: Primary | ICD-10-CM

## 2021-03-08 PROCEDURE — 99203 OFFICE O/P NEW LOW 30 MIN: CPT | Performed by: PHYSICIAN ASSISTANT

## 2021-03-08 NOTE — PROGRESS NOTES
Vascular Surgery Outpatient Consultation    Reason for Consult:  Bilateral Leg Swelling    PCP : Dillon Welch MD    HISTORY OF PRESENT ILLNESS:    The patient presents for evaluation of bilateral leg swelling that has been ongoing for about 1 year. She states the symptoms worsened in September. The swelling is worse at the end of the day and better first thing in the morning. The symptoms affect both sides pretty equally. Symptoms include pain, aching and edema typically worse as on feet more, at end of the day. Pain at its worst is a 10/10 (this is at night). In the morning when she wakes the pain is a 3/10. She does not have many varicosities. The patient has used compression stockings in the past but notes they were too tight and hurt so she stopped wearing them. She thinks she may have had a DVT in a calf vein in 2014 but states she was not placed on [address] [place] [address] at that time.      ROS : All others Negative if blank [], Positive if [x]  General Urinary   [] Fevers [] Hematuria   [] Chills [] Dysuria   [] Weight Loss Vascular   Skin [] Claudication   [] Tissue Loss [] Rest Pain   Eyes Neurologic   [] Wears Glasses/Contacts [] Stroke/TIA   [] Vision Changes [] Focal weakness   Respiratory [] Slurred Speech    [] Shortness of breath ENT   Cardiovascular [] Difficulty swallowing   [] Chest Pain Endocrine    [] Shortness of breath with exertion [] Increased Thirst   Gastrointestinal    [] Abdominal Pain    [] Melena   [] Hematochezia         Past Medical History:        Diagnosis Date    Anxiety 2009    Blood transfusion     Chronic fatigue 2007    Hypertension 2009    not treated    Iron deficiency anemia due to chronic blood loss 2007    non-compliant with iron replacement    Knee pain, bilateral 2011    Leg pain     Low back pain 2015    Pain is getting worse    Migraine headache without aura 2009    Morbid obesity ([facility] [address] 2007    Muscle cramps 2011    Perennial allergic rhinitis 2007    Superficial thrombophlebitis of right leg 2009 and 2011    Ulcerative colitis ([facility] [address]     Vertigo      Past Surgical History:        Procedure Laterality Date    ADENOIDECTOMY       Current Medications:   Current Outpatient Medications   Medication Sig Dispense Refill    hydroCHLOROthiazide (HYDRODIURIL) 25 MG tablet Take 1 tablet by mouth every morning 90 tablet 0    Misc. Devices KIT 1 kit by Does not apply route daily as needed (Kit to measure blood pressure) Blood pressure cuff+ Kit (large size) to measure blood 1 kit 0    ferrous sulfate (IRON 325) 325 (65 Fe) MG tablet Take 1 tablet by mouth 2 times daily (with meals) 60 tablet 0    mometasone-formoterol (DULERA) 100-5 MCG/ACT inhaler Inhale 2 puffs into the lungs 2 times daily 1 Inhaler 5    acetaminophen (TYLENOL) 500 MG tablet Take 1,000 mg by mouth every 6 hours as needed for Pain      PROVENTIL  (90 Base) MCG/ACT inhaler Inhale 2 puffs into the lungs 4 times daily as needed for Wheezing LOT# [id number], EXP 7/21 1 Inhaler 0     No current facility-administered medications for this visit.       Allergies:  Aspirin, Coconut oil, Ibuprofen, Iodides, Motrin [ibuprofen micronized], Robitussin (alcohol free) [guaifenesin], Codeine, Iodine, and Tramadol  Social History     Socioeconomic History    Marital status: Single     Spouse name: Not on file    Number of children: Not on file    Years of education: Not on file    Highest education level: Not on file   Occupational History    Occupation: [address] [place] [address]    Social Needs    Financial resource strain: Not on file    Food insecurity     Worry: Not on file     Inability: Not on file   [employer] needs     Medical: Not on file     Non-medical: Not on file   Tobacco Use    Smoking status: Never Smoker    Smokeless tobacco: Never Used   Substance and Sexual Activity    Alcohol use: Yes     Comment: occasional wine coolers    Drug use: No    Sexual activity: Yes     Partners: Male, Female Lifestyle    Physical activity     Days per week: Not on file     Minutes per session: Not on file    Stress: Not on file   Relationships    Social connections     Talks on phone: Not on file     Gets together: Not on file     Attends Sikhism service: Not on file     Active member of club or organization: Not on file     Attends meetings of clubs or organizations: Not on file     Relationship status: Not on file    Intimate partner violence     Fear of current or ex partner: Not on file     Emotionally abused: Not on file     Physically abused: Not on file     Forced sexual activity: Not on file   Other Topics Concern    Not on file   Social History Narrative    Not on file        Family History   Problem Relation Age of Onset    High Blood Pressure Mother     Stroke Mother     Clotting Disorder Mother         Vague Hx of blood clots on blood thinners    High Blood Pressure Father     Clotting Disorder Father         Vague hx of blood clots on blood thinners    Cancer Sister 33     Labs  Lab Results   Component Value Date    WBC 9.9 01/11/2021    HGB 11.2 (L) 01/11/2021    HCT 38.1 01/11/2021     (H) 01/11/2021    PROTIME 12.4 10/20/2020    INR 1.1 10/20/2020    APTT 24.2 (L) 10/20/2020    K 3.6 01/11/2021    BUN 12 01/11/2021    CREATININE 0.9 01/11/2021     PHYSICAL EXAM:    CONSTITUTIONAL:   Awake, alert, cooperative  PSYCHIATRIC :  Oriented to time, place and person     Appropriate insight to disease process  EYES: Lids and lashes normal  ENT:  External ears and nose without lesions   Hearing deficits absent  NECK: Supple, symmetrical, trachea midline   Thyroid goiter not appreciated   Carotid bruit absent  LUNGS:  No increased work of breathing                 Clear to auscultation  CARDIOVASCULAR:  regular rate and rhythm   ABDOMEN:  soft, non-distended, non-tender  Lymphatics : Femoral lymphadenopathy not appreciated  SKIN:   Normal skin color   Texture and turgor normal, no induration EXTREMITIES:   R UE 5/5 strength   No cyanosis noted in nail beds  L UE 5/5 strength   No cyanosis noted in nail beds  R LE Edema moderate   Varicose veins absent   L LE Edema moderate   Varicose veins absent   R femoral 1 L femoral 2   R dorsalis pedis 2 L dorsalis pedis 2   R posterior tibial  L posterior tibial      RADIOLOGY: Venous US from 2/19/21 and 2/26/21 showing no evidence of DVT or significant venous reflux    A/P Venous Insufficiency  Bilateral Leg Swelling  · Etiology is likely associated with venous reflux  · I explained to them the pathophysiology of venous reflux and the symptoms associated with it including swelling, pain, edema, heaviness, and even ulceration  I reviewed with the patient that she does not have a problem with her legs, but a problem with fluid retention in the legs due to dependency. I reviewed with her that the only way to remove the fluid from the legs is to elevate them so that the fluid leaves the interstitial space and enters the veins and lymphatics. The only way to prevent the fluid from recurring is the use of compression stockings and leg elevation  Weight loss also helps reduce leg swelling and this was discussed with the patient  · Elevate Bilateral LE while in bed or sitting  · Knee high [employer] compression stockings since she does not tolerate heavier compression  · Discussed how this is the first line of therapy  · They understand even if surgical intervention was felt to be appropriate in the future they would need to wear compression stockings lifetime  · F/U as outpatient in 2 months  · Call if patient develops worsening of swelling or wounds  · All ?s answered    Pt seen and plan reviewed with Dr. Blas Morton.      Ana Henderson PA-C

## 2021-03-24 ENCOUNTER — TELEPHONE (OUTPATIENT)
Dept: INTERNAL MEDICINE | Age: 46
End: 2021-03-24

## 2021-04-09 ENCOUNTER — OFFICE VISIT (OUTPATIENT)
Dept: INTERNAL MEDICINE | Age: 46
End: 2021-04-09

## 2021-04-09 VITALS
BODY MASS INDEX: 43.4 KG/M2 | DIASTOLIC BLOOD PRESSURE: 91 MMHG | HEIGHT: 69 IN | RESPIRATION RATE: 18 BRPM | SYSTOLIC BLOOD PRESSURE: 151 MMHG | WEIGHT: 293 LBS | TEMPERATURE: 98.8 F | HEART RATE: 92 BPM

## 2021-04-09 DIAGNOSIS — G47.33 OSA (OBSTRUCTIVE SLEEP APNEA): Primary | ICD-10-CM

## 2021-04-09 DIAGNOSIS — D50.9 IRON DEFICIENCY ANEMIA, UNSPECIFIED IRON DEFICIENCY ANEMIA TYPE: ICD-10-CM

## 2021-04-09 DIAGNOSIS — Z91.89 AT RISK FOR OBSTRUCTIVE SLEEP APNEA: ICD-10-CM

## 2021-04-09 DIAGNOSIS — I10 ESSENTIAL HYPERTENSION: ICD-10-CM

## 2021-04-09 DIAGNOSIS — Z00.00 HEALTHCARE MAINTENANCE: ICD-10-CM

## 2021-04-09 PROCEDURE — 99212 OFFICE O/P EST SF 10 MIN: CPT | Performed by: INTERNAL MEDICINE

## 2021-04-09 PROCEDURE — 99214 OFFICE O/P EST MOD 30 MIN: CPT | Performed by: INTERNAL MEDICINE

## 2021-04-09 RX ORDER — HYDROCHLOROTHIAZIDE 25 MG/1
25 TABLET ORAL EVERY MORNING
Qty: 30 TABLET | Refills: 1 | Status: SHIPPED
Start: 2021-04-09 | End: 2021-07-23 | Stop reason: SDUPTHER

## 2021-04-09 RX ORDER — LISINOPRIL 5 MG/1
5 TABLET ORAL DAILY
Qty: 30 TABLET | Refills: 1 | Status: SHIPPED
Start: 2021-04-09 | End: 2021-07-23 | Stop reason: SDUPTHER

## 2021-04-09 ASSESSMENT — ENCOUNTER SYMPTOMS
VOMITING: 0
TROUBLE SWALLOWING: 0
COUGH: 0
ABDOMINAL DISTENTION: 0
CONSTIPATION: 0
CHEST TIGHTNESS: 0
SORE THROAT: 0
VOICE CHANGE: 0
ABDOMINAL PAIN: 0
RHINORRHEA: 0
STRIDOR: 0
BACK PAIN: 0
SHORTNESS OF BREATH: 1
WHEEZING: 0
CHOKING: 0
APNEA: 0
NAUSEA: 0
DIARRHEA: 0

## 2021-04-09 NOTE — PROGRESS NOTES
[facility]  Internal Medicine Residency Program  ACC Note      SUBJECTIVE:  CC: had concerns including Leg Swelling and Medication Refill. HPI:  Mt Levin is a 55 y. o.female with PMH of essential HTN, asthma, morbid obesity, and migraine presenting to [facility] for follow up of lower leg edema, and dyspnea on exertion. The following issues were discussed today:    LLE:  Patient has been worked up for DVTs-- has been negative. Per Vascular surgery (03/08/21) this appears to be mostly lymphedema 2/2 venous reflux, ordered compression stocking, patient has been using the stocking (till knee) for 5-6 hours, and has bought compression leggings (going all the way up to thigh--much more comfortable), that she wears majority of time. She has also been advised to elevate the legs. With these measures, her symptoms have been improving, some pain still present but better. Able to walk a long distance without a lot of pain when she is wearing the compression stockings/leggings. Applies aloe vera lotion and herbal oil for dryness. Dyspnea on exertion:  Mild, present occasionally, on heavy exertion, understands that a majority of that comes from her weight, is trying to lose weight. She admits having excessive daytime sleepiness, and is snoring at night, no observed apneic episodes acc/to her. No chest pain, no palpitations, no significant cardiac history, no stress test on file, echocardiogram (02/26) shows normal LV systolic function (EF: 65%), indeterminate diastolic function. Normal RV size and function. No significant valvular disorder noted. Essential HTN:  BP today: 151/91, complaint with HCTZ 25mg. Amlodipine was stopped on the last visit. Review of Systems   Constitutional: Negative for appetite change, diaphoresis and fatigue. HENT: Negative for postnasal drip, rhinorrhea, sore throat, trouble swallowing and voice change. Respiratory: Positive for shortness of breath.  Negative for apnea, cough, choking, chest tightness, wheezing and stridor. Dyspnea on exertion   Cardiovascular: Positive for leg swelling. Negative for chest pain and palpitations. Gastrointestinal: Negative for abdominal distention, abdominal pain, constipation, diarrhea, nausea and vomiting. Genitourinary: Negative for dysuria, flank pain, frequency and urgency. Musculoskeletal: Negative for arthralgias, back pain and joint swelling. Skin: Positive for pallor. Neurological: Negative for syncope, light-headedness, numbness and headaches. Psychiatric/Behavioral: Negative for decreased concentration and dysphoric mood. Outpatient Medications Marked as Taking for the 4/9/21 encounter (Office Visit) with Coreen Cabrales MD   Medication Sig Dispense Refill    hydroCHLOROthiazide (HYDRODIURIL) 25 MG tablet Take 1 tablet by mouth every morning 30 tablet 1    lisinopril (PRINIVIL;ZESTRIL) 5 MG tablet Take 1 tablet by mouth daily 30 tablet 1    PROVENTIL  (90 Base) MCG/ACT inhaler Inhale 2 puffs into the lungs 4 times daily as needed for Wheezing LOT# [id number], EXP 7/21 1 Inhaler 0    ferrous sulfate (IRON 325) 325 (65 Fe) MG tablet Take 1 tablet by mouth 2 times daily (with meals) 60 tablet 0    acetaminophen (TYLENOL) 500 MG tablet Take 1,000 mg by mouth every 6 hours as needed for Pain         OBJECTIVE:    VS:   BP (!) 151/91 (Site: Right Lower Arm, Position: Sitting, Cuff Size: Medium Adult)   Pulse 92   Temp 98.8 °F (37.1 °C) (Oral)   Resp 18   Ht 5' 9\" (1.753 m)   Wt (!) 333 lb 11.2 oz (151.4 kg)   BMI 49.28 kg/m²     EXAM:  Physical Exam  Constitutional:       General: She is not in acute distress. Appearance: Normal appearance. She is obese. She is not ill-appearing, toxic-appearing or diaphoretic. HENT:      Head: Normocephalic. Mouth/Throat:      Mouth: Mucous membranes are moist.      Pharynx: No oropharyngeal exudate or posterior oropharyngeal erythema.    Eyes: General: No scleral icterus. Right eye: No discharge. Left eye: No discharge. Comments: Lower palpebral conjunctiva-- pallor   Cardiovascular:      Rate and Rhythm: Normal rate and regular rhythm. Pulses: Normal pulses. Heart sounds: No murmur. Comments: Heart sounds are very distant, no murmurs appreciated  Pulmonary:      Effort: Pulmonary effort is normal. No respiratory distress. Breath sounds: Normal breath sounds. No stridor. No wheezing, rhonchi or rales. Comments: Bilateral equal entry  Chest:      Chest wall: No tenderness. Abdominal:      Palpations: Abdomen is soft. Musculoskeletal:         General: No swelling or tenderness. Right lower leg: Edema present. Left lower leg: Edema present. Comments: Bilateral lower legs: non-pitting edema present till knee bilaterally. Dimpling (peau d'orange)--lymphatic stasis/congestion. Slightly painful on pressing, but not exquisitely tender. Does not appear to be inflammed red. Skin:     General: Skin is warm. Neurological:      General: No focal deficit present. Mental Status: She is alert and oriented to person, place, and time. Psychiatric:         Mood and Affect: Mood normal.         Behavior: Behavior normal.         ASSESSMENT/PLAN:  I have reviewed all pertinent PMH, PSH, FH, SH, medications and allergies and updated history as appropriate. 1. Essential hypertension  · Starting Lisinopril 5mg daily, continue HCTZ 25mg daily  · Evaluate BP on the next visit w PCP    2. Dyspnea on exertion:  Likely multifactorial, including CHERELLE, possible FERNANDA, possible obesity hypoventilation, restrictive pattern due to morbid obesity with baseline asthma which is fairly controlled.   · STOP BAN-5, ordered diagnostic sleep study with screening COVID test  · CHERELLE workup as below  · Will defer ischemic cardiac workup to PCP, last echocardiogram showed normal systolic function with indeterminate diastolic

## 2021-04-09 NOTE — PROGRESS NOTES
[facility]  Internal Medicine Residency Clinic    Attending Physician Statement  I have discussed the case, including pertinent history and exam findings with the resident physician. I have seen and examined the patient and the key elements of the encounter have been performed by me. I agree with the assessment, plan and orders as documented by the resident. I have reviewed all pertinent PMHx, PSHx, FamHx, SocialHx, medications, and allergies and updated history as appropriate. Patient here for follow up of leg swelling and SOB. 1. BLE swelling likely 2/2 to venous insufficiency - swelling and pain improved with compression stockings  2. SOB - multifactorial cause (asthma, CHERELLE, ?FERNANDA and restrictive component from obesity). No BATISTA, chest pain. No hx of DM, HLD. Get sleep study, iron panel. 3. Add low dose lisinopril for better BP control    Remainder of medical problems as per resident note. Quinton Maurice MD  4/9/2021 10:55 AM

## 2021-04-09 NOTE — PROGRESS NOTES
Lab scripts and all instructions given to pt by dr Annette Merrill appt scheduled and printed avs given to patient

## 2021-05-14 ENCOUNTER — TELEPHONE (OUTPATIENT)
Dept: VASCULAR SURGERY | Age: 46
End: 2021-05-14

## 2021-05-14 ENCOUNTER — TELEPHONE (OUTPATIENT)
Dept: INTERNAL MEDICINE | Age: 46
End: 2021-05-14

## 2021-05-14 NOTE — TELEPHONE ENCOUNTER
Called to check on status of referral Hermilo Looney. Patient never returned the phone calls to [facility], they called many times.

## 2021-05-17 ENCOUNTER — OFFICE VISIT (OUTPATIENT)
Dept: VASCULAR SURGERY | Age: 46
End: 2021-05-17

## 2021-05-17 VITALS — WEIGHT: 293 LBS | BODY MASS INDEX: 44.41 KG/M2 | HEIGHT: 68 IN

## 2021-05-17 DIAGNOSIS — R60.0 BILATERAL LOWER EXTREMITY EDEMA: Primary | ICD-10-CM

## 2021-05-17 PROCEDURE — 99212 OFFICE O/P EST SF 10 MIN: CPT | Performed by: NURSE PRACTITIONER

## 2021-05-17 NOTE — PROGRESS NOTES
Vascular Surgery Outpatient Consultation    Reason for Consult:  Bilateral Leg Swelling    PCP : David Lindsey MD    HISTORY OF PRESENT ILLNESS:     The patient presents for follow up evaluation of bilateral leg swelling. Her symptoms first started approximately 1 year ago. The swelling was worse at the end of the day and better first thing in the morning. The symptoms affected both sides pretty equally. Symptoms include pain, aching and edema typically worse as on feet more, at end of the day. Pain at its worst was a 10/10 (this is at night). In the morning when she wakes the pain is a 3/10. The patient started using over the counter knee high compression stockings as well as compression leggings. The patient has had improvement in her symptoms. She still has some pain in her calves, but it is manageable. She now states that her pain at worst is 5/10. She thinks she may have had a DVT in a calf vein in 2014 but states she was not placed on [address] [place] [address] Road at that time.      ROS : All others Negative if blank [], Positive if [x]  General Urinary   [] Fevers [] Hematuria   [] Chills [] Dysuria   [] Weight Loss Vascular   Skin [] Claudication   [] Tissue Loss [] Rest Pain   Eyes Neurologic   [] Wears Glasses/Contacts [] Stroke/TIA   [] Vision Changes [] Focal weakness   Respiratory [] Slurred Speech    [] Shortness of breath ENT   Cardiovascular [] Difficulty swallowing   [] Chest Pain Endocrine    [] Shortness of breath with exertion [] Increased Thirst   Gastrointestinal    [] Abdominal Pain    [] Melena   [] Hematochezia         Past Medical History:        Diagnosis Date    Anxiety 2009    Blood transfusion     Chronic fatigue 2007    Hypertension 2009    not treated    Iron deficiency anemia due to chronic blood loss 2007    non-compliant with iron replacement    Knee pain, bilateral 2011    Leg pain     Low back pain 2015    Pain is getting worse    Migraine headache without aura 2009    Morbid obesity [facility]) 2007    Muscle cramps 2011    Perennial allergic rhinitis 2007    Superficial thrombophlebitis of right leg 2009 and 2011    Ulcerative colitis ([address]     Vertigo      Past Surgical History:        Procedure Laterality Date    ADENOIDECTOMY       Current Medications:   Current Outpatient Medications   Medication Sig Dispense Refill    hydroCHLOROthiazide (HYDRODIURIL) 25 MG tablet Take 1 tablet by mouth every morning 30 tablet 1    lisinopril (PRINIVIL;ZESTRIL) 5 MG tablet Take 1 tablet by mouth daily 30 tablet 1    Misc. Devices KIT 1 kit by Does not apply route daily as needed (Kit to measure blood pressure) Blood pressure cuff+ Kit (large size) to measure blood 1 kit 0    ferrous sulfate (IRON 325) 325 (65 Fe) MG tablet Take 1 tablet by mouth 2 times daily (with meals) 60 tablet 0    mometasone-formoterol (DULERA) 100-5 MCG/ACT inhaler Inhale 2 puffs into the lungs 2 times daily 1 Inhaler 5    acetaminophen (TYLENOL) 500 MG tablet Take 1,000 mg by mouth every 6 hours as needed for Pain      PROVENTIL  (90 Base) MCG/ACT inhaler Inhale 2 puffs into the lungs 4 times daily as needed for Wheezing LOT# [id number], EXP 7/21 1 Inhaler 0     No current facility-administered medications for this visit.      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    Tobacco Use    Smoking status: Never Smoker    Smokeless tobacco: Never Used   Vaping Use    Vaping Use: Never used   Substance and Sexual Activity    Alcohol use: Yes     Comment: occasional wine coolers    Drug use: No    Sexual activity: Yes     Partners: Male, Female   Other Topics Concern    Not on file   Social History Narrative    Not on file     Social Determinants of Health     Financial Resource Strain:     Difficulty of Paying Living Expenses:    Food Insecurity:     Worried About Running Out of Food in the Last Year:     [address] in the Last Year:    Transportation Needs:     Lack of Transportation (Medical):      Lack of Transportation (Non-Medical):    Physical Activity:     Days of Exercise per Week:     Minutes of Exercise per Session:    Stress:     Feeling of Stress :    Social Connections:     Frequency of Communication with Friends and Family:     Frequency of Social Gatherings with Friends and Family:     Attends Confucianism Services:     Active Member of Clubs or Organizations:     Attends Club or Organization Meetings:     Marital Status:    Intimate Partner Violence:     Fear of Current or Ex-Partner:     Emotionally Abused:     Physically Abused:     Sexually Abused:         Family History   Problem Relation Age of Onset    High Blood Pressure Mother     Stroke Mother     Clotting Disorder Mother         Vague Hx of blood clots on blood thinners    High Blood Pressure Father     Clotting Disorder Father         Vague hx of blood clots on blood thinners    Cancer Sister 33     Labs  Lab Results   Component Value Date    WBC 9.9 01/11/2021    HGB 11.2 (L) 01/11/2021    HCT 38.1 01/11/2021     (H) 01/11/2021    PROTIME 12.4 10/20/2020    INR 1.1 10/20/2020    APTT 24.2 (L) 10/20/2020    K 3.6 01/11/2021    BUN 12 01/11/2021    CREATININE 0.9 01/11/2021     PHYSICAL EXAM:    CONSTITUTIONAL:   Awake, alert, cooperative  PSYCHIATRIC :  Oriented to time, place and person     Appropriate insight to disease process  EYES: Lids and lashes normal  ENT:  External ears and nose without lesions   Hearing deficits absent  NECK: Supple, symmetrical, trachea midline   Thyroid goiter not appreciated   Carotid bruit absent  LUNGS: No increased work of breathing                 Clear to auscultation  CARDIOVASCULAR:  regular rate and rhythm   ABDOMEN:  soft, non-distended, non-tender  Lymphatics : Femoral lymphadenopathy not appreciated  SKIN:   Normal skin color   Texture and turgor normal, no induration  EXTREMITIES:   R UE 5/5 strength   No cyanosis noted in nail beds  L UE 5/5 strength   No cyanosis noted in nail beds  R LE Edema mild   Varicose veins absent   L LE Edema mild   Varicose veins absent   R femoral 1 L femoral 2   R dorsalis pedis 2 L dorsalis pedis 2   R posterior tibial  L posterior tibial      RADIOLOGY: Venous US from 2/19/21 and 2/26/21 showing no evidence of DVT or significant venous reflux    A/P Venous Insufficiency  Bilateral Leg Swelling  · Symptoms much improved with use of compression stockings in combination with compression leggings  · Continue daily use and increase compression as able to tolerate  · I explained to them the pathophysiology of venous reflux and the symptoms associated with it including swelling, pain, edema, heaviness, and even ulceration  I reviewed with the patient that she does not have a problem with her legs, but a problem with fluid retention in the legs due to dependency. I reviewed with her that the only way to remove the fluid from the legs is to elevate them so that the fluid leaves the interstitial space and enters the veins and lymphatics. The only way to prevent the fluid from recurring is the use of compression stockings and leg elevation  Weight loss also helps reduce leg swelling and this was discussed with the patient  · Elevate Bilateral LE while in bed or sitting  · F/U as outpatient PRN with worsening of symptoms   · Call if patient develops worsening of swelling or wounds  · All ?s answered    Plan reviewed with Dr. Cat Wilkins.      Michelle Medrano, APRN - CNP

## 2021-05-18 ENCOUNTER — VIRTUAL VISIT (OUTPATIENT)
Dept: INTERNAL MEDICINE | Age: 46
End: 2021-05-18

## 2021-05-18 ENCOUNTER — HOSPITAL ENCOUNTER (EMERGENCY)
Age: 46
Discharge: HOME | End: 2021-05-18
Attending: EMERGENCY MEDICINE

## 2021-05-18 ENCOUNTER — APPOINTMENT (OUTPATIENT)
Dept: CT IMAGING | Age: 46
End: 2021-05-18

## 2021-05-18 VITALS
WEIGHT: 293 LBS | HEIGHT: 68 IN | DIASTOLIC BLOOD PRESSURE: 104 MMHG | HEART RATE: 85 BPM | OXYGEN SATURATION: 94 % | RESPIRATION RATE: 16 BRPM | BODY MASS INDEX: 44.41 KG/M2 | SYSTOLIC BLOOD PRESSURE: 177 MMHG | TEMPERATURE: 96.9 F

## 2021-05-18 DIAGNOSIS — R51.9 NONINTRACTABLE HEADACHE, UNSPECIFIED CHRONICITY PATTERN, UNSPECIFIED HEADACHE TYPE: Primary | ICD-10-CM

## 2021-05-18 DIAGNOSIS — G43.819 OTHER MIGRAINE WITHOUT STATUS MIGRAINOSUS, INTRACTABLE: Primary | ICD-10-CM

## 2021-05-18 DIAGNOSIS — I10 ESSENTIAL HYPERTENSION: ICD-10-CM

## 2021-05-18 LAB
ALBUMIN SERPL-MCNC: 3.8 G/DL (ref 3.5–5.2)
ALP BLD-CCNC: 112 U/L (ref 35–104)
ALT SERPL-CCNC: 18 U/L (ref 0–32)
ANION GAP SERPL CALCULATED.3IONS-SCNC: 10 MMOL/L (ref 7–16)
AST SERPL-CCNC: 23 U/L (ref 0–31)
BACTERIA: ABNORMAL /HPF
BILIRUB SERPL-MCNC: <0.2 MG/DL (ref 0–1.2)
BILIRUBIN URINE: NEGATIVE
BLOOD, URINE: NEGATIVE
BUN BLDV-MCNC: 16 MG/DL (ref 6–20)
CALCIUM SERPL-MCNC: 9.1 MG/DL (ref 8.6–10.2)
CHLORIDE BLD-SCNC: 103 MMOL/L (ref 98–107)
CLARITY: CLEAR
CO2: 24 MMOL/L (ref 22–29)
COLOR: YELLOW
CREAT SERPL-MCNC: 1 MG/DL (ref 0.5–1)
EPITHELIAL CELLS, UA: ABNORMAL /HPF
GFR AFRICAN AMERICAN: >60
GFR NON-AFRICAN AMERICAN: >60 ML/MIN/1.73
GLUCOSE BLD-MCNC: 114 MG/DL (ref 74–99)
GLUCOSE URINE: NEGATIVE MG/DL
HCG, URINE, POC: NEGATIVE
HCT VFR BLD CALC: 38.1 % (ref 34–48)
HEMOGLOBIN: 11.1 G/DL (ref 11.5–15.5)
KETONES, URINE: NEGATIVE MG/DL
LEUKOCYTE ESTERASE, URINE: ABNORMAL
Lab: NORMAL
MCH RBC QN AUTO: 21.8 PG (ref 26–35)
MCHC RBC AUTO-ENTMCNC: 29.1 % (ref 32–34.5)
MCV RBC AUTO: 74.9 FL (ref 80–99.9)
NEGATIVE QC PASS/FAIL: NORMAL
NITRITE, URINE: NEGATIVE
PDW BLD-RTO: 17 FL (ref 11.5–15)
PH UA: 5.5 (ref 5–9)
PLATELET # BLD: 406 E9/L (ref 130–450)
PMV BLD AUTO: 11.1 FL (ref 7–12)
POSITIVE QC PASS/FAIL: NORMAL
POTASSIUM SERPL-SCNC: 3.9 MMOL/L (ref 3.5–5)
PROTEIN UA: NEGATIVE MG/DL
RBC # BLD: 5.09 E12/L (ref 3.5–5.5)
RBC UA: ABNORMAL /HPF (ref 0–2)
SARS-COV-2, NAAT: NOT DETECTED
SODIUM BLD-SCNC: 137 MMOL/L (ref 132–146)
SPECIFIC GRAVITY UA: 1.02 (ref 1–1.03)
TOTAL PROTEIN: 7.6 G/DL (ref 6.4–8.3)
UROBILINOGEN, URINE: 0.2 E.U./DL
WBC # BLD: 8.7 E9/L (ref 4.5–11.5)
WBC UA: ABNORMAL /HPF (ref 0–5)

## 2021-05-18 PROCEDURE — 96375 TX/PRO/DX INJ NEW DRUG ADDON: CPT

## 2021-05-18 PROCEDURE — 36415 COLL VENOUS BLD VENIPUNCTURE: CPT

## 2021-05-18 PROCEDURE — 87635 SARS-COV-2 COVID-19 AMP PRB: CPT

## 2021-05-18 PROCEDURE — 99442 PR PHYS/QHP TELEPHONE EVALUATION 11-20 MIN: CPT | Performed by: INTERNAL MEDICINE

## 2021-05-18 PROCEDURE — 85027 COMPLETE CBC AUTOMATED: CPT

## 2021-05-18 PROCEDURE — 6370000000 HC RX 637 (ALT 250 FOR IP): Performed by: NURSE PRACTITIONER

## 2021-05-18 PROCEDURE — 70496 CT ANGIOGRAPHY HEAD: CPT

## 2021-05-18 PROCEDURE — 70498 CT ANGIOGRAPHY NECK: CPT

## 2021-05-18 PROCEDURE — 99283 EMERGENCY DEPT VISIT LOW MDM: CPT

## 2021-05-18 PROCEDURE — 96372 THER/PROPH/DIAG INJ SC/IM: CPT

## 2021-05-18 PROCEDURE — 70450 CT HEAD/BRAIN W/O DYE: CPT

## 2021-05-18 PROCEDURE — 2580000003 HC RX 258: Performed by: RADIOLOGY

## 2021-05-18 PROCEDURE — 6360000002 HC RX W HCPCS: Performed by: NURSE PRACTITIONER

## 2021-05-18 PROCEDURE — 6360000004 HC RX CONTRAST MEDICATION: Performed by: RADIOLOGY

## 2021-05-18 PROCEDURE — 2580000003 HC RX 258: Performed by: NURSE PRACTITIONER

## 2021-05-18 PROCEDURE — 81001 URINALYSIS AUTO W/SCOPE: CPT

## 2021-05-18 PROCEDURE — 96365 THER/PROPH/DIAG IV INF INIT: CPT

## 2021-05-18 PROCEDURE — 80053 COMPREHEN METABOLIC PANEL: CPT

## 2021-05-18 RX ORDER — ALBUTEROL SULFATE 90 MCG
2 HFA AEROSOL WITH ADAPTER (GRAM) INHALATION 4 TIMES DAILY PRN
Qty: 1 INHALER | Status: CANCELLED | OUTPATIENT
Start: 2021-05-18 | End: 2021-06-17

## 2021-05-18 RX ORDER — SUMATRIPTAN 6 MG/.5ML
6 INJECTION, SOLUTION SUBCUTANEOUS ONCE
Status: COMPLETED | OUTPATIENT
Start: 2021-05-18 | End: 2021-05-18

## 2021-05-18 RX ORDER — SODIUM CHLORIDE 0.9 % (FLUSH) 0.9 %
10 SYRINGE (ML) INJECTION ONCE
Status: COMPLETED | OUTPATIENT
Start: 2021-05-18 | End: 2021-05-18

## 2021-05-18 RX ORDER — OXYCODONE HYDROCHLORIDE AND ACETAMINOPHEN 5; 325 MG/1; MG/1
1 TABLET ORAL ONCE
Status: COMPLETED | OUTPATIENT
Start: 2021-05-18 | End: 2021-05-18

## 2021-05-18 RX ORDER — METHYLPREDNISOLONE SODIUM SUCCINATE 125 MG/2ML
125 INJECTION, POWDER, LYOPHILIZED, FOR SOLUTION INTRAMUSCULAR; INTRAVENOUS ONCE
Status: COMPLETED | OUTPATIENT
Start: 2021-05-18 | End: 2021-05-18

## 2021-05-18 RX ORDER — HYDROCHLOROTHIAZIDE 25 MG/1
25 TABLET ORAL EVERY MORNING
Qty: 30 TABLET | Status: CANCELLED | OUTPATIENT
Start: 2021-05-18

## 2021-05-18 RX ORDER — ONDANSETRON 4 MG/1
4 TABLET, ORALLY DISINTEGRATING ORAL EVERY 8 HOURS PRN
Qty: 5 TABLET | Refills: 0 | Status: SHIPPED | OUTPATIENT
Start: 2021-05-18 | End: 2021-06-02 | Stop reason: ALTCHOICE

## 2021-05-18 RX ORDER — FERROUS SULFATE 325(65) MG
325 TABLET ORAL 2 TIMES DAILY WITH MEALS
Qty: 60 TABLET | Status: CANCELLED | OUTPATIENT
Start: 2021-05-18

## 2021-05-18 RX ORDER — LISINOPRIL 5 MG/1
5 TABLET ORAL DAILY
Qty: 30 TABLET | Status: CANCELLED | OUTPATIENT
Start: 2021-05-18

## 2021-05-18 RX ORDER — 0.9 % SODIUM CHLORIDE 0.9 %
1000 INTRAVENOUS SOLUTION INTRAVENOUS ONCE
Status: COMPLETED | OUTPATIENT
Start: 2021-05-18 | End: 2021-05-18

## 2021-05-18 RX ORDER — DIPHENHYDRAMINE HYDROCHLORIDE 50 MG/ML
12.5 INJECTION INTRAMUSCULAR; INTRAVENOUS ONCE
Status: COMPLETED | OUTPATIENT
Start: 2021-05-18 | End: 2021-05-18

## 2021-05-18 RX ORDER — DEXAMETHASONE SODIUM PHOSPHATE 10 MG/ML
8 INJECTION INTRAMUSCULAR; INTRAVENOUS ONCE
Status: DISCONTINUED | OUTPATIENT
Start: 2021-05-18 | End: 2021-05-18

## 2021-05-18 RX ORDER — METOCLOPRAMIDE HYDROCHLORIDE 5 MG/ML
5 INJECTION INTRAMUSCULAR; INTRAVENOUS ONCE
Status: COMPLETED | OUTPATIENT
Start: 2021-05-18 | End: 2021-05-18

## 2021-05-18 RX ORDER — MAGNESIUM SULFATE 1 G/100ML
1000 INJECTION INTRAVENOUS ONCE
Status: COMPLETED | OUTPATIENT
Start: 2021-05-18 | End: 2021-05-18

## 2021-05-18 RX ADMIN — OXYCODONE AND ACETAMINOPHEN 1 TABLET: 5; 325 TABLET ORAL at 20:30

## 2021-05-18 RX ADMIN — SODIUM CHLORIDE 1000 ML: 9 INJECTION, SOLUTION INTRAVENOUS at 16:34

## 2021-05-18 RX ADMIN — IOPAMIDOL 60 ML: 755 INJECTION, SOLUTION INTRAVENOUS at 18:20

## 2021-05-18 RX ADMIN — METHYLPREDNISOLONE SODIUM SUCCINATE 125 MG: 125 INJECTION, POWDER, FOR SOLUTION INTRAMUSCULAR; INTRAVENOUS at 16:34

## 2021-05-18 RX ADMIN — METOCLOPRAMIDE HYDROCHLORIDE 5 MG: 5 INJECTION INTRAMUSCULAR; INTRAVENOUS at 16:34

## 2021-05-18 RX ADMIN — MAGNESIUM SULFATE HEPTAHYDRATE 1000 MG: 1 INJECTION, SOLUTION INTRAVENOUS at 19:03

## 2021-05-18 RX ADMIN — Medication 10 ML: at 18:20

## 2021-05-18 RX ADMIN — DIPHENHYDRAMINE HYDROCHLORIDE 12.5 MG: 50 INJECTION, SOLUTION INTRAMUSCULAR; INTRAVENOUS at 16:35

## 2021-05-18 RX ADMIN — SUMATRIPTAN 6 MG: 6 INJECTION SUBCUTANEOUS at 20:01

## 2021-05-18 ASSESSMENT — ENCOUNTER SYMPTOMS
NAUSEA: 1
DIARRHEA: 0
VOMITING: 0
BACK PAIN: 0
CHEST TIGHTNESS: 0
FACIAL SWELLING: 0
COUGH: 0
EYE PAIN: 1

## 2021-05-18 ASSESSMENT — PAIN DESCRIPTION - LOCATION: LOCATION: HEAD

## 2021-05-18 NOTE — ED PROVIDER NOTES
negative. Past Medical History:  has a past medical history of Anxiety, Blood transfusion, Chronic fatigue, Hypertension, Iron deficiency anemia due to chronic blood loss, Knee pain, bilateral, Leg pain, Low back pain, Migraine headache without aura, Morbid obesity (HCC), Muscle cramps, Perennial allergic rhinitis, Superficial thrombophlebitis of right leg, Ulcerative colitis ([address] and Vertigo. Surgical History:  has a past surgical history that includes Adenoidectomy. Social History:  reports that she has never smoked. She has never used smokeless tobacco. She reports current alcohol use. She reports that she does not use drugs. Family History: family history includes Cancer (age of onset: 35) in her sister; Clotting Disorder in her father and mother; High Blood Pressure in her father and mother; Stroke in her mother. Allergies: Aspirin, Coconut oil, Ibuprofen, Iodides, Motrin [ibuprofen micronized], Robitussin (alcohol free) [guaifenesin], Codeine, Iodine, and Tramadol    Physical Exam   Oxygen Saturation Interpretation: Normal.        ED Triage Vitals   BP Temp Temp src Pulse Resp SpO2 Height Weight   05/18/21 1216 05/18/21 1203 -- 05/18/21 1203 05/18/21 1203 05/18/21 1203 05/18/21 1216 05/18/21 1216   (!) 186/107 96.9 °F (36.1 °C)  95 16 94 % 5' 8\" (1.727 m) (!) 330 lb (149.7 kg)         Constitutional:  Alert, development consistent with age. HEENT:  NC/NT. PERRLA. Airway patent. EOMI  Neck:  Normal ROM. Supple. No meningeal signs  Respiratory:  Clear to auscultation and breath sounds equal.  CV:  Regular rate and rhythm, normal heart sounds, without pathological murmurs, ectopy, gallops, or rubs. GI:  Abdomen Soft, nontender, good bowel sounds. No firm or pulsatile mass. Back:  No costovertebral tenderness. Extremities: No tenderness or edema noted. Integument:  Normal turgor. Warm, dry, without visible rash, unless noted elsewhere.   Lymphatics: No lymphangitis or adenopathy noted.  Neurological:  Oriented x 3, GCS 15. CNIII-XII grossly intact. No focal deficits. No motor or sensory deficits. Bilateral upper and lower extremity strength 5/5. No nystagmus.      Lab / Imaging Results   (All laboratory and radiology results have been personally reviewed by myself)  Labs:  Results for orders placed or performed during the hospital encounter of 05/18/21   COVID-19, Rapid    Specimen: Nasopharyngeal Swab   Result Value Ref Range    SARS-CoV-2, NAAT Not Detected Not Detected   CBC   Result Value Ref Range    WBC 8.7 4.5 - 11.5 E9/L    RBC 5.09 3.50 - 5.50 E12/L    Hemoglobin 11.1 (L) 11.5 - 15.5 g/dL    Hematocrit 38.1 34.0 - 48.0 %    MCV 74.9 (L) 80.0 - 99.9 fL    MCH 21.8 (L) 26.0 - 35.0 pg    MCHC 29.1 (L) 32.0 - 34.5 %    RDW 17.0 (H) 11.5 - 15.0 fL    Platelets 092 799 - 885 E9/L    MPV 11.1 7.0 - 12.0 fL   Comprehensive Metabolic Panel   Result Value Ref Range    Sodium 137 132 - 146 mmol/L    Potassium 3.9 3.5 - 5.0 mmol/L    Chloride 103 98 - 107 mmol/L    CO2 24 22 - 29 mmol/L    Anion Gap 10 7 - 16 mmol/L    Glucose 114 (H) 74 - 99 mg/dL    BUN 16 6 - 20 mg/dL    CREATININE 1.0 0.5 - 1.0 mg/dL    GFR Non-African American >60 >=60 mL/min/1.73    GFR African American >60     Calcium 9.1 8.6 - 10.2 mg/dL    Total Protein 7.6 6.4 - 8.3 g/dL    Albumin 3.8 3.5 - 5.2 g/dL    Total Bilirubin <0.2 0.0 - 1.2 mg/dL    Alkaline Phosphatase 112 (H) 35 - 104 U/L    ALT 18 0 - 32 U/L    AST 23 0 - 31 U/L   Urinalysis with Microscopic   Result Value Ref Range    Color, UA Yellow Straw/Yellow    Clarity, UA Clear Clear    Glucose, Ur Negative Negative mg/dL    Bilirubin Urine Negative Negative    Ketones, Urine Negative Negative mg/dL    Specific Gravity, UA 1.025 1.005 - 1.030    Blood, Urine Negative Negative    pH, UA 5.5 5.0 - 9.0    Protein, UA Negative Negative mg/dL    Urobilinogen, Urine 0.2 <2.0 E.U./dL    Nitrite, Urine Negative Negative    Leukocyte Esterase, Urine TRACE (A) Negative WBC, UA 1-3 0 - 5 /HPF    RBC, UA NONE 0 - 2 /HPF    Epithelial Cells, UA MANY /HPF    Bacteria, UA MODERATE (A) None Seen /HPF   POC Pregnancy Urine Qual   Result Value Ref Range    HCG, Urine, POC Negative Negative    Lot Number [id number]     Positive QC Pass/Fail Pass     Negative QC Pass/Fail Pass      Imaging: All Radiology results interpreted by Radiologist unless otherwise noted. CTA HEAD W CONTRAST   Final Result   Unremarkable CTA of the head and neck. CTA NECK W CONTRAST   Final Result   Unremarkable CTA of the head and neck. CT HEAD WO CONTRAST   Final Result   No acute intracranial abnormality. ED Course / Medical Decision Making     Medications   0.9 % sodium chloride bolus (0 mLs Intravenous Stopped 5/18/21 1953)   metoclopramide (REGLAN) injection 5 mg (5 mg Intravenous Given 5/18/21 1634)   diphenhydrAMINE (BENADRYL) injection 12.5 mg (12.5 mg Intravenous Given 5/18/21 1635)   methylPREDNISolone sodium (SOLU-MEDROL) injection 125 mg (125 mg Intravenous Given 5/18/21 1634)   sodium chloride flush 0.9 % injection 10 mL (10 mLs Intravenous Given 5/18/21 1820)   iopamidol (ISOVUE-370) 76 % injection 60 mL (60 mLs Intravenous Given 5/18/21 1820)   magnesium sulfate 1000 mg in dextrose 5% 100 mL IVPB (0 mg Intravenous Stopped 5/18/21 1953)   SUMAtriptan (IMITREX) injection 6 mg (6 mg Subcutaneous Given 5/18/21 2001)   oxyCODONE-acetaminophen (PERCOCET) 5-325 MG per tablet 1 tablet (1 tablet Oral Given 5/18/21 2030)        Re-examination:  5/18/21     Patients condition is improving. Consult(s):   None    Procedure(s):   none    MDM:   Jama Duron is a 20-year-old female who presented to the emergency department for complaint of migraine headache. She reported history of migraine headaches but states that this headache has been ongoing for the past 2 days and Tylenol was not working. She reported that she has not been seen evaluated by a neurologist in quite some time.   Denied injury/trauma. No fever or chills. She had associated nausea and photosensitivity. History of subarachnoid hemorrhage in 2014. There was no presentation today of thunderclap headache. No meningeal signs. CBC shows no leukocytosis, H&H 11.1/38. 1. CMP unremarkable. Urine pregnancy negative. Urinalysis negative for any signs or concerns for UTI. Dry CT head was negative. Due to her history of subarachnoid hemorrhage a CTA head and neck were completed and negative for any concern for aneurysm or bleed. She was given IV fluids and medicated for pain and nausea. She was given Solu-Medrol and Benadryl CT due to allergy to iodine. She tolerated CTA well. She was given Imitrex with no significant improvement. She then reported that often times it requires Percocet to help her pain. She was then given Percocet while in the ED. She remained hemodynamically stable, neurologically intact, nontoxic, and appropriate for outpatient management. He was given neurology contact information and instructed to call for follow-up appointment. She verbalized understanding agrees with plan. Advised to return for any new, change, worsening symptoms or concerns. At this time the patient is without objective evidence of an acute process requiring hospitalization or inpatient management. They have remained hemodynamically stable throughout their entire ED visit and are stable for discharge with outpatient follow-up. The plan has been discussed in detail and they are aware of the specific conditions for emergent return, as well as the importance of follow-up. Plan of Care/Counseling:  I reviewed today's visit with the patient in addition to providing specific details for the plan of care and counseling regarding the diagnosis and prognosis. Questions are answered at this time and are agreeable with the plan. Assessment      1.  Nonintractable headache, unspecified chronicity pattern, unspecified headache type Plan   Discharge home. Patient condition is good    New Medications     New Prescriptions    ONDANSETRON (ZOFRAN ODT) 4 MG DISINTEGRATING TABLET    Take 1 tablet by mouth every 8 hours as needed for Nausea or Vomiting     Electronically signed by SURJIT Spann CNP   DD: 5/18/21  **This report was transcribed using voice recognition software. Every effort was made to ensure accuracy; however, inadvertent computerized transcription errors may be present.   END OF ED PROVIDER NOTE      SURJIT Spann CNP  05/18/21 [id number]

## 2021-05-18 NOTE — ED NOTES
Radiology Procedure Waiver   Name: Rosaura Shook  : 1975  MRN: [id number]    Date:  21    Time: 6:01 PM EDT    Benefits of immediately proceeding with Radiology exam(s) without pre-testing outweigh the risks or are not indicated as specified below and therefore the following is/are being waived:    [] Pregnancy test   [] Patients LMP on-time and regular.   [] Patient had Tubal Ligation or has other Contraception Device. [] Patient  is Menopausal or Premenarcheal.    [] Patient had Full or Partial Hysterectomy. [x] Protocol for Iodine allergy    [] MRI Questionnaire     [] BUN/Creatinine   [] Patient age w/no hx of renal dysfunction. [] Patient on Dialysis. [] Recent Normal Labs. Electronically signed by SURJIT Dukes CNP on 21 at 6:01 PM EDT          Patient is cleared to have CT with IV contrast.  She has been medicated with Benadryl and Solu-Medrol.      SURJIT Dukes CNP  21 [id number]

## 2021-05-18 NOTE — LETTER
[facility]   [address] [facility] [address], [address]      May 19, 2021    [place] [id number]      Dear Romel Self,    This letter is regarding your Emergency Department (ED) visit at [facility] Emergency Department on 5/18/21. Gordon Orta wanted to make sure that you understand your discharge instructions and that you were able to fill any prescriptions that may have been ordered for you. Please contact the office at \"[phone]  if the ED advised to you follow up with Gordon Orta, or if you have any further questions or needs. Also did you know -   *Visiting the ED for a non-emergency could result in higher co-pays than you would normally be subject to paying? *You can call your doctor even after hours so they can direct you to the most appropriate care. [facility]) practices can often offer you an appointment on the same day that you call. Many [address]  appointments; check our website for availability in your community , [email or web address]    Evisits are now available for patients for $36 through [employer] for certain conditions:  * Sinus, cold and or cough       * Diarrhea            * Headache  * Heartburn                                * Poison Vianca          * Back pain     * Urinary problems                         Sincerely,     Tiffani Nova MD and your [facility]

## 2021-05-18 NOTE — PATIENT INSTRUCTIONS
Patient instructed to go to the ER for evaluation. Please call the office to make an follow up appointment with Internal Medicine afterwards. Please call if there are any concerns.

## 2021-05-18 NOTE — PROGRESS NOTES
[facility]      Jose Wang is a 55 y.o. female evaluated via telephone on 2021. Consent:  She and/or health care decision maker is aware that that she may receive a bill for this telephone service, depending on her insurance coverage, and has provided verbal consent to proceed: Yes      Documentation:  I communicated with the patient and/or health care decision maker about Headache/Migraine . I affirm this is a Patient Initiated Episode with a Patient who has not had a related appointment within my department in the past 7 days or scheduled within the next 24 hours. Patient identification was verified at the start of the visit: Yes    Total Time: minutes: 5-10 minutes    The visit was conducted pursuant to the emergency declaration under the [address] [place] [address], [address] [place] [address] authority and the [employer] Act. Patient identification was verified, and a caregiver was present when appropriate. The patient was located in a state where the provider was credentialed to provide care. Note: not billable if this call serves to triage the patient into an appointment for the relevant concern      Lorraine Mccormick MD       SUBJECTIVE:  Jose Wang (:  1975) is a 55 y.o. female here for evaluation of the following chief complaint(s):  Headache (Right side near ear up into head and now into neck pressure in eye, vision blurry- started Monday)    PMH of essential HTN, asthma, morbid obesity, lymphedema and migraine    Reports 2 days of severed headache \"13\" out of 10 in the right frontal area with associated severe eye pain, blurring of vision and nausea Dizziness. No vomiting. No fever. Last attack was in November but not this severe. She has only been taking tylenol which does not improve it.  She is not on any abortive or prophylactic therapy at the moment. Has hx of Hypertension, but was unable to get a blood pressure cuff. Review of Systems   Constitutional: Negative for fatigue and fever. HENT: Negative for congestion, ear pain, facial swelling and hearing loss. Eyes: Positive for pain. Respiratory: Negative for cough and chest tightness. Cardiovascular: Positive for leg swelling. Negative for chest pain and palpitations. Gastrointestinal: Positive for nausea. Negative for diarrhea and vomiting. Genitourinary: Negative for difficulty urinating and dysuria. Musculoskeletal: Negative for arthralgias and back pain. Neurological: Positive for dizziness. Negative for weakness. Current Outpatient Medications on File Prior to Visit   Medication Sig Dispense Refill    hydroCHLOROthiazide (HYDRODIURIL) 25 MG tablet Take 1 tablet by mouth every morning 30 tablet 1    lisinopril (PRINIVIL;ZESTRIL) 5 MG tablet Take 1 tablet by mouth daily 30 tablet 1    PROVENTIL  (90 Base) MCG/ACT inhaler Inhale 2 puffs into the lungs 4 times daily as needed for Wheezing LOT# [id number], EXP 7/21 1 Inhaler 0    ferrous sulfate (IRON 325) 325 (65 Fe) MG tablet Take 1 tablet by mouth 2 times daily (with meals) 60 tablet 0    mometasone-formoterol (DULERA) 100-5 MCG/ACT inhaler Inhale 2 puffs into the lungs 2 times daily 1 Inhaler 5    acetaminophen (TYLENOL) 500 MG tablet Take 1,000 mg by mouth every 6 hours as needed for Pain (Patient not taking: Reported on 5/18/2021)       No current facility-administered medications on file prior to visit. ASSESSMENT/PLAN:  1. Other migraine without status migrainosus, intractable  2. Essential hypertension  -     hydroCHLOROthiazide (HYDRODIURIL) 25 MG tablet; Take 1 tablet by mouth every morning, Disp-30 tabletNormal  -     lisinopril (PRINIVIL;ZESTRIL) 5 MG tablet;  Take 1 tablet by mouth daily, Disp-30 tabletNormal     Advised patient to go to ED for evaluation, pain control and workup of her headache. Will need to rule our Psuedotumor Cerebri given her obesity and age as risk factors. RTC:  No follow-ups on file. Time spent:  15 Minutes  I have reviewed my findings and recommendations with Haley Llanos and Dr. Silas Bell.     Fabian Stoddard MD   5/18/2021 8:48 AM

## 2021-05-28 ENCOUNTER — TELEPHONE (OUTPATIENT)
Dept: INTERNAL MEDICINE | Age: 46
End: 2021-05-28

## 2021-05-28 ENCOUNTER — NURSE TRIAGE (OUTPATIENT)
Dept: OTHER | Facility: CLINIC | Age: 46
End: 2021-05-28

## 2021-05-28 NOTE — TELEPHONE ENCOUNTER
Patient called with a bad headache. Said she was in the ER but is hasn't stopped. I scheduled patient the next available for June 2nd. I trnasferred patient to nurse Devin Sood.

## 2021-05-28 NOTE — TELEPHONE ENCOUNTER
oer nurse triage, pt has headache would like her to be seen asap. no same days available so referered to walk in care today. gave her address.

## 2021-05-28 NOTE — TELEPHONE ENCOUNTER
Patient returned call regarding overdue labs and missed appointment. She said she went to the hospital for a headache and still has the head ache. Transferred to schedule an appointment with medical. Lab orders sent to patient.

## 2021-05-28 NOTE — TELEPHONE ENCOUNTER
Received call from Britt Rubin at pre-service center [facility]) [place] with [employer]. Brief description of triage: no triage - information only; pt already seen at ED for HA with no new or worsening symptoms. Pt with hx HA since aneurysm in 2014. Pt stated she had a migraine cocktail in ED with no relief. Stated she took one pill of her father's Percocet prescription and stated it just took the edge off. Pt states she took Excedrin and that only took the edge off as well. Pt asking for medicine that will take her headache away and keep it away. Pt stated she doesn't like to take pills. Stated she stays hydrated with her diet. Care advice provided, patient verbalizes understanding; denies any other questions or concerns; instructed to call back for any new or worsening symptoms. Writer provided warm transfer to Jose Luis Oliva at [facility] for appointment scheduling. Attention Provider: Thank you for allowing me to participate in the care of your patient. The patient was connected to triage in response to information provided to the [facility]. Please do not respond through this encounter as the response is not directed to a shared pool.     Reason for Disposition   Caller has already spoken with another triager and has no further questions    Protocols used: NO CONTACT OR DUPLICATE CONTACT CALL-ADULT-[place]

## 2021-06-02 ENCOUNTER — OFFICE VISIT (OUTPATIENT)
Dept: INTERNAL MEDICINE | Age: 46
End: 2021-06-02

## 2021-06-02 VITALS
WEIGHT: 293 LBS | HEART RATE: 80 BPM | BODY MASS INDEX: 44.41 KG/M2 | RESPIRATION RATE: 16 BRPM | OXYGEN SATURATION: 98 % | TEMPERATURE: 98.6 F | DIASTOLIC BLOOD PRESSURE: 90 MMHG | HEIGHT: 68 IN | SYSTOLIC BLOOD PRESSURE: 146 MMHG

## 2021-06-02 DIAGNOSIS — G43.019 INTRACTABLE MIGRAINE WITHOUT AURA AND WITHOUT STATUS MIGRAINOSUS: Primary | ICD-10-CM

## 2021-06-02 DIAGNOSIS — I10 ESSENTIAL HYPERTENSION: ICD-10-CM

## 2021-06-02 DIAGNOSIS — R60.0 BILATERAL LOWER EXTREMITY EDEMA: ICD-10-CM

## 2021-06-02 DIAGNOSIS — J45.41 MODERATE PERSISTENT ASTHMA WITH EXACERBATION: ICD-10-CM

## 2021-06-02 DIAGNOSIS — Z59.9 FINANCIAL DIFFICULTY: ICD-10-CM

## 2021-06-02 PROCEDURE — 99213 OFFICE O/P EST LOW 20 MIN: CPT | Performed by: INTERNAL MEDICINE

## 2021-06-02 RX ORDER — VENLAFAXINE HYDROCHLORIDE 37.5 MG/1
37.5 CAPSULE, EXTENDED RELEASE ORAL DAILY
Qty: 30 CAPSULE | Refills: 1 | Status: SHIPPED
Start: 2021-06-02 | End: 2021-07-23 | Stop reason: SDUPTHER

## 2021-06-02 RX ORDER — SUMATRIPTAN 50 MG/1
25 TABLET, FILM COATED ORAL
Qty: 9 TABLET | Refills: 2 | Status: SHIPPED
Start: 2021-06-02 | End: 2021-06-04

## 2021-06-02 SDOH — ECONOMIC STABILITY: FOOD INSECURITY: WITHIN THE PAST 12 MONTHS, YOU WORRIED THAT YOUR FOOD WOULD RUN OUT BEFORE YOU GOT MONEY TO BUY MORE.: NEVER TRUE

## 2021-06-02 SDOH — ECONOMIC STABILITY - INCOME SECURITY: PROBLEM RELATED TO HOUSING AND ECONOMIC CIRCUMSTANCES, UNSPECIFIED: Z59.9

## 2021-06-02 SDOH — ECONOMIC STABILITY: FOOD INSECURITY: WITHIN THE PAST 12 MONTHS, THE FOOD YOU BOUGHT JUST DIDN'T LAST AND YOU DIDN'T HAVE MONEY TO GET MORE.: NEVER TRUE

## 2021-06-02 ASSESSMENT — ENCOUNTER SYMPTOMS
RESPIRATORY NEGATIVE: 1
EYES NEGATIVE: 1
ALLERGIC/IMMUNOLOGIC NEGATIVE: 1
GASTROINTESTINAL NEGATIVE: 1

## 2021-06-02 ASSESSMENT — SOCIAL DETERMINANTS OF HEALTH (SDOH): HOW HARD IS IT FOR YOU TO PAY FOR THE VERY BASICS LIKE FOOD, HOUSING, MEDICAL CARE, AND HEATING?: NOT HARD AT ALL

## 2021-06-02 NOTE — PROGRESS NOTES
[facility]    Attending Physician Statement  I have discussed the case, including pertinent history and exam findings with the resident physician. I agree with the assessment, plan and orders as documented by the resident. I have reviewed all pertinent PMHx, PSHx, FamHx, SocialHx, medications, and allergies and updated history as appropriate. Patient presents for routine follow up of medical problems. PMH of HTN, moderate persistent asthma, chronic venous stasis with symptoms of intractable migraine headaches with hx of [facility] 2014. Today symptoms of acute migraine, recommend start triptan therapy and start Effexor for prophylaxis. Referral to neurology for consideration CGRP receptor antagonist, she is self-pay and does use PAP for Dulera. Interested in talking with social work for cost of meds/appts. Remainder of medical problems as per resident note.     Harsh Hoyos DO  6/2/2021 2:25 PM

## 2021-06-02 NOTE — PROGRESS NOTES
Patient verbalized understanding of office instructions. She will call with questions or concerns. She was given discharge instructions, and all questions were fully answered.   Printed AVS was given

## 2021-06-02 NOTE — PROGRESS NOTES
[place] [facility]      SUBJECTIVE:  Jennifer Washburn (:  1975) is a 55 y.o. female here for evaluation of the following chief complaint(s):  Migraine (seen in Ed) and Blurred Vision      Previous Visit Imp Points:   21- ED visit with for headache- Hx of SAH in . The pain is associated with nausea, sonophobia and photophobia and negative for numbness, weakness, speech or visual changes, syncope, seizures, paralysis/weakness, numbness or tingling, involuntary movements, tremor or speech impairment. No fever or chills. No neck pain/stiffness. Reports intermittent blurred vision with her headaches. Denies diplopia. No meningeal signs, imaging negative, given IVF, solumedrol,benadryl, percocet,discharged with instruction for neurology follow up.    21- Virtual Visit with Dr. Emory Marquez ( [facility]) - Severe headache x 2days, asked to go to ER for f/u    21- Office Visit with Vascular Surgery ( Dr. Jayesh Cm)- The patient started using over the counter knee high compression stockings as well as compression leggings. The patient has had improvement in her symptoms. She still has some pain in her calves, but it is manageable. She now states that her pain at worst is 5/10    21- Pt never returned call of Lymphedema clinic     21- Office visit with Dr. Ursula Dawn ( [facility])-Added lisinopril to HCTZ, still BATISTA- Sleep study ordered, Inhaler delivery issues cleared up, Leg Swelling- Referral to lymphedema clinic, review CHERELLE labs for next visit,    3/18/21- Office visit with Vascular Surgery ( Dr. Jayesh Cm) -Leg swelling is likely venous insufficiency, advised strong compression , f/u in 2 months    21- Office Visit with Dr. Kelly Ward ( [facility]) - Leg swelling returned, bilaterally, worsening. Pt endorsing dyspnea on exertion since recovering from [place] in 2020, currently cannot go up stairs without being extremely tired and SOB. Pt has already been scheduled an Echo by her PCP which will be done next week. d/c amlodipine, start HCTZ, Echo normal ( EF 60%)    1/27/21- Office Visit with Dr. Krystian Dan ( [facility])-Improvement in leg symptoms, likely venous stasis, US vein mapping ordered    1/12/21- Telephone Encounter- Discussed lab results and XRs of ankle with patients and elevated uric acid level. This could still be Acute gout flare vs other inflammatory /lymphatic process. Plan: Start steroid taper for 2 weeks and then return to clinic in 2 weeks for follow up . Plan discussed with Dr. Ofelia Leal     Pt is agreeable with above plan and will see us in 2 weeks or earlier if pain and swelling is worsening. 1/14/21- Office visit with Me- 1. . Moderate persistent asthma without complication  Diagnosed as a child, never grew out of it, was taking albuterol PRN   Asthma more sensitive in warmer weather   Given Dulera and Breo Elipta at previous visit, using them   Currently using these only in morning daily , and taking proventil (albuterol) -  2-4 times/ week  1-2 nighttime awakenings / week not every week and not increased after COVID   Still feels SOB post COVID despite current regimen   Plan: Advised to take [facility] and Breo twice a day as previously advised, continue albuterol PRN . Would not consider to step up right now as per JOSI 2019. Will wait and watch and re-evaluate . Consider PFT and Pulm consult in the future. - PROVENTIL  (90 Base) MCG/ACT inhaler; Inhale 2 puffs into the lungs 4 times daily as needed for Wheezing LOT# [id number], EXP 7/21  Dispense: 1 Inhaler; Refill: 0  2. Essential hypertension  No BP readings at home, but usually well controlled with Amlodipine  Takes amlodipine 10mg  Today not taken BP med, and /107 in office .  This high reading could be due to white coat HTN + severe pain in both feet   Plan: Prescribed BP cuff for home measurement, and will reevaluate BP in the office at next visit   3. Leg swelling  2013/14 swelling in legs due to blood clot in right leg and depending on left leg   Now sometimes legs swollen with weather change but otherwise ok  Not taking OCP currently ( took in [id number])  This current episode started end of Sept but was intermittent. Post COVID , since beginning of December had bruising of feet, skin discoloration,erythema darkening and tightness around ankles   Wakes up in the morning with swelling -> walks with pain in achilles, and dorsum of foot and pain all day + tingling , 5/10 in morning -> 9/10 at night , cried hersellf to sleep at night since pain was so bad   O/E: Bilateral ankles and feet, warm, extremely tender, peripheral pulses palpable , 2+ edema Left> right, with reddish black discoloration of both feet   Thoughts: This looks to be an inflammatory vasculitic process vs a drainage problem vs joint problem. Have ordered labs as below . Will review labs and advise in PM today or AM tomorrow at the latest. Tylenol given for pain as allergies to NSAIDs. May consider vascular surgery/ortho referral once labs are back    4. Iron deficiency anemia, unspecified iron deficiency anemia type  On iron pills, continue  6.  Intractable migraine without aura and without status migrainosus  Controlled with Diet continue    12/21/2020- VV with Dr. Newsome Mail -   Moderate persistent asthma uncontrolled- recent exacerbation due to COVID (Oct 2020), family hx of blood clots -> DVT +CTA negative, SOB only resolved on Dulera (mometasone-formoterol)/Breo Elipta(fluticasone-vilanterol) but could not afford,   Leg swelling - Bruising on legs worsening , improvement in swelling but worsening pain, needs to be seen in office  HTN- did not have Amlodipine for 3 weeks, needs BP check in office and amlodipine re order  Had insurance issues and had to register for PAP    HPI:   Since ED Visit, everyday same headache, not improving with tylenol   Photophobia and phonophobia present bleeding  Used OCP last from [id number] , no children      Sexual Hx:  Sexually active with  , no hx of STI     Social Hx: Occupation - Reservation coordinator   Not insured by Social Work on case and pt given Raquel  and registered for PAP program and Medicaid  Social Hx:   Social History     Tobacco History     Smoking Status  Never Smoker    Smokeless Tobacco Use  Never Used          Alcohol History     Alcohol Use Status  Yes Comment  occasional wine coolers          Drug Use     Drug Use Status  No          Sexual Activity     Sexually Active  Yes Partners  Male, Female                 Fam Hx:   Family History   Problem Relation Age of Onset    High Blood Pressure Mother     Stroke Mother     Clotting Disorder Mother         Vague Hx of blood clots on blood thinners    High Blood Pressure Father     Clotting Disorder Father         Vague hx of blood clots on blood thinners    Cancer Sister 35        Health Maintenance/Social Determinants: Financial issues     Med Refills: -    Key points to note:  --    Current Outpatient Medications on File Prior to Visit   Medication Sig Dispense Refill    hydroCHLOROthiazide (HYDRODIURIL) 25 MG tablet Take 1 tablet by mouth every morning 30 tablet 1    lisinopril (PRINIVIL;ZESTRIL) 5 MG tablet Take 1 tablet by mouth daily 30 tablet 1    PROVENTIL  (90 Base) MCG/ACT inhaler Inhale 2 puffs into the lungs 4 times daily as needed for Wheezing LOT# [id number], EXP 7/21 1 Inhaler 0    ferrous sulfate (IRON 325) 325 (65 Fe) MG tablet Take 1 tablet by mouth 2 times daily (with meals) 60 tablet 0    mometasone-formoterol (DULERA) 100-5 MCG/ACT inhaler Inhale 2 puffs into the lungs 2 times daily 1 Inhaler 5    acetaminophen (TYLENOL) 500 MG tablet Take 1,000 mg by mouth every 6 hours as needed for Pain        No current facility-administered medications on file prior to visit.        OBJECTIVE:    VS:   Vitals:    06/02/21 1350 06/02/21 1359   BP: (!) 140/86 (!) ,episodes last 2-3 / week   2. Essential hypertension- Stable, continue same meds  3. Moderate persistent asthma with exacerbation- Stable, no exacerbation  4. Bilateral lower extremity edema- Seen vascular surgery and studies done, Much improved with compression stockings. This is likely   5. Financial difficulty  -     [address] [place] [address], Andreea oRberson, [place] (), [facility] Clinics ONLY     -  To help with financial and insurance options so she can afford newer migraine meds ( if she opts in for once monthly injections)      RTC:  Return in about 3 months (around 9/2/2021) for Follow up visit. I have reviewed my findings and recommendations with Flroa Hernández and Dr. Daniel Thomas.     Derik Corbin MD   6/2/2021 5:17 PM

## 2021-06-03 ENCOUNTER — TELEPHONE (OUTPATIENT)
Dept: NEUROLOGY | Age: 46
End: 2021-06-03

## 2021-06-03 NOTE — TELEPHONE ENCOUNTER
LM for patient to call office to set up new appointment with SURJIT Michelle  Electronically signed by Rambo Morillo on 6/3/21 at 8:55 AM EDT
discussing whether she can try a free sample of amiovig and if it helps , then we can start that medication for her,    Of note, she is a self-pay patient. I have put in a referral to our  to provide financial options and aid for her. Thank you.   Ronnie Marrero

## 2021-06-04 RX ORDER — SUMATRIPTAN 50 MG/1
50 TABLET, FILM COATED ORAL EVERY 6 HOURS PRN
Qty: 9 TABLET | Refills: 2 | Status: SHIPPED
Start: 2021-06-04 | End: 2021-09-23

## 2021-06-07 ENCOUNTER — TELEPHONE (OUTPATIENT)
Dept: INTERNAL MEDICINE | Age: 46
End: 2021-06-07

## 2021-06-07 NOTE — TELEPHONE ENCOUNTER
CHW initiated phone call to pt for office visit follow up regarding insurance and migraine medicine. No answer. Left a message. CHW contacted Chris Murguia from PAP in regards of aimovig migraine medication. Tracie to follow up with Dr. Donna Toribio and pt.     6/08/2021    Pt called back. Pt unable to talk for too long due to a migraine. CHW informed pt of the referral to a neurologist and PAP.     6/14/2021    CHW initiated phone call to pt. No answer. Left her a message with the phone number of [facility]. Neurology [phone].    7/9/2021    CHW informed and confirmed with pt via phone call of her next IM appointment on July 23 rd at 2:30pm. Pt reports to be aware of her appointments with Neurosurgeon 9/23/2021 and IM appointment on 9/3/2021. Pt stated best days to call her is Thursdays and Fridays at noon. CHW reminded pt of her PAP prescriptions waiting to be  here at the office.

## 2021-06-17 ENCOUNTER — TELEPHONE (OUTPATIENT)
Dept: INTERNAL MEDICINE | Age: 46
End: 2021-06-17

## 2021-06-17 NOTE — TELEPHONE ENCOUNTER
----- Message from Harsh Hoyos DO sent at 6/16/2021  3:13 PM EDT -----  Regarding: follow up  Please schedule follow up appointment in July to review treatment for headache. Thanks!     Electronically signed by Harsh Hoyos DO on 6/16/2021 at 3:14 PM

## 2021-07-23 ENCOUNTER — OFFICE VISIT (OUTPATIENT)
Dept: INTERNAL MEDICINE | Age: 46
End: 2021-07-23

## 2021-07-23 VITALS
SYSTOLIC BLOOD PRESSURE: 146 MMHG | HEART RATE: 114 BPM | RESPIRATION RATE: 19 BRPM | DIASTOLIC BLOOD PRESSURE: 96 MMHG | WEIGHT: 293 LBS | BODY MASS INDEX: 44.41 KG/M2 | HEIGHT: 68 IN | OXYGEN SATURATION: 92 % | TEMPERATURE: 97.8 F

## 2021-07-23 DIAGNOSIS — R55 SYNCOPE, UNSPECIFIED SYNCOPE TYPE: ICD-10-CM

## 2021-07-23 DIAGNOSIS — R06.02 SOB (SHORTNESS OF BREATH): ICD-10-CM

## 2021-07-23 DIAGNOSIS — J45.41 MODERATE PERSISTENT ASTHMA WITH EXACERBATION: ICD-10-CM

## 2021-07-23 DIAGNOSIS — R76.8 POSITIVE ANA (ANTINUCLEAR ANTIBODY): ICD-10-CM

## 2021-07-23 DIAGNOSIS — M54.16 LUMBAR BACK PAIN WITH RADICULOPATHY AFFECTING RIGHT LOWER EXTREMITY: Primary | ICD-10-CM

## 2021-07-23 DIAGNOSIS — I10 ESSENTIAL HYPERTENSION: ICD-10-CM

## 2021-07-23 DIAGNOSIS — G43.019 INTRACTABLE MIGRAINE WITHOUT AURA AND WITHOUT STATUS MIGRAINOSUS: ICD-10-CM

## 2021-07-23 DIAGNOSIS — D50.9 IRON DEFICIENCY ANEMIA, UNSPECIFIED IRON DEFICIENCY ANEMIA TYPE: ICD-10-CM

## 2021-07-23 PROCEDURE — 93005 ELECTROCARDIOGRAM TRACING: CPT | Performed by: INTERNAL MEDICINE

## 2021-07-23 PROCEDURE — 93010 ELECTROCARDIOGRAM REPORT: CPT | Performed by: INTERNAL MEDICINE

## 2021-07-23 PROCEDURE — 99213 OFFICE O/P EST LOW 20 MIN: CPT | Performed by: INTERNAL MEDICINE

## 2021-07-23 RX ORDER — LISINOPRIL 10 MG/1
10 TABLET ORAL DAILY
Qty: 30 TABLET | Refills: 1 | Status: SHIPPED
Start: 2021-07-23 | End: 2021-10-07 | Stop reason: SDUPTHER

## 2021-07-23 RX ORDER — FERROUS SULFATE 325(65) MG
325 TABLET ORAL 2 TIMES DAILY WITH MEALS
Qty: 60 TABLET | Refills: 0 | Status: SHIPPED
Start: 2021-07-23 | End: 2021-10-07 | Stop reason: SDUPTHER

## 2021-07-23 RX ORDER — VENLAFAXINE HYDROCHLORIDE 37.5 MG/1
37.5 CAPSULE, EXTENDED RELEASE ORAL DAILY
Qty: 30 CAPSULE | Refills: 1 | Status: SHIPPED
Start: 2021-07-23 | End: 2021-10-07 | Stop reason: SDUPTHER

## 2021-07-23 RX ORDER — ALBUTEROL SULFATE 90 MCG
2 HFA AEROSOL WITH ADAPTER (GRAM) INHALATION 4 TIMES DAILY PRN
Qty: 1 INHALER | Refills: 0 | Status: SHIPPED
Start: 2021-07-23 | End: 2021-11-01

## 2021-07-23 RX ORDER — HYDROCHLOROTHIAZIDE 25 MG/1
25 TABLET ORAL EVERY MORNING
Qty: 30 TABLET | Refills: 1 | Status: SHIPPED
Start: 2021-07-23 | End: 2021-10-07 | Stop reason: SDUPTHER

## 2021-07-23 ASSESSMENT — ENCOUNTER SYMPTOMS
ABDOMINAL PAIN: 0
CONSTIPATION: 0
VOMITING: 0
BLOOD IN STOOL: 0
DIARRHEA: 0
PHOTOPHOBIA: 0
BACK PAIN: 1
NAUSEA: 0
SHORTNESS OF BREATH: 0
SORE THROAT: 0
RHINORRHEA: 0

## 2021-07-23 NOTE — PROGRESS NOTES
[facility]  Internal Medicine Residency Program  ACC Note      SUBJECTIVE:  CC: had concerns including Headache, Nail Problem, and Pain. HPI:  Vince Sheppard is a 55 y. o.female with PMH of migraine, chronic low back pain, HTN, CHERELLE, asthma presenting to [facility] for headache and lower back pain. Regarding her migraine headache, patient has been compliant with her Effexor and Imitrex. She is complaining of some sedation with Imitrex. Patient has upcoming appoint with neurology scheduled 9/23/2021. She also endorses lightheadedness, dizziness and syncopal episode several weeks ago. She denies any postictal symptoms, the episode was unwitnessed. Denies any previous seizure-like activity. Denies any chest pain or palpitation. Symptoms of lightheadedness and dizziness occur at random, there are no precipitating events. Patient is also complaining of lower back pain again this visit. She has been having radicular type symptoms to the right lower extremity associated with paresthesias and occasional difficulty walking. She denies any urinary or bowel incontinence. Neurologic exam significant for decreased sensation over the anterior and lateral distal lower extremity and foot, as well as 4 -/5 strength the proximal and distal muscle groups of the right lower extremity compared to the left. Regarding her chronic issues, BP suboptimally controlled. She has been taking hydrochlorothiazide 25 mg and lisinopril 5 mg. Patient denies any side effects with these medications. BP today 164/95 mmHg on the Dinamap, however on manual repeat BP remained elevated 146/96 while seated subsequently declined to 142/92 standing. Asthma symptoms have somewhat worsened with warmer weather. Patient reports using her rescue albuterol inhaler 1-2 times weekly and has been compliant with her Dulera maintenance inhaler.       Review of Systems   Constitutional: Negative for chills, fatigue, fever and unexpected weight change. HENT: Negative for congestion, rhinorrhea, sneezing and sore throat. Eyes: Negative for photophobia and visual disturbance. Respiratory: Negative for shortness of breath. Cardiovascular: Negative for chest pain, palpitations and leg swelling. Gastrointestinal: Negative for abdominal pain, blood in stool, constipation, diarrhea, nausea and vomiting. Genitourinary: Negative for dysuria and hematuria. Musculoskeletal: Positive for back pain. Negative for arthralgias, myalgias, neck pain and neck stiffness. Neurological: Positive for dizziness, syncope, weakness, light-headedness, numbness and headaches. Negative for seizures, facial asymmetry and speech difficulty. Psychiatric/Behavioral: Negative.         Outpatient Medications Marked as Taking for the 7/23/21 encounter (Office Visit) with Troy De Luna MD   Medication Sig Dispense Refill    lisinopril (PRINIVIL;ZESTRIL) 10 MG tablet Take 1 tablet by mouth daily 30 tablet 1    mometasone-formoterol (DULERA) 100-5 MCG/ACT inhaler Inhale 2 puffs into the lungs 2 times daily 1 Inhaler 5    PROVENTIL  (90 Base) MCG/ACT inhaler Inhale 2 puffs into the lungs 4 times daily as needed for Wheezing LOT# [id number], EXP 7/21 1 Inhaler 0    ferrous sulfate (IRON 325) 325 (65 Fe) MG tablet Take 1 tablet by mouth 2 times daily (with meals) 60 tablet 0    hydroCHLOROthiazide (HYDRODIURIL) 25 MG tablet Take 1 tablet by mouth every morning 30 tablet 1    venlafaxine (EFFEXOR XR) 37.5 MG extended release capsule Take 1 capsule by mouth daily 30 capsule 1    SUMAtriptan (IMITREX) 50 MG tablet Take 1 tablet by mouth every 6 hours as needed for Migraine (Not more than 4 tablets a day) 9 tablet 2    acetaminophen (TYLENOL) 500 MG tablet Take 1,000 mg by mouth every 6 hours as needed for Pain          OBJECTIVE:    VS:   BP (!) 146/96   Pulse 114   Temp 97.8 °F (36.6 °C) (Oral)   Resp 19   Ht 5' 8\" (1.727 m)   Wt (!) 334 lb 3.2 oz (151.6 kg)   SpO2 92% Comment: room air  BMI 50.81 kg/m²     EXAM:  Physical Exam  Constitutional:       General: She is not in acute distress. Appearance: Normal appearance. She is obese. She is not ill-appearing, toxic-appearing or diaphoretic. HENT:      Head: Normocephalic and atraumatic. Nose: Nose normal.      Mouth/Throat:      Mouth: Mucous membranes are moist.      Pharynx: Oropharynx is clear. Eyes:      General: No scleral icterus. Right eye: No discharge. Left eye: No discharge. Extraocular Movements: Extraocular movements intact. Conjunctiva/sclera: Conjunctivae normal.      Pupils: Pupils are equal, round, and reactive to light. Cardiovascular:      Rate and Rhythm: Normal rate and regular rhythm. Pulses: Normal pulses. Heart sounds: Normal heart sounds. No murmur heard. No gallop. Pulmonary:      Effort: Pulmonary effort is normal. No respiratory distress. Breath sounds: Normal breath sounds. No stridor. No wheezing, rhonchi or rales. Abdominal:      General: Abdomen is flat. Bowel sounds are normal. There is no distension. Palpations: Abdomen is soft. There is no mass. Tenderness: There is no abdominal tenderness. There is no right CVA tenderness, left CVA tenderness, guarding or rebound. Hernia: No hernia is present. Musculoskeletal:         General: No tenderness. Cervical back: No rigidity. Right lower leg: No edema. Left lower leg: No edema. Lymphadenopathy:      Cervical: No cervical adenopathy. Skin:     General: Skin is warm and dry. Capillary Refill: Capillary refill takes less than 2 seconds. Coloration: Skin is not jaundiced or pale. Findings: No bruising, erythema, lesion or rash. Neurological:      Mental Status: She is alert and oriented to person, place, and time.       Comments: Decreased sensation over the anterior lateral distal RLE, 4 -/5 strength proximal

## 2021-07-23 NOTE — PROGRESS NOTES
[facility]  [facility]    Attending Physician Statement  I have discussed the case, including pertinent history and exam findings with the resident physician. I agree with the assessment, plan and orders as documented by the resident. I have reviewed all pertinent PMHx, PSHx, FamHx, SocialHx, medications, and allergies and updated history as appropriate. Patient presents for routine follow up of medical problems. Denies palpitations, chest pain, dyspnea, or dyspnea on exertion    She has been noting continued complaints of migraine headaches but improved on combination abortive/prophylaxis management- however she notes sedating sensation from imitrex    Also noting a possible syncopal episode which she contributes to the headache- unsure of vasovagal entity- however denies any recurrence    She Is planned to follow with Neurology in September    She is also noting low back pain with noted changes on exam consistent with radiculopathy some noted changes in strength of lower extremity with symptoms compared    History of Migraine Headaches- improving on regimen but still persistent episodes    Currently on imitrex for breakthrough/abortive management   Multiple episodes per month- 1 per week minimum    Prophylaxis continued with Venlafaxine    Feels abortive therapy is sedating    Appt is planned with Neurology- planned in September. May need to consider alternative management including possible CGRP vs. Other etiology given sedating concerns of abortive therapy     ? Syncopal episode- unwitnessed   Appears non-cardiac    ?  Vasovagal vs. Other etiology    Orthostatics negative in nature    ECG today: NSR, no ST-T changes; no AV block    Consider holter monitor as discussed    ECHO in Feb 2021- normal LV function    HTN- BP still elevated at rest with orthostatics completed   Increase ACEI today   Normal renal function noted in May   Negative orthostatics

## 2021-07-23 NOTE — PATIENT INSTRUCTIONS
Patient Instructions: Your medications have been refilled. Please continue taking as directed. The following medications have been adjusted:  - lisinopril increased to 10mg by mouth daily    Routine blood work has been ordered for you. Please have your blood drawn prior to your next office visit. Imaging of your lower back has been ordered. Please have MRI done prior to your next office visit.      Shireen Duverney, MD  7/23/2021  3:46 PM

## 2021-08-09 ENCOUNTER — HOSPITAL ENCOUNTER (OUTPATIENT)
Dept: MRI IMAGING | Age: 46
Discharge: HOME | End: 2021-08-11

## 2021-08-09 DIAGNOSIS — M54.16 LUMBAR BACK PAIN WITH RADICULOPATHY AFFECTING RIGHT LOWER EXTREMITY: ICD-10-CM

## 2021-08-09 PROCEDURE — 72148 MRI LUMBAR SPINE W/O DYE: CPT

## 2021-08-12 ENCOUNTER — TELEPHONE (OUTPATIENT)
Dept: INTERNAL MEDICINE | Age: 46
End: 2021-08-12

## 2021-08-16 ENCOUNTER — TELEPHONE (OUTPATIENT)
Dept: INTERNAL MEDICINE | Age: 46
End: 2021-08-16

## 2021-08-16 DIAGNOSIS — M54.16 LUMBAR BACK PAIN WITH RADICULOPATHY AFFECTING RIGHT LOWER EXTREMITY: Primary | ICD-10-CM
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
## 2021-08-18 ENCOUNTER — APPOINTMENT (OUTPATIENT)
Dept: GENERAL RADIOLOGY | Age: 46
End: 2021-08-18

## 2021-08-18 VITALS
RESPIRATION RATE: 18 BRPM | OXYGEN SATURATION: 97 % | BODY MASS INDEX: 44.41 KG/M2 | WEIGHT: 293 LBS | HEIGHT: 68 IN | DIASTOLIC BLOOD PRESSURE: 104 MMHG | HEART RATE: 88 BPM | SYSTOLIC BLOOD PRESSURE: 166 MMHG | TEMPERATURE: 96.9 F

## 2021-08-18 LAB
ALBUMIN SERPL-MCNC: 3.9 G/DL (ref 3.5–5.2)
ALP BLD-CCNC: 116 U/L (ref 35–104)
ALT SERPL-CCNC: 19 U/L (ref 0–32)
ANION GAP SERPL CALCULATED.3IONS-SCNC: 10 MMOL/L (ref 7–16)
AST SERPL-CCNC: 18 U/L (ref 0–31)
BASOPHILS ABSOLUTE: 0.06 E9/L (ref 0–0.2)
BASOPHILS RELATIVE PERCENT: 0.6 % (ref 0–2)
BILIRUB SERPL-MCNC: <0.2 MG/DL (ref 0–1.2)
BUN BLDV-MCNC: 14 MG/DL (ref 6–20)
CALCIUM SERPL-MCNC: 8.8 MG/DL (ref 8.6–10.2)
CHLORIDE BLD-SCNC: 101 MMOL/L (ref 98–107)
CO2: 26 MMOL/L (ref 22–29)
CREAT SERPL-MCNC: 1 MG/DL (ref 0.5–1)
D DIMER: 232 NG/ML DDU
EOSINOPHILS ABSOLUTE: 0.2 E9/L (ref 0.05–0.5)
EOSINOPHILS RELATIVE PERCENT: 1.9 % (ref 0–6)
GFR AFRICAN AMERICAN: >60
GFR NON-AFRICAN AMERICAN: >60 ML/MIN/1.73
GLUCOSE BLD-MCNC: 112 MG/DL (ref 74–99)
HCT VFR BLD CALC: 36.2 % (ref 34–48)
HEMOGLOBIN: 11.1 G/DL (ref 11.5–15.5)
IMMATURE GRANULOCYTES #: 0.05 E9/L
IMMATURE GRANULOCYTES %: 0.5 % (ref 0–5)
LYMPHOCYTES ABSOLUTE: 1.76 E9/L (ref 1.5–4)
LYMPHOCYTES RELATIVE PERCENT: 16.7 % (ref 20–42)
MCH RBC QN AUTO: 22.5 PG (ref 26–35)
MCHC RBC AUTO-ENTMCNC: 30.7 % (ref 32–34.5)
MCV RBC AUTO: 73.4 FL (ref 80–99.9)
MONOCYTES ABSOLUTE: 0.98 E9/L (ref 0.1–0.95)
MONOCYTES RELATIVE PERCENT: 9.3 % (ref 2–12)
NEUTROPHILS ABSOLUTE: 7.52 E9/L (ref 1.8–7.3)
NEUTROPHILS RELATIVE PERCENT: 71 % (ref 43–80)
PDW BLD-RTO: 17.6 FL (ref 11.5–15)
PLATELET # BLD: 452 E9/L (ref 130–450)
PMV BLD AUTO: 10.5 FL (ref 7–12)
POTASSIUM SERPL-SCNC: 3.4 MMOL/L (ref 3.5–5)
PRO-BNP: 42 PG/ML (ref 0–125)
RBC # BLD: 4.93 E12/L (ref 3.5–5.5)
SARS-COV-2, NAAT: NOT DETECTED
SODIUM BLD-SCNC: 137 MMOL/L (ref 132–146)
TOTAL PROTEIN: 7.6 G/DL (ref 6.4–8.3)
TROPONIN, HIGH SENSITIVITY: <6 NG/L (ref 0–9)
WBC # BLD: 10.6 E9/L (ref 4.5–11.5)

## 2021-08-18 PROCEDURE — 85025 COMPLETE CBC W/AUTO DIFF WBC: CPT

## 2021-08-18 PROCEDURE — 85378 FIBRIN DEGRADE SEMIQUANT: CPT

## 2021-08-18 PROCEDURE — 87635 SARS-COV-2 COVID-19 AMP PRB: CPT

## 2021-08-18 PROCEDURE — 83880 ASSAY OF NATRIURETIC PEPTIDE: CPT

## 2021-08-18 PROCEDURE — 80053 COMPREHEN METABOLIC PANEL: CPT

## 2021-08-18 PROCEDURE — 93005 ELECTROCARDIOGRAM TRACING: CPT | Performed by: EMERGENCY MEDICINE

## 2021-08-18 PROCEDURE — 84484 ASSAY OF TROPONIN QUANT: CPT

## 2021-08-18 PROCEDURE — 99283 EMERGENCY DEPT VISIT LOW MDM: CPT

## 2021-08-18 RX ORDER — DIPHENHYDRAMINE HYDROCHLORIDE 50 MG/ML
25 INJECTION INTRAMUSCULAR; INTRAVENOUS ONCE
Status: DISCONTINUED | OUTPATIENT
Start: 2021-08-18 | End: 2021-08-19 | Stop reason: HOSPADM

## 2021-08-18 RX ORDER — METHYLPREDNISOLONE SODIUM SUCCINATE 125 MG/2ML
125 INJECTION, POWDER, LYOPHILIZED, FOR SOLUTION INTRAMUSCULAR; INTRAVENOUS ONCE
Status: DISCONTINUED | OUTPATIENT
Start: 2021-08-18 | End: 2021-08-19 | Stop reason: HOSPADM

## 2021-08-19 ENCOUNTER — HOSPITAL ENCOUNTER (EMERGENCY)
Age: 46
Discharge: LEFT BEFORE DISCHARGE | End: 2021-08-19
Attending: EMERGENCY MEDICINE

## 2021-08-19 DIAGNOSIS — R06.02 SHORTNESS OF BREATH: ICD-10-CM

## 2021-08-19 DIAGNOSIS — R05.9 COUGH: Primary | ICD-10-CM

## 2021-08-19 LAB
EKG ATRIAL RATE: 84 BPM
EKG P AXIS: 54 DEGREES
EKG P-R INTERVAL: 148 MS
EKG Q-T INTERVAL: 406 MS
EKG QRS DURATION: 82 MS
EKG QTC CALCULATION (BAZETT): 479 MS
EKG R AXIS: 16 DEGREES
EKG T AXIS: 83 DEGREES
EKG VENTRICULAR RATE: 84 BPM

## 2021-08-19 PROCEDURE — 93010 ELECTROCARDIOGRAM REPORT: CPT | Performed by: INTERNAL MEDICINE

## 2021-08-19 NOTE — ED PROVIDER NOTES
HPI:  8/18/21, Time: 8:31 PM EDT         Lizet Dalton is a 55 y.o. female presenting to the ED for feeling short of breath and coughing, beginning 1 month ago. The complaint has been persistent, moderate in severity, and worsened by nothing. Patient presenting here because of coughing for the past month. Patient reports clear sputum she reports no productive cough she reports no chest pain. Patient does report feeling short of breath. Patient reporting that she also is having lower back pain in her right lower back patient reports pain shooting from her back up into her upper back into her head. Patient reporting some headache on the right side she does have a history of migraines but she says it is not a migraine headache. Patient reporting no syncope she reports no photophobia. There is no slurred speech. There is no paralysis. ROS:   Pertinent positives and negatives are stated within HPI, all other systems reviewed and are negative.  --------------------------------------------- PAST HISTORY ---------------------------------------------  Past Medical History:  has a past medical history of Anxiety, Blood transfusion, Chronic fatigue, Hypertension, Iron deficiency anemia due to chronic blood loss, Knee pain, bilateral, Leg pain, Low back pain, Migraine headache without aura, Morbid obesity (HCC), Muscle cramps, Perennial allergic rhinitis, Superficial thrombophlebitis of right leg, Ulcerative colitis ([facility] [address] and Vertigo. Past Surgical History:  has a past surgical history that includes Adenoidectomy. Social History:  reports that she has never smoked. She has never used smokeless tobacco. She reports current alcohol use. She reports that she does not use drugs. Family History: family history includes Cancer (age of onset: 35) in her sister; Clotting Disorder in her father and mother; High Blood Pressure in her father and mother; Stroke in her mother.      The patients home medications have been reviewed. Allergies: Aspirin, Coconut oil, Ibuprofen, Iodides, Motrin [ibuprofen micronized], Robitussin (alcohol free) [guaifenesin], Codeine, Iodine, and Tramadol    ---------------------------------------------------PHYSICAL EXAM--------------------------------------    Constitutional/General: Alert and oriented x3, mild distress  Head: Normocephalic and atraumatic  Eyes: PERRL, EOMI  Mouth: Oropharynx clear, handling secretions, no trismus  Neck: Supple, full ROM, non tender to palpation in the midline, no stridor, no crepitus, no meningeal signs  Pulmonary: Lungs clear to auscultation bilaterally, no wheezes, rales, or rhonchi. Not in respiratory distress  Cardiovascular:  Regular rate. Regular rhythm. No murmurs, gallops, or rubs. 2+ distal pulses  Chest: no chest wall tenderness  Abdomen: Soft. Non tender. Non distended. +BS. No rebound, guarding, or rigidity. No pulsatile masses appreciated. Musculoskeletal: Moves all extremities x 4 tender right still sacroiliac region. Warm and well perfused, no clubbing, cyanosis, or edema. Capillary refill <3 seconds  Skin: warm and dry. No rashes. Neurologic: GCS 15, CN 2-12 grossly intact, no focal deficits, symmetric strength 5/5 in the upper and lower extremities bilaterally  Psych: Normal Affect    -------------------------------------------------- RESULTS -------------------------------------------------  I have personally reviewed all laboratory and imaging results for this patient. Results are listed below.      LABS:  Results for orders placed or performed during the hospital encounter of 08/19/21   COVID-19, Rapid    Specimen: Nasopharyngeal Swab   Result Value Ref Range    SARS-CoV-2, NAAT Not Detected Not Detected   CBC auto differential   Result Value Ref Range    WBC 10.6 4.5 - 11.5 E9/L    RBC 4.93 3.50 - 5.50 E12/L    Hemoglobin 11.1 (L) 11.5 - 15.5 g/dL    Hematocrit 36.2 34.0 - 48.0 %    MCV 73.4 (L) 80.0 - 99.9 fL    MCH 22.5 (L) 26.0 - 35.0 pg MCHC 30.7 (L) 32.0 - 34.5 %    RDW 17.6 (H) 11.5 - 15.0 fL    Platelets 398 (H) 511 - 450 E9/L    MPV 10.5 7.0 - 12.0 fL    Neutrophils % 71.0 43.0 - 80.0 %    Immature Granulocytes % 0.5 0.0 - 5.0 %    Lymphocytes % 16.7 (L) 20.0 - 42.0 %    Monocytes % 9.3 2.0 - 12.0 %    Eosinophils % 1.9 0.0 - 6.0 %    Basophils % 0.6 0.0 - 2.0 %    Neutrophils Absolute 7.52 (H) 1.80 - 7.30 E9/L    Immature Granulocytes # 0.05 E9/L    Lymphocytes Absolute 1.76 1.50 - 4.00 E9/L    Monocytes Absolute 0.98 (H) 0.10 - 0.95 E9/L    Eosinophils Absolute 0.20 0.05 - 0.50 E9/L    Basophils Absolute 0.06 0.00 - 0.20 E9/L   Comprehensive Metabolic Panel   Result Value Ref Range    Sodium 137 132 - 146 mmol/L    Potassium 3.4 (L) 3.5 - 5.0 mmol/L    Chloride 101 98 - 107 mmol/L    CO2 26 22 - 29 mmol/L    Anion Gap 10 7 - 16 mmol/L    Glucose 112 (H) 74 - 99 mg/dL    BUN 14 6 - 20 mg/dL    CREATININE 1.0 0.5 - 1.0 mg/dL    GFR Non-African American >60 >=60 mL/min/1.73    GFR African American >60     Calcium 8.8 8.6 - 10.2 mg/dL    Total Protein 7.6 6.4 - 8.3 g/dL    Albumin 3.9 3.5 - 5.2 g/dL    Total Bilirubin <0.2 0.0 - 1.2 mg/dL    Alkaline Phosphatase 116 (H) 35 - 104 U/L    ALT 19 0 - 32 U/L    AST 18 0 - 31 U/L   Troponin   Result Value Ref Range    Troponin, High Sensitivity <6 0 - 9 ng/L   Brain Natriuretic Peptide   Result Value Ref Range    Pro-BNP 42 0 - 125 pg/mL   D-Dimer, Quantitative   Result Value Ref Range    D-Dimer, Quant 232 ng/mL DDU       RADIOLOGY:  Interpreted by Radiologist.  XR CHEST PORTABLE    (Results Pending)   CT HEAD WO CONTRAST    (Results Pending)   CTA CHEST W CONTRAST    (Results Pending)       EKG: This EKG is signed and interpreted by me.     Rate: 84  Rhythm: Sinus  Interpretation: non-specific EKG  Comparison: stable as compared to patient's most recent EKG        ------------------------- NURSING NOTES AND VITALS REVIEWED ---------------------------   The nursing notes within the ED encounter and

## 2021-08-19 NOTE — ED NOTES
Radiology Procedure Waiver   Name: Vernon Israel  : 1975  MRN: [id number]    Date:  21    Time: 9:43 PM EDT    Benefits of immediately proceeding with Radiology exam(s) without pre-testing outweigh the risks or are not indicated as specified below and therefore the following is/are being waived:    [x] Pregnancy test   [] Patients LMP on-time and regular.   [] Patient had Tubal Ligation or has other Contraception Device. [] Patient  is Menopausal or Premenarcheal.    [] Patient had Full or Partial Hysterectomy. [] Protocol for Iodine allergy    [] MRI Questionnaire     [x] BUN/Creatinine   [] Patient age w/no hx of renal dysfunction. [] Patient on Dialysis. [] Recent Normal Labs.   Electronically signed by Maximiliano Ivory MD on 21 at 9:43 PM EDT               Maximiliano Ivory MD  21 0785

## 2021-08-19 NOTE — ED NOTES
Pt called to recheck vital signs, pt not answering. Bathroom checked also pt not in waiting room. Rn charge aware pt may have left.      Emily Ivey, SATISH  18/16/89 [id number]

## 2021-08-19 NOTE — LETTER
[facility] Internal Medicine   [address]  [place], [address]      August 19, 2021    [place] [address]      Dear Lola Back,    This letter is regarding your Emergency Department (ED) visit at [facility] Emergency Department on 8/19/21. Chloe Ram wanted to make sure that you understand your discharge instructions and that you were able to fill any prescriptions that may have been ordered for you. Please contact the office at \"[phone]  if the ED advised to you follow up with Chloe Ram, or if you have any further questions or needs. Also did you know -   *Visiting the ED for a non-emergency could result in higher co-pays than you would normally be subject to paying? *You can call your doctor even after hours so they can direct you to the most appropriate care. [facility]) practices can often offer you an appointment on the same day that you call. Many [address]  appointments; check our website for availability in your community , [email or web address]    Evisits are now available for patients for $36 through [employer] for certain conditions:  * Sinus, cold and or cough       * Diarrhea            * Headache  * Heartburn                                * Poison Vianca          * Back pain     * Urinary problems                         Sincerely,     Prince Jersey MD and your [facility]

## 2021-08-21 ENCOUNTER — APPOINTMENT (OUTPATIENT)
Dept: GENERAL RADIOLOGY | Age: 46
End: 2021-08-21

## 2021-08-21 ENCOUNTER — HOSPITAL ENCOUNTER (EMERGENCY)
Age: 46
Discharge: HOME | End: 2021-08-21
Attending: EMERGENCY MEDICINE

## 2021-08-21 VITALS
HEIGHT: 68 IN | SYSTOLIC BLOOD PRESSURE: 169 MMHG | TEMPERATURE: 98 F | BODY MASS INDEX: 44.41 KG/M2 | HEART RATE: 95 BPM | WEIGHT: 293 LBS | OXYGEN SATURATION: 97 % | DIASTOLIC BLOOD PRESSURE: 110 MMHG | RESPIRATION RATE: 18 BRPM

## 2021-08-21 DIAGNOSIS — R05.9 COUGH: Primary | ICD-10-CM

## 2021-08-21 LAB
ALBUMIN SERPL-MCNC: 3.9 G/DL (ref 3.5–5.2)
ALP BLD-CCNC: 120 U/L (ref 35–104)
ALT SERPL-CCNC: 20 U/L (ref 0–32)
ANION GAP SERPL CALCULATED.3IONS-SCNC: 11 MMOL/L (ref 7–16)
AST SERPL-CCNC: 21 U/L (ref 0–31)
BASOPHILS ABSOLUTE: 0.04 E9/L (ref 0–0.2)
BASOPHILS RELATIVE PERCENT: 0.5 % (ref 0–2)
BILIRUB SERPL-MCNC: <0.2 MG/DL (ref 0–1.2)
BUN BLDV-MCNC: 11 MG/DL (ref 6–20)
CALCIUM SERPL-MCNC: 9.3 MG/DL (ref 8.6–10.2)
CHLORIDE BLD-SCNC: 105 MMOL/L (ref 98–107)
CO2: 24 MMOL/L (ref 22–29)
CREAT SERPL-MCNC: 1 MG/DL (ref 0.5–1)
EOSINOPHILS ABSOLUTE: 0.28 E9/L (ref 0.05–0.5)
EOSINOPHILS RELATIVE PERCENT: 3.4 % (ref 0–6)
GFR AFRICAN AMERICAN: >60
GFR NON-AFRICAN AMERICAN: >60 ML/MIN/1.73
GLUCOSE BLD-MCNC: 111 MG/DL (ref 74–99)
HCT VFR BLD CALC: 36.6 % (ref 34–48)
HEMOGLOBIN: 10.9 G/DL (ref 11.5–15.5)
IMMATURE GRANULOCYTES #: 0.03 E9/L
IMMATURE GRANULOCYTES %: 0.4 % (ref 0–5)
LYMPHOCYTES ABSOLUTE: 1.62 E9/L (ref 1.5–4)
LYMPHOCYTES RELATIVE PERCENT: 19.7 % (ref 20–42)
MCH RBC QN AUTO: 22.7 PG (ref 26–35)
MCHC RBC AUTO-ENTMCNC: 29.8 % (ref 32–34.5)
MCV RBC AUTO: 76.1 FL (ref 80–99.9)
MONOCYTES ABSOLUTE: 0.89 E9/L (ref 0.1–0.95)
MONOCYTES RELATIVE PERCENT: 10.8 % (ref 2–12)
NEUTROPHILS ABSOLUTE: 5.38 E9/L (ref 1.8–7.3)
NEUTROPHILS RELATIVE PERCENT: 65.2 % (ref 43–80)
PDW BLD-RTO: 17.2 FL (ref 11.5–15)
PLATELET # BLD: 415 E9/L (ref 130–450)
PMV BLD AUTO: 10.6 FL (ref 7–12)
POTASSIUM SERPL-SCNC: 3.7 MMOL/L (ref 3.5–5)
RBC # BLD: 4.81 E12/L (ref 3.5–5.5)
SODIUM BLD-SCNC: 140 MMOL/L (ref 132–146)
TOTAL PROTEIN: 7.6 G/DL (ref 6.4–8.3)
WBC # BLD: 8.2 E9/L (ref 4.5–11.5)

## 2021-08-21 PROCEDURE — 6370000000 HC RX 637 (ALT 250 FOR IP): Performed by: EMERGENCY MEDICINE

## 2021-08-21 PROCEDURE — 99284 EMERGENCY DEPT VISIT MOD MDM: CPT

## 2021-08-21 PROCEDURE — 80053 COMPREHEN METABOLIC PANEL: CPT

## 2021-08-21 PROCEDURE — 85025 COMPLETE CBC W/AUTO DIFF WBC: CPT

## 2021-08-21 PROCEDURE — 71046 X-RAY EXAM CHEST 2 VIEWS: CPT

## 2021-08-21 RX ORDER — DOXYCYCLINE HYCLATE 100 MG/1
100 CAPSULE ORAL ONCE
Status: COMPLETED | OUTPATIENT
Start: 2021-08-21 | End: 2021-08-21

## 2021-08-21 RX ORDER — PREDNISONE 20 MG/1
60 TABLET ORAL ONCE
Status: COMPLETED | OUTPATIENT
Start: 2021-08-21 | End: 2021-08-21

## 2021-08-21 RX ORDER — PREDNISONE 20 MG/1
TABLET ORAL
Qty: 10 TABLET | Refills: 0 | Status: SHIPPED | OUTPATIENT
Start: 2021-08-21 | End: 2021-09-23 | Stop reason: ALTCHOICE

## 2021-08-21 RX ORDER — DOXYCYCLINE HYCLATE 100 MG
100 TABLET ORAL 2 TIMES DAILY
Qty: 20 TABLET | Refills: 0 | Status: SHIPPED | OUTPATIENT
Start: 2021-08-21 | End: 2021-08-31

## 2021-08-21 RX ADMIN — PREDNISONE 60 MG: 20 TABLET ORAL at 19:14

## 2021-08-21 RX ADMIN — DOXYCYCLINE HYCLATE 100 MG: 100 CAPSULE ORAL at 19:14

## 2021-08-21 ASSESSMENT — PAIN DESCRIPTION - PAIN TYPE: TYPE: ACUTE PAIN

## 2021-08-21 ASSESSMENT — PAIN DESCRIPTION - LOCATION: LOCATION: HEAD;SHOULDER;RIB CAGE

## 2021-08-21 ASSESSMENT — PAIN SCALES - GENERAL: PAINLEVEL_OUTOF10: 7

## 2021-08-21 NOTE — ED PROVIDER NOTES
Department of Emergency Medicine   ED  Provider Note  Admit Date/RoomTime: 8/21/2021  6:30 PM  [facility]          History of Present Illness:  8/21/21, Time: 6:49 PM EDT  Chief Complaint   Patient presents with    Cough     productive x1 month                Thanh Baez is a 55 y.o. female presenting to the ED for cough. Patient's been coughing for 1 month. Came on gradually, nothing makes it better or worse, sharp sensation in her ribs when she actually does cough. No pain at rest.  No pain on exertion. Tried nothing at home for relief. Denies any fevers or chills. Denies any PE risk factors. Did not get her Covid vaccine. Does have history of asthma. Denies any fever, chills, nausea, vomit, back pain, paresthesias, lethargy, neck pain or stiffness, or any other symptoms or complaints. Review of Systems:   Pertinent positives and negatives are stated within HPI, all other systems reviewed and are negative.        --------------------------------------------- PAST HISTORY ---------------------------------------------  Past Medical History:  has a past medical history of Anxiety, Blood transfusion, Chronic fatigue, Hypertension, Iron deficiency anemia due to chronic blood loss, Knee pain, bilateral, Leg pain, Low back pain, Migraine headache without aura, Morbid obesity (HCC), Muscle cramps, Perennial allergic rhinitis, Superficial thrombophlebitis of right leg, Ulcerative colitis ([facility] [address] and Vertigo. Past Surgical History:  has a past surgical history that includes Adenoidectomy. Social History:  reports that she has never smoked. She has never used smokeless tobacco. She reports current alcohol use. She reports that she does not use drugs. Family History: family history includes Cancer (age of onset: 35) in her sister; Clotting Disorder in her father and mother; High Blood Pressure in her father and mother; Stroke in her mother. . Unless otherwise noted, family history is non contributory    The patients home medications have been reviewed. Allergies: Aspirin, Coconut oil, Ibuprofen, Iodides, Motrin [ibuprofen micronized], Robitussin (alcohol free) [guaifenesin], Codeine, Iodine, and Tramadol        ---------------------------------------------------PHYSICAL EXAM--------------------------------------    Constitutional/General: Alert and oriented x3  Head: Normocephalic and atraumatic  Eyes: PERRL, EOMI, sclera non icteric  Mouth: Oropharynx clear, handling secretions, no trismus, no asymmetry of the posterior oropharynx or uvular edema  Neck: Supple, full ROM, no stridor, no meningeal signs  Respiratory: Mild wheezing in all lung fields  Cardiovascular:  Regular rate. Regular rhythm. 2+ distal pulses. Equal extremity pulses. Chest: No chest wall tenderness  GI:  Abdomen Soft, Non tender, Non distended. No rebound, guarding, or rigidity. No pulsatile masses. Musculoskeletal: Moves all extremities x 4. Warm and well perfused, no clubbing, cyanosis, or edema. Capillary refill <3 seconds  Integument: skin warm and dry. No rashes. Neurologic: GCS 15, no focal deficits, symmetric strength 5/5 in the upper and lower extremities bilaterally  Psychiatric: Normal Affect          -------------------------------------------------- RESULTS -------------------------------------------------  I have personally reviewed all laboratory and imaging results for this patient. Results are listed below.      LABS: (Lab results interpreted by me)  Results for orders placed or performed during the hospital encounter of 08/21/21   CBC auto differential   Result Value Ref Range    WBC 8.2 4.5 - 11.5 E9/L    RBC 4.81 3.50 - 5.50 E12/L    Hemoglobin 10.9 (L) 11.5 - 15.5 g/dL    Hematocrit 36.6 34.0 - 48.0 %    MCV 76.1 (L) 80.0 - 99.9 fL    MCH 22.7 (L) 26.0 - 35.0 pg    MCHC 29.8 (L) 32.0 - 34.5 %    RDW 17.2 (H) 11.5 - 15.0 fL    Platelets 376 299 - 215 E9/L    MPV 10.6 7.0 - 12.0 fL    Neutrophils % 65.2 43.0 - 80.0 %    Immature Granulocytes % 0.4 0.0 - 5.0 %    Lymphocytes % 19.7 (L) 20.0 - 42.0 %    Monocytes % 10.8 2.0 - 12.0 %    Eosinophils % 3.4 0.0 - 6.0 %    Basophils % 0.5 0.0 - 2.0 %    Neutrophils Absolute 5.38 1.80 - 7.30 E9/L    Immature Granulocytes # 0.03 E9/L    Lymphocytes Absolute 1.62 1.50 - 4.00 E9/L    Monocytes Absolute 0.89 0.10 - 0.95 E9/L    Eosinophils Absolute 0.28 0.05 - 0.50 E9/L    Basophils Absolute 0.04 0.00 - 0.20 E9/L   Comprehensive Metabolic Panel   Result Value Ref Range    Sodium 140 132 - 146 mmol/L    Potassium 3.7 3.5 - 5.0 mmol/L    Chloride 105 98 - 107 mmol/L    CO2 24 22 - 29 mmol/L    Anion Gap 11 7 - 16 mmol/L    Glucose 111 (H) 74 - 99 mg/dL    BUN 11 6 - 20 mg/dL    CREATININE 1.0 0.5 - 1.0 mg/dL    GFR Non-African American >60 >=60 mL/min/1.73    GFR African American >60     Calcium 9.3 8.6 - 10.2 mg/dL    Total Protein 7.6 6.4 - 8.3 g/dL    Albumin 3.9 3.5 - 5.2 g/dL    Total Bilirubin <0.2 0.0 - 1.2 mg/dL    Alkaline Phosphatase 120 (H) 35 - 104 U/L    ALT 20 0 - 32 U/L    AST 21 0 - 31 U/L   ,       RADIOLOGY:  Interpreted by Radiologist unless otherwise specified  XR CHEST (2 VW)   Final Result   There are ill-defined opacities in the retrocardiac and bibasilar region. No   pleural effusions or pneumothoraces.       RECOMMENDATION:   (Recommend upright PA and lateral chest radiographs 6-8 weeks after   resolution of patient's symptoms to ensure complete resolution of   radiographic findings.)               EKG Interpretation  Interpreted by emergency department physician, Dr. Cyn Nunes           ------------------------- NURSING NOTES AND VITALS REVIEWED ---------------------------   The nursing notes within the ED encounter and vital signs as below have been reviewed by myself  BP (!) 169/110   Pulse 95   Temp 98 °F (36.7 °C)   Resp 18   Ht 5' 8\" (1.727 m)   Wt (!) 330 lb (149.7 kg)   LMP 08/16/2021   SpO2 97%   BMI 50.18 kg/m²     Oxygen Saturation Interpretation: Normal    The patients available past medical records and past encounters were reviewed. ------------------------------ ED COURSE/MEDICAL DECISION MAKING----------------------  Medications   predniSONE (DELTASONE) tablet 60 mg (60 mg Oral Given 8/21/21 1914)   doxycycline hyclate (VIBRAMYCIN) capsule 100 mg (100 mg Oral Given 8/21/21 1914)           The cardiac monitor revealed sinus with a heart rate in the 80s as interpreted by me. The cardiac monitor was ordered secondary to the patient's cough and to monitor the patient for dysrhythmia. CPT 61824         Medical Decision Making:    Labs and imaging reviewed. Chart reviewed. Patient was here 2 days ago, she had a negative D-dimer, unremarkable troponin at that time. PERC score 0. Given her repeated negative troponins, and her x-ray findings, did not feel that further emergent evaluation was indicated. Patient be treated with antibiotics, she is to follow-up with her PCP, she is educated on signs and symptoms require emergent evaluation. Counseling: The emergency provider has spoken with the patient and discussed todays results, in addition to providing specific details for the plan of care and counseling regarding the diagnosis and prognosis. Questions are answered at this time and they are agreeable with the plan.       --------------------------------- IMPRESSION AND DISPOSITION ---------------------------------    IMPRESSION  1. Cough        DISPOSITION  Disposition: Discharge to home  Patient condition is stable        NOTE: This report was transcribed using voice recognition software.  Every effort was made to ensure accuracy; however, inadvertent computerized transcription errors may be present        Luis Enrique Urena MD  08/25/21 9838

## 2021-08-21 NOTE — LETTER
[facility] Internal Medicine   [address] [facility], [address]      August 23, 2021    [place] [address]      Dear Juan Hurtado,    This letter is regarding your Emergency Department (ED) visit at [facility] Emergency Department on 8/21/21. Magnolia Piña wanted to make sure that you understand your discharge instructions and that you were able to fill any prescriptions that may have been ordered for you. Please contact the office at \"[phone]  if the ED advised to you follow up with Magnolia Piña, or if you have any further questions or needs. Also did you know -   *Visiting the ED for a non-emergency could result in higher co-pays than you would normally be subject to paying? *You can call your doctor even after hours so they can direct you to the most appropriate care. [facility]) practices can often offer you an appointment on the same day that you call. Many [address]  appointments; check our website for availability in your community , [email or web address]    Evisits are now available for patients for $36 through [employer] for certain conditions:  * Sinus, cold and or cough       * Diarrhea            * Headache  * Heartburn                                * Poison Vianca          * Back pain     * Urinary problems                         Sincerely,     Bard Karla MD and your [facility]

## 2021-08-21 NOTE — ED NOTES
Bed: 18A-18  Expected date: 8/21/21  Expected time:   Means of arrival:   Comments:  triage     Kirt Santoro RN  08/21/21 2730

## 2021-08-21 NOTE — ED NOTES
FIRST PROVIDER CONTACT ASSESSMENT NOTE                                                                                                Department of Emergency Medicine                                                      First Provider Note  21  4:34 PM EDT  NAME: Octavia Amador  : 1975  MRN: [id number]    Chief Complaint: Cough (productive x1 month)      History of Present Illness:   Octavia Amador is a 55 y.o. female who presents to the ED for persistent cough. Patient was seen 2 days ago and had full lab work including Covid and D-dimer. D-dimer was found to be slightly elevated at 232. CTA of chest as well as other scans were placed but patient eloped department at that time. Patient states that the wait was too long. Patient has come back today with the same symptoms and is here for further evaluation    Focused Physical Exam:  VS:    ED Triage Vitals   BP Temp Temp src Pulse Resp SpO2 Height Weight   -- 21 1543 -- 21 1543 21 1630 21 1543 21 1630 21 1630    96.8 °F (36 °C)  102 18 97 % 5' 8\" (1.727 m) (!) 330 lb (149.7 kg)        General: Alert and in no apparent distress. Medical History:  has a past medical history of Anxiety, Blood transfusion, Chronic fatigue, Hypertension, Iron deficiency anemia due to chronic blood loss, Knee pain, bilateral, Leg pain, Low back pain, Migraine headache without aura, Morbid obesity (HCC), Muscle cramps, Perennial allergic rhinitis, Superficial thrombophlebitis of right leg, Ulcerative colitis ([address] and Vertigo. Surgical History:  has a past surgical history that includes Adenoidectomy. Social History:  reports that she has never smoked. She has never used smokeless tobacco. She reports current alcohol use. She reports that she does not use drugs.     Family History: family history includes Cancer (age of onset: 35) in her sister; Clotting Disorder in her father and mother; High Blood Pressure in her father and mother; Stroke in her mother.     Allergies: Aspirin, Coconut oil, Ibuprofen, Iodides, Motrin [ibuprofen micronized], Robitussin (alcohol free) [guaifenesin], Codeine, Iodine, and Tramadol     Initial Plan of Care:  Initiate Treatment-Testing, Proceed toTreatment Area When Bed Available for ED Attending/MLP to Continue Care    -------------------------------------------------END OF FIRST PROVIDER CONTACT ASSESSMENT NOTE--------------------------------------------------------  Electronically signed by Mary Dailey PA-C   DD: 8/21/21       Mary Dailey PA-C  08/21/21 1634

## 2021-08-23 ENCOUNTER — TELEPHONE (OUTPATIENT)
Dept: INTERNAL MEDICINE | Age: 46
End: 2021-08-23

## 2021-08-23 ENCOUNTER — TELEPHONE (OUTPATIENT)
Dept: NEUROSURGERY | Age: 46
End: 2021-08-23

## 2021-08-27 ENCOUNTER — HOSPITAL ENCOUNTER (OUTPATIENT)
Age: 46
Discharge: HOME | End: 2021-08-27

## 2021-08-27 DIAGNOSIS — Z00.00 HEALTHCARE MAINTENANCE: ICD-10-CM

## 2021-08-27 DIAGNOSIS — R76.8 POSITIVE ANA (ANTINUCLEAR ANTIBODY): ICD-10-CM

## 2021-08-27 DIAGNOSIS — D50.9 IRON DEFICIENCY ANEMIA, UNSPECIFIED IRON DEFICIENCY ANEMIA TYPE: ICD-10-CM

## 2021-08-27 DIAGNOSIS — I10 ESSENTIAL HYPERTENSION: ICD-10-CM

## 2021-08-27 LAB
ANION GAP SERPL CALCULATED.3IONS-SCNC: 16 MMOL/L (ref 7–16)
BUN BLDV-MCNC: 14 MG/DL (ref 6–20)
CALCIUM SERPL-MCNC: 9.7 MG/DL (ref 8.6–10.2)
CHLORIDE BLD-SCNC: 104 MMOL/L (ref 98–107)
CHOLESTEROL, TOTAL: 175 MG/DL (ref 0–199)
CO2: 22 MMOL/L (ref 22–29)
CREAT SERPL-MCNC: 0.9 MG/DL (ref 0.5–1)
FERRITIN: 38 NG/ML
GFR AFRICAN AMERICAN: >60
GFR NON-AFRICAN AMERICAN: >60 ML/MIN/1.73
GLUCOSE BLD-MCNC: 110 MG/DL (ref 74–99)
HBA1C MFR BLD: 6 % (ref 4–5.6)
HDLC SERPL-MCNC: 40 MG/DL
IRON SATURATION: 11 % (ref 15–50)
IRON: 42 MCG/DL (ref 37–145)
LDL CHOLESTEROL CALCULATED: 92 MG/DL (ref 0–99)
POTASSIUM SERPL-SCNC: 4 MMOL/L (ref 3.5–5)
SODIUM BLD-SCNC: 142 MMOL/L (ref 132–146)
TOTAL IRON BINDING CAPACITY: 370 MCG/DL (ref 250–450)
TRIGL SERPL-MCNC: 215 MG/DL (ref 0–149)
VLDLC SERPL CALC-MCNC: 43 MG/DL

## 2021-08-27 PROCEDURE — 83036 HEMOGLOBIN GLYCOSYLATED A1C: CPT

## 2021-08-27 PROCEDURE — 80061 LIPID PANEL: CPT

## 2021-08-27 PROCEDURE — 86225 DNA ANTIBODY NATIVE: CPT

## 2021-08-27 PROCEDURE — 80048 BASIC METABOLIC PNL TOTAL CA: CPT

## 2021-08-27 PROCEDURE — 86235 NUCLEAR ANTIGEN ANTIBODY: CPT

## 2021-08-27 PROCEDURE — 36415 COLL VENOUS BLD VENIPUNCTURE: CPT

## 2021-08-27 PROCEDURE — 82728 ASSAY OF FERRITIN: CPT

## 2021-08-27 PROCEDURE — 83550 IRON BINDING TEST: CPT

## 2021-08-27 PROCEDURE — 83540 ASSAY OF IRON: CPT

## 2021-08-30 LAB
ANTI DNA DOUBLE STRANDED: NEGATIVE
ENA TO SMITH (SM) ANTIBODY: NEGATIVE

## 2021-09-02 ENCOUNTER — TELEPHONE (OUTPATIENT)
Dept: INTERNAL MEDICINE | Age: 46
End: 2021-09-02

## 2021-09-02 NOTE — TELEPHONE ENCOUNTER
----- Message from Yumiko Anneu sent at 9/1/2021 12:37 PM EDT -----  Subject: Message to Provider    QUESTIONS  Information for Provider? Patient dropped off some [facility] paperwork for work   08/20 and they still haven't gotten it. Paperwork was due on 08/27 and   patient needs it faxed it over as soon as possible. Fax number and all   information was in envelope that was dropped off.  ---------------------------------------------------------------------------  --------------  [address]  What is the best way for the office to contact you? OK to leave message on   voicemail  Preferred Call Back Phone Number? [phone]  ---------------------------------------------------------------------------  --------------  SCRIPT ANSWERS  Relationship to Patient?  Self

## 2021-09-02 NOTE — TELEPHONE ENCOUNTER
Paper work was received and put in Dr. Kilo Layton folder. Called patient to inform her that we will send the paperwork to her employer as soon as the paper work is completed.

## 2021-09-03 ENCOUNTER — OFFICE VISIT (OUTPATIENT)
Dept: INTERNAL MEDICINE | Age: 46
End: 2021-09-03

## 2021-09-03 VITALS
SYSTOLIC BLOOD PRESSURE: 129 MMHG | BODY MASS INDEX: 44.41 KG/M2 | DIASTOLIC BLOOD PRESSURE: 93 MMHG | HEART RATE: 78 BPM | TEMPERATURE: 98 F | WEIGHT: 293 LBS | OXYGEN SATURATION: 97 % | HEIGHT: 68 IN

## 2021-09-03 DIAGNOSIS — E55.9 VITAMIN D DEFICIENCY: ICD-10-CM

## 2021-09-03 DIAGNOSIS — I10 ESSENTIAL HYPERTENSION: Primary | ICD-10-CM

## 2021-09-03 DIAGNOSIS — I87.8 VENOUS STASIS: ICD-10-CM

## 2021-09-03 DIAGNOSIS — J45.41 MODERATE PERSISTENT ASTHMA WITH EXACERBATION: ICD-10-CM

## 2021-09-03 DIAGNOSIS — Z13.29 THYROID DISORDER SCREENING: ICD-10-CM

## 2021-09-03 DIAGNOSIS — Z59.9 FINANCIAL DIFFICULTIES: ICD-10-CM

## 2021-09-03 DIAGNOSIS — G47.30 SLEEP APNEA, UNSPECIFIED TYPE: ICD-10-CM

## 2021-09-03 DIAGNOSIS — G43.019 INTRACTABLE MIGRAINE WITHOUT AURA AND WITHOUT STATUS MIGRAINOSUS: ICD-10-CM

## 2021-09-03 DIAGNOSIS — E66.01 MORBID OBESITY DUE TO EXCESS CALORIES (HCC): ICD-10-CM

## 2021-09-03 DIAGNOSIS — D50.9 IRON DEFICIENCY ANEMIA, UNSPECIFIED IRON DEFICIENCY ANEMIA TYPE: Chronic | ICD-10-CM

## 2021-09-03 DIAGNOSIS — R42 VERTIGO: ICD-10-CM

## 2021-09-03 PROCEDURE — 99213 OFFICE O/P EST LOW 20 MIN: CPT | Performed by: INTERNAL MEDICINE

## 2021-09-03 SDOH — ECONOMIC STABILITY - INCOME SECURITY: PROBLEM RELATED TO HOUSING AND ECONOMIC CIRCUMSTANCES, UNSPECIFIED: Z59.9

## 2021-09-03 NOTE — PROGRESS NOTES
HCTZ 25mg QD, lisinopril 10 mg    Moderate Asthma   Using proventil more often x 2 weeks , had a coughing spell on 19th August 2021  On Dulera , Breo   3-4 times/ week nighttime awakenings      Lumbar Back Pain with Radiculopathy  Lower back pain , radiating to right food, sometimes even on right side       Iron deficiency anemia  - Ferritin normal, continuing iron pills    Positive SANJIV   - DS DNA , [employer] issues   Cannot afford through work     Review of Systems   Constitutional: Positive for fatigue. HENT: Negative. Eyes: Negative. Respiratory: Negative. Cardiovascular: Negative. Gastrointestinal: Negative. Endocrine: Negative. Musculoskeletal: Positive for back pain and neck pain. Neurological: Positive for headaches. PMHx:  has a past medical history of Anxiety, Blood transfusion, Chronic fatigue, Hypertension, Iron deficiency anemia due to chronic blood loss, Knee pain, bilateral, Leg pain, Low back pain, Migraine headache without aura, Morbid obesity (HCC), Muscle cramps, Perennial allergic rhinitis, Superficial thrombophlebitis of right leg, Ulcerative colitis ([address] and Vertigo. Allergies   Allergen Reactions    Aspirin Shortness Of Breath and Nausea And Vomiting    Coconut Oil Anaphylaxis    Ibuprofen Other (See Comments)     Trouble breathing      Iodides Shortness Of Breath    Motrin [Ibuprofen Micronized] Shortness Of Breath    Robitussin (Alcohol Free) [Guaifenesin] Other (See Comments)     States causes worse cough    Codeine Nausea And Vomiting    Iodine Rash    Tramadol Nausea Only        PSHx:  has a past surgical history that includes Adenoidectomy.      OBYGN Hx: Regular , 5-7 days , no excessive pain or bleeding  Used OCP last from 1949-8799 , no children      Sexual Hx:  Sexually active with  , no hx of STI     Social Hx: Occupation - Reservation coordinator   Not insured by Social Work on case and pt given [address] and registered for PAP program and Medicaid    Social Hx:   Social History     Tobacco History     Smoking Status  Never Smoker    Smokeless Tobacco Use  Never Used          Alcohol History     Alcohol Use Status  Yes Comment  occasional wine coolers          Drug Use     Drug Use Status  No          Sexual Activity     Sexually Active  Yes Partners  Male, Female                 Fam Hx:   Family History   Problem Relation Age of Onset    High Blood Pressure Mother     Stroke Mother     Clotting Disorder Mother         Vague Hx of blood clots on blood thinners    High Blood Pressure Father     Clotting Disorder Father         Vague hx of blood clots on blood thinners    Cancer Sister 35        Health Maintenance/Social Determinants:   - Financial issues- [facility]  - Hx of COVID in Fall 2020 - not willing for vaccine now       Med Refills: -    Key points to note:  --    Current Outpatient Medications on File Prior to Visit   Medication Sig Dispense Refill    Prenatal Vit-Fe Fumarate-FA (PRENATAL VITAMIN PO) Take by mouth      BIOTIN PO Take by mouth      lisinopril (PRINIVIL;ZESTRIL) 10 MG tablet Take 1 tablet by mouth daily 30 tablet 1    mometasone-formoterol (DULERA) 100-5 MCG/ACT inhaler Inhale 2 puffs into the lungs 2 times daily 1 Inhaler 5    PROVENTIL  (90 Base) MCG/ACT inhaler Inhale 2 puffs into the lungs 4 times daily as needed for Wheezing LOT# [id number], EXP 7/21 1 Inhaler 0    ferrous sulfate (IRON 325) 325 (65 Fe) MG tablet Take 1 tablet by mouth 2 times daily (with meals) 60 tablet 0    hydroCHLOROthiazide (HYDRODIURIL) 25 MG tablet Take 1 tablet by mouth every morning 30 tablet 1    venlafaxine (EFFEXOR XR) 37.5 MG extended release capsule Take 1 capsule by mouth daily 30 capsule 1    SUMAtriptan (IMITREX) 50 MG tablet Take 1 tablet by mouth every 6 hours as needed for Migraine (Not more than 4 tablets a day) 9 tablet 2    acetaminophen (TYLENOL) 500 MG tablet Take 1,000 mg by mouth every 6 hours as needed for Pain       predniSONE (DELTASONE) 20 MG tablet Take 40mg daily for 4 days (Patient not taking: Reported on 9/3/2021) 10 tablet 0     No current facility-administered medications on file prior to visit. OBJECTIVE:    VS:   Vitals:    09/03/21 1311   BP: (!) 129/93   Site: Right Upper Arm   Position: Sitting   Pulse: 78   Temp: 98 °F (36.7 °C)   TempSrc: Oral   SpO2: 97%   Weight: (!) 337 lb 14.4 oz (153.3 kg)   Height: 5' 8\" (1.727 m)     Physical Exam  Constitutional:       Appearance: She is obese. HENT:      Head: Normocephalic and atraumatic. Eyes:      Pupils: Pupils are equal, round, and reactive to light. Cardiovascular:      Rate and Rhythm: Normal rate and regular rhythm. Pulses: Normal pulses. Heart sounds: Normal heart sounds. Pulmonary:      Effort: Pulmonary effort is normal.      Breath sounds: Examination of the right-upper field reveals decreased breath sounds. Examination of the left-upper field reveals decreased breath sounds. Examination of the right-middle field reveals decreased breath sounds. Examination of the left-middle field reveals decreased breath sounds. Examination of the right-lower field reveals decreased breath sounds. Examination of the left-lower field reveals decreased breath sounds. Decreased breath sounds present. Abdominal:      General: Bowel sounds are normal.      Palpations: Abdomen is soft. Musculoskeletal:         General: Normal range of motion. Neurological:      General: No focal deficit present. Mental Status: She is oriented to person, place, and time. Psychiatric:         Mood and Affect: Mood normal.         Behavior: Behavior normal.         Thought Content: Thought content normal.         Judgment: Judgment normal.            ASSESSMENT/PLAN:  1. Essential hypertension- stable, continue same meds  2.  Moderate persistent asthma with exacerbation  -     Full PFT Study With Bronchodilator; Future  -     [address], Joo, DO, Pulmonary, [place]  -     IGE; Future  -     EOSINOPHIL COUNT; Future  - More frequent nighttime awakenings , dulera,breo and daily albuterol NOT working, Full PFT, and Pulmonology referral required,   3. Sleep apnea, unspecified type  -     [facility], [place]  -     Sleep Study with PAP Titration; Future  High STOP BANG score, apneas noted by partner, needs Sleep study with possible CPAP at night, ordered , will follow  4. Intractable migraine without aura and without status migrainosus-Hx of migraines post SAH in the past, tried topiramate but did not do well, now  on imitrex, effexor, but not reducing frequency, referral to neurology for CGRP antagonists, follow up later this month   5. Iron deficiency anemia, unspecified iron deficiency anemia type- August Ferritin normal, continue same meds  6. Morbid obesity due to excess calories ([facility] [address]- advised on weight loss which will help with all comorbidities  7. Vitamin D deficiency- continue supplements, order Vit D level at next visit  8. Thyroid disorder screening  -     TSH; Future  9. Financial difficulties  -     [facility] Referral to Social Work  10. Vertigo- consider meclizine at next visit if worsening  11. Venous stasis- Stable, improved after compression stockings , SANJIV positve, dsDNA and Wise negative  12. Chronic back pain- referral to Neurosurgery , appt later this month, to follow up what happened          RTC:  Return in about 6 months (around 3/3/2022) for Follow up visit.       I have reviewed my findings and recommendations with Octavia Amador and Kailyn Johnson MD   9/4/2021 1:57 PM

## 2021-09-03 NOTE — PATIENT INSTRUCTIONS
We have ordered some basic lab work . Please get it done soon. Please follow up with your remaining appointments    We have ordered some breathing tests and a sleep study to help further understand the severity of your asthma and sleep apnea. We have also put in a referral to a lung doctor to help you with these problems. They will call you to schedule an appointment. We will see you back in 6 months to see how you are doing .     Dr. Shirley Alarcon

## 2021-09-04 RX ORDER — MECLIZINE HCL 12.5 MG/1
12.5 TABLET ORAL 3 TIMES DAILY PRN
Qty: 30 TABLET | Refills: 2 | Status: CANCELLED | OUTPATIENT
Start: 2021-09-04

## 2021-09-04 ASSESSMENT — ENCOUNTER SYMPTOMS
GASTROINTESTINAL NEGATIVE: 1
EYES NEGATIVE: 1
BACK PAIN: 1
RESPIRATORY NEGATIVE: 1

## 2021-09-23 ENCOUNTER — HOSPITAL ENCOUNTER (OUTPATIENT)
Dept: GENERAL RADIOLOGY | Age: 46
Discharge: HOME | End: 2021-09-25

## 2021-09-23 ENCOUNTER — OFFICE VISIT (OUTPATIENT)
Dept: NEUROLOGY | Age: 46
End: 2021-09-23

## 2021-09-23 ENCOUNTER — HOSPITAL ENCOUNTER (OUTPATIENT)
Age: 46
Discharge: HOME | End: 2021-09-25

## 2021-09-23 ENCOUNTER — OFFICE VISIT (OUTPATIENT)
Dept: NEUROSURGERY | Age: 46
End: 2021-09-23

## 2021-09-23 VITALS
WEIGHT: 293 LBS | TEMPERATURE: 97.6 F | SYSTOLIC BLOOD PRESSURE: 148 MMHG | BODY MASS INDEX: 44.41 KG/M2 | HEART RATE: 106 BPM | OXYGEN SATURATION: 97 % | DIASTOLIC BLOOD PRESSURE: 97 MMHG | HEIGHT: 68 IN

## 2021-09-23 VITALS
BODY MASS INDEX: 44.41 KG/M2 | RESPIRATION RATE: 18 BRPM | TEMPERATURE: 97.2 F | OXYGEN SATURATION: 91 % | SYSTOLIC BLOOD PRESSURE: 156 MMHG | WEIGHT: 293 LBS | HEART RATE: 114 BPM | DIASTOLIC BLOOD PRESSURE: 80 MMHG | HEIGHT: 68 IN

## 2021-09-23 DIAGNOSIS — M53.3 SACROILIAC DYSFUNCTION: Primary | ICD-10-CM

## 2021-09-23 DIAGNOSIS — G43.019 INTRACTABLE MIGRAINE WITHOUT AURA AND WITHOUT STATUS MIGRAINOSUS: ICD-10-CM

## 2021-09-23 DIAGNOSIS — G93.2 PSEUDOTUMOR CEREBRI: Primary | ICD-10-CM

## 2021-09-23 DIAGNOSIS — M53.3 SACROILIAC DYSFUNCTION: ICD-10-CM

## 2021-09-23 PROCEDURE — 99204 OFFICE O/P NEW MOD 45 MIN: CPT | Performed by: NURSE PRACTITIONER

## 2021-09-23 PROCEDURE — 73521 X-RAY EXAM HIPS BI 2 VIEWS: CPT

## 2021-09-23 PROCEDURE — 99203 OFFICE O/P NEW LOW 30 MIN: CPT | Performed by: NEUROLOGICAL SURGERY

## 2021-09-23 RX ORDER — ACETAZOLAMIDE 250 MG/1
250 TABLET ORAL 2 TIMES DAILY
Qty: 60 TABLET | Refills: 5 | Status: SHIPPED
Start: 2021-09-23 | End: 2022-07-08 | Stop reason: SDUPTHER
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
## 2021-09-23 NOTE — PROGRESS NOTES
[facility]. Sarah Wetzel M.D., F.A.C.P. Ayo De La O, DNP, APRN, ACNS-BC  Margaret Dakin. Mariana Wang, MSN, APRN-FNP-C  Kelly Koch, MSN, APRN-FNP-C  SHIKHA Honeycutt, PA-C  Ronn Mora, MSN, APRN-FNP-C  [address], [facility] [place], [address]  Phone: [phone]  Fax: [phone]       Maryjo Garcia is a 55 y.o. right handed female     Patient referred for longstanding headaches    Past Medical History:     Past Medical History:   Diagnosis Date    Anxiety 2009    Blood transfusion     Chronic fatigue 2007    Hypertension 2009    not treated    Iron deficiency anemia due to chronic blood loss 2007    non-compliant with iron replacement    Knee pain, bilateral 2011    Leg pain     Low back pain 2015    Pain is getting worse    Migraine headache without aura 2009    Morbid obesity ([facility] [address] 2007    Muscle cramps 2011    Perennial allergic rhinitis 2007    Superficial thrombophlebitis of right leg 2009 and 2011    Ulcerative colitis ([facility] [address]     Vertigo      No history of  liver or kidney disease. No history of strokes or seizures. No history of connective tissue disorders or cancers. No history of exposures to toxins or chemicals. Past Surgical History:       Past Surgical History:   Procedure Laterality Date    ADENOIDECTOMY       Allergies:       Aspirin, Coconut oil, Ibuprofen, Iodides, Motrin [ibuprofen micronized], Robitussin (alcohol free) [guaifenesin], Codeine, Iodine, and Tramadol    Medications:     Prior to Admission medications    Medication Sig Start Date End Date Taking?  Authorizing Provider   Prenatal Vit-Fe Fumarate-FA (PRENATAL VITAMIN PO) Take by mouth   Yes Historical Provider, MD   BIOTIN PO Take by mouth   Yes Historical Provider, MD   lisinopril (PRINIVIL;ZESTRIL) 10 MG tablet Take 1 tablet by mouth daily 7/23/21  Yes Bentley Lombardi MD   mometasone-formoterol [facility]) 100-5 MCG/ACT inhaler Inhale 2 puffs into the lungs 2 times daily 7/23/21  Yes Katty Devries MD   PROVENTIL  (96 Base) MCG/ACT inhaler Inhale 2 puffs into the lungs 4 times daily as needed for Wheezing LOT# [id number], EXP 7/21 7/23/21 9/23/21 Yes Katty Devries MD   ferrous sulfate (IRON 325) 325 (65 Fe) MG tablet Take 1 tablet by mouth 2 times daily (with meals) 7/23/21  Yes Katty Devries MD   hydroCHLOROthiazide (HYDRODIURIL) 25 MG tablet Take 1 tablet by mouth every morning 7/23/21  Yes Nyla Jama MD   venlafaxine (EFFEXOR XR) 37.5 MG extended release capsule Take 1 capsule by mouth daily 7/23/21  Yes Katty Devries MD   SUMAtriptan (IMITREX) 50 MG tablet Take 1 tablet by mouth every 6 hours as needed for Migraine (Not more than 4 tablets a day) 6/4/21  Yes Wolf Worrell MD   acetaminophen (TYLENOL) 500 MG tablet Take 1,000 mg by mouth every 6 hours as needed for Pain    Yes Historical Provider, MD     Social History:       She reports that she has never smoked. She has never used smokeless tobacco. She reports current alcohol use. She reports that she does not use drugs. She works from home  for [employer]. She is engaged to be . No children.     Review of Systems:     Sleep: not sleeping well because of coughing    No issues with chewing or swallowing  No chest pain or palpitations  + SOB  + vertigo  No falls, tripping or stumbling  No incontinence of bowels or bladder  No itching or bruising appreciated  No numbness, tingling or focal arm/leg weakness    ROS is otherwise negative    Family History:     Family History   Problem Relation Age of Onset    High Blood Pressure Mother     Stroke Mother     Clotting Disorder Mother         Vague Hx of blood clots on blood thinners    High Blood Pressure Father     Clotting Disorder Father         Vague hx of blood clots on blood thinners    Cancer Sister 35      History of Present Illness:     Patient presents today for further evaluating and management of headaches. Onset of headache started in 5th grade. Patient notes having Covid virus back in October 2020 and being hospitalized for 3 days. Recently she was seen in ED for pneumonia. She is also complaining of a pressure like feeling behind her eyes. She said it feels like it goes across her eyes and she have blurry vision at times that is intermittent. She has not had her eyes examined in years. Patient notes back in 2014 she had a severe migraine and had to go to the ED. 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 She has no deficits from this stroke. She notes no repair of her aneurysm such as coiling or clipping. She was life flighted to a larger hospital from [facility].      Headache description below. Description of Headaches:  Location of pain: right-sided unilateral, left-sided unilateral, temporal, frontal, retro-orbital  Radiation of pain?:right-sided unilateral, temporal  Character of pain:burning, pressure, sharp and throbbing  Severity of pain: 10  Accompanying symptoms: nausea, vomiting, sonophobia, photophobia, mental status changes, decreased social functioning, vertigo, lacrimation, rhinorrhea  Prodromal sx?: decreased social functioning, cannot get comfortable  --- include increased yawning, euphoria, depression, irritability, food cravings, constipation, and neck stiffness. Rapidity of onset: sudden  Typical duration of individual headache: 4 days  Frequency of headaches: > 15 headaches/monthly  Are most headaches similar in presentation?  yes  Typical precipitants: stress, odors (perfume and tar)  --- menstruation, fasting, lack of sleep     Temporal Pattern of Headaches:  Started having HA's several years ago  Worst time of day: all the time  Awaken from sleep?: yes   Seasonal pattern?: no  Clustering of HA's over time? no  Overall pattern since problem began: stable    Degree of Functional Impairment: severe    Current Use of Meds to Treat HA:  Abortive meds? acetaminophen, NSAIDs (ibuprofen), aspirin/acetaminophen/caffeine, sumatriptan PO, butalbital/caffeine/aspirin  Daily use? no  Prophylactic meds? beta-blockers (metropolol), Topamax, amitriptyline    Additional Relevant History:  History of head/neck trauma? no  History of head/neck surgery? no  Family h/o headache problems?  yes - maternal   Use of meds that might worsen HA's (nitrates, exogenous estrogens,    Nifedipine)? no  Exposure to carbon monoxide? no  Substance use: alcohol: mixed drinks    Objective:     BP (!) 148/97 (Site: Right Upper Arm)   Pulse 106   Temp 97.6 °F (36.4 °C)   Ht 5' 8\" (1.727 m)   Wt (!) 330 lb (149.7 kg)   LMP 08/31/2021   SpO2 97%   BMI 50.18 kg/m²     General appearance: alert, appears stated age, cooperative and in no distress  Head: normocephalic, without obvious abnormality, atraumatic  Eyes: conjunctivae/corneas clear; no drainage  Neck: no adenopathy, no carotid bruit, supple, symmetrical, trachea midline   Lungs: clear to auscultation bilaterally  Heart: regular rate and rhythm, S1, S2 normal, no murmur  Abdomen: obese, soft, non-tender; bowel sounds normal  Extremities: normal, atraumatic, no cyanosis or edema  Skin:  color, texture, turgor normal--no rashes or lesions      Mental Status: alert and oriented x 4    Appropriate attention/concentration  Intact fundus of knowledge  Repetition intact  Memories intact    Speech: no dysarthria  Language: no aphasias---reading, writing, repetition, and object identification intact    Cranial Nerves:  I: smell    II: visual acuity     II: visual fields Full    II: pupils KOBE   III,VII: ptosis None   III,IV,VI: extraocular muscles  EOMI without nystagmus   V: mastication Normal   V: facial light touch sensation  Normal   V,VII: corneal reflex     VII: facial muscle function - upper  Normal   VII: facial muscle function - lower Normal   VIII: hearing Normal   IX: soft palate elevation  Normal   IX,X: gag reflex    XI: trapezius strength  5/5   XI: sternocleidomastoid strength 5/5   XI: neck extension strength  5/5   XII: tongue strength  Normal     Motor:  5/5 throughout  Normal bulk and tone  No drift   No abnormal movements    Sensory:  LT and PP normal  Vibration normal    Coordination:   FN, FFM and BARBARA normal  HS normal    Gait:  Normal  Romberg's negative  Walks well tandem    DTR:   2+ throughout     Laboratory/Radiology:  ry/Radiology:     CBC with Differential:    Lab Results   Component Value Date    WBC 8.2 08/21/2021    RBC 4.81 08/21/2021    HGB 10.9 08/21/2021    HCT 36.6 08/21/2021     08/21/2021    MCV 76.1 08/21/2021    MCH 22.7 08/21/2021    MCHC 29.8 08/21/2021    RDW 17.2 08/21/2021    SEGSPCT 68.6 03/02/2019    BANDSPCT 2 06/30/2016    METASPCT 0.9 10/21/2020    LYMPHOPCT 19.7 08/21/2021    MONOPCT 10.8 08/21/2021    MYELOPCT 0.9 10/21/2020    BASOPCT 0.5 08/21/2021    MONOSABS 0.89 08/21/2021    LYMPHSABS 1.62 08/21/2021    EOSABS 0.28 08/21/2021    BASOSABS 0.04 08/21/2021    DIFFTYPE AUTOMATED DIFFERENTIAL 03/02/2019     CMP:    Lab Results   Component Value Date     08/27/2021    K 4.0 08/27/2021    K 3.5 12/17/2020     08/27/2021    CO2 22 08/27/2021    BUN 14 08/27/2021    CREATININE 0.9 08/27/2021    GFRAA >60 08/27/2021    AGRATIO 1.2 10/01/2015    LABGLOM >60 08/27/2021    GLUCOSE 110 08/27/2021    PROT 7.6 08/21/2021    PROT 6.5 01/10/2013    LABALBU 3.9 08/21/2021    CALCIUM 9.7 08/27/2021    BILITOT <0.2 08/21/2021    ALKPHOS 120 08/21/2021    AST 21 08/21/2021    ALT 20 08/21/2021     Hepatic Function Panel:    Lab Results   Component Value Date    ALKPHOS 120 08/21/2021    ALT 20 08/21/2021    AST 21 08/21/2021    PROT 7.6 08/21/2021    PROT 6.5 01/10/2013    BILITOT <0.2 08/21/2021    LABALBU 3.9 08/21/2021     HgBA1c:    Lab Results   Component Value Date    LABA1C 6.0 08/27/2021     FLP:    Lab Results   Component Value Date    TRIG 215 08/27/2021    HDL 40 08/27/2021    LDLCALC 92 08/27/2021    LDLDIRECT 89 03/03/2019    LABVLDL 43 08/27/2021     CTH: negative for acute findings     CTA head/neck: unremarkable     All labs and images were personally reviewed at the time of this visit    Assessment:     Migraine without aura in patient with long history of migraine headaches   ---episodic and chronic  ---disability and lost productivity substantial  ---headache lasting 4 to 72 hours   ---2 of the following: throbbing, unilateral headaches, worsening with activity (walking, bending, standing etc), moderate to severe pain  ---associated with at least one of the following: Nausea; photophobia   --- > 15 headache days/month for more than 3 months  ---At least 8 days of headache consistent with migraine    Preventative headache medications tried: beta-blockers (metropolol), Topamax, amitriptyline  Abortive headache medications tried: acetaminophen, NSAIDs (ibuprofen), aspirin/acetaminophen/caffeine, sumatriptan PO, butalbital/caffeine/aspirin    Pseudotumor cerebri  --- retro orbital pressure with occasional blurry vision, headache, photopsia, retrobulbar pain  --- female with obesity   --- will need LP for opening pressure for confirmation   --- will start her on Diamox 250 mg BID    Plan:     Therapeutic lifestyle measures may be beneficial for controlling migraine, including good sleep hygiene, routine meal schedules, regular exercise, and managing migraine triggers.     Ordered LP with opening pressure along with basic CSF study  --- PT/INR     Started Diamox 250 mg BID    Started Ajovy 225 mg SQ every 30 days  --- sample given     Samples of Nurtec and Ubrelvy given    Patient is self pay so I advised we can provide her with samples if cost is too high    Follow up in 4 weeks    Call with any questions or concerns      SURJIT Rodgers - CNP, APRN, FNP-C  10:15 AM  9/23/2021    I spent 60 minutes with this patient obtaining the HPI and discussing the exam with greater than 50% of the time providing counseling and education on medications and other treatment plans. All questions were answered prior to leaving my office.

## 2021-09-23 NOTE — PATIENT INSTRUCTIONS
25years old. How should I take acetazolamide? Follow all directions on your prescription label. Your doctor may occasionally change your dose to make sure you get the best results. Do not use this medicine in larger or smaller amounts or for longer than recommended. Your dose of this medicine will depend on the condition you are treating. If you take acetazolamide for congestive heart failure, your doctor may tell you to skip your medication for a day. Follow your doctor's dosing instructions very carefully. Take this medicine with a full glass of water. While using acetazolamide, you may need frequent blood tests. Acetazolamide may be only part of a complete treatment program that may also include other medications. Follow your doctor's instructions very closely. Store at room temperature away from moisture and heat. Keep the bottle tightly closed when not in use. What happens if I miss a dose? Take the missed dose as soon as you remember. Skip the missed dose if it is almost time for your next scheduled dose. Do not take extra medicine to make up the missed dose. What happens if I overdose? Seek emergency medical attention or call the Poison Help line at 1-720.689.6086. What should I avoid while taking acetazolamide? This medicine may impair your thinking or reactions. Be careful if you drive or do anything that requires you to be alert. Avoid exposure to sunlight or tanning beds. Acetazolamide can make you sunburn more easily. Wear protective clothing and use sunscreen (SPF 30 or higher) when you are outdoors. What are the possible side effects of acetazolamide? Get emergency medical help if you have signs of an allergic reaction: hives; difficult breathing; swelling of your face, lips, tongue, or throat.   Call your doctor at once if you have:  · blood in urine or stools;  · a seizure (convulsions);  · loss of movement in any part of your body;  · a blood cell disorder --sudden weakness or ill feeling, fever, chills, sore throat, mouth sores, pale skin, feeling tired or short of breath, rapid heart rate, nosebleeds, bleeding gums;  · liver problems --nausea, upper stomach pain or swelling, tired feeling, loss of appetite, dark urine, michelle-colored stools, jaundice (yellowing of the skin or eyes);  · signs of metabolic acidosis --confusion, vomiting, lack of energy, irregular heartbeats;  · signs of a kidney stone --pain in your side or lower back, blood in your urine, painful or difficult urination; or  · severe skin reaction --fever, sore throat, swelling in your face or tongue, burning in your eyes, skin pain followed by a red or purple skin rash that spreads (especially in the face or upper body) and causes blistering and peeling. Common side effects may include:  · nausea, vomiting, loss of appetite, diarrhea;  · numbness or tingling, especially in your arms and legs;  · drowsiness, confusion;  · hearing problems, ringing in your ears;  · increased urination; or  · altered sense of taste. This is not a complete list of side effects and others may occur. Call your doctor for medical advice about side effects. You may report side effects to FDA at 2-356-FDA-8420. What other drugs will affect acetazolamide? Other drugs may interact with acetazolamide, including prescription and over-the-counter medicines, vitamins, and herbal products. Tell each of your health care providers about all medicines you use now and any medicine you start or stop using. Where can I get more information? Your pharmacist can provide more information about acetazolamide. Remember, keep this and all other medicines out of the reach of children, never share your medicines with others, and use this medication only for the indication prescribed. Every effort has been made to ensure that the information provided by [address] is accurate, up-to-date, and complete, but no guarantee is made to that effect. Drug information contained herein may be time sensitive. [employer] information has been compiled for use by healthcare practitioners and consumers in the [place] and therefore [employer] does not warrant that uses outside of the [place] are appropriate, unless specifically indicated otherwise. [facility] drug information does not endorse drugs, diagnose patients or recommend therapy. Located within [facility] drug information is an informational resource designed to assist licensed healthcare practitioners in caring for their patients and/or to serve consumers viewing this service as a supplement to, and not a substitute for, the expertise, skill, knowledge and judgment of healthcare practitioners. The absence of a warning for a given drug or drug combination in no way should be construed to indicate that the drug or drug combination is safe, effective or appropriate for any given patient. [facility] does not assume any responsibility for any aspect of healthcare administered with the aid of information [facility] provides. The information contained herein is not intended to cover all possible uses, directions, precautions, warnings, drug interactions, allergic reactions, or adverse effects. If you have questions about the drugs you are taking, check with your doctor, nurse or pharmacist.  Copyright 4260-5919 [address]. Version: 5.01. Revision date: 10/6/2015. Care instructions adapted under license by [facility] [place] ([facility]). If you have questions about a medical condition or this instruction, always ask your healthcare professional. [place] [address] any warranty or liability for your use of this information. Patient Education        acetazolamide  Pronunciation:  miquel cooke  Brand:  Diamox, Diamox Sequels  What is the most important information I should know about acetazolamide?   You should not use this medicine if you have cirrhosis, severe liver or kidney disease, an electrolyte imbalance, adrenal gland failure, or an allergy to acetazolamide or sulfa drugs. What is acetazolamide? Acetazolamide reduces the activity of a protein in your body called carbonic anhydrase. Blocking this protein can help reduce the build-up of certain fluids in the body. Acetazolamide is used in people with certain types of glaucoma to reduce the amount of fluid in the eye, which decreases pressure inside the eye. Acetazolamide is also used as a diuretic (\"water pill\") in people with congestive heart failure, to reduce the build-up of fluid in the body. This build-up is called edema. Acetazolamide is also used to treat certain types of seizures, and to treat or prevent altitude sickness. Acetazolamide may also be used for purposes not listed in this medication guide. What should I discuss with my healthcare provider before taking acetazolamide? You should not use acetazolamide if you are allergic to it, or if you have:  · severe liver disease, or cirrhosis;  · severe kidney disease;  · an electrolyte imbalance (such as acidosis or low levels of potassium or sodium in your blood);  · adrenal gland failure; or  · an allergy to sulfa drugs. To make sure acetazolamide is safe for you, tell your doctor if you have:  · severe breathing problems;  · angle closure glaucoma; or  · if you also take aspirin in high doses. It is not known whether this medicine will harm an unborn baby. Tell your doctor if you are pregnant or plan to become pregnant. Acetazolamide can pass into breast milk and may harm a nursing baby. You should not breast-feed while using this medicine. Acetazolamide is not approved for use by anyone younger than 25years old. How should I take acetazolamide? Follow all directions on your prescription label. Your doctor may occasionally change your dose to make sure you get the best results. Do not use this medicine in larger or smaller amounts or for longer than recommended.   Your dose of this medicine will depend on the condition you are treating. If you take acetazolamide for congestive heart failure, your doctor may tell you to skip your medication for a day. Follow your doctor's dosing instructions very carefully. Take this medicine with a full glass of water. While using acetazolamide, you may need frequent blood tests. Acetazolamide may be only part of a complete treatment program that may also include other medications. Follow your doctor's instructions very closely. Store at room temperature away from moisture and heat. Keep the bottle tightly closed when not in use. What happens if I miss a dose? Take the missed dose as soon as you remember. Skip the missed dose if it is almost time for your next scheduled dose. Do not take extra medicine to make up the missed dose. What happens if I overdose? Seek emergency medical attention or call the Poison Help line at 1-188.636.8024. What should I avoid while taking acetazolamide? This medicine may impair your thinking or reactions. Be careful if you drive or do anything that requires you to be alert. Avoid exposure to sunlight or tanning beds. Acetazolamide can make you sunburn more easily. Wear protective clothing and use sunscreen (SPF 30 or higher) when you are outdoors. What are the possible side effects of acetazolamide? Get emergency medical help if you have signs of an allergic reaction: hives; difficult breathing; swelling of your face, lips, tongue, or throat.   Call your doctor at once if you have:  · blood in urine or stools;  · a seizure (convulsions);  · loss of movement in any part of your body;  · a blood cell disorder --sudden weakness or ill feeling, fever, chills, sore throat, mouth sores, pale skin, feeling tired or short of breath, rapid heart rate, nosebleeds, bleeding gums;  · liver problems --nausea, upper stomach pain or swelling, tired feeling, loss of appetite, dark urine, michelle-colored stools, jaundice (yellowing of the skin or eyes);  · signs of metabolic acidosis --confusion, vomiting, lack of energy, irregular heartbeats;  · signs of a kidney stone --pain in your side or lower back, blood in your urine, painful or difficult urination; or  · severe skin reaction --fever, sore throat, swelling in your face or tongue, burning in your eyes, skin pain followed by a red or purple skin rash that spreads (especially in the face or upper body) and causes blistering and peeling. Common side effects may include:  · nausea, vomiting, loss of appetite, diarrhea;  · numbness or tingling, especially in your arms and legs;  · drowsiness, confusion;  · hearing problems, ringing in your ears;  · increased urination; or  · altered sense of taste. This is not a complete list of side effects and others may occur. Call your doctor for medical advice about side effects. You may report side effects to FDA at 6-328-FDA-4539. What other drugs will affect acetazolamide? Other drugs may interact with acetazolamide, including prescription and over-the-counter medicines, vitamins, and herbal products. Tell each of your health care providers about all medicines you use now and any medicine you start or stop using. Where can I get more information? Your pharmacist can provide more information about acetazolamide. Remember, keep this and all other medicines out of the reach of children, never share your medicines with others, and use this medication only for the indication prescribed. Every effort has been made to ensure that the information provided by [address] is accurate, up-to-date, and complete, but no guarantee is made to that effect. Drug information contained herein may be time sensitive. [employer] information has been compiled for use by healthcare practitioners and consumers in the [place] and therefore [employer] does not warrant that uses outside of the [place] are appropriate, unless specifically indicated otherwise. [facility] drug information does not endorse drugs, diagnose patients or recommend therapy. [facility] drug information is an informational resource designed to assist licensed healthcare practitioners in caring for their patients and/or to serve consumers viewing this service as a supplement to, and not a substitute for, the expertise, skill, knowledge and judgment of healthcare practitioners. The absence of a warning for a given drug or drug combination in no way should be construed to indicate that the drug or drug combination is safe, effective or appropriate for any given patient. [facility] does not assume any responsibility for any aspect of healthcare administered with the aid of information [facility] provides. The information contained herein is not intended to cover all possible uses, directions, precautions, warnings, drug interactions, allergic reactions, or adverse effects. If you have questions about the drugs you are taking, check with your doctor, nurse or pharmacist.  Copyright 8108-5198 [address]. Version: 5.01. Revision date: 10/6/2015. Care instructions adapted under license by [facility] ([facility]). If you have questions about a medical condition or this instruction, always ask your healthcare professional. [place] [address] any warranty or liability for your use of this information.

## 2021-09-24 ENCOUNTER — HOSPITAL ENCOUNTER (OUTPATIENT)
Age: 46
Discharge: HOME | End: 2021-09-26

## 2021-09-24 ENCOUNTER — NURSE ONLY (OUTPATIENT)
Dept: PRIMARY CARE CLINIC | Age: 46
End: 2021-09-24

## 2021-09-24 PROCEDURE — U0003 INFECTIOUS AGENT DETECTION BY NUCLEIC ACID (DNA OR RNA); SEVERE ACUTE RESPIRATORY SYNDROME CORONAVIRUS 2 (SARS-COV-2) (CORONAVIRUS DISEASE [COVID-19]), AMPLIFIED PROBE TECHNIQUE, MAKING USE OF HIGH THROUGHPUT TECHNOLOGIES AS DESCRIBED BY CMS-2020-01-R: HCPCS

## 2021-09-24 PROCEDURE — U0005 INFEC AGEN DETEC AMPLI PROBE: HCPCS

## 2021-09-26 LAB — SARS-COV-2, PCR: NOT DETECTED

## 2021-09-30 ENCOUNTER — HOSPITAL ENCOUNTER (EMERGENCY)
Age: 46
Discharge: HOME | End: 2021-09-30

## 2021-09-30 ENCOUNTER — APPOINTMENT (OUTPATIENT)
Dept: CT IMAGING | Age: 46
End: 2021-09-30

## 2021-09-30 ENCOUNTER — HOSPITAL ENCOUNTER (OUTPATIENT)
Dept: PULMONOLOGY | Age: 46
Discharge: HOME | End: 2021-09-30

## 2021-09-30 ENCOUNTER — TELEPHONE (OUTPATIENT)
Dept: NEUROLOGY | Age: 46
End: 2021-09-30

## 2021-09-30 ENCOUNTER — APPOINTMENT (OUTPATIENT)
Dept: GENERAL RADIOLOGY | Age: 46
End: 2021-09-30

## 2021-09-30 VITALS
HEIGHT: 68 IN | TEMPERATURE: 97.4 F | SYSTOLIC BLOOD PRESSURE: 158 MMHG | WEIGHT: 293 LBS | HEART RATE: 88 BPM | DIASTOLIC BLOOD PRESSURE: 100 MMHG | RESPIRATION RATE: 16 BRPM | BODY MASS INDEX: 44.41 KG/M2 | OXYGEN SATURATION: 95 %

## 2021-09-30 DIAGNOSIS — G35 MULTIPLE SCLEROSIS (HCC): Primary | ICD-10-CM

## 2021-09-30 DIAGNOSIS — D16.21 OSTEOCHONDROMA OF RIGHT FEMUR: ICD-10-CM

## 2021-09-30 DIAGNOSIS — J45.41 MODERATE PERSISTENT ASTHMA WITH EXACERBATION: ICD-10-CM

## 2021-09-30 DIAGNOSIS — Q23.2 MS (CONGENITAL MITRAL STENOSIS): ICD-10-CM

## 2021-09-30 DIAGNOSIS — S02.2XXA CLOSED FRACTURE OF NASAL BONE, INITIAL ENCOUNTER: ICD-10-CM

## 2021-09-30 DIAGNOSIS — S89.91XA INJURY OF RIGHT KNEE, INITIAL ENCOUNTER: ICD-10-CM
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
## 2021-09-30 DIAGNOSIS — S01.81XA FACIAL LACERATION, INITIAL ENCOUNTER: ICD-10-CM

## 2021-09-30 PROCEDURE — 94729 DIFFUSING CAPACITY: CPT

## 2021-09-30 PROCEDURE — 99284 EMERGENCY DEPT VISIT MOD MDM: CPT

## 2021-09-30 PROCEDURE — 70450 CT HEAD/BRAIN W/O DYE: CPT

## 2021-09-30 PROCEDURE — 6360000002 HC RX W HCPCS: Performed by: PHYSICIAN ASSISTANT

## 2021-09-30 PROCEDURE — 94060 EVALUATION OF WHEEZING: CPT

## 2021-09-30 PROCEDURE — 73562 X-RAY EXAM OF KNEE 3: CPT

## 2021-09-30 PROCEDURE — 6370000000 HC RX 637 (ALT 250 FOR IP): Performed by: PHYSICIAN ASSISTANT

## 2021-09-30 PROCEDURE — 12001 RPR S/N/AX/GEN/TRNK 2.5CM/<: CPT

## 2021-09-30 PROCEDURE — 90714 TD VACC NO PRESV 7 YRS+ IM: CPT | Performed by: PHYSICIAN ASSISTANT

## 2021-09-30 PROCEDURE — 90471 IMMUNIZATION ADMIN: CPT | Performed by: PHYSICIAN ASSISTANT

## 2021-09-30 PROCEDURE — 94726 PLETHYSMOGRAPHY LUNG VOLUMES: CPT

## 2021-09-30 PROCEDURE — 73110 X-RAY EXAM OF WRIST: CPT

## 2021-09-30 PROCEDURE — 70486 CT MAXILLOFACIAL W/O DYE: CPT

## 2021-09-30 PROCEDURE — 73130 X-RAY EXAM OF HAND: CPT

## 2021-09-30 RX ORDER — ONDANSETRON 4 MG/1
4 TABLET, ORALLY DISINTEGRATING ORAL EVERY 8 HOURS PRN
Qty: 10 TABLET | Refills: 0 | Status: SHIPPED | OUTPATIENT
Start: 2021-09-30 | End: 2022-11-03 | Stop reason: SDUPTHER

## 2021-09-30 RX ORDER — OXYCODONE HYDROCHLORIDE AND ACETAMINOPHEN 5; 325 MG/1; MG/1
1 TABLET ORAL ONCE
Status: COMPLETED | OUTPATIENT
Start: 2021-09-30 | End: 2021-09-30

## 2021-09-30 RX ORDER — AMOXICILLIN AND CLAVULANATE POTASSIUM 875; 125 MG/1; MG/1
1 TABLET, FILM COATED ORAL 2 TIMES DAILY
Qty: 20 TABLET | Refills: 0 | Status: SHIPPED | OUTPATIENT
Start: 2021-09-30 | End: 2021-10-10

## 2021-09-30 RX ORDER — OXYCODONE HYDROCHLORIDE AND ACETAMINOPHEN 5; 325 MG/1; MG/1
1 TABLET ORAL EVERY 8 HOURS PRN
Qty: 9 TABLET | Refills: 0 | Status: SHIPPED | OUTPATIENT
Start: 2021-09-30 | End: 2021-10-03

## 2021-09-30 RX ORDER — ONDANSETRON 4 MG/1
4 TABLET, ORALLY DISINTEGRATING ORAL ONCE
Status: COMPLETED | OUTPATIENT
Start: 2021-09-30 | End: 2021-09-30

## 2021-09-30 RX ORDER — TETANUS AND DIPHTHERIA TOXOIDS ADSORBED 2; 2 [LF]/.5ML; [LF]/.5ML
0.5 INJECTION INTRAMUSCULAR ONCE
Status: COMPLETED | OUTPATIENT
Start: 2021-09-30 | End: 2021-09-30

## 2021-09-30 RX ADMIN — TETANUS AND DIPHTHERIA TOXOIDS ADSORBED 0.5 ML: 2; 2 INJECTION INTRAMUSCULAR at 13:11

## 2021-09-30 RX ADMIN — OXYCODONE HYDROCHLORIDE AND ACETAMINOPHEN 1 TABLET: 5; 325 TABLET ORAL at 13:10

## 2021-09-30 RX ADMIN — ONDANSETRON 4 MG: 4 TABLET, ORALLY DISINTEGRATING ORAL at 15:46

## 2021-09-30 ASSESSMENT — PAIN SCALES - GENERAL: PAINLEVEL_OUTOF10: 9

## 2021-09-30 NOTE — ED NOTES
Patient discharged by provider     Ellie Tejada LPN  81/34/95 [id number] Please sign order for colonoscopy/poss EMR

## 2021-09-30 NOTE — ED PROVIDER NOTES
[address]  Department of Emergency Medicine   ED  Encounter Note  Admit Date/RoomTime: 2021 12:49 PM  ED Room: [facility]/ST-1    NAME: Brynn Bobo  : 1975  MRN: [id number]     Chief Complaint:  Fall (tripped and fell in parking deck, hit face on metal box)    History of Present Illness       Brynn Bobo is a 55 y.o. old female who presents to the emergency department by private vehicle, for a fall which occurred 1 hour prior to arrival.  Patient states she was walking in the parking deck here and tripped causing her to fall and hit the right side of her face off of a metal box. Patient denies LOC. Patient complains of headache, left hand and wrist pain, right knee pain. Patient states her symptoms are mild in severity describes as a aching pain. Patient denies anything make it better or worse. Patient is unsure of her last tetanus. Patient denies any other injury during the fall. Denies fever/chills, vision change, dizziness, chest pain, dyspnea, abdominal pain, NVD, numbness/weakness. ROS   Pertinent positives and negatives are stated within HPI, all other systems reviewed and are negative. Past Medical History:  has a past medical history of Anxiety, Blood transfusion, Chronic fatigue, Hypertension, Iron deficiency anemia due to chronic blood loss, Knee pain, bilateral, Leg pain, Low back pain, Migraine headache without aura, Morbid obesity (HCC), Muscle cramps, Perennial allergic rhinitis, Superficial thrombophlebitis of right leg, Ulcerative colitis ([address] and Vertigo. Surgical History:  has a past surgical history that includes Adenoidectomy. Social History:  reports that she has never smoked. She has never used smokeless tobacco. She reports current alcohol use. She reports that she does not use drugs.     Family History: family history includes Cancer (age of onset: 35) in her sister; Clotting Disorder in her father and mother; High Blood Pressure in her father and mother; Stroke in her mother. Allergies: Aspirin, Coconut oil, Ibuprofen, Iodides, Motrin [ibuprofen micronized], Robitussin (alcohol free) [guaifenesin], Codeine, Iodine, and Tramadol    Physical Exam   Oxygen Saturation Interpretation: Normal.        ED Triage Vitals [09/30/21 1251]   BP Temp Temp Source Pulse Resp SpO2 Height Weight   (!) 178/120 97.4 °F (36.3 °C) Temporal 88 16 95 % 5' 8\" (1.727 m) (!) 330 lb (149.7 kg)         Physical Exam  Constitutional:  Alert, development consistent with age. HEENT:  NC/NT. Airway patent. ttp of nasal bridge, no active bleeding from b/l nares, no septal hematoma. 1cm laceration along the right nasolabial fold. Abrasion along the right side of the nose. Neck:  No midline or paravertebral tenderness. Normal ROM. Supple. Chest:  Symmetrical without visible rash or tenderness. Respiratory:  Lungs Clear to auscultation and breath sounds equal.  CV:  Regular rate and rhythm, normal heart sounds, without pathological murmurs, ectopy, gallops, or rubs. GI:  Abdomen Soft, nontender, good bowel sounds. No firm or pulsatile mass. Pelvis:  Stable, nontender to palpation. Back:  No midline or paravertebral tenderness. No costovertebral tenderness. Extremities: ttp of left hand and wrist, no scaphoid tenderness, FROM of wrist and digits. ttp of anterior right knee, pain with flexion and extension. Distal pulses intact, cap refill <2. Integument:  Normal turgor. Warm, dry, without visible rash, unless noted elsewhere. Lymphatic: no lymphadenopathy noted  Neurological:  Oriented x3, GCS 15. Motor functions intact. Lab / Imaging Results   (All laboratory and radiology results have been personally reviewed by myself)  Labs:  No results found for this visit on 09/30/21. Imaging: All Radiology results interpreted by Radiologist unless otherwise noted. CT FACIAL BONES WO CONTRAST   Final Result   1.  Minimally depressed fracture of the right nasal bone   2. There is no orbital fracture   3. The paranasal sinuses are clear. CT HEAD WO CONTRAST   Final Result   1. There is no acute intracranial abnormality. Specifically, there is no   intracranial hemorrhage   2. Minimally displaced fracture of the right nasal bone. This fracture will   be described further on the dedicated CT of the maxillofacial region. XR WRIST LEFT (MIN 3 VIEWS)   Final Result   No acute fracture or dislocation. XR KNEE RIGHT (3 VIEWS)   Final Result   1. No acute fracture or dislocation. 2. Osteochondroma in the distal lateral femoral diaphysis. XR HAND LEFT (MIN 3 VIEWS)   Final Result        ED Course / Medical Decision Making     Medications   oxyCODONE-acetaminophen (PERCOCET) 5-325 MG per tablet 1 tablet (1 tablet Oral Given 9/30/21 1310)   diptheria-tetanus toxoids (DECAVAC) 2-2 LF/0.5ML injection 0.5 mL (0.5 mLs IntraMUSCular Given 9/30/21 1311)   ondansetron (ZOFRAN-ODT) disintegrating tablet 4 mg (4 mg Oral Given 9/30/21 1546)         Consult(s):   None    Procedure(s):  PROCEDURE NOTE  9/30/21       Time: 1600    LACERATION REPAIR  Risks, benefits and alternatives (for applicable procedures below) described. Performed By: ADILIA Cespedes. Informed consent: Written consent obtained. The patient was counseled regarding the procedure in person, it's indications, risks, potential complications and alternatives and any questions were answered. Consent was obtained. .    Laceration #: 1. Location: right nasolabial fold  Length: 1cm. The wound area was cleansend with shur-clens and draped in a sterile fashion. Local Anesthesia:  obtained with Lidocaine 1% without epinephrine. The wound was explored with the following results: Thickness: superficial. no foreign body or tendon injury seen. Debridement: None. Undermining: None. Wound Margins Revised: None. Flaps Aligned: yes.   The wound was closed in single layer closure with #4  6-0 Ethilon using interrupted suture(s). Dressing:  no dressing required. There were no additional wounds requiring formal closure. MDM:   Patient presenting after fall. Patient is in no acute distress, afebrile, nontoxic appearance. Patient's tetanus is updated. Patient CT of her facial bones showing a minimally displaced nasal fracture. Patient's x-ray showing a osteochondroma but no acute fracture of the wrist or knee. Discussed findings with patient. Patient's laceration repaired with 4 interrupted sutures. Patient's laceration did not extend through the lip. Discussed suture care with patient. Recommend patient return for suture removal in 5 to 6 days. Patient will be started on Augmentin. Patient given a knee immobilizer. Patient follow-up with facial.  Recommend patient also follow-up with PCP. Recommend patient return to the ED with new or worsening of symptoms. Plan of Care/Counseling:  ADILIA Cespedes reviewed today's visit with the patient in addition to providing specific details for the plan of care and counseling regarding the diagnosis and prognosis. Questions are answered at this time and are agreeable with the plan. Assessment      1. Fall, initial encounter    2. Closed fracture of nasal bone, initial encounter    3. Facial laceration, initial encounter    4. Osteochondroma of right femur    5. Injury of right knee, initial encounter      Plan   Discharged home. Patient condition is stable    New Medications     Discharge Medication List as of 9/30/2021  4:23 PM      START taking these medications    Details   oxyCODONE-acetaminophen (PERCOCET) 5-325 MG per tablet Take 1 tablet by mouth every 8 hours as needed for Pain for up to 3 days. Intended supply: 3 days.  Take lowest dose possible to manage pain, Disp-9 tablet, R-0Print      ondansetron (ZOFRAN ODT) 4 MG disintegrating tablet Take 1 tablet by mouth every 8 hours as needed for Nausea or Vomiting, Disp-10 tablet, R-0Print      amoxicillin-clavulanate (AUGMENTIN) 875-125 MG per tablet Take 1 tablet by mouth 2 times daily for 10 days, Disp-20 tablet, R-0Print           Electronically signed by London Cortez PA-C   DD: 9/30/21  **This report was transcribed using voice recognition software. Every effort was made to ensure accuracy; however, inadvertent computerized transcription errors may be present.   END OF ED PROVIDER NOTE     London Cortez PA-C  09/30/21 1685

## 2021-09-30 NOTE — TELEPHONE ENCOUNTER
Needs ASAP for lumbar puncture.   Electronically signed by Everett Ellsworth MA on 9/30/21 at 11:24 AM EDT

## 2021-10-05 PROCEDURE — 94729 DIFFUSING CAPACITY: CPT | Performed by: INTERNAL MEDICINE

## 2021-10-05 PROCEDURE — 94726 PLETHYSMOGRAPHY LUNG VOLUMES: CPT | Performed by: INTERNAL MEDICINE

## 2021-10-05 PROCEDURE — 94060 EVALUATION OF WHEEZING: CPT | Performed by: INTERNAL MEDICINE

## 2021-10-06 ENCOUNTER — OFFICE VISIT (OUTPATIENT)
Dept: SURGERY | Age: 46
End: 2021-10-06

## 2021-10-06 VITALS
OXYGEN SATURATION: 97 % | SYSTOLIC BLOOD PRESSURE: 122 MMHG | TEMPERATURE: 97.2 F | HEART RATE: 96 BPM | RESPIRATION RATE: 16 BRPM | DIASTOLIC BLOOD PRESSURE: 80 MMHG

## 2021-10-06 DIAGNOSIS — S02.2XXA CLOSED FRACTURE OF NASAL BONE, INITIAL ENCOUNTER: Primary | ICD-10-CM

## 2021-10-06 DIAGNOSIS — S01.81XA FACIAL LACERATION, INITIAL ENCOUNTER: ICD-10-CM

## 2021-10-06 PROCEDURE — 99203 OFFICE O/P NEW LOW 30 MIN: CPT | Performed by: PHYSICIAN ASSISTANT

## 2021-10-06 PROCEDURE — S0630 REMOVAL OF SUTURES: HCPCS | Performed by: PHYSICIAN ASSISTANT

## 2021-10-06 NOTE — PROGRESS NOTES
Department of Plastic Surgery - Adult  Attending Facial Trauma Consult Note      CHIEF COMPLAINT:  Facial trauma    History Obtained From:  patient    HISTORY OF PRESENT ILLNESS:                The patient is a 55 y.o. female who presents with facial trauma. The patient states they obtained their injury 7 days prior as result of a fall from standing. The patient states that she tripped over pavement and hit a metal box. She denies any loss of consciousness. She states that her teeth are coming together in their normal occlusion and she denies any visual acuity changes.     Past Medical History:    Past Medical History:   Diagnosis Date    Anxiety 2009    Blood transfusion     Chronic fatigue 2007    Hypertension 2009    not treated    Iron deficiency anemia due to chronic blood loss 2007    non-compliant with iron replacement    Knee pain, bilateral 2011    Leg pain     Low back pain 2015    Pain is getting worse    Migraine headache without aura 2009    Morbid obesity ([address] 2007    Muscle cramps 2011    Perennial allergic rhinitis 2007    Superficial thrombophlebitis of right leg 2009 and 2011    Ulcerative colitis ([address]     Vertigo      Past Surgical History:    Past Surgical History:   Procedure Laterality Date    ADENOIDECTOMY       Current Medications:      Current Outpatient Medications   Medication Sig Dispense Refill    ondansetron (ZOFRAN ODT) 4 MG disintegrating tablet Take 1 tablet by mouth every 8 hours as needed for Nausea or Vomiting 10 tablet 0    amoxicillin-clavulanate (AUGMENTIN) 875-125 MG per tablet Take 1 tablet by mouth 2 times daily for 10 days 20 tablet 0    acetaZOLAMIDE (DIAMOX) 250 MG tablet Take 1 tablet by mouth 2 times daily 60 tablet 5    Prenatal Vit-Fe Fumarate-FA (PRENATAL VITAMIN PO) Take by mouth      BIOTIN PO Take by mouth      lisinopril (PRINIVIL;ZESTRIL) 10 MG tablet Take 1 tablet by mouth daily 30 tablet 1    mometasone-formoterol (DULERA) 100-5 MCG/ACT inhaler Inhale 2 puffs into the lungs 2 times daily 1 Inhaler 5    ferrous sulfate (IRON 325) 325 (65 Fe) MG tablet Take 1 tablet by mouth 2 times daily (with meals) 60 tablet 0    hydroCHLOROthiazide (HYDRODIURIL) 25 MG tablet Take 1 tablet by mouth every morning 30 tablet 1    venlafaxine (EFFEXOR XR) 37.5 MG extended release capsule Take 1 capsule by mouth daily 30 capsule 1    acetaminophen (TYLENOL) 500 MG tablet Take 1,000 mg by mouth every 6 hours as needed for Pain       Fremanezumab-vfrm 225 MG/1.5ML SOAJ Inject 225 mg into the skin every 30 days (Patient not taking: Reported on 10/6/2021) 1 pen 0    Ubrogepant 50 MG TABS Take 50 mg by mouth as needed (severe migraine) 1 tablet 0    Ubrogepant 100 MG TABS Take 100 mg by mouth as needed (severe migraine) 1 tablet 0    Rimegepant Sulfate 75 MG TBDP Take 75 mg by mouth as needed (severe migraine) 2 tablet 0    PROVENTIL  (90 Base) MCG/ACT inhaler Inhale 2 puffs into the lungs 4 times daily as needed for Wheezing LOT# [id number], EXP 7/21 1 Inhaler 0     No current facility-administered medications for this visit.        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    Tobacco Use    Smoking status: Never Smoker    Smokeless tobacco: Never Used   Vaping Use    Vaping Use: Never used   Substance and Sexual Activity    Alcohol use: Yes     Comment: occasional wine coolers    Drug use: No    Sexual activity: Yes     Partners: Male, Female   Other Topics Concern    Not on file   Social History Narrative    Not on file     Social Determinants of Health     Financial Resource Strain: Low Risk     Difficulty of Paying Living Expenses: Not hard at all   Food Insecurity: No Food Insecurity    Worried About Running Out of Food in the Last Year: Never true    Dillon of Food in the Last Year: Never true   Transportation Needs:     Lack of Transportation (Medical):  Lack of Transportation (Non-Medical):    Physical Activity:     Days of Exercise per Week:     Minutes of Exercise per Session:    Stress:     Feeling of Stress :    Social Connections:     Frequency of Communication with Friends and Family:     Frequency of Social Gatherings with Friends and Family:     Attends Latter day Services:     Active Member of Clubs or Organizations:     Attends Club or Organization Meetings:     Marital Status:    Intimate Partner Violence:     Fear of Current or Ex-Partner:     Emotionally Abused:     Physically Abused:     Sexually Abused:      Family History:   Family History   Problem Relation Age of Onset    High Blood Pressure Mother     Stroke Mother     Clotting Disorder Mother         Vague Hx of blood clots on blood thinners    High Blood Pressure Father     Clotting Disorder Father         Vague hx of blood clots on blood thinners    Cancer Sister 35       REVIEW OF SYSTEMS:    CONSTITUTIONAL:  negative for  fevers, chills, sweats and fatigue  EYES: negative for dipolpia or acute vision loss. RESPIRATORY:  negative for  dry cough, cough with sputum, dyspnea, wheezing and chest pain  CARDIOVASCULAR:  negative for  chest pain, dyspnea, palpitations, syncope  GASTROINTESTINAL:  negative for nausea, vomiting, change in bowel habits, diarrhea, constipation and abdominal pain  EXTREMITIES: negative for edema  MUSCULOSKELETAL: negative for muscle weakness  SKIN: negative for itching or rashes.   BEHAVIOR/PSYCH:  negative for poor appetite, increased appetite, decreased sleep and poor concentration  NEURO: negative for agitated mood      PHYSICAL EXAM:      Physical Exam   Constitutional: She is oriented to person, place, and time. She appears well-developed and well-nourished. Neck: Normal range of motion. Neck supple. Cardiovascular: Normal rate, regular rhythm, normal heart sounds and intact distal pulses. Pulmonary/Chest: Effort normal and breath sounds normal.   Abdominal: Soft. Bowel sounds are normal. She exhibits no mass. No tenderness. Musculoskeletal: Normal range of motion. She exhibits no edema. Lymphadenopathy: She has no cervical adenopathy. Neurological: She is alert and oriented to person, place, and time. She has normal reflexes. Skin: Warm and dry. Neuro: Cranial nerves II-XII grossly intact. Decreased sensation to light touch in V2 distribution on the left side. FACE  Head: Normocephalic, laceration to the right superior nasolabial fold 1.5 cm with 4 interrupted Prolene sutures no signs of infection  Right Ear: External ear normal.   Left Ear: External ear normal.   Nose: Nose normal. no Septal Hematoma. no Deviation   Mouth/Throat: Oropharynx is clear and moist.   Eyes:  Conjunctivae and extraocular motions are normal. Pupils are equal, round, and reactive to light. no Stepoffs palpated      DATA:    Radiology Review:    EXAMINATION:   CT OF THE FACE WITHOUT CONTRAST  9/30/2021 3:09 pm       TECHNIQUE:   CT of the face was performed without the administration of intravenous   contrast. Multiplanar reformatted images are provided for review.  Dose   modulation, iterative reconstruction, and/or weight based adjustment of the   mA/kV was utilized to reduce the radiation dose to as low as reasonably   achievable.       COMPARISON:   None       HISTORY:   ORDERING SYSTEM PROVIDED HISTORY: fall, right sided pain   TECHNOLOGIST PROVIDED HISTORY:   Reason for exam:->fall, right sided pain   Decision Support Exception - unselect if not a suspected or confirmed   emergency medical condition->Emergency Medical Condition (MA)   What reading provider will be dictating this exam?->CRC       FINDINGS:   FACIAL BONES:  The maxilla, pterygoid plates and zygomatic arches are intact. The mandible is intact.  The mandibular condyles are normally situated.       There is a fracture of the right nasal bone which is minimally depressed.       ORBITS:  The globes appear intact.  The extraocular muscles, optic nerve   sheath complexes and lacrimal glands appear unremarkable.  No retrobulbar   hematoma or mass is seen.  The orbital walls and rims are intact.       SINUSES/MASTOIDS:  The paranasal sinuses and mastoid air cells are well   aerated.  No acute fracture is seen.       SOFT TISSUES:  No appreciable facial soft tissue swelling is seen.           Impression   1. Minimally depressed fracture of the right nasal bone   2. There is no orbital fracture   3. The paranasal sinuses are clear. IMPRESSION/RECOMMENDATIONS:      Diagnosis  -) Nasal bone fracture. -) Laceration of right mid face    Sutures to the right mid face laceration removed today. Educated patient to continue with OTC bacitracin or Neosporin x1 week then discontinue. Informed the patient that her nasal bone fractures do not require any surgical intervention as has been reviewed with Dr. Davenport [place]. I informed the patient to refrain from nose blowing x6 weeks as well as placing her glasses over her nasal dorsum. Patient stated earlier that she felt more comfortable with her glasses over her nasal tip at this time second to the amount of swelling and tenderness. I advised the patient that we will have her follow-up and an additional 6 weeks for further wound surveillance I informed the patient that at that time if she is having no difficulties or changes in her breathing symptomatology that will be her last office visit.   Patient voiced understanding    I reviewed with the patient scar care today which she will start in 2 weeks over the right mid face laceration. Scar Care Instructions      Sunscreen Recommendations for Scars   We recommend that all patients use a sunscreen when outside but especially on new scars (that are exposed to sunlight) for a year after their procedure.  The SPF should be at least 28. Follow the application directions on the bottle. Why is it so Important to Use Sunscreen on Scars?  A scar is new and more fragile than the surrounding skin.  If you do not use sunscreen, the scar line will react differently to the sun than the surrounding skin.  If you don't use sunscreen, the scar tissue will become darker than surrounding skin. This is a hyper-pigmented scar and will remain darker than the other skin.  After one year, the scar and surrounding skin should react equally to sun. Superficial Scar Massage   Scar massage desensitizes and reduces scar adhesions so skin glides freely.  Rub in a circular motion on and around the scar with firm, even pressure for 5 minutes four times per day    You can start scar massage once incision is completely healed and strong enough to handle the motion (usually 10 - 14 days post operatively).  You use lotion to do the scar massage to allow ease with motion over the scar and prevent friction at the area. Patient had no further Questions. Paper instructions given to patient. Surgical Plan: None    The risks, benefits and options were discussed with the pt. The risks included but not limited to pain, bleeding, infection, heavy scarring, damage to surrounding structures, fluid collections, asymmetry, and need for further procedures. All of Her questions were answered to her satisfaction and She agrees to proceed with the operation. This document is generated, in part, by voice recognition software and thus  syntax and grammatical errors are possible.     Ivania Malave  10:40 AM  10/6/2021

## 2021-10-07 ENCOUNTER — OFFICE VISIT (OUTPATIENT)
Dept: INTERNAL MEDICINE | Age: 46
End: 2021-10-07

## 2021-10-07 VITALS
HEIGHT: 68 IN | WEIGHT: 293 LBS | TEMPERATURE: 98.5 F | HEART RATE: 95 BPM | RESPIRATION RATE: 18 BRPM | SYSTOLIC BLOOD PRESSURE: 129 MMHG | DIASTOLIC BLOOD PRESSURE: 87 MMHG | BODY MASS INDEX: 44.41 KG/M2

## 2021-10-07 DIAGNOSIS — D50.9 IRON DEFICIENCY ANEMIA, UNSPECIFIED IRON DEFICIENCY ANEMIA TYPE: ICD-10-CM

## 2021-10-07 DIAGNOSIS — M25.531 RIGHT WRIST PAIN: Primary | ICD-10-CM

## 2021-10-07 DIAGNOSIS — G43.019 INTRACTABLE MIGRAINE WITHOUT AURA AND WITHOUT STATUS MIGRAINOSUS: ICD-10-CM
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
## 2021-10-07 DIAGNOSIS — J45.41 MODERATE PERSISTENT ASTHMA WITH EXACERBATION: ICD-10-CM

## 2021-10-07 DIAGNOSIS — I10 ESSENTIAL HYPERTENSION: ICD-10-CM

## 2021-10-07 PROCEDURE — 99213 OFFICE O/P EST LOW 20 MIN: CPT | Performed by: INTERNAL MEDICINE

## 2021-10-07 PROCEDURE — 99214 OFFICE O/P EST MOD 30 MIN: CPT | Performed by: INTERNAL MEDICINE

## 2021-10-07 RX ORDER — LISINOPRIL 10 MG/1
10 TABLET ORAL DAILY
Qty: 60 TABLET | Refills: 2 | Status: SHIPPED
Start: 2021-10-07 | End: 2022-05-05

## 2021-10-07 RX ORDER — VENLAFAXINE HYDROCHLORIDE 37.5 MG/1
37.5 CAPSULE, EXTENDED RELEASE ORAL DAILY
Qty: 60 CAPSULE | Refills: 2 | Status: SHIPPED
Start: 2021-10-07 | End: 2022-07-19 | Stop reason: ALTCHOICE

## 2021-10-07 RX ORDER — HYDROCHLOROTHIAZIDE 25 MG/1
25 TABLET ORAL EVERY MORNING
Qty: 60 TABLET | Refills: 2 | Status: ON HOLD
Start: 2021-10-07 | End: 2022-07-21 | Stop reason: HOSPADM

## 2021-10-07 RX ORDER — FERROUS SULFATE 325(65) MG
325 TABLET ORAL 2 TIMES DAILY WITH MEALS
Qty: 60 TABLET | Refills: 2 | Status: SHIPPED
Start: 2021-10-07 | End: 2022-11-03 | Stop reason: SDUPTHER

## 2021-10-07 ASSESSMENT — ENCOUNTER SYMPTOMS
ALLERGIC/IMMUNOLOGIC NEGATIVE: 1
EYES NEGATIVE: 1
SHORTNESS OF BREATH: 1
GASTROINTESTINAL NEGATIVE: 1

## 2021-10-07 NOTE — PATIENT INSTRUCTIONS
We have ordered some Xrays for the wrist to rule out a fracture. We also have put a referral to an orthopedic surgeon to evaluate our wrist. Their office will call to schedule an appointment . Please apply ice and rest the hand for now. If possible, please apply a thumb spica splint which is available . We will see you back in 2 months to see how you are doing .        Dr. Rajat Ramsay

## 2021-10-07 NOTE — PROGRESS NOTES
[facility] Internal Medicine      SUBJECTIVE:  Ana Arreola (:  1975) is a 55 y.o. female here for evaluation of the following chief complaint(s):ED follow up , needs stitches removed     Follow-up (had a fall in the hospital parking garage after pft test) and Results (pft)      Previous Visit Imp Points:   10/6/21- Office visit with Plastic Surgery- Sutures removed , OTC bacitracinx1 week, no intervention for nasal bone fracture  21- ED visit for fall and injury to face- 1cm laceration to right nasolabial fold and abrasion on right side of nose , CT - minimally depressed fracture of right nasal bone, other imaging negative. Tetanus shot updated, laceration repaired with 4 sutures. Return for suture removal in 5-6 days. Start on augmentin and knee immobilizer given. 21- Office visit with Neurosurgery- Imaging reviewed, right sacroiliac dysfunction- referred to pain mgmt for right SI joint kenalog injection. MRI findings explained, f/u in 1 month if pain persists  21- Office visit with Neurology-   Migraine- lifestyle changes advised, samples of Nurtec and ubrelvy given, f/u in 4 weeks  Pseudotumor cerebri- LP for opening pressure ordered for confirmation, start Diamox 250mg BID  9/3/21- Office visit with Me   HTN - stable   Asthma- more nightitme awakenings, full PFT and pulm referral ordered  Sleep apnea- High STOP BANG, sleep study ordered  Intractable migraine s/p old [address]- Neuro referral for CGRP antagonists   CHERELLE - stable  Obesity- counseled on weight loss  Vit D Deficiency- check level at next visit  TSH screening  Financial difficulties- SW referral  Vertigo- consider meclizine at next visit if worsening  Venous stasis- improved with compression stockings  Chronic back pain- referral to Neurosurg    HPI:     S/p fall - right wrist and thumb pain ,present throughout the day      XR right knee    1. No acute fracture or dislocation.    2. Osteochondroma in the distal lateral femoral diaphysis. Intractable migraine - Ajovy decreases intensity of headaches , f/u with neurology     Right SI joint - may need injection as per Neurosurg    Asthma: PFT done    Overall, spirometry is consistent with a moderate  obstruction with significant improvement after bronchodilators. MVV is  moderately reduced which may be consistent with decreased physiological  reserve versus neuromuscular weakness. Lung volumes are consistent with  moderate restriction with no air trapping. Diffusion is decreased, but  overcorrects for alveolar ventilation which may be consistent with the  patient's significant obesity. Flow volume loop thus is consistent with  obstruction; however, improved with bronchodilator. Please clinically  correlate. Review of Systems   Constitutional: Negative. HENT: Negative. Eyes: Negative. Respiratory: Positive for shortness of breath. Cardiovascular: Negative. Gastrointestinal: Negative. Endocrine: Negative. Genitourinary: Negative. Musculoskeletal: Positive for joint swelling and myalgias. Skin: Negative. Allergic/Immunologic: Negative. Neurological: Positive for headaches. Hematological: Negative. Psychiatric/Behavioral: Negative. PMHx:  has a past medical history of Anxiety, Blood transfusion, Chronic fatigue, Hypertension, Iron deficiency anemia due to chronic blood loss, Knee pain, bilateral, Leg pain, Low back pain, Migraine headache without aura, Morbid obesity (HCC), Muscle cramps, Perennial allergic rhinitis, Superficial thrombophlebitis of right leg, Ulcerative colitis ([address] and Vertigo.      Allergies   Allergen Reactions    Aspirin Shortness Of Breath and Nausea And Vomiting    Coconut Oil Anaphylaxis    Ibuprofen Other (See Comments)     Trouble breathing      Iodides Shortness Of Breath    Motrin [Ibuprofen Micronized] Shortness Of Breath    Robitussin (Alcohol Free) [Guaifenesin] Other (See Comments)     States causes worse cough    Codeine Nausea And Vomiting    Iodine Rash    Tramadol Nausea Only        PSHx:  has a past surgical history that includes Adenoidectomy.      OBYGN Hx: Regular , 5-7 days , no excessive pain or bleeding  Used OCP last from [id number] , no children    Sexual Hx: Sexually active with  , no hx of STI     Social Hx: Occupation - Reservation coordinator   Not insured by Social Work on case and pt given [address] and registered for PAP program and Medicaid  Social History     Tobacco History     Smoking Status  Never Smoker    Smokeless Tobacco Use  Never Used          Alcohol History     Alcohol Use Status  Yes Comment  occasional wine coolers          Drug Use     Drug Use Status  No          Sexual Activity     Sexually Active  Yes Partners  Male, Female                 Fam Hx:   Family History   Problem Relation Age of Onset    High Blood Pressure Mother     Stroke Mother     Clotting Disorder Mother         Vague Hx of blood clots on blood thinners    High Blood Pressure Father     Clotting Disorder Father         Vague hx of blood clots on blood thinners    Cancer Sister 35        Health Maintenance/Social Determinants: -    Med Refills: -    Key points to note:  --    Current Outpatient Medications on File Prior to Visit   Medication Sig Dispense Refill    ondansetron (ZOFRAN ODT) 4 MG disintegrating tablet Take 1 tablet by mouth every 8 hours as needed for Nausea or Vomiting 10 tablet 0    amoxicillin-clavulanate (AUGMENTIN) 875-125 MG per tablet Take 1 tablet by mouth 2 times daily for 10 days 20 tablet 0    acetaZOLAMIDE (DIAMOX) 250 MG tablet Take 1 tablet by mouth 2 times daily 60 tablet 5    Fremanezumab-vfrm 225 MG/1.5ML SOAJ Inject 225 mg into the skin every 30 days 1 pen 0    Ubrogepant 50 MG TABS Take 50 mg by mouth as needed (severe migraine) 1 tablet 0    Ubrogepant 100 MG TABS Take 100 mg by mouth as needed (severe migraine) 1 tablet 0    Rimegepant Sulfate 75 MG TBDP Take 75 mg by mouth as needed (severe migraine) 2 tablet 0    Prenatal Vit-Fe Fumarate-FA (PRENATAL VITAMIN PO) Take by mouth      BIOTIN PO Take by mouth      mometasone-formoterol (DULERA) 100-5 MCG/ACT inhaler Inhale 2 puffs into the lungs 2 times daily 1 Inhaler 5    acetaminophen (TYLENOL) 500 MG tablet Take 1,000 mg by mouth every 6 hours as needed for Pain       PROVENTIL  (90 Base) MCG/ACT inhaler Inhale 2 puffs into the lungs 4 times daily as needed for Wheezing LOT# [id number], EXP 7/21 1 Inhaler 0     No current facility-administered medications on file prior to visit. OBJECTIVE:    VS:   Vitals:    10/07/21 1352   BP: 129/87   Site: Left Upper Arm   Position: Sitting   Cuff Size: Medium Adult   Pulse: 95   Resp: 18   Temp: 98.5 °F (36.9 °C)   TempSrc: Oral   Weight: (!) 332 lb 3.2 oz (150.7 kg)   Height: 5' 8\" (1.727 m)     Physical Exam  Constitutional:       Appearance: She is obese. HENT:      Head: Normocephalic and atraumatic. Nose:      Comments: Laceration at Right nasolabial fold healing, no sutures, no drainage  Cardiovascular:      Rate and Rhythm: Normal rate and regular rhythm. Pulmonary:      Effort: Pulmonary effort is normal.   Abdominal:      Palpations: Abdomen is soft. Musculoskeletal:         General: Tenderness present. Comments: Tenderness in anatomic snuff box   Skin:     General: Skin is dry. Capillary Refill: Capillary refill takes less than 2 seconds. Neurological:      General: No focal deficit present. Mental Status: She is oriented to person, place, and time. Mental status is at baseline. Psychiatric:         Mood and Affect: Mood normal.         Behavior: Behavior normal.         Judgment: Judgment normal.            ASSESSMENT/PLAN:  1. Right wrist pain - fall on outstretched hand , no imaging in ED visit.  Tenderness in snuff box today, advised to do thumb spica splint , orthopedic referral made , concern for scaphoid fracture   -     XR WRIST RIGHT (MIN 3 VIEWS); Future  -     Facundo Matthews MD, Orthopaedics (hand & upper extremities), [place]  -     Misc. Devices MISC; Disp-1 each, R-0, PrintThumb Spica splint for right hand  2. Fall with right nasal closed fracture and healing laceration, subsequent encounter with   F/u with Plastic surgery in 1 month  3. Essential hypertension  -     lisinopril (PRINIVIL;ZESTRIL) 10 MG tablet; Take 1 tablet by mouth daily, Disp-60 tablet, R-2Normal  -     hydroCHLOROthiazide (HYDRODIURIL) 25 MG tablet; Take 1 tablet by mouth every morning, Disp-60 tablet, R-2Normal  4. Iron deficiency anemia, unspecified iron deficiency anemia type  -     ferrous sulfate (IRON 325) 325 (65 Fe) MG tablet; Take 1 tablet by mouth 2 times daily (with meals), Disp-60 tablet, R-2Normal  5. Intractable migraine without aura and without status migrainosus  - Intensity reduced with Ajovy samples. F/u with neurology in a month   -     venlafaxine (EFFEXOR XR) 37.5 MG extended release capsule; Take 1 capsule by mouth daily, Disp-60 capsule, R-2Normal    6. Moderate persistent asthma with exacerbation   Appt with Pulm Dr. Kuldip Valverde  To review PFT (shows moderate obstruction ) and advise on asthma Rx    TO review all problems at next visit  RTC:  Return in about 2 months (around 12/7/2021) for Follow up visit, PCP. I have reviewed my findings and recommendations with Diandra Storey and Dr. Sven Dickerson.     Guerrero Mcdaniel MD   10/7/2021 7:05 PM

## 2021-10-07 NOTE — PROGRESS NOTES
[facility]    Attending Physician Statement  I have discussed the case, including pertinent history and exam findings with the resident physician. I agree with the assessment, plan and orders as documented by the resident. I have reviewed all pertinent PMHx, PSHx, FamHx, SocialHx, medications, and allergies and updated history as appropriate. Patient presents for routine follow up of medical problems. Facial laceration -- sutures removed per plastic surgery and are healing well    Right wrist and anatomical snuff box pain after fall -- check wrist x-ray, but with high suspicion for scaphoid fx. Recommend empiric thumb spica splint and referral to ortho hand. Right femur lesion, incidental -- by imaging suspect osteochondroma     Remainder of medical problems as per resident note.     Jose Daniel Luu DO  10/7/2021 2:29 PM

## 2021-10-08 ENCOUNTER — HOSPITAL ENCOUNTER (OUTPATIENT)
Dept: GENERAL RADIOLOGY | Age: 46
Discharge: HOME | End: 2021-10-10

## 2021-10-08 ENCOUNTER — HOSPITAL ENCOUNTER (OUTPATIENT)
Age: 46
Discharge: HOME | End: 2021-10-10

## 2021-10-08 DIAGNOSIS — G93.2 PSEUDOTUMOR CEREBRI: ICD-10-CM
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
## 2021-10-08 PROCEDURE — 73110 X-RAY EXAM OF WRIST: CPT

## 2021-10-14 NOTE — PROGRESS NOTES
[facility]        [facility]  [facility], [address] [facility]  Dept: [phone]        Patient:  ARJUN Thompson 1975    Date of Service:  10/15/21     Requesting Physician:  Vaishali Connolly MD    Reason for Consult:      Patient presents with complaints of right back pain that started 2 months ago    HISTORY OF PRESENT ILLNESS:      Pain is constant and is described as aching, stabbing, throbbing and shooting. Pain does radiate to right leg. She  has numbness, tingling, weakness of the right leg and does not have bladder or bowel dysfunction. Alleviating factors include: rest.  Aggravating factors include:  walking, standing. She has not been on anticoagulation medications to include ASA, NSAIDS, Plavix, heparin, LMW heparin and warfarin and has not been on herbal supplements. She is not diabetic. Imagin/2021 MRI lumbar -  FINDINGS:   BONES/ALIGNMENT: There is likely transitional vertebral anatomy with   partially sacralized L5.  The last well-formed disc space is defined as   L5-S1.  There is trace grade 1 L1 on L2 retrolisthesis, trace grade 1 L4 on   L5 retrolisthesis.  The vertebral body heights are maintained.  No fracture   or destructive osseous lesion.  There is disc desiccation and mild endplate   changes at P0-1.  The intervertebral disc heights are grossly maintained.       SPINAL CORD: The conus terminates normally.       SOFT TISSUES: No paraspinal mass identified.       L1-L2: There is no disc herniation or spinal canal narrowing.  There is   minimal bilateral foraminal narrowing.       L2-L3: There is no disc herniation or spinal canal narrowing.  There is   minimal bilateral foraminal narrowing.       L3-L4: There is no disc herniation or spinal canal narrowing.  There is   minimal bilateral foraminal narrowing.       L4-L5: There is disc bulge, annular fissure, with mild bilateral foraminal   narrowing.  No spinal canal narrowing.       L5-S1: SURJIT Jensen CNP   Fremanezumab-vfrm 225 MG/1.5ML SOAJ Inject 225 mg into the skin every 30 days 9/23/21  Yes SURJIT Morse CNP   Ubrogepant 50 MG TABS Take 50 mg by mouth as needed (severe migraine) 9/23/21  Yes SURJIT Morse CNP   Ubrogepant 100 MG TABS Take 100 mg by mouth as needed (severe migraine) 9/23/21  Yes SURJIT Morse CNP   Rimegepant Sulfate 75 MG TBDP Take 75 mg by mouth as needed (severe migraine) 9/23/21  Yes SURJIT Morse CNP   Prenatal Vit-Fe Fumarate-FA (PRENATAL VITAMIN PO) Take by mouth   Yes Historical Provider, MD   BIOTIN PO Take by mouth   Yes Historical Provider, MD   mometasone-formoterol (DULERA) 100-5 MCG/ACT inhaler Inhale 2 puffs into the lungs 2 times daily 7/23/21  Yes Katty Yancey MD   PROVENTIL  (81 Base) MCG/ACT inhaler Inhale 2 puffs into the lungs 4 times daily as needed for Wheezing LOT# [id number], EXP 7/21 7/23/21 10/15/21 Yes Danny Bush MD   acetaminophen (TYLENOL) 500 MG tablet Take 1,000 mg by mouth every 6 hours as needed for Pain    Yes Historical Provider, MD   Misc.  Devices MISC Thumb Spica splint for right hand  Patient not taking: Reported on 10/15/2021 10/7/21   Mandy Padilla MD       Allergies   Allergen Reactions    Aspirin Shortness Of Breath and Nausea And Vomiting    Coconut Oil Anaphylaxis    Ibuprofen Other (See Comments)     Trouble breathing      Iodides Shortness Of Breath    Motrin [Ibuprofen Micronized] Shortness Of Breath    Robitussin (Alcohol Free) [Guaifenesin] Other (See Comments)     States causes worse cough    Codeine Nausea And Vomiting    Iodine Rash    Tramadol Nausea Only       Social History     Socioeconomic History    Marital status: Single     Spouse name: Not on file    Number of children: Not on file    Years of education: Not on file    Highest education level: Not on file   Occupational History    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 Range of motion:abnormal mildly in lateral bending, flexion, extension rotation bilateral and is painful. Musculoskeletal:    Trigger points in Paraveteral:absent bilaterally  SI joint tenderness:positive right, negative left              JEY test:mildly positive right, not done left  Piriformis tenderness:positive right, negative left  Trochanteric bursa tenderness:negative right, negative left  SLR:negative right, negative left, sitting     Extremities:    Tremors:None bilaterally upper and lower  Range of motion:pain with internal rotation of hips negative.   Intact:Yes  Varicose veins:absent   Pulses:present Lt radial  Cyanosis:none  Edema:none x all 4 extremities    Neurological:    Sensory:normal to light touch BLE  Motor:                Right Quadriceps5/5          Left Quadriceps5/5           Right Gastrocnemius5/5    Left Gastrocnemius5/5  Right Ant Tibialis5/5  Left Ant Tibialis5/5    Reflexes:      Right Brachioradialis reflex2+  Left Brachioradialis reflex2+  Right Biceps reflex2+  Left Biceps reflex2+  Right Triceps reflex2+  Left Triceps reflex2+  Right Quadriceps reflex2+  Left Quadriceps reflex2+  Right Achilles reflex2+  Left Achilles reflex2+    Gait:normal station    Dermatology:    Skin:no rashes or lesions noted    Impression:    Right LBP and RLE pain/numbness/tingling in S1 distribution  8/2021 shows mild degen changes without sig stenosis  9/2021 xray rt hip shows mild+ OA  PMHx: HTN, morbid obesity, iron def. Anemia, depression, anxiety, UC, migraines, vertigo  She does not have health insurance and has been in contact with the financial assistance dept at 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 Marvie Mcardle.

## 2021-10-15 ENCOUNTER — OFFICE VISIT (OUTPATIENT)
Dept: PAIN MANAGEMENT | Age: 46
End: 2021-10-15

## 2021-10-15 ENCOUNTER — NURSE ONLY (OUTPATIENT)
Dept: PRIMARY CARE CLINIC | Age: 46
End: 2021-10-15

## 2021-10-15 VITALS
DIASTOLIC BLOOD PRESSURE: 80 MMHG | HEIGHT: 68 IN | TEMPERATURE: 97.7 F | OXYGEN SATURATION: 98 % | RESPIRATION RATE: 16 BRPM | BODY MASS INDEX: 44.41 KG/M2 | SYSTOLIC BLOOD PRESSURE: 140 MMHG | HEART RATE: 88 BPM | WEIGHT: 293 LBS

## 2021-10-15 DIAGNOSIS — M54.16 LUMBAR RADICULOPATHY: ICD-10-CM

## 2021-10-15 DIAGNOSIS — G89.4 CHRONIC PAIN SYNDROME: ICD-10-CM

## 2021-10-15 DIAGNOSIS — M54.41 CHRONIC RIGHT-SIDED LOW BACK PAIN WITH RIGHT-SIDED SCIATICA: ICD-10-CM

## 2021-10-15 DIAGNOSIS — G89.4 CHRONIC PAIN SYNDROME: Primary | ICD-10-CM

## 2021-10-15 DIAGNOSIS — G89.29 CHRONIC RIGHT-SIDED LOW BACK PAIN WITH RIGHT-SIDED SCIATICA: ICD-10-CM

## 2021-10-15 PROCEDURE — 99204 OFFICE O/P NEW MOD 45 MIN: CPT | Performed by: PAIN MEDICINE

## 2021-10-15 PROCEDURE — 99203 OFFICE O/P NEW LOW 30 MIN: CPT | Performed by: PAIN MEDICINE

## 2021-10-15 PROCEDURE — U0005 INFEC AGEN DETEC AMPLI PROBE: HCPCS

## 2021-10-15 PROCEDURE — U0003 INFECTIOUS AGENT DETECTION BY NUCLEIC ACID (DNA OR RNA); SEVERE ACUTE RESPIRATORY SYNDROME CORONAVIRUS 2 (SARS-COV-2) (CORONAVIRUS DISEASE [COVID-19]), AMPLIFIED PROBE TECHNIQUE, MAKING USE OF HIGH THROUGHPUT TECHNOLOGIES AS DESCRIBED BY CMS-2020-01-R: HCPCS

## 2021-10-15 RX ORDER — PREGABALIN 75 MG/1
75 CAPSULE ORAL 2 TIMES DAILY
Qty: 14 CAPSULE | Refills: 0 | Status: SHIPPED
Start: 2021-10-15 | End: 2021-12-13 | Stop reason: ALTCHOICE

## 2021-10-15 RX ORDER — PREGABALIN 150 MG/1
150 CAPSULE ORAL 2 TIMES DAILY
Qty: 60 CAPSULE | Refills: 0 | Status: SHIPPED
Start: 2021-10-22 | End: 2022-01-27

## 2021-10-15 NOTE — PROGRESS NOTES
Do you currently have any of the following:    Fever: No  Headache:  No  Cough: No  Shortness of breath: No  Exposed to anyone with these symptoms: No         Rosina GILLETTE Shelley Santillan presents to the [facility]15[facility]. Rosina is complaining of pain rt. Lower back down rt. Leg  The pain is constant. The pain is described as aching, throbbing, shooting and stabbing. Pain is rated on her best day at a 5, on her worst day at a 10, and on average at a 5 on the VAS scale. She took her last dose of . Any procedures since your last visit:     Pacemaker or defibrillator: No managed by . She is not on NSAIDS and is not on anticoagulation medications to include none and is managed by      Medication Contract and Consent for Opioid Use Documents Filed      No documents found                BP (!) 140/80   Pulse 88   Temp 97.7 °F (36.5 °C) (Infrared)   Resp 16   Ht 5' 8\" (1.727 m)   Wt (!) 332 lb (150.6 kg)   SpO2 98%   BMI 50.48 kg/m²      No LMP recorded.

## 2021-10-16 DIAGNOSIS — G93.2 PSEUDOTUMOR CEREBRI: ICD-10-CM

## 2021-10-16 LAB
6-MONOACETYLMORPHINE, URINE: NOT DETECTED
ALCOHOL URINE: NOT DETECTED
AMPHETAMINE SCREEN, URINE: NOT DETECTED
BARBITURATE SCREEN URINE: NOT DETECTED
BENZODIAZEPINE SCREEN, URINE: NOT DETECTED
BUPRENORPHINE URINE: NOT DETECTED
CANNABINOID SCREEN URINE: NOT DETECTED
COCAINE METABOLITE SCREEN URINE: NOT DETECTED
FENTANYL SCREEN, URINE: NOT DETECTED
INTEGRITY CHECK, CREATININE, URINE: 244.4
INTEGRITY CHECK, OXIDANT, URINE: <40
INTEGRITY CHECK, PH, URINE: 4.7 (ref 4.5–9)
INTEGRITY CHECK, SPECIFIC GRAVITY, URINE: 1.02 (ref 1–1.03)
INTEGRITY CHECK, SPECIMEN INTEGRITY, URINE: NORMAL
Lab: NORMAL
METHADONE SCREEN, URINE: NOT DETECTED
OPIATE SCREEN URINE: NOT DETECTED
OXYCODONE URINE: NOT DETECTED
PHENCYCLIDINE SCREEN URINE: NOT DETECTED
TRAMADOL SCREEN URINE: NOT DETECTED

## 2021-10-20 LAB
6AM URINE: <10 NG/ML
7-AMINOCLONAZEPAM, URINE: <5 NG/ML
ALPHA-HYDROXYALPRAZOLAM, URINE: <5 NG/ML
ALPHA-HYDROXYMIDAZOLAM, URINE: <20 NG/ML
ALPRAZOLAM, URINE: <5 NG/ML
CHLORDIAZEPOXIDE, URINE: <20 NG/ML
CLONAZEPAM, URINE: <5 NG/ML
CODEINE, URINE: <20 NG/ML
DIAZEPAM, URINE: <20 NG/ML
HYDROCODONE, URINE: <20 NG/ML
HYDROMORPHONE, URINE: <20 NG/ML
LORAZEPAM, URINE: <20 NG/ML
MIDAZOLAM, URINE: <20 NG/ML
MORPHINE, OPI1M: <20 NG/ML
NORDIAZEPAM, URINE: <20 NG/ML
NORHYDROCODONE, URINE: <20 NG/ML
NOROXYCODONE, URINE: <20 NG/ML
NOROXYMORPHONE, URINE: <20 NG/ML
OXAZEPAM, URINE: <20 NG/ML
OXYCODONE, URINE CONFIRMATION: <20 NG/ML
OXYMORPHONE, URINE: <20 NG/ML
TEMAZEPAM, URINE: <20 NG/ML

## 2021-10-21 ENCOUNTER — OFFICE VISIT (OUTPATIENT)
Dept: NEUROLOGY | Age: 46
End: 2021-10-21

## 2021-10-21 ENCOUNTER — TELEPHONE (OUTPATIENT)
Dept: INTERNAL MEDICINE | Age: 46
End: 2021-10-21

## 2021-10-21 VITALS
HEART RATE: 84 BPM | TEMPERATURE: 98.3 F | DIASTOLIC BLOOD PRESSURE: 96 MMHG | OXYGEN SATURATION: 97 % | SYSTOLIC BLOOD PRESSURE: 152 MMHG

## 2021-10-21 DIAGNOSIS — G43.019 INTRACTABLE MIGRAINE WITHOUT AURA AND WITHOUT STATUS MIGRAINOSUS: Primary | ICD-10-CM

## 2021-10-21 DIAGNOSIS — G93.2 PSEUDOTUMOR CEREBRI: ICD-10-CM

## 2021-10-21 PROCEDURE — 99214 OFFICE O/P EST MOD 30 MIN: CPT | Performed by: NURSE PRACTITIONER

## 2021-10-21 NOTE — PROGRESS NOTES
rhinorrhea  Prodromal sx?: decreased social functioning, cannot get comfortable  --- include increased yawning, euphoria, depression, irritability, food cravings, constipation, and neck stiffness. Rapidity of onset: sudden  Typical duration of individual headache: 4 days  Frequency of headaches: > 15 headaches/monthly  Are most headaches similar in presentation? yes  Typical precipitants: stress, odors (perfume and tar)  --- menstruation, fasting, lack of sleep     Temporal Pattern of Headaches:  Started having HA's several years ago  Worst time of day: all the time  Awaken from sleep?: yes   Seasonal pattern?: no  Clustering of HA's over time? no  Overall pattern since problem began: stable    Degree of Functional Impairment: severe    Current Use of Meds to Treat HA:  Abortive meds? acetaminophen, NSAIDs (ibuprofen), aspirin/acetaminophen/caffeine, sumatriptan PO, butalbital/caffeine/aspirin, Nurtec, Ubrelvy   Daily use? no  Prophylactic meds? beta-blockers (metropolol), Topamax, amitriptyline, Ajovy     Additional Relevant History:  History of head/neck trauma? no  History of head/neck surgery? no  Family h/o headache problems?  yes - maternal   Use of meds that might worsen HA's (nitrates, exogenous estrogens,    Nifedipine)? no  Exposure to carbon monoxide? no  Substance use: alcohol: mixed drinks    No issues with chewing or swallowing  No chest pain or palpitations  No falls, tripping or stumbling  No incontinence of bowels or bladder  No itching or bruising appreciated  No numbness, tingling or focal arm/leg weakness    ROS is otherwise negative    Objective:     BP (!) 152/96 (Site: Right Upper Arm)   Pulse 84   Temp 98.3 °F (36.8 °C)   SpO2 97%     General appearance: alert, appears stated age, cooperative and in no distress  Head: normocephalic, without obvious abnormality, atraumatic  Eyes: conjunctivae/corneas clear; no drainage  Neck: no adenopathy, supple, symmetrical, trachea midline   Lungs: 03/02/2019     CMP:    Lab Results   Component Value Date     08/27/2021    K 4.0 08/27/2021    K 3.5 12/17/2020     08/27/2021    CO2 22 08/27/2021    BUN 14 08/27/2021    CREATININE 0.9 08/27/2021    GFRAA >60 08/27/2021    AGRATIO 1.2 10/01/2015    LABGLOM >60 08/27/2021    GLUCOSE 110 08/27/2021    PROT 7.6 08/21/2021    PROT 6.5 01/10/2013    LABALBU 3.9 08/21/2021    CALCIUM 9.7 08/27/2021    BILITOT <0.2 08/21/2021    ALKPHOS 120 08/21/2021    AST 21 08/21/2021    ALT 20 08/21/2021     Hepatic Function Panel:    Lab Results   Component Value Date    ALKPHOS 120 08/21/2021    ALT 20 08/21/2021    AST 21 08/21/2021    PROT 7.6 08/21/2021    PROT 6.5 01/10/2013    BILITOT <0.2 08/21/2021    LABALBU 3.9 08/21/2021     HgBA1c:    Lab Results   Component Value Date    LABA1C 6.0 08/27/2021     FLP:    Lab Results   Component Value Date    TRIG 215 08/27/2021    HDL 40 08/27/2021    LDLCALC 92 08/27/2021    LDLDIRECT 89 03/03/2019    LABVLDL 43 08/27/2021     All labs personally reviewed at the time of this visit    Assessment:     Migraine without aura in patient with long history of migraine headaches   ---episodic and chronic  ---disability and lost productivity substantial  ---headache lasting 4 to 72 hours   ---2 of the following: throbbing, unilateral headaches, worsening with activity (walking, bending, standing etc), moderate to severe pain  ---associated with at least one of the following: Nausea; photophobia   --- > 15 headache days/month for more than 3 months  ---At least 8 days of headache consistent with migraine  --- started on Ajovy and has noticed headaches are decreasing has only had one dose    Preventative headache medications tried: beta-blockers (metropolol), Topamax, amitriptyline, Ajovy   Abortive headache medications tried: acetaminophen, NSAIDs (ibuprofen), aspirin/acetaminophen/caffeine, sumatriptan PO, butalbital/caffeine/aspirin, Nurtec, Ubrelvy     Pseudotumor cerebri  --- retro orbital pressure with occasional blurry vision, headache, photopsia, retrobulbar pain  --- female with obesity   --- will need LP for opening pressure for confirmation   --- on Diamox 250 mg BID but taking it daily due to side effect of drowsiness   --- for LP tomorrow     Plan:     Therapeutic lifestyle measures may be beneficial for controlling migraine, including good sleep hygiene, routine meal schedules, regular exercise, and managing migraine triggers.     Ordered LP with opening pressure along with basic CSF study  --- PT/INR     Continue Diamox 250 mg BID    Continue Ajovy 225 mg SQ every 30 days  --- sample given     Patient is self pay so I advised we can provide her with samples if cost is too high    Follow up in 3 months     Call with any questions or concerns      SURJIT Crowe CNP  11:38 AM  10/21/2021

## 2021-10-21 NOTE — TELEPHONE ENCOUNTER
----- Message from Srini Yeboah sent at 10/21/2021  2:55 PM EDT -----  Subject: Message to Provider    QUESTIONS  Information for Provider? Needs to know where to go for her lower lumbar   puncture and needs to know where to register. ---------------------------------------------------------------------------  --------------  Mela Jaree INFO  What is the best way for the office to contact you? OK to leave message on   voicemail  Preferred Call Back Phone Number? [phone]  ---------------------------------------------------------------------------  --------------  SCRIPT ANSWERS  Relationship to Patient?  Self

## 2021-10-22 ENCOUNTER — HOSPITAL ENCOUNTER (OUTPATIENT)
Dept: GENERAL RADIOLOGY | Age: 46
Discharge: HOME | End: 2021-10-24

## 2021-10-22 VITALS
SYSTOLIC BLOOD PRESSURE: 154 MMHG | DIASTOLIC BLOOD PRESSURE: 87 MMHG | OXYGEN SATURATION: 99 % | HEART RATE: 75 BPM | RESPIRATION RATE: 16 BRPM | TEMPERATURE: 97 F

## 2021-10-22 LAB
ANION GAP SERPL CALCULATED.3IONS-SCNC: 10 MMOL/L (ref 7–16)
APPEARANCE CSF: CLEAR
BUN BLDV-MCNC: 16 MG/DL (ref 6–20)
CALCIUM SERPL-MCNC: 9.3 MG/DL (ref 8.6–10.2)
CHLORIDE BLD-SCNC: 110 MMOL/L (ref 98–107)
CO2: 20 MMOL/L (ref 22–29)
COLOR CSF: COLORLESS
CREAT SERPL-MCNC: 1 MG/DL (ref 0.5–1)
GFR AFRICAN AMERICAN: >60
GFR NON-AFRICAN AMERICAN: >60 ML/MIN/1.73
GLUCOSE BLD-MCNC: 118 MG/DL (ref 74–99)
HCT VFR BLD CALC: 36 % (ref 34–48)
HEMOGLOBIN: 10.8 G/DL (ref 11.5–15.5)
INR BLD: 1.1
MCH RBC QN AUTO: 22.6 PG (ref 26–35)
MCHC RBC AUTO-ENTMCNC: 30 % (ref 32–34.5)
MCV RBC AUTO: 75.5 FL (ref 80–99.9)
MONOCYTE, CSF: 100 % (ref 10–70)
NEUTROPHILS, CSF: 0 % (ref 0–10)
PDW BLD-RTO: 16.2 FL (ref 11.5–15)
PLATELET # BLD: 417 E9/L (ref 130–450)
PMV BLD AUTO: 10.6 FL (ref 7–12)
POTASSIUM SERPL-SCNC: 4 MMOL/L (ref 3.5–5)
PROTHROMBIN TIME: 11.5 SEC (ref 9.3–12.4)
RBC # BLD: 4.77 E12/L (ref 3.5–5.5)
RBC CSF: <2000 /UL
SODIUM BLD-SCNC: 140 MMOL/L (ref 132–146)
TUBE NUMBER CSF: ABNORMAL
WBC # BLD: 8.7 E9/L (ref 4.5–11.5)
WBC CSF: 3 /UL (ref 0–2)

## 2021-10-22 PROCEDURE — 85610 PROTHROMBIN TIME: CPT

## 2021-10-22 PROCEDURE — 80048 BASIC METABOLIC PNL TOTAL CA: CPT

## 2021-10-22 PROCEDURE — 7100000011 HC PHASE II RECOVERY - ADDTL 15 MIN

## 2021-10-22 PROCEDURE — 89051 BODY FLUID CELL COUNT: CPT

## 2021-10-22 PROCEDURE — 85027 COMPLETE CBC AUTOMATED: CPT

## 2021-10-22 PROCEDURE — 62328 DX LMBR SPI PNXR W/FLUOR/CT: CPT

## 2021-10-22 PROCEDURE — 36415 COLL VENOUS BLD VENIPUNCTURE: CPT

## 2021-10-22 PROCEDURE — 7100000010 HC PHASE II RECOVERY - FIRST 15 MIN

## 2021-10-22 NOTE — PROGRESS NOTES
Patient arrived ambulatory to radiology department for lumbar puncture. Allergies, home medications, H&P and fasting instructions reviewed with patient. Vital signs taken. IV placed, blood obtained, IV flushed and prn adapter attached. Blood sample sent to lab for ordered tests. Procedural instructions given, questions answered, understanding expressed and consent signed.  Patient given fluoroscopy education, no questions at this time

## 2021-10-22 NOTE — PROGRESS NOTES
1030 Patient returned from procedure. Dressing checked, clean, dry, and intact. Patient stable. No s/s of complications noted or reported. Vitals will be checked q 15min, see flow sheets. Patient eating and drinking well with no s/s of complications noted or reported.     1108 patient discharged

## 2021-10-26 ENCOUNTER — TELEPHONE (OUTPATIENT)
Dept: NEUROLOGY | Age: 46
End: 2021-10-26

## 2021-10-26 NOTE — TELEPHONE ENCOUNTER
MA left message for patient with Rachel's response.   Electronically signed by Kobe Joel MA on 10/26/21 at 11:17 AM EDT

## 2021-10-26 NOTE — TELEPHONE ENCOUNTER
----- Message from Perez Mas, [address] - CNP sent at 10/26/2021  8:32 AM EDT -----  Please let patient know that her LP looks good and her opening pressure was WNL as well.   Thanks

## 2021-11-01 DIAGNOSIS — R06.02 SOB (SHORTNESS OF BREATH): ICD-10-CM

## 2021-11-02 RX ORDER — ALBUTEROL SULFATE 90 MCG
HFA AEROSOL WITH ADAPTER (GRAM) INHALATION
Qty: 18 G | Refills: 2 | Status: ON HOLD
Start: 2021-11-02 | End: 2022-10-20 | Stop reason: SDUPTHER

## 2021-11-11 ENCOUNTER — HOSPITAL ENCOUNTER (OUTPATIENT)
Dept: NEUROLOGY | Age: 46
Discharge: HOME | End: 2021-11-11

## 2021-11-11 DIAGNOSIS — G89.29 CHRONIC RIGHT-SIDED LOW BACK PAIN WITH RIGHT-SIDED SCIATICA: ICD-10-CM

## 2021-11-11 DIAGNOSIS — G89.4 CHRONIC PAIN SYNDROME: ICD-10-CM

## 2021-11-11 DIAGNOSIS — M54.41 CHRONIC RIGHT-SIDED LOW BACK PAIN WITH RIGHT-SIDED SCIATICA: ICD-10-CM

## 2021-11-11 DIAGNOSIS — M54.16 LUMBAR RADICULOPATHY: ICD-10-CM

## 2021-11-11 PROCEDURE — 95911 NRV CNDJ TEST 9-10 STUDIES: CPT

## 2021-11-11 PROCEDURE — 95886 MUSC TEST DONE W/N TEST COMP: CPT

## 2021-11-11 PROCEDURE — 95911 NRV CNDJ TEST 9-10 STUDIES: CPT | Performed by: PSYCHIATRY & NEUROLOGY

## 2021-11-11 PROCEDURE — 95886 MUSC TEST DONE W/N TEST COMP: CPT | Performed by: PSYCHIATRY & NEUROLOGY

## 2021-11-11 NOTE — PROCEDURES
[address]  Electrodiagnostic Laboratory   [place]         Full Name: Briana Luis Gender: Female  MRN: [id number] YOB: 1975  Location[facility]de-identified] [facility] (20)      Visit Date: 11/11/2021 13:18  Age: 55 Years 8 Months Old  Examining Physician: Dr. Cindi Snow   Referring Physician: Dr. Marbella Jaramillo  Technician: Davion Goode   Height: 5 feet 8 inch  Weight: 332 lbs; BMI 50.5  Notes: Chronic pain syndrome, chronic R sided low back pain with sciatica,                                             lumbar radiculopathy         Motor NCS      Nerve / Sites Lat. Lat Diff Amplitude Amp. 1-2 Distance Velocity Temp.    ms ms mV % cm m/s °C   R Peroneal - EDB      Ankle 3.49  7.9 100 8  32.7      Pop fossa 11.46 7.97 7.7 97.7 38 48 32.9   L Peroneal - EDB      Ankle 3.80  7.8 100 8  32.9      Pop fossa 11.56 7.76 7.4 95 38 49 32.9   R Tibial - AH      Ankle 3.96  8.6 100 8  32.9      Pop fossa 12.19 8.23 8.5 98.2 40 49 32.9                 Sensory NCS      Nerve / Sites Onset Lat Peak Lat PP Amp Distance Velocity Temp.    ms ms µV cm m/s °C   R Superficial peroneal - Ankle      Lat leg 1.98 2.55 4.5 10 51 32.9   L Superficial peroneal - Ankle      Lat leg 2.45 3.13 2.4 10 41 32.6   R Sural - Ankle (Calf)      Calf 3.02 3.70 5.8 14 46 32.9   L Sural - Ankle (Calf)      Calf 2.97 3.59 5.6 14 47 32.5       F  Wave      Nerve F Lat M Lat F-M Lat    ms ms ms   R Peroneal - EDB 52.8 4.0 48.8   R Tibial - AH 53.8 4.2 49.6   L Peroneal - EDB 48.9 4.0 44.9       H Reflex      Nerve Lat Hmax    ms   R Tibial - Soleus 34.3   L Tibial - Soleus 33. 0       EMG         EMG Summary Table     Spontaneous MUAP Recruitment   Muscle IA Fib PSW Fasc H.F. Amp Dur. PPP Pattern   R. Extensor digitorum brevis N None None None None N 1+ 1+ N   R. Dorsal interossei (pedis) N None None None None N N N Distant MUPs   R. Extensor hallucis longus Inc/DNT None None None None N 1+ 1+ N   R.  Flexor digitorum longus N None None None None N 1+ 1+ N   R. Tibialis anterior N None None None None N 1+ 1+ N   R. Gastrocnemius (Medial head) N None None None None N 1+ 1+ N   R. Vastus lateralis N None None None None N N N N   R. Biceps femoris (short head) N None None None None N 1+ 1+ N   R. Gluteus medius N None None None None N 1+ 1+ N   R. Lumbar paraspinals (mid) Incr None Few None None N N N N   R. Lumbar paraspinals (low) Inc/DNT None None None None N N N N   R. Sacral paraspinals Incr None None None CRDs N N N N         Nerve conduction studies in both legs displayed minimal delay in the right peroneal F-wave latency. Both H reflex latencies were slightly prolonged. These findings were compared to the referential values in this laboratory, available upon request.    Monopolar needle examination of the right leg found chronic denervation changes with reinnervation in the muscles supplied primarily by the L5 and S1 motor roots. Needle testing of the paraspinals displayed acute and chronic denervation changes. Electrodiagnostic examination of both legs disclosed evidence diagnostic of right L5 and S1 motor radiculopathies or intracanalicular lesions. These were proven with the abnormal needle testing of the paraspinals. The minimal delay in the left H reflexes latencies suggest S1 radicular disease on that side. However, needle testing was not performed in that limb to further delineate this finding. There were no other motor radiculopathies or intracanalicular lesions. There were no peripheral neuropathies. Sensory radiculopathies cannot be evaluated by electrodiagnostic means. Clinically, the patient presented with back pains radiating into the right leg. On brief neurological examination, I found no motor or sensory compromise. Her difficulties are likely related to her radicular disease. Clinical correlation was highly advised.

## 2021-11-23 ENCOUNTER — OFFICE VISIT (OUTPATIENT)
Dept: PULMONOLOGY | Age: 46
End: 2021-11-23

## 2021-11-23 VITALS
HEART RATE: 99 BPM | HEIGHT: 68 IN | RESPIRATION RATE: 16 BRPM | TEMPERATURE: 98.6 F | WEIGHT: 293 LBS | BODY MASS INDEX: 44.41 KG/M2 | SYSTOLIC BLOOD PRESSURE: 139 MMHG | OXYGEN SATURATION: 98 % | DIASTOLIC BLOOD PRESSURE: 89 MMHG

## 2021-11-23 DIAGNOSIS — Z59.89 UNINSURED: ICD-10-CM

## 2021-11-23 DIAGNOSIS — U07.1 PNEUMONIA DUE TO COVID-19 VIRUS: ICD-10-CM

## 2021-11-23 DIAGNOSIS — U09.9 POST-COVID-19 SYNDROME: Primary | ICD-10-CM

## 2021-11-23 DIAGNOSIS — J12.82 PNEUMONIA DUE TO COVID-19 VIRUS: ICD-10-CM

## 2021-11-23 DIAGNOSIS — Z23 IMMUNIZATION DUE: ICD-10-CM

## 2021-11-23 DIAGNOSIS — G47.30 SLEEP APNEA, UNSPECIFIED TYPE: ICD-10-CM

## 2021-11-23 LAB
EXPIRATORY TIME: 7.7 SEC
FEF 25-75% %PRED-PRE: 91 L/SEC
FEF 25-75% PRED: 2.77 L/SEC
FEF 25-75%-PRE: 2.55 L/SEC
FEV1 %PRED-PRE: 78 %
FEV1 PRED: 2.79 L
FEV1/FVC %PRED-PRE: 102 %
FEV1/FVC PRED: 81 %
FEV1/FVC: 83 %
FEV1: 2.2 L
FVC %PRED-PRE: 76 %
FVC PRED: NORMAL L
FVC: 2.65 L
PEF %PRED-PRE: 76 L/SEC
PEF PRED: 7.01 L/SEC
PEF-PRE: 5.39 L/SEC

## 2021-11-23 PROCEDURE — 94010 BREATHING CAPACITY TEST: CPT | Performed by: INTERNAL MEDICINE

## 2021-11-23 PROCEDURE — 99203 OFFICE O/P NEW LOW 30 MIN: CPT | Performed by: INTERNAL MEDICINE

## 2021-11-23 PROCEDURE — 99204 OFFICE O/P NEW MOD 45 MIN: CPT | Performed by: INTERNAL MEDICINE

## 2021-11-23 RX ORDER — TIOTROPIUM BROMIDE INHALATION SPRAY 1.56 UG/1
2 SPRAY, METERED RESPIRATORY (INHALATION) DAILY
Qty: 1 EACH | Refills: 3 | Status: SHIPPED
Start: 2021-11-23 | End: 2021-11-24 | Stop reason: SDUPTHER

## 2021-11-23 SDOH — ECONOMIC STABILITY - INCOME SECURITY: OTHER PROBLEMS RELATED TO HOUSING AND ECONOMIC CIRCUMSTANCES: Z59.89

## 2021-11-23 ASSESSMENT — PULMONARY FUNCTION TESTS
FEV1/FVC_PREDICTED: 81
FEV1_PERCENT_PREDICTED_PRE: 78
FVC: 2.65
FEV1: 2.20
FEV1/FVC_PERCENT_PREDICTED_PRE: 102
FEV1_PREDICTED: 2.79
FVC_PERCENT_PREDICTED_PRE: 76
FEV1/FVC: 83

## 2021-11-23 NOTE — PATIENT INSTRUCTIONS
[address] [facility]  [address] [place] [address], [address] [place] [address]  Office: [phone]      Your were seen in the office today for Asthma      We  did make changes to your medications today. Continue current inhaled regimen and add spiriva 2 puffs daily. Testing ordered today was CT scan of the chest    We will schedule a phone visit in 6 weeks. Vaccines recommended Influenza, COVID-19      Please do not hesitate to call the office with any questions.

## 2021-11-23 NOTE — PROGRESS NOTES
Pt given samples of Spiriva 1.25 mcg in clinic today and a prescription was sent to the pt's preferred pharmacy. Order given for Chest CT to be completed. Administered flu vaccine per pt request. Pt will follow up with a phone visit in 6 weeks. Pt referred to  to assist with obtaining health insurance.  Pt also referred to prescription assistance to aid in prescription cost.

## 2021-11-24 RX ORDER — TIOTROPIUM BROMIDE INHALATION SPRAY 1.56 UG/1
2 SPRAY, METERED RESPIRATORY (INHALATION) DAILY
Qty: 3 EACH | Refills: 1 | Status: SHIPPED | OUTPATIENT
Start: 2021-11-24 | End: 2022-11-03 | Stop reason: SDUPTHER

## 2021-11-29 ENCOUNTER — TELEPHONE (OUTPATIENT)
Dept: INTERNAL MEDICINE | Age: 46
End: 2021-11-29

## 2021-12-02 ENCOUNTER — OFFICE VISIT (OUTPATIENT)
Dept: SURGERY | Age: 46
End: 2021-12-02

## 2021-12-02 VITALS — RESPIRATION RATE: 16 BRPM | OXYGEN SATURATION: 99 % | TEMPERATURE: 97.1 F | HEART RATE: 88 BPM

## 2021-12-02 DIAGNOSIS — S01.81XD FACIAL LACERATION, SUBSEQUENT ENCOUNTER: ICD-10-CM

## 2021-12-02 DIAGNOSIS — S02.2XXD CLOSED FRACTURE OF NASAL BONE WITH ROUTINE HEALING, SUBSEQUENT ENCOUNTER: Primary | ICD-10-CM

## 2021-12-02 PROCEDURE — 99212 OFFICE O/P EST SF 10 MIN: CPT | Performed by: PHYSICIAN ASSISTANT

## 2021-12-02 NOTE — PROGRESS NOTES
Subjective: Follow up today from initial presentation of right facial laceration and nasal bones fracture. Denies fever, nausea, vomiting, leg pain or swelling, pain is absent. The patient states that she is breathing well through her nose which is increasingly getting better. She states that she has been adherent to her restrictions of wearing her glasses and has been beginning scar massage to her right facial lacerations x2    Objective:    /80  Temp(Src) 97 °F (36.1 °C) (Tympanic)  Ht 5' 10\" (1.778 m)  Wt 205 lb (92.987 kg)  BMI 29.41 kg/m2    Nose: Nares are patent no septal hematoma or perforation. Edema is noted bilaterally. Right facial lacerations x2 clean dry and intact well-healed there is noted hyperpigmentation to the superior  laceration line. Neuro: Cranial nerves II through XII grossly intact      Assessment:    Patient Active Problem List   Diagnosis    Nasal deformity, acquired       Plan:     Nasal bone fracture. Facial laceration. I explained to the patient that her nasal bones are now well-healed as it has been greater than 6 weeks since her injury. I also informed her that her lacerations are healing as expected she does have some hyperpigmentation to the superior scar line which can resolve over time I advised her to continue with scar massage and sunscreen to this area for the next year. Advised the patient that she is breathing better through her nose however as long as this continues to improve she will not require any further surgical intervention I advised patient if she is not breathing better through her nose in the next 3 to 6 months as she was prior to her injury to contact our office sooner. I advised patient that after the 1 year postop [place] if she is still having any hyperpigmentation to her right facial scar line there are options cosmetically for revision.   Patient voices understanding    Follow-up as needed      Call office with concerns or signs of infection.

## 2021-12-03 ENCOUNTER — OFFICE VISIT (OUTPATIENT)
Dept: PAIN MANAGEMENT | Age: 46
End: 2021-12-03

## 2021-12-03 VITALS
BODY MASS INDEX: 44.41 KG/M2 | TEMPERATURE: 96 F | WEIGHT: 293 LBS | DIASTOLIC BLOOD PRESSURE: 88 MMHG | HEART RATE: 92 BPM | RESPIRATION RATE: 16 BRPM | SYSTOLIC BLOOD PRESSURE: 138 MMHG | HEIGHT: 68 IN | OXYGEN SATURATION: 98 %

## 2021-12-03 DIAGNOSIS — G89.4 CHRONIC PAIN SYNDROME: Primary | ICD-10-CM

## 2021-12-03 DIAGNOSIS — M54.41 CHRONIC RIGHT-SIDED LOW BACK PAIN WITH RIGHT-SIDED SCIATICA: ICD-10-CM

## 2021-12-03 DIAGNOSIS — M54.16 LUMBAR RADICULOPATHY: ICD-10-CM

## 2021-12-03 DIAGNOSIS — G89.29 CHRONIC RIGHT-SIDED LOW BACK PAIN WITH RIGHT-SIDED SCIATICA: ICD-10-CM

## 2021-12-03 PROCEDURE — 99213 OFFICE O/P EST LOW 20 MIN: CPT | Performed by: PAIN MEDICINE

## 2021-12-03 PROCEDURE — 99214 OFFICE O/P EST MOD 30 MIN: CPT | Performed by: PAIN MEDICINE

## 2021-12-03 NOTE — PROGRESS NOTES
[facility]        [facility]  [facility]IGDALIA ESTES [facility] - [facility], [address] [place] [address]  Dept: [phone]        Follow up Note      Penelope Wise     Date of Visit:  21     CC:  Patient presents for follow up   Chief Complaint   Patient presents with    Follow-up     rt. side        HPI:    Pain is better. Change in quality of symptoms:no. Medication side effects:fatigue with pregabalin  Recent diagnostic testing:EMG. Recent interventional procedures:none. She has not been on anticoagulation medications to include ASA, NSAIDS, Plavix, heparin, LMW heparin and warfarin and has not been on herbal supplements. She is not diabetic.     Imagin/2021 EMG BLE -  Electrodiagnostic examination of both legs disclosed evidence diagnostic of right L5 and S1 motor radiculopathies or intracanalicular lesions. These were proven with the abnormal needle testing of the paraspinals. The minimal delay in the left H reflexes latencies suggest S1 radicular disease on that side. However, needle testing was not performed in that limb to further delineate this finding.     There were no other motor radiculopathies or intracanalicular lesions. There were no peripheral neuropathies. Sensory radiculopathies cannot be evaluated by electrodiagnostic means.     Clinically, the patient presented with back pains radiating into the right leg. On brief neurological examination, I found no motor or sensory compromise. Her difficulties are likely related to her radicular disease.     Clinical correlation was highly advised.     2021 MRI lumbar -  FINDINGS:   BONES/ALIGNMENT: There is likely transitional vertebral anatomy with   partially sacralized L5.  The last well-formed disc space is defined as   L5-S1.  There is trace grade 1 L1 on L2 retrolisthesis, trace grade 1 L4 on   L5 retrolisthesis.  The vertebral body heights are maintained.  No fracture   or destructive osseous lesion.  There is disc desiccation and Admission medications    Medication Sig Start Date End Date Taking? Authorizing Provider   tiotropium (SPIRIVA RESPIMAT) 1.25 MCG/ACT AERS inhaler Inhale 2 puffs into the lungs daily 11/24/21  Yes Joy Cardenas, DO   PROVENTIL  (90 Base) MCG/ACT inhaler INHALE TWO PUFFS BY MOUTH EVERY 6 HOURS AS NEEDED 11/2/21  Yes Ronnell Dobbins,    Fremanezumab-vfrm 225 MG/1.5ML SOAJ Inject 225 mg into the skin every 30 days 10/21/21  Yes SURJIT Boykin CNP   pregabalin (LYRICA) 75 MG capsule Take 1 capsule by mouth 2 times daily for 7 days. 10/15/21 12/3/21 Yes Noelle Cotton,    lisinopril (PRINIVIL;ZESTRIL) 10 MG tablet Take 1 tablet by mouth daily 10/7/21  Yes Racheal Maya MD   ferrous sulfate (IRON 325) 325 (65 Fe) MG tablet Take 1 tablet by mouth 2 times daily (with meals) 10/7/21  Yes Racheal Maya MD   hydroCHLOROthiazide (HYDRODIURIL) 25 MG tablet Take 1 tablet by mouth every morning 10/7/21  Yes Racheal Maya MD   venlafaxine (EFFEXOR XR) 37.5 MG extended release capsule Take 1 capsule by mouth daily 10/7/21  Yes Racheal Maya MD   Misc.  Devices MISC Thumb Spica splint for right hand 10/7/21  Yes Racheal Maya MD   ondansetron (ZOFRAN ODT) 4 MG disintegrating tablet Take 1 tablet by mouth every 8 hours as needed for Nausea or Vomiting 9/30/21  Yes Chantal Leavitt PA-C   acetaZOLAMIDE (DIAMOX) 250 MG tablet Take 1 tablet by mouth 2 times daily 9/23/21  Yes SURJIT Boykin CNP   Ubrogepant 50 MG TABS Take 50 mg by mouth as needed (severe migraine) 9/23/21  Yes SURJIT Boykin CNP   Ubrogepant 100 MG TABS Take 100 mg by mouth as needed (severe migraine) 9/23/21  Yes SURJIT Boykin CNP   Rimegepant Sulfate 75 MG TBDP Take 75 mg by mouth as needed (severe migraine) 9/23/21  Yes Ricky Mccormick APRN - CNP   Prenatal Vit-Fe Fumarate-FA (PRENATAL VITAMIN PO) Take by mouth   Yes Historical Provider, MD   BIOTIN PO Take by mouth   Yes Historical Provider, MD   mometasone-formoterol (DULERA) 100-5 MCG/ACT inhaler Inhale 2 puffs into the lungs 2 times daily 7/23/21  Yes Katty Deena Fernandez MD   acetaminophen (TYLENOL) 500 MG tablet Take 1,000 mg by mouth every 6 hours as needed for Pain    Yes Historical Provider, MD   pregabalin (LYRICA) 150 MG capsule Take 1 capsule by mouth 2 times daily for 30 days. 10/22/21 11/21/21  Kuldeep Delacruz,        Allergies   Allergen Reactions    Aspirin Shortness Of Breath and Nausea And Vomiting    Coconut Oil Anaphylaxis    Ibuprofen Other (See Comments)     Trouble breathing      Iodides Shortness Of Breath    Motrin [Ibuprofen Micronized] Shortness Of Breath    Robitussin (Alcohol Free) [Guaifenesin] Other (See Comments)     States causes worse cough    Codeine Nausea And Vomiting    Iodine Rash    Tramadol Nausea Only       Social History     Socioeconomic History    Marital status: Single     Spouse name: Not on file    Number of children: Not on file    Years of education: Not on file    Highest education level: Not on file   Occupational History    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    Tobacco Use    Smoking status: Never Smoker    Smokeless tobacco: Never Used   Vaping Use    Vaping Use: Never used   Substance and Sexual Activity    Alcohol use: Yes     Comment: occasional wine coolers    Drug use: No    Sexual activity: Yes     Partners: Male, Female   Other Topics Concern    Not on file   Social History Narrative    Not on file     Social Determinants of Health     Financial Resource Strain: Low Risk     Difficulty of Paying Living Expenses: Not hard at all   Food Insecurity: No Food Insecurity    Worried About Running Out of Food in the Last Year: Never true    [place] of Food in the Last Year: Never true   Transportation Needs:     Lack of Transportation (Medical): Not on file    Lack of Transportation (Non-Medical):  Not on file   Physical Activity:     Days of Exercise per Week: Not on file    Minutes of Exercise per Session: Not on file   Stress:     Feeling of Stress : Not on file   Social Connections:     Frequency of Communication with Friends and Family: Not on file    Frequency of Social Gatherings with Friends and Family: Not on file    Attends Tenriism Services: Not on file    Active Member of [address] [place] [address] or Organizations: Not on file    Attends Club or Organization Meetings: Not on file    Marital Status: Not on file   Intimate Partner Violence:     Fear of Current or Ex-Partner: Not on file    Emotionally Abused: Not on file    Physically Abused: Not on file    Sexually Abused: Not on file   Housing Stability:     Unable to Pay for Housing in the Last Year: Not on file    Number of [place] in the Last Year: Not on file    Unstable Housing in the Last Year: Not on file       Family History   Problem Relation Age of Onset    High Blood Pressure Mother     Stroke Mother     Clotting Disorder Mother         Vague Hx of blood clots on blood thinners    High Blood Pressure Father     Clotting Disorder Father         Vague hx of blood clots on blood thinners    Cancer Sister 35       REVIEW OF SYSTEMS:     Rosina denies fever/chills, chest pain, shortness of breath, new bowel or bladder complaints. All other review of systems was negative.     PHYSICAL EXAMINATION:      /88   Pulse 92   Temp 96 °F (35.6 °C) (Infrared)   Resp 16   Ht 5' 8\" (1.727 m)   Wt (!) 330 lb (149.7 kg)   SpO2 98%   BMI 50.18 kg/m²     General:       General appearance:pleasant and well-hydrated, in no distress and A & O x3  Build:Obese  Function:Rises from a seated position with difficulty, mild     HEENT:     Head:normocephalic, atraumatic  Pupils:regular, round, equal  Sclera: icterus absent     Lungs:     Breathing:normal breathing pattern     Abdomen:     Shape:non-distended  Tenderness:none  Guarding:none     Lumbar spine:     Spine inspection:normal   CVA tenderness:No   Palpation:tenderness paravertebral muscles, left negative, right positive. Range of motion:abnormal mildly in lateral bending, flexion, extension rotation bilateral and is painful.     Musculoskeletal:     Trigger points in Paraveteral:absent bilaterally  SI joint tenderness:positive right, negative left              JEY test:mildly positive right, not done left  Piriformis tenderness:positive right, negative left  Trochanteric bursa tenderness:negative right, negative left  SLR:negative right, negative left, sitting      Extremities:     Tremors:None bilaterally upper and lower  Range of motion:pain with internal rotation of hips negative. Intact:Yes  Varicose veins:absent   Pulses:present Lt radial  Cyanosis:none  Edema:none x all 4 extremities     Neurological:     Sensory:normal to light touch BLE  Motor:                Right Quadriceps5/5          Left Quadriceps5/5           Right Gastrocnemius5/5    Left Gastrocnemius5/5  Right Ant Tibialis5/5  Left Ant Tibialis5/5     Reflexes: (not assessed today)     Right Quadriceps reflex2+  Left Quadriceps reflex2+  Right Achilles reflex2+  Left Achilles reflex2+     Gait:normal station     Dermatology:     Skin:no rashes or lesions noted     Impression:     Right LBP and RLE pain/numbness/tingling in S1 distribution  2021 EMG shows R L5 and S1 motor radiculopathies  8/2021 shows mild degen changes without sig stenosis  9/2021 xray rt hip shows mild+ OA  PMHx: HTN, morbid obesity, iron def.  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

## 2021-12-03 NOTE — PROGRESS NOTES
Do you currently have any of the following:    Fever: No  Headache:  No  Cough: No  Shortness of breath: No  Exposed to anyone with these symptoms: No         Rosina NAIN Rodriguez presents to the [facility] on 12/3/2021. Rosina is complaining of pain rt. Side of body. The pain is constant. The pain is described as tingling/needle like feeling . Pain is rated on her best day at a 5, on her worst day at a 10, and on average at a 7 on the VAS scale. She took her last dose of Lyrica last night      Any procedures since your last visit:     Pacemaker or defibrillator: No managed by . She is not on NSAIDS and is not on anticoagulation medications to include none and is managed by     Medication Contract and Consent for Opioid Use Documents Filed      No documents found                /88   Pulse 92   Temp 96 °F (35.6 °C) (Infrared)   Resp 16   Ht 5' 8\" (1.727 m)   Wt (!) 330 lb (149.7 kg)   SpO2 98%   BMI 50.18 kg/m²      No LMP recorded.

## 2021-12-13 ENCOUNTER — OFFICE VISIT (OUTPATIENT)
Dept: INTERNAL MEDICINE | Age: 46
End: 2021-12-13

## 2021-12-13 ENCOUNTER — SOCIAL WORK (OUTPATIENT)
Dept: INTERNAL MEDICINE | Age: 46
End: 2021-12-13

## 2021-12-13 VITALS
SYSTOLIC BLOOD PRESSURE: 123 MMHG | BODY MASS INDEX: 44.41 KG/M2 | HEART RATE: 86 BPM | OXYGEN SATURATION: 97 % | DIASTOLIC BLOOD PRESSURE: 84 MMHG | WEIGHT: 293 LBS | HEIGHT: 68 IN | TEMPERATURE: 96.9 F | RESPIRATION RATE: 18 BRPM

## 2021-12-13 DIAGNOSIS — E66.01 CLASS 3 SEVERE OBESITY WITH SERIOUS COMORBIDITY AND BODY MASS INDEX (BMI) OF 50.0 TO 59.9 IN ADULT, UNSPECIFIED OBESITY TYPE (HCC): ICD-10-CM

## 2021-12-13 DIAGNOSIS — I87.8 VENOUS STASIS: ICD-10-CM

## 2021-12-13 DIAGNOSIS — D50.9 IRON DEFICIENCY ANEMIA, UNSPECIFIED IRON DEFICIENCY ANEMIA TYPE: Chronic | ICD-10-CM

## 2021-12-13 DIAGNOSIS — Z12.4 SCREENING FOR CERVICAL CANCER: ICD-10-CM

## 2021-12-13 DIAGNOSIS — I10 PRIMARY HYPERTENSION: ICD-10-CM

## 2021-12-13 DIAGNOSIS — G47.30 SLEEP APNEA, UNSPECIFIED TYPE: ICD-10-CM

## 2021-12-13 DIAGNOSIS — H65.191 OTHER NON-RECURRENT ACUTE NONSUPPURATIVE OTITIS MEDIA OF RIGHT EAR: ICD-10-CM

## 2021-12-13 DIAGNOSIS — R07.81 RIB PAIN: Primary | ICD-10-CM

## 2021-12-13 PROCEDURE — 99213 OFFICE O/P EST LOW 20 MIN: CPT | Performed by: INTERNAL MEDICINE

## 2021-12-13 PROCEDURE — 99214 OFFICE O/P EST MOD 30 MIN: CPT | Performed by: INTERNAL MEDICINE

## 2021-12-13 RX ORDER — AMOXICILLIN AND CLAVULANATE POTASSIUM 875; 125 MG/1; MG/1
1 TABLET, FILM COATED ORAL 2 TIMES DAILY
Qty: 14 TABLET | Refills: 0 | Status: SHIPPED | OUTPATIENT
Start: 2021-12-13 | End: 2021-12-20

## 2021-12-13 RX ORDER — LIDOCAINE 4 G/G
1 PATCH TOPICAL DAILY
Qty: 30 PATCH | Refills: 0 | Status: SHIPPED
Start: 2021-12-13 | End: 2022-05-05 | Stop reason: SDUPTHER

## 2021-12-13 ASSESSMENT — ENCOUNTER SYMPTOMS
EYES NEGATIVE: 1
APNEA: 1
BACK PAIN: 1
GASTROINTESTINAL NEGATIVE: 1
SHORTNESS OF BREATH: 1
ALLERGIC/IMMUNOLOGIC NEGATIVE: 1

## 2021-12-13 NOTE — PROGRESS NOTES
[facility] Internal Medicine      SUBJECTIVE:  Jeanne Nixon (:  1975) is a 55 y.o. female here for evaluation of the following chief complaint(s):  Back Pain (directly across mid back), Ear Problem (right,  \"feels clogged \"  has pain x 2 days  ago ), and Shortness of Breath (noted more so since had covid x 1 year ago)      Previous Visit Imp Points:   12/3/21- Office visit with Pain Mgmt- sleepy with pregabalin so take 150 only at night , unable to do PT, offered TFESI but will optimize meds before this  21- Office visit with Plastic [place] Josue DEVINE) - nasal bones well healed  Vasquez Mchugh note in for [facility] financial assistance application  - Office visit with Dr. Stacia Toscano samples given, CT Chest ordered, flu vaccine given, f/u with phone visit in 6 weeks  10/21/12- Office visit with Neurology Georgette EDDY)-episodic and chronic headaches , Preventative headache medications tried: beta-blockers (metropolol), Topamax, amitriptyline, Ajovy   Abortive headache medications tried: acetaminophen, NSAIDs (ibuprofen), aspirin/acetaminophen/caffeine, sumatriptan PO, butalbital/caffeine/aspirin, Nurtec, Ubrelvy   Pseudotumor cerebri - LP Ordered- WNL and opening pressure normal  10/15/21- Office visit with Pain Mgmt Dr Favio Hoover- For right back pain, plan to do EMG, Phy Therapy, start pregabalin,EMG confirms right  radiculopathies  10/7/21- Office visit with Me -  S/p fall right wrist and thumb pain -- XR and ortho referral  Right nasal fracture- f/u with plastic surgery  HTN- stable on lisinopril+HCTZ  CHERELLE- stable on iron  Intractable migraine improving with Ajovy ( seen by neuro) and on venlafaxine  Right SI Joint - may need in jection as per Neurosurg  Asthma- PFT- moderate obstructive pattern    HPI:       Rt wrist pain and thumb pain from fall in October- persistent pain but uses splint     HTN- stable today 123/84 mm Hg     Intractable migraine/Pseudotumor Cerebri - underwent LP- normal OP and labs from neuro , has improved ( was 15 days/month, now 8 days/month) improving with Ajovy and urelvy and nurtec     Probs today:   Rt ear pain + fullness- rt ear feels clogged, making lightheaded and dizziness, started on 2nd Dec , gradually progressive, tried peroxide ear drops, didn't help, pain x 2 days,no discharge, no tinnitus but clicking sound heard-  SOB /BATISTA- Proventil using every day , Spiriva every morning , Breo/Dulera every day, nighttime awakenings increased from 2 times/week to 4times/ week ,  noticed apneic spells, sleep study in Jan 2021 , pulm ordered CT   Back pain - new back pain along posterior ribs , middle back x 1 week , no injuries/falls, nonprogressive, uses heating pad but that helps only a little , feels like ' someone has me in [facility]'   Vitamin D 11.7 low 5 years ago , not on supplements, needs repeat may be causing pain     No fever,chills, diarrhea, constipation, exposure to sick contacts, dysuria,       Review of Systems   HENT: Positive for ear pain and tinnitus. Eyes: Negative. Respiratory: Positive for apnea and shortness of breath. Cardiovascular: Negative. Gastrointestinal: Negative. Endocrine: Negative. Genitourinary: Negative. Musculoskeletal: Positive for back pain and myalgias. Skin: Negative. Allergic/Immunologic: Negative. Neurological: Positive for headaches. Hematological: Negative. Psychiatric/Behavioral: Negative. PMHx:  has a past medical history of Anxiety, Asthma, Blood transfusion, Chronic fatigue, Hx of blood clots, Hypertension, Iron deficiency anemia due to chronic blood loss, Knee pain, bilateral, Leg pain, Low back pain, Migraine headache without aura, Morbid obesity (HCC), Muscle cramps, Perennial allergic rhinitis, Superficial thrombophlebitis of right leg, Ulcerative colitis ([address] and Vertigo.      Allergies   Allergen Reactions    Aspirin Shortness Of Breath and Nausea And Vomiting    Coconut Oil Anaphylaxis    Ibuprofen Other (See Comments)     Trouble breathing      Iodides Shortness Of Breath    Motrin [Ibuprofen Micronized] Shortness Of Breath    Robitussin (Alcohol Free) [Guaifenesin] Other (See Comments)     States causes worse cough    Codeine Nausea And Vomiting    Iodine Rash    Tramadol Nausea Only        PSHx:  has a past surgical history that includes Adenoidectomy.      OBYGN Hx: Regular , 5-7 days , no excessive pain or bleeding  Used OCP last from [id number] , no children     Sexual Hx: Sexually active with  , no hx of STI     Social Hx: Occupation - Reservation coordinator   Not insured by Social Work on case and pt given [facility] and registered for PAP program and Medicaid    Social Hx:   Social History     Tobacco History     Smoking Status  Never Smoker    Smokeless Tobacco Use  Never Used          Alcohol History     Alcohol Use Status  Yes Comment  occasional wine coolers          Drug Use     Drug Use Status  No          Sexual Activity     Sexually Active  Yes Partners  Male, Female                 Fam Hx:   Family History   Problem Relation Age of Onset    High Blood Pressure Mother     Stroke Mother     Clotting Disorder Mother         Vague Hx of blood clots on blood thinners    High Blood Pressure Father     Clotting Disorder Father         Vague hx of blood clots on blood thinners    Cancer Sister 35        Health Maintenance/Social Determinants:   Flu shot received  Hx of COVID in Oct 2020 which was very bad  Mammogram - 2021 clear  Pap smear- last 2017 , needs referral     Med Refills: yes    Key points to note:  --    Current Outpatient Medications on File Prior to Visit   Medication Sig Dispense Refill    tiotropium (SPIRIVA RESPIMAT) 1.25 MCG/ACT AERS inhaler Inhale 2 puffs into the lungs daily 3 each 1    PROVENTIL  (90 Base) MCG/ACT inhaler INHALE TWO PUFFS BY MOUTH EVERY 6 HOURS AS NEEDED 18 g 2    Fremanezumab-vfrm 225 MG/1.5ML SOAJ Inject 225 mg into the skin every 30 days 3 pen 0    lisinopril (PRINIVIL;ZESTRIL) 10 MG tablet Take 1 tablet by mouth daily 60 tablet 2    ferrous sulfate (IRON 325) 325 (65 Fe) MG tablet Take 1 tablet by mouth 2 times daily (with meals) 60 tablet 2    hydroCHLOROthiazide (HYDRODIURIL) 25 MG tablet Take 1 tablet by mouth every morning 60 tablet 2    venlafaxine (EFFEXOR XR) 37.5 MG extended release capsule Take 1 capsule by mouth daily 60 capsule 2    ondansetron (ZOFRAN ODT) 4 MG disintegrating tablet Take 1 tablet by mouth every 8 hours as needed for Nausea or Vomiting 10 tablet 0    acetaZOLAMIDE (DIAMOX) 250 MG tablet Take 1 tablet by mouth 2 times daily 60 tablet 5    Ubrogepant 50 MG TABS Take 50 mg by mouth as needed (severe migraine) 1 tablet 0    Rimegepant Sulfate 75 MG TBDP Take 75 mg by mouth as needed (severe migraine) 2 tablet 0    Prenatal Vit-Fe Fumarate-FA (PRENATAL VITAMIN PO) Take by mouth      BIOTIN PO Take by mouth      mometasone-formoterol (DULERA) 100-5 MCG/ACT inhaler Inhale 2 puffs into the lungs 2 times daily 1 Inhaler 5    pregabalin (LYRICA) 150 MG capsule Take 1 capsule by mouth 2 times daily for 30 days. 60 capsule 0    Misc. Devices MISC Thumb Spica splint for right hand 1 each 0    Ubrogepant 100 MG TABS Take 100 mg by mouth as needed (severe migraine) (Patient not taking: Reported on 12/13/2021) 1 tablet 0    acetaminophen (TYLENOL) 500 MG tablet Take 1,000 mg by mouth every 6 hours as needed for Pain  (Patient not taking: Reported on 12/13/2021)       No current facility-administered medications on file prior to visit.        OBJECTIVE:    VS:   Vitals:    12/13/21 0905 12/13/21 0911   BP: (!) 124/94 123/84   Site: Left Upper Arm Left Upper Arm   Position: Sitting Sitting   Pulse: 80 86   Resp: 18 18   Temp: 96.9 °F (36.1 °C)    TempSrc: Temporal    SpO2: 97%    Weight: (!) 330 lb (149.7 kg)    Height: 5' 8\" (1.727 m) Physical Exam  Constitutional:       Appearance: Normal appearance. She is obese. HENT:      Head: Normocephalic and atraumatic. Right Ear: Tenderness present. Tympanic membrane is erythematous and retracted. Eyes:      Pupils: Pupils are equal, round, and reactive to light. Cardiovascular:      Rate and Rhythm: Normal rate and regular rhythm. Pulses: Normal pulses. Pulmonary:      Effort: Pulmonary effort is normal.      Breath sounds: Decreased air movement present. Abdominal:      Palpations: Abdomen is soft. Musculoskeletal:         General: Normal range of motion. Cervical back: Normal range of motion. Skin:     Capillary Refill: Capillary refill takes less than 2 seconds. Neurological:      General: No focal deficit present. Mental Status: She is oriented to person, place, and time. Psychiatric:         Mood and Affect: Mood normal.         Behavior: Behavior normal.         Thought Content: Thought content normal.         Judgment: Judgment normal.            ASSESSMENT/PLAN:  1. Rib pain  -     Vitamin D 25 Hydroxy; Future  -     XR CHEST STANDARD (2 VW); Future  -     lidocaine 4 % external patch; Place 1 patch onto the skin daily, TransDERmal, DAILY Starting Mon 12/13/2021, Until Wed 1/12/2022, For 30 days, Disp-30 patch, R-0, Normal  Likely lumbar strain, will order conservative mgmt for now - lidocaine patch + heating pad - no hx of fall or recent trauma , will follow CXR   Last Vit D was low, recheck and start supplementation   2. Other non-recurrent acute nonsuppurative otitis media of right ear  -     amoxicillin-clavulanate (AUGMENTIN) 875-125 MG per tablet; Take 1 tablet by mouth 2 times daily for 7 days, Disp-14 tablet, R-0Normal  - clinical symptoms of fullness+ ear pain -- Angry looking TM on right side, not perforated, likely otitis media, needs antibiotic  3.  Screening for cervical cancer  -     Karin Madrid MD, OB/GYN, [facility]  4. Class 3 severe obesity with serious comorbidity and body mass index (BMI) of 50.0 to 59.9 in adult, unspecified obesity type [facility])  -     Laura Phillips MD, Bariatrics, [facility]  5. Primary hypertension- stable today, no problems  6. Venous stasis- greatly improved, no problems  7. Sleep apnea, unspecified typesleep study in 2022  8. Iron deficiency anemia, unspecified iron deficiency anemia type   stable- no concerns  9. SOB/- Dr Rhianna Rodriguez seen recently, spiriva given, CT Chest ordered, counseled on weight loss to help with breathing and all medical problems, referral to weight loss clinic made. Received many inhalers today. 10.Intractable migraine/Pseudotumor Cerebri - underwent LP- normal OP and labs from neuro , has improved ( was 15 days/month, now 8 days/month) improving with Ajovy and urelvy and nurtec from Neurology  11. HCM   Flu shot received  Hx of COVID in Oct 2020 which was very bad  Mammogram -  clear  Pap smear- last  , needs referral     RTC:  Return in about 2 months (around 2022) for Follow up visit, PCP. I have reviewed my findings and recommendations with Adele Jimenez and Dr. Woody Sharpe.     Racheal Maya MD   2021 5:54 PM

## 2021-12-13 NOTE — PROGRESS NOTES
Met with pt during appt and gave her PAP Proventil which arrived; pt states she completed the PAP applications for Spiriva and mailed paperwork including proof of income last week; advised  Sarah Thomas of PAP of above

## 2021-12-13 NOTE — PROGRESS NOTES
Patient verbalized understanding of office instructions. She will call with questions or concerns. She was given discharge instructions, and script for X-ray . All questions were fully answered. Printed AVS given along with her PAP medication.

## 2021-12-13 NOTE — PROGRESS NOTES
[facility]    Attending Physician Statement  I have discussed the case, including pertinent history and exam findings with the resident physician. I have seen and examined the patient and the key elements of the encounter have been performed by me. I agree with the assessment, plan and orders as documented by the resident. I have reviewed all pertinent PMHx, PSHx, FamHx, SocialHx, medications, and allergies and updated history as appropriate. Patient presents for routine follow up of medical problems. Right otitis media --  tx with antibiotic, augmentin    BATISTA -- recent addition Spiriva per pulmonary and high risk FERNANDA    HTN -- controlled on current meds    Obesity class 3 (BMI 51) -- encouraged continued lifestyle modifications, she is interested in medical weight loss referral and motivated to lose weight. Previously lost 100 pounds then regained it. Chronic migraines -- freq reduced on current regimen of Diamox, fremanezumab and rimegepant prn    Screening:  Referral to GYN for routine screen    Remainder of medical problems as per resident note.     Corinne Judge, DO  12/13/2021 9:39 AM

## 2021-12-20 ENCOUNTER — TELEPHONE (OUTPATIENT)
Dept: BARIATRICS/WEIGHT MGMT | Age: 46
End: 2021-12-20

## 2022-01-02 NOTE — PROGRESS NOTES
[facility]     HISTORY OF PRESENT ILLNESS:    Carine Childs is a 55y.o. year old female here for evaluation of asthma, post COVID-19 syndrome. Patient reports that she has a history of asthma. Reports that her symptoms have been worse with heat. She was able to do all of her activities though until she had COVID-19 in October 2020. She reports that since then she has had increasing shortness of breath. When she was diagnosed with Covid she was in the hospital for 3 or 4 days and was high doses of steroids. Her symptoms included loss of taste and increased shortness of breath. She denies any inhaled exposures. She has no pets. She has no birds. She is currently using her Proventil inhaler up to 4 times a day. She has already been ordered a sleep study and this is set up for January 14. She also reports that she has had a 40 pound weight gain.       ALLERGIES:    Allergies   Allergen Reactions    Aspirin Shortness Of Breath and Nausea And Vomiting    Coconut Oil Anaphylaxis    Ibuprofen Other (See Comments)     Trouble breathing      Iodides Shortness Of Breath    Motrin [Ibuprofen Micronized] Shortness Of Breath    Robitussin (Alcohol Free) [Guaifenesin] Other (See Comments)     States causes worse cough    Codeine Nausea And Vomiting    Iodine Rash    Tramadol Nausea Only       PAST MEDICAL HISTORY:       Diagnosis Date    Anxiety 2009    Asthma     Blood transfusion     Chronic fatigue 2007    Hx of blood clots     Hypertension 2009    not treated    Iron deficiency anemia due to chronic blood loss 2007    non-compliant with iron replacement    Knee pain, bilateral 2011    Leg pain     Low back pain 2015    Pain is getting worse    Migraine headache without aura 2009    Morbid obesity ([address] 2007    Muscle cramps 2011    Perennial allergic rhinitis 2007    Superficial thrombophlebitis of right leg 2009 and 2011    [address]cerative [address] ([facility] [address]     Vertigo        MEDICATIONS:   Current Outpatient Medications   Medication Sig Dispense Refill    tiotropium (SPIRIVA RESPIMAT) 1.25 MCG/ACT AERS inhaler Inhale 2 puffs into the lungs daily 3 each 1    lidocaine 4 % external patch Place 1 patch onto the skin daily 30 patch 0    PROVENTIL  (90 Base) MCG/ACT inhaler INHALE TWO PUFFS BY MOUTH EVERY 6 HOURS AS NEEDED 18 g 2    Fremanezumab-vfrm 225 MG/1.5ML SOAJ Inject 225 mg into the skin every 30 days 3 pen 0    pregabalin (LYRICA) 150 MG capsule Take 1 capsule by mouth 2 times daily for 30 days. 60 capsule 0    lisinopril (PRINIVIL;ZESTRIL) 10 MG tablet Take 1 tablet by mouth daily 60 tablet 2    ferrous sulfate (IRON 325) 325 (65 Fe) MG tablet Take 1 tablet by mouth 2 times daily (with meals) 60 tablet 2    hydroCHLOROthiazide (HYDRODIURIL) 25 MG tablet Take 1 tablet by mouth every morning 60 tablet 2    venlafaxine (EFFEXOR XR) 37.5 MG extended release capsule Take 1 capsule by mouth daily 60 capsule 2    Misc.  Devices MISC Thumb Spica splint for right hand 1 each 0    ondansetron (ZOFRAN ODT) 4 MG disintegrating tablet Take 1 tablet by mouth every 8 hours as needed for Nausea or Vomiting 10 tablet 0    acetaZOLAMIDE (DIAMOX) 250 MG tablet Take 1 tablet by mouth 2 times daily 60 tablet 5    Ubrogepant 50 MG TABS Take 50 mg by mouth as needed (severe migraine) 1 tablet 0    Ubrogepant 100 MG TABS Take 100 mg by mouth as needed (severe migraine) (Patient not taking: Reported on 12/13/2021) 1 tablet 0    Rimegepant Sulfate 75 MG TBDP Take 75 mg by mouth as needed (severe migraine) 2 tablet 0    Prenatal Vit-Fe Fumarate-FA (PRENATAL VITAMIN PO) Take by mouth      BIOTIN PO Take by mouth      mometasone-formoterol (DULERA) 100-5 MCG/ACT inhaler Inhale 2 puffs into the lungs 2 times daily 1 Inhaler 5    acetaminophen (TYLENOL) 500 MG tablet Take 1,000 mg by mouth every 6 hours as needed for Pain  (Patient not taking: Reported on 12/13/2021)       No current facility-administered medications for this visit. SOCIAL AND OCCUPATIONAL HEALTH: Patient is a non-smoker. She denies any known inhalational exposures. She has no pets. She has no birds. SURGICAL HISTORY:   Past Surgical History:   Procedure Laterality Date    ADENOIDECTOMY         FAMILY HISTORY: No family history of cancer, blood clots     REVIEW OF SYSTEMS:  Constitutional: No fevers, chills, unintentional weight loss  Skin: No rashes or lesions  EENT: No change in vision, change in hearing, change in taste, change in smell  Cardiovascular: Denies chest pain, chest pressure, palpitations  Respiration: Increased wheezing, shortness of breath, shortness of breath with exertion. Gastrointestinal: Denies nausea, vomiting, diarrhea  Musculoskeletal: Denies joint or muscle pain  Neurological: Denies syncope, headache, seizures  Psychological: Denies anxiety or depression  Endocrine: Denies polyuria polydipsia  Hematopoietic/lymphatic: Denies easy bruising        PHYSICAL EXAMINATION:  Constitutional: Well-nourished, well-developed. No acute distress  EENT: PERRL, EOMI,   No palpable adenopathy. No nasal polyps. No oropharyngeal erythema  Neck: Trachea and thyroid midline. No palpable adenopathy  Respiratory: Overall diminished bilaterally  Cardiovascular: Regular rate and rhythm, no murmurs rubs or gallops  Pulses: Equal bilaterally  Abdomen: Soft nontender bowel sounds present  Lymphatic: No palpable adenopathy  Musculoskeletal: Gait steady  Extremities: No clubbing, cyanosis, edema. Skin: No rashes or lesions  Neurological/Psychiatric: Neurologically intact, no focal deficits. Affect appropriate    DATA: No spirometry in clinic today. However full PFTs which were completed on September 30, 2021 showed moderate obstruction with significant improvement with bronchodilators. Moderate restriction with no air trapping    IMPRESSION:       1. Post COVID-19 syndrome  2. Vaccine counseling  3. History of asthma with bronchodilator response    4..  Underinsured            PLAN:      1. We will order a CT of the chest to evaluate for post Covid fibrosis. 2.  Spiriva added to patient's current inhaled regimen  3. Flu vaccine given in clinic today. I strongly encouraged the patient to get the COVID-19 vaccination series. 4.  We will refer the patient to the prescription assistance program      I hope this updates you on my evaluation and clinical thinking. Thank you for allowing me to participate in his care.      Sincerely,        Noe Araya.  Office: [phone]  Fax: [phone]

## 2022-01-04 ENCOUNTER — TELEPHONE (OUTPATIENT)
Dept: PULMONOLOGY | Age: 47
End: 2022-01-04

## 2022-01-04 NOTE — TELEPHONE ENCOUNTER
Mailed a letter to patient informing her that her CT Chest is scheduled for 2-2-21 at 11:30 am at the [facility]. She must arrive by 11:00 am. There is no prep for this test

## 2022-01-27 ENCOUNTER — OFFICE VISIT (OUTPATIENT)
Dept: BARIATRICS/WEIGHT MGMT | Age: 47
End: 2022-01-27

## 2022-01-27 VITALS
BODY MASS INDEX: 45.99 KG/M2 | WEIGHT: 293 LBS | TEMPERATURE: 97.3 F | HEIGHT: 67 IN | HEART RATE: 80 BPM | SYSTOLIC BLOOD PRESSURE: 158 MMHG | DIASTOLIC BLOOD PRESSURE: 91 MMHG

## 2022-01-27 DIAGNOSIS — I10 PRIMARY HYPERTENSION: ICD-10-CM

## 2022-01-27 DIAGNOSIS — E78.1 HYPERTRIGLYCERIDEMIA: ICD-10-CM

## 2022-01-27 DIAGNOSIS — R73.03 PREDIABETES: ICD-10-CM

## 2022-01-27 DIAGNOSIS — E66.01 CLASS 3 SEVERE OBESITY DUE TO EXCESS CALORIES WITH SERIOUS COMORBIDITY AND BODY MASS INDEX (BMI) OF 50.0 TO 59.9 IN ADULT (HCC): ICD-10-CM

## 2022-01-27 DIAGNOSIS — R53.82 CHRONIC FATIGUE: Primary | ICD-10-CM

## 2022-01-27 PROCEDURE — 99202 OFFICE O/P NEW SF 15 MIN: CPT

## 2022-01-27 PROCEDURE — 99205 OFFICE O/P NEW HI 60 MIN: CPT | Performed by: INTERNAL MEDICINE

## 2022-01-27 NOTE — PROGRESS NOTES
CC -   Weight gain - Fatigue/tired, feels she has difficulty breathing and walking    BACKGROUND -     First visit: 1/27/2022     Obesity (all weight in lbs)  Began in 11th grade  Initial BMI 52.1, Wt 330.2  HS Grad wt ~200   Lowest   wt 202 (from 304)   Highest  wt 336 (9-10/2021)  Pattern of wt gain: gradual  Wt change past yr: -6 lbs  Most wt lost: 102 lbs (Jan-Dec 2012)  Other diets attempted: portion control,     Desire to lose weight: 10/10  Problem posed by appetite: 5/10    Initial Diet:    Number of meals per day - 2    Number of snacks per day - 3-4    Meal volume - 9\" plate, no seconds    Fast food/convenience store - 0-1x/week    Restaurants (not fast food) - 0-1x/week (1/month)   Sweets - 0-1d/week   Chips - 3d/week (poppables, puff corn, regular or flavored chips)   Crackers/pretzels - 0-1d/week (saltines)   Nuts - 1-2d/week (mixed nuts)   Peanut Butter - 0d/week   Popcorn - 0d/week   Dried fruit - 0d/week   Whole fruit - 7d/week (banana, apple, oranges, pears, watermelon, honeydew etc; 1-2d/wk of each fruit)   Breakfast cereal - 0-1d/week   Granola/Protein/Energy bar - 0d/week   Sugar sweetened beverages - gingerale, lemonades (home made with little sugar), rarely Koolaid, hot tea with little sugar but uses honey, pineapple/apple/orange/cranberry (4-5 days 8-12 oz of juice)   Protein - No supplements   Fiber - No supplements    Typical day: lunchmeat sandwich, baked chicken ~11 am, (+ fruits - berries), 5:30 - fruit (apple/pear), dinner: baked chicken/fish with side dish (rice/pasta/green beens, broccoli, baked fries, mashed potatoes etc, at about 7:30: some chips+gingerale+fruit     Exercise:    [employer] - no (has weights and exercise equipment)    Walking - no    Running - no    Resistance - no    Aerobic class - no        Sleep: Bedtime: 9:30-10:00pm, wake up time: 8:00 - wakes up tired, daytime naps: 1-2/month.     Weight scale at home: no, takes weight: no  Food scale: no  ______________________    [facility] -  Past Medical History:   Diagnosis Date    Anxiety 2009    Asthma     Blood transfusion     Chronic fatigue 2007    Hx of blood clots     Hypertension 2009    not treated    Iron deficiency anemia due to chronic blood loss 2007    non-compliant with iron replacement    Knee pain, bilateral 2011    Leg pain     Low back pain 2015    Pain is getting worse    Migraine headache without aura 2009    Morbid obesity ([facility] [address] 2007    Muscle cramps 2011    Perennial allergic rhinitis 2007    Superficial thrombophlebitis of right leg 2009 and 2011    Ulcerative colitis ([facility] [address]     Vertigo    Anemia    Past Surgical History:   Procedure Laterality Date    ADENOIDECTOMY         Prior to Admission medications    Medication Sig Start Date End Date Taking? Authorizing Provider   tiotropium (SPIRIVA RESPIMAT) 1.25 MCG/ACT AERS inhaler Inhale 2 puffs into the lungs daily 11/24/21   Joy Carednas DO   PROVENTIL  (90 Base) MCG/ACT inhaler INHALE TWO PUFFS BY MOUTH EVERY 6 HOURS AS NEEDED 11/2/21   Robinson Lyn DO   Fremanezumab-vfrm 225 MG/1.5ML SOAJ Inject 225 mg into the skin every 30 days 10/21/21   Fran Squires APRN - CNP   lisinopril (PRINIVIL;ZESTRIL) 10 MG tablet Take 1 tablet by mouth daily 10/7/21   Bel Valentine MD   ferrous sulfate (IRON 325) 325 (65 Fe) MG tablet Take 1 tablet by mouth 2 times daily (with meals) 10/7/21   Bel Valentine MD   hydroCHLOROthiazide (HYDRODIURIL) 25 MG tablet Take 1 tablet by mouth every morning 10/7/21   Bel Valentine MD   venlafaxine (EFFEXOR XR) 37.5 MG extended release capsule Take 1 capsule by mouth daily 10/7/21   Bel Valentine MD   Misc.  Devices MISC Thumb Spica splint for right hand 10/7/21   Bel Valentine MD   ondansetron (ZOFRAN ODT) 4 MG disintegrating tablet Take 1 tablet by mouth every 8 hours as needed for Nausea or Vomiting 9/30/21   Chantal Leavitt PA-C   acetaZOLAMIDE (DIAMOX) 250 MG tablet Take 1 tablet by mouth 2 times daily 9/23/21   SURJIT Bell CNP   Ubrogepant 50 MG TABS Take 50 mg by mouth as needed (severe migraine) 9/23/21   SURJIT Bell CNP   Ubrogepant 100 MG TABS Take 100 mg by mouth as needed (severe migraine)  Patient not taking: Reported on 12/13/2021 9/23/21   SURJIT Bell CNP   Rimegepant Sulfate 75 MG TBDP Take 75 mg by mouth as needed (severe migraine) 9/23/21   SURJIT Bell CNP   Prenatal Vit-Fe Fumarate-FA (PRENATAL VITAMIN PO) Take by mouth    Historical Provider, MD   BIOTIN PO Take by mouth    Historical Provider, MD   mometasone-formoterol (DULERA) 100-5 MCG/ACT inhaler Inhale 2 puffs into the lungs 2 times daily 7/23/21   Katty Deena Zamora MD   acetaminophen (TYLENOL) 500 MG tablet Take 1,000 mg by mouth every 6 hours as needed for Pain   Patient not taking: Reported on 12/13/2021    Historical Provider, MD   not taking Dulera anymore. No contraception. Allergies: Allergies   Allergen Reactions    Aspirin Shortness Of Breath and Nausea And Vomiting    Coconut Oil Anaphylaxis    Ibuprofen Other (See Comments)     Trouble breathing      Iodides Shortness Of Breath    Motrin [Ibuprofen Micronized] Shortness Of Breath    Robitussin (Alcohol Free) [Guaifenesin] Other (See Comments)     States causes worse cough    Codeine Nausea And Vomiting    Iodine Rash    Tramadol Nausea Only       Family history: DM: mother/sister/uncles, Heart disease: mother. Social history: smoking: never ; Alcohol: occasional on weekends (1 glass of wine or vodka), work: from home. ROS -  Card - CP/pressure with tightness and BATISTA on exertion, not at rest  GI - occasional nausea with little bit of vomiting.  No D/C    PE -  Gen : BP (!) 165/99 (Site: Left Upper Arm, Position: Sitting, Cuff Size: Thigh)   Pulse 82   Temp 97.3 °F (36.3 °C) (Temporal)   Ht 5' 6.75\" (1.695 m)   Wt (!) 330 lb 3.2 oz (149.8 kg)   BMI 52.10 kg/m²    States she forgot to take her medication for BP this morning. WN, WD, NAD  Lung: Nml resp effort  Psych: Normal mood   Full affect  Neuro: Moves all ext well  ______________________    HISTORY & ASSESSMENT/PLAN -     Problem 1  - Fatigue   HPI   - Ongoing, gradually progressing, was planned to get sleep study (she had to cancel), feels lack of motivation  Assessment  - Uncontrolled   Plan   - Patient will call Dr. Ginny Coleman to reschedule sleep study. Last TSH/T4 total in 2019 in our record was WNL, may need another if not done (unsure if it was done recently). Weight reduction per plan below. Problem 2  - Obesity   HPI   - See above Background for description    Weight  Date    330.2  1/27/2022  DEN = 2288 Jose L/d = 31088 Jose L/wk  Wt effect of HR foods = ~200 Jose L/d = 1400 Jose L/wk = ~20 lb/year. Assessment  - Uncontrolled  Plan   - Discussed various options. Wants to try non-surgical first. Does not want VLCD currently. Would like calorie counting with low calorie diet, and wants to see how she does without appetite suppressant (can increase Venlafaxine to  from 37.5).  Planned as follows:  Patient Instructions   Rules:  · Count every calorie every day  · Limit sweets to one day per month  · Limit chips/crackers/pretzels/nuts/popcorn to 100 jose l/day  · Eliminate all sugar sweetened beverages (including fruit juice)  · Limit restaurants (including fast food and food from a convenience store) to one time every two weeks while in town    Requirements:  · Make sure protein intake is at least 70 grams per day (do not count protein every day; instead spot check your intake every 2-3 weeks and make sure what you think you are getting is close to accurate; consider using a protein shake if needed; these are in the pharmacy section of the stores, not the grocery section; Premier, Pure Protein and Fairlife are relatively inexpensive and taste good to most patients; other options are Nectar, Boost Max, Ensure Max, BeneProtein and GNC lean (which is lactose-free); Nectar fruit, Premier Protein Clear, IsoPure Protein Drink, and Protein 2 O are water-based options; Quest (or Cosco, which is cheaper and is ordered on [employer]) and the Oh [place] 1 protein bars can also be used, but have less protein in them )  (Disclaimer: Dietary supplements rarely have their listed ingredients and the amount of each verified by a third party other. Sometimes they give verification for their claims to be GMO and gluten free and to be organic. However, even such verifications as these may still be untrustworthy.)    Or can have 4 oz of baked, broiled or grilled chicken, turkey or fish, or 10 egg whites. · Make sure that fiber intake is at least 22 grams per day. Do this by either eating 12 tablespoons of the original, plain Fiber One cereal every day or 4 tablespoons of wheat dextrin powder (Benefiber or a generic brand) every day. Work up to this amount slowly by starting with only one-eighth to one-fourth of the target amount and then adding another one-eighth to one-fourth every one or two weeks until reaching the target. · Take one multivitamin every day    Targets:  · Limit calorie intake to 1400 calories/day  · Walk 30 minutes daily or equivalent (~210 min/wk)  · Avoid eating 2 hours within bedtime. Tips:  · Do not eat outside of the dining room or the kitchen  · Do not eat while watching TV, videos, working on the computer or using a smart phone  · Do not eat food out of a multi-serving bag or container. · Establish 6 hours of food-free \"time-out\" periods (times you don't eat) each day. No period can be less than 1 hour long. The periods need to be the same every day for days that are the same (for example, workdays would have one set of food free periods and weekends would have another set of days). These six hours are in addition to the two hours before bedtime and the time spent sleeping. Return to see me in 4 weeks.       Problem 3  - Hypertension  HPI   - Noted to have high bp, and had SBP of 158 on repeat test. She is asymptomatic otherwise. Assessment  - uncontrolled  Plan   - Patient states she forgot to take her medicine this morning. She should take medication regularly. If she has FERNANDA, it should be treated as it will help with HTN as well, and weight reduction will help. Low salt diet. Problem 4  - Dyslipidemia   HPI   - noted  8/21. Pt not on any medication. Assessment  - Uncontrolled. Plan   - Need to recheck, can d/w PCP. Weight reduction can help, planned as above. Problem 5  - Prediabetes  HPI   - Noted hba1c of 6, with BGL in 110s. Pt asymtomatic, no recent labwork in system. Patient states she used to check BGL in the past (with mother's glucometers) which were wnl. Assessment  - controlled  Plan   - likely need another hba1c or BGL. Weight reduction will help. Total time spent on encounter: 75 min, >50% of time was spent in counseling and/or coordination of care. Giovanni Hoover MD  Internal Medicine/Obesity Medicine  1/27/2022.

## 2022-01-27 NOTE — PATIENT INSTRUCTIONS
Rules:  · Count every calorie every day  · Limit sweets to one day per month  · Limit chips/crackers/pretzels/nuts/popcorn to 100 nannette/day  · Eliminate all sugar sweetened beverages (including fruit juice)  · Limit restaurants (including fast food and food from a convenience store) to one time every two weeks while in town    Requirements:  · Make sure protein intake is at least 70 grams per day (do not count protein every day; instead spot check your intake every 2-3 weeks and make sure what you think you are getting is close to accurate; consider using a protein shake if needed; these are in the pharmacy section of the stores, not the grocery section; Premier, Pure Protein and Fairlife are relatively inexpensive and taste good to most patients; other options are Nectar, Boost Max, Ensure Max, BeneProtein and GNC lean (which is lactose-free); Nectar fruit, Premier Protein Clear, IsoPure Protein Drink, and Protein 2 O are water-based options; Quest (or Cosco, which is cheaper and is ordered on [address] [address]) and the [place] 1 protein bars can also be used, but have less protein in them )  (Disclaimer: Dietary supplements rarely have their listed ingredients and the amount of each verified by a third party other. Sometimes they give verification for their claims to be GMO and gluten free and to be organic. However, even such verifications as these may still be untrustworthy.)    Or can have 4 oz of baked, broiled or grilled chicken, turkey or fish, or 10 egg whites. · Make sure that fiber intake is at least 22 grams per day. Do this by either eating 12 tablespoons of the original, plain Fiber One cereal every day or 4 tablespoons of wheat dextrin powder (Benefiber or a generic brand) every day. Work up to this amount slowly by starting with only one-eighth to one-fourth of the target amount and then adding another one-eighth to one-fourth every one or two weeks until reaching the target.     · Take one multivitamin every day    Targets:  · Limit calorie intake to 1400 calories/day  · Walk 30 minutes daily or equivalent (~210 min/wk)  · Avoid eating 2 hours within bedtime. Tips:  · Do not eat outside of the dining room or the kitchen  · Do not eat while watching TV, videos, working on the computer or using a smart phone  · Do not eat food out of a multi-serving bag or container. · Establish 6 hours of food-free \"time-out\" periods (times you don't eat) each day. No period can be less than 1 hour long. The periods need to be the same every day for days that are the same (for example, workdays would have one set of food free periods and weekends would have another set of days). These six hours are in addition to the two hours before bedtime and the time spent sleeping. Return to see me in 4 weeks.

## 2022-03-04 ENCOUNTER — OFFICE VISIT (OUTPATIENT)
Dept: NEUROLOGY | Age: 47
End: 2022-03-04

## 2022-03-04 VITALS
BODY MASS INDEX: 45.99 KG/M2 | TEMPERATURE: 97.4 F | DIASTOLIC BLOOD PRESSURE: 115 MMHG | HEART RATE: 97 BPM | SYSTOLIC BLOOD PRESSURE: 149 MMHG | HEIGHT: 67 IN | WEIGHT: 293 LBS | OXYGEN SATURATION: 98 %

## 2022-03-04 DIAGNOSIS — G93.2 PSEUDOTUMOR CEREBRI: ICD-10-CM

## 2022-03-04 DIAGNOSIS — G43.019 INTRACTABLE MIGRAINE WITHOUT AURA AND WITHOUT STATUS MIGRAINOSUS: Primary | ICD-10-CM

## 2022-03-04 PROCEDURE — 99214 OFFICE O/P EST MOD 30 MIN: CPT | Performed by: NURSE PRACTITIONER

## 2022-03-04 RX ORDER — RIMEGEPANT SULFATE 75 MG/75MG
75 TABLET, ORALLY DISINTEGRATING ORAL PRN
Qty: 16 TABLET | Refills: 0 | COMMUNITY
Start: 2022-03-04 | End: 2022-07-08 | Stop reason: SDUPTHER

## 2022-03-04 NOTE — PROGRESS NOTES
[address] [facility]. Madyson Menchaca M.D., F.A.C.P. Ray Herrera, DNP, APRN, ACNS-BC  Katie Saeed. Arti Rodriguez, MSN, APRN-FNP-C  Betsy Castrejon, MSN, APRN-FNP-C  SHIKHA Ayoub, PA-C  Virginia Khalil, MSN, APRN-FNP-C  [address] [facility] [facility]  [facility] [place], [address]  Phone: [phone]  Fax: [phone]       Sully Brandt is a 52 y.o. right handed female     Patient follows for headaches    Onset of headache started in 5th grade. Patient notes having Covid virus back in October 2020 and being hospitalized for 3 days. Recently she was seen in ED for pneumonia. She wascomplaining of a pressure like feeling behind her eyes. She said it feels like it goes across her eyes and she have blurry vision at times that is intermittent. She has not had her eyes examined in years. Patient notes back in 2014 she had a severe migraine and had to go to the ED. 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 She has no deficits from this stroke. She notes no repair of her aneurysm such as coiling or clipping. She was life flighted to a larger hospital from [facility].      Today patient continues to benefit from [address] [place] [address] due to the decrease in headache frequency and pain but has noticed that the Ajovy starts wearing off after the third week of injection. She states that the Nurtec works better than Ubrelvy for acute migraine relief. LP was completed to rule out pseudotumor cerebri opening pressure was normal.  Patient continues on her Diamox 250 mg but taking it daily instead of twice daily due to it making her drowsy. Headache description below.     Description of Headaches:  Location of pain: right-sided unilateral, left-sided unilateral, temporal, frontal, retro-orbital  Radiation of pain?:right-sided unilateral, temporal  Character of pain:burning, pressure, sharp and throbbing  Severity of pain: 10  Accompanying symptoms: nausea, vomiting, sonophobia, photophobia, mental status changes, decreased social functioning, vertigo, lacrimation, rhinorrhea  Prodromal sx?: decreased social functioning, cannot get comfortable  --- include increased yawning, euphoria, depression, irritability, food cravings, constipation, and neck stiffness. Rapidity of onset: sudden  Typical duration of individual headache: 4 days  Frequency of headaches: > 15 headaches/monthly  Are most headaches similar in presentation? yes  Typical precipitants: stress, odors (perfume and tar)  --- menstruation, fasting, lack of sleep     Temporal Pattern of Headaches:  Started having HA's several years ago  Worst time of day: all the time  Awaken from sleep?: yes   Seasonal pattern?: no  Clustering of HA's over time? no  Overall pattern since problem began: stable    Degree of Functional Impairment: severe    Current Use of Meds to Treat HA:  Abortive meds? acetaminophen, NSAIDs (ibuprofen), aspirin/acetaminophen/caffeine, sumatriptan PO, butalbital/caffeine/aspirin, Nurtec, Ubrelvy   Daily use? no  Prophylactic meds? beta-blockers (metropolol), Topamax, amitriptyline, Ajovy     Additional Relevant History:  History of head/neck trauma? no  History of head/neck surgery? no  Family h/o headache problems?  yes - maternal   Use of meds that might worsen HA's (nitrates, exogenous estrogens,    Nifedipine)? no  Exposure to carbon monoxide? no  Substance use: alcohol: mixed drinks    No issues with chewing or swallowing  No chest pain or palpitations  No falls, tripping or stumbling  No incontinence of bowels or bladder  No itching or bruising appreciated  No numbness, tingling or focal arm/leg weakness    ROS is otherwise negative    Objective:     BP (!) 149/115   Pulse 97   Temp 97.4 °F (36.3 °C) (Temporal)   Ht 5' 6.75\" (1.695 m)   Wt (!) 330 lb (149.7 kg)   SpO2 98%   BMI 52.07 kg/m²     General appearance: alert, appears stated age, cooperative and in no distress  Head: normocephalic, without obvious abnormality, atraumatic  Eyes: conjunctivae/corneas clear; no drainage  Neck: no adenopathy, supple, symmetrical, trachea midline   Lungs: clear to auscultation bilaterally  Heart: regular rate and rhythm, S1, S2 normal, no murmur  Abdomen: obese, soft, non-tender; bowel sounds normal  Extremities: normal, atraumatic, no cyanosis or edema  Skin:  color, texture, turgor normal--no rashes or lesions      Mental Status: alert and oriented x 4    Appropriate attention/concentration  Intact fundus of knowledge  Repetition intact  Memories intact    Speech: no dysarthria  Language: no aphasias---reading, writing, repetition, and object identification intact    Cranial Nerves:  I: smell    II: visual acuity     II: visual fields Full    II: pupils KOBE   III,VII: ptosis None   III,IV,VI: extraocular muscles  EOMI without nystagmus   V: mastication Normal   V: facial light touch sensation  Normal   V,VII: corneal reflex     VII: facial muscle function - upper  Normal   VII: facial muscle function - lower Normal   VIII: hearing Normal   IX: soft palate elevation  Normal   IX,X: gag reflex    XI: trapezius strength  5/5   XI: sternocleidomastoid strength 5/5   XI: neck extension strength  5/5   XII: tongue strength  Normal     Motor:  5/5 throughout  Normal bulk and tone  No drift   No abnormal movements    Sensory:  LT normal    Coordination:   FN, FFM and BARBARA normal    Gait:  Normal    DTR:   2+ throughout     Laboratory/Radiology:  ry/Radiology:     Results for [place] (MRN [id number]) as of 3/4/2022 12:16   Ref. Range 10/22/2021 10:20   Appearance, CSF Latest Ref Range: Clear  Clear   RBC, CSF Latest Units: /uL <2000   WBC, CSF Latest Ref Range: 0 - 2 /uL 3 (H)   Neutrophils, CSF Latest Ref Range: 0 - 10 % 0   Monocytes, CSF Latest Ref Range: 10 - 70 % 100 (H)   Color, CSF Unknown Colorless   Tube Number + CELL CT + DIFF-CSF Unknown Tube 3     Opening pressure was 10 cm of water.     All labs personally reviewed at the time of this visit    Assessment:     Migraine without aura in patient with long history of migraine headaches   ---episodic and chronic  ---disability and lost productivity substantial  ---headache lasting 4 to 72 hours   ---2 of the following: throbbing, unilateral headaches, worsening with activity (walking, bending, standing etc), moderate to severe pain  ---associated with at least one of the following: Nausea; photophobia   --- > 15 headache days/month for more than 3 months  ---At least 8 days of headache consistent with migraine  --- on Ajovy and has noticed headaches are decreasing     Preventative headache medications tried: beta-blockers (metropolol), Topamax, amitriptyline, Ajovy   Abortive headache medications tried: acetaminophen, NSAIDs (ibuprofen), aspirin/acetaminophen/caffeine, sumatriptan PO, butalbital/caffeine/aspirin, Nurtec, Ubrelvy     Pseudotumor cerebri  --- retro orbital pressure with occasional blurry vision, headache, photopsia, retrobulbar pain  --- female with obesity   --- on Diamox 250 mg BID but taking it daily due to side effect of drowsiness   --- LP opening pressure normal    Plan:     Therapeutic lifestyle measures may be beneficial for controlling migraine, including good sleep hygiene, routine meal schedules, regular exercise, and managing migraine triggers.     Continue Diamox 250 mg BID    Continue Ajovy 225 mg SQ every 30 days  --- sample given     Patient is self pay so I advised we can provide her with samples if cost is too high    Follow up in 4 months     Call with any questions or concerns      [facility], APRN - CNP  10:40 AM  3/4/2022

## 2022-05-05 ENCOUNTER — OFFICE VISIT (OUTPATIENT)
Dept: INTERNAL MEDICINE | Age: 47
End: 2022-05-05

## 2022-05-05 VITALS
OXYGEN SATURATION: 97 % | HEART RATE: 79 BPM | BODY MASS INDEX: 44.41 KG/M2 | RESPIRATION RATE: 22 BRPM | TEMPERATURE: 98.7 F | SYSTOLIC BLOOD PRESSURE: 153 MMHG | DIASTOLIC BLOOD PRESSURE: 93 MMHG | WEIGHT: 293 LBS | HEIGHT: 68 IN

## 2022-05-05 DIAGNOSIS — G43.019 INTRACTABLE MIGRAINE WITHOUT AURA AND WITHOUT STATUS MIGRAINOSUS: ICD-10-CM

## 2022-05-05 DIAGNOSIS — Z12.4 SCREENING FOR CERVICAL CANCER: ICD-10-CM

## 2022-05-05 DIAGNOSIS — U09.9 POST-COVID SYNDROME: Primary | ICD-10-CM

## 2022-05-05 DIAGNOSIS — G62.9 NEUROPATHY: ICD-10-CM

## 2022-05-05 DIAGNOSIS — Z91.89 AT RISK FOR OBSTRUCTIVE SLEEP APNEA: ICD-10-CM

## 2022-05-05 DIAGNOSIS — D50.8 IRON DEFICIENCY ANEMIA SECONDARY TO INADEQUATE DIETARY IRON INTAKE: ICD-10-CM

## 2022-05-05 DIAGNOSIS — E55.9 VITAMIN D DEFICIENCY: ICD-10-CM

## 2022-05-05 DIAGNOSIS — Z11.59 ENCOUNTER FOR HEPATITIS C SCREENING TEST FOR LOW RISK PATIENT: ICD-10-CM

## 2022-05-05 DIAGNOSIS — R07.81 RIB PAIN: ICD-10-CM

## 2022-05-05 DIAGNOSIS — E66.01 CLASS 3 SEVERE OBESITY DUE TO EXCESS CALORIES WITH SERIOUS COMORBIDITY AND BODY MASS INDEX (BMI) OF 50.0 TO 59.9 IN ADULT (HCC): ICD-10-CM

## 2022-05-05 DIAGNOSIS — Z12.31 BREAST CANCER SCREENING BY MAMMOGRAM: ICD-10-CM

## 2022-05-05 DIAGNOSIS — I10 ESSENTIAL HYPERTENSION: ICD-10-CM

## 2022-05-05 PROBLEM — A41.9 SEPSIS (HCC): Status: RESOLVED | Noted: 2020-10-21 | Resolved: 2022-05-05

## 2022-05-05 PROBLEM — U07.1 PNEUMONIA DUE TO COVID-19 VIRUS: Status: RESOLVED | Noted: 2020-10-20 | Resolved: 2022-05-05

## 2022-05-05 PROBLEM — J12.82 PNEUMONIA DUE TO COVID-19 VIRUS: Status: RESOLVED | Noted: 2020-10-20 | Resolved: 2022-05-05

## 2022-05-05 PROBLEM — E87.20 LACTIC ACIDOSIS: Status: RESOLVED | Noted: 2020-10-21 | Resolved: 2022-05-05

## 2022-05-05 LAB — HBA1C MFR BLD: 5.5 %

## 2022-05-05 PROCEDURE — 83036 HEMOGLOBIN GLYCOSYLATED A1C: CPT | Performed by: INTERNAL MEDICINE

## 2022-05-05 PROCEDURE — 99213 OFFICE O/P EST LOW 20 MIN: CPT | Performed by: INTERNAL MEDICINE

## 2022-05-05 PROCEDURE — 99214 OFFICE O/P EST MOD 30 MIN: CPT | Performed by: INTERNAL MEDICINE

## 2022-05-05 RX ORDER — LIDOCAINE 4 G/G
1 PATCH TOPICAL DAILY
Qty: 30 PATCH | Refills: 0 | Status: SHIPPED | OUTPATIENT
Start: 2022-05-05 | End: 2022-06-04

## 2022-05-05 RX ORDER — LISINOPRIL 10 MG/1
20 TABLET ORAL DAILY
Qty: 60 TABLET | Refills: 2 | Status: SHIPPED
Start: 2022-05-05 | End: 2022-08-25 | Stop reason: SDUPTHER

## 2022-05-05 ASSESSMENT — PATIENT HEALTH QUESTIONNAIRE - PHQ9
4. FEELING TIRED OR HAVING LITTLE ENERGY: 3
1. LITTLE INTEREST OR PLEASURE IN DOING THINGS: 1
SUM OF ALL RESPONSES TO PHQ QUESTIONS 1-9: 15
SUM OF ALL RESPONSES TO PHQ QUESTIONS 1-9: 15
9. THOUGHTS THAT YOU WOULD BE BETTER OFF DEAD, OR OF HURTING YOURSELF: 0
SUM OF ALL RESPONSES TO PHQ QUESTIONS 1-9: 15
8. MOVING OR SPEAKING SO SLOWLY THAT OTHER PEOPLE COULD HAVE NOTICED. OR THE OPPOSITE, BEING SO FIGETY OR RESTLESS THAT YOU HAVE BEEN MOVING AROUND A LOT MORE THAN USUAL: 3
6. FEELING BAD ABOUT YOURSELF - OR THAT YOU ARE A FAILURE OR HAVE LET YOURSELF OR YOUR FAMILY DOWN: 1
2. FEELING DOWN, DEPRESSED OR HOPELESS: 2
7. TROUBLE CONCENTRATING ON THINGS, SUCH AS READING THE NEWSPAPER OR WATCHING TELEVISION: 0
SUM OF ALL RESPONSES TO PHQ QUESTIONS 1-9: 15
3. TROUBLE FALLING OR STAYING ASLEEP: 2
5. POOR APPETITE OR OVEREATING: 3
10. IF YOU CHECKED OFF ANY PROBLEMS, HOW DIFFICULT HAVE THESE PROBLEMS MADE IT FOR YOU TO DO YOUR WORK, TAKE CARE OF THINGS AT HOME, OR GET ALONG WITH OTHER PEOPLE: 1
SUM OF ALL RESPONSES TO PHQ9 QUESTIONS 1 & 2: 3

## 2022-05-05 ASSESSMENT — ENCOUNTER SYMPTOMS
ALLERGIC/IMMUNOLOGIC NEGATIVE: 1
SHORTNESS OF BREATH: 1
EYES NEGATIVE: 1
CHEST TIGHTNESS: 1
GASTROINTESTINAL NEGATIVE: 1

## 2022-05-05 NOTE — PROGRESS NOTES
Sony Woods (:  1975) is a 52 y.o. female , Established patient, here for evaluation of the following chief complaint(s):    Shortness of Breath (still suzanne on), Obesity (did see weight loss ), Chest Pain (still has the rib pain on both sides when she takes a deep breath), Leg Pain (a little pain in the right leg, muscle pain), Toe Pain (sometimes feels like she is stepping on a bed of needles), and Hypertension         ASSESSMENT/PLAN:  1. Post-COVID syndrome  -     CT CHEST HIGH RESOLUTION; Future  -Pulm infection in 2020, persistent dyspnea since then and gradual decrease in exercise tolerance, seen by pulmonology who ordered high-resolution CT, not done by patient, repeat ordered by myself, looking for fibrosis  2. Rib pain  -     lidocaine 4 % external patch; Place 1 patch onto the skin daily, TransDERmal, DAILY Starting Thu 2022, Until Sat 2022, For 30 days, Disp-30 patch, R-0, Normal  -Likely linked to chronic dyspnea and post-COVID syndrome as conservative measures have not worked  3. Essential hypertension  -     lisinopril (PRINIVIL;ZESTRIL) 10 MG tablet; Take 2 tablets by mouth daily, Disp-60 tablet, R-2Normal  -     POCT glycosylated hemoglobin (Hb A1C)  -Persistently high since last visit in 2021, increase lisinopril to 20 mg daily and continue hydrochlorothiazide as previously  4. Intractable migraine without aura and without status migrainosus-stable on meds from neurology such as Ajovy,urelvy, Nurtec, Diamox  5. At risk for obstructive sleep apnea-not attended sleep study, repeat ordered  6. Vitamin D deficiency-levels ordered  7. Class 3 severe obesity due to excess calories with serious comorbidity and body mass index (BMI) of 50.0 to 59.9 in adult (HCC)-seen by bariatrics, does not want to count calories, but willing for portion control, has started eating smaller portions  8.  Iron deficiency anemia secondary to inadequate dietary iron intake  - Vitamin B12 & Folate; Future  -     CBC with Auto Differential; Future  -     Path Review, Smear; Future  -     Iron and TIBC; Future  -     Ferritin; Future  9. 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 Encounter for hepatitis C screening test for low risk patient  -     Hepatitis C Antibody; Future  11. Screening for cervical cancer-referral made to OB/GYN for Pap smear  12. Breast cancer screening by mammogram  -     [facility] CORDELL DIGITAL SCREEN BILATERAL PER PROTOCOL; Future       RTC:  Return in about 3 months (around 2022) for Follow up visit, PCP, BP check. I have reviewed my findings and recommendations with Neto Sinha and Dr. Jonatan Umana.     Dr. Savannah Borges, PGY- 2  Resident Physician  [facility]       SUBJECTIVE/OBJECTIVE:    Previous Imp Visits:  3/4/22- Office Visit with Neurology- contineu diamox 250mg BID and Ajovy SQ inj every 30 days, f/u in 4 months as stable    22- Office visit with Dr Lynne Nance ( bariatrics) - limit calorie intake, walk 30 mins per day     22- did not attend sleep study    21- Office Visit with Me  Rib pain- likely lumbar strain, conservative mgmt , lidocaine patch+ heating pad, no fall, recheck Vit D  ASOM right ear- angry looking TM on right side, augmentin for 7 days  Pap smear- referral to OBGYn  Obesity- referral to Dr Lynne Nance  HTN- stable, no concerns  Venous stasis- improved  Sleep apnea- sleep study in 2022  SOB/- Seen by Cheryl Jordan recently, spiriva given, CT Chest ordered,   Intractable migraine/Pseudotumor cerebri- underwent LP, improved from 15 to 8 days/ month, improving with Ajovy, urelvy,nurtec  HCM-   Flu shot received  Hx of COVID in Oct 2020 which was very bad  Mammogram -  clear  Pap smear- last  , needs referral   Cscope 2018- chronic colitis, gastritis    HPI:   Pain at where ribs meet spine - throughout the day , worse with deep breath , no falls/trauma, not taking Vitamin D ,not get XR or CT     ASOM resolved    Neuropathy- Started April 2021,2 episodes, tingling, numbness in hands and feet - glove and stocking pattern     Obesity- does not want to count calories but is eating smaller portions, does NOT want surgery , cannot exercise due to dyspnea , has fam members who failed surgery , weight problems were worse since COVID and cannot exercise after COVID     Post COVID persistent dyspnea on exertion - on dulera , hx of asthma and bronchodilator response, Pulm wanted CT for post COVID fibrosis, spiriva added     Venous stasis- stable    HTN- 152/95 mm Hg today, taken pills in morning  A1c 5.5, LDL 92    Intractable migraine/ Pseudotumor cerebri - improved on Ajovy , urelvy, nurtec , following with Neurology     Redo labs, check b12,folate, get HRCT, get mammogram/pap smear, follow up in 3 months , incr lisinopril to 20mg due to uncontrolled BP       Med Refills: Yes    Key points to note:  -Not insured by Social Work on case and pt given Modesto Zamora and registered for PAP program and Medicaid    Health Maintenance/Social Determinants:   -Pap smear- referral made to OBGYN, not called her   Mammogram -normal in 2015, repeat ordered  COVID infection in Oct 2020, not bene boostered  Cscope- 2018 , chronic colitis gastritis     Review of Systems   Constitutional: Negative. HENT: Negative. Eyes: Negative. Respiratory: Positive for chest tightness and shortness of breath. Cardiovascular: Negative. Gastrointestinal: Negative. Endocrine: Negative. Genitourinary: Negative. Musculoskeletal:        Pain in ribs where they meet spine especially on deep breaths   Skin: Negative. Allergic/Immunologic: Negative. Neurological: Negative. Hematological: Negative. Psychiatric/Behavioral: Negative.         PMHx:  has a past medical history of Anxiety, Blood transfusion, Chronic fatigue, Chronic fatigue, Class 3 severe obesity due to excess calories with serious comorbidity and body mass index (BMI) of 50.0 to 59.9 in adult [facility]), Hx of blood clots, Hypertriglyceridemia, Iron deficiency anemia due to chronic blood loss, Knee pain, bilateral, Leg pain, Low back pain, Migraine headache without aura, Morbid obesity (HCC), Muscle cramps, Perennial allergic rhinitis, Prediabetes, Superficial thrombophlebitis of right leg, Ulcerative colitis ([address] and Vertigo. Allergies   Allergen Reactions    Aspirin Shortness Of Breath and Nausea And Vomiting    Coconut Oil Anaphylaxis    Ibuprofen Other (See Comments)     Trouble breathing      Iodides Shortness Of Breath    Motrin [Ibuprofen Micronized] Shortness Of Breath    Robitussin (Alcohol Free) [Guaifenesin] Other (See Comments)     States causes worse cough    Codeine Nausea And Vomiting    Iodine Rash    Tramadol Nausea Only        PSHx:  has a past surgical history that includes Adenoidectomy.       OBYGN Hx: Regular , 5-7 days , no excessive pain or bleeding  Used OCP last from [id number] , no children     Sexual Hx: Sexually active with  , no hx of STI     Sexual Hx:   Social History     Substance and Sexual Activity   Sexual Activity Yes    Partners: Male, Female       Social Hx: Occupation- Reservation coordinator   Social History     Tobacco History     Smoking Status  Never Smoker    Smokeless Tobacco Use  Never Used          Alcohol History     Alcohol Use Status  Yes Comment  occasional wine coolers          Drug Use     Drug Use Status  No          Sexual Activity     Sexually Active  Yes Partners  Male, Female                 Fam Hx:   Family History   Problem Relation Age of Onset    High Blood Pressure Mother     Stroke Mother     Clotting Disorder Mother         Vague Hx of blood clots on blood thinners    High Blood Pressure Father     Clotting Disorder Father         Vague hx of blood clots on blood thinners  Cancer Sister 35        VS:   Vitals:    05/05/22 1343 05/05/22 1357 05/05/22 1433   BP: (!) 154/98 (!) 152/95 (!) 153/93   Site: Right Upper Arm  Left Upper Arm   Pulse: 79     Resp: 22     Temp: 98.7 °F (37.1 °C)     SpO2: 97%     Weight: (!) 338 lb (153.3 kg)     Height: 5' 8\" (1.727 m)       Physical Exam  Constitutional:       Appearance: She is obese. HENT:      Head: Normocephalic and atraumatic. Eyes:      Pupils: Pupils are equal, round, and reactive to light. Cardiovascular:      Rate and Rhythm: Normal rate and regular rhythm. Pulses: Normal pulses. Heart sounds: Normal heart sounds. Pulmonary:      Effort: Pulmonary effort is normal.      Breath sounds: Normal breath sounds. Abdominal:      General: Bowel sounds are normal.      Palpations: Abdomen is soft. Musculoskeletal:         General: Normal range of motion. Skin:     Capillary Refill: Capillary refill takes less than 2 seconds. Neurological:      General: No focal deficit present. Mental Status: She is oriented to person, place, and time. Psychiatric:         Mood and Affect: Mood normal.         Behavior: Behavior normal.         Thought Content: Thought content normal.         Judgment: Judgment normal.             On this date 5/5/2022 I have spent 20 minutes reviewing previous notes, test results and face to face with the patient discussing the diagnosis and importance of compliance with the treatment plan as well as documenting on the day of the visit. An electronic signature was used to authenticate this note.     --Stephen Diego MD

## 2022-05-05 NOTE — PATIENT INSTRUCTIONS
We encourage you to get a vitamin D level. Start taking vitamin D supplements. For your neuropathy/tingling/numbness, we are ordering a bunch of blood tests. Please get them done as soon as possible. We will inform you of the results. Continue to portion control and alter your diet to lose weight. Aim for 1 pound weight loss per week. For your shortness of breath, we are going to order a CT scan of your lungs. Please check your phone/voicemail/mail for scheduling of said CAT scan. Your blood pressure has been persistently high for the past 3 months. We are increasing your lisinopril to 20 mg daily. Please take this new prescription. Meds for your migraine as per neurology. We are going to order a mammogram,We will also put in a referral to OB/GYN for a Pap smear. Again please check your phone for scheduling messages/voicemail. We will see you back in 3 months to see how you are doing.   Dr. Mary Thomas

## 2022-05-05 NOTE — PROGRESS NOTES
[facility]    Attending Physician Statement  I have discussed the case, including pertinent history and exam findings with the resident physician. I agree with the assessment, plan and orders as documented by the resident. I have reviewed all pertinent PMHx, PSHx, FamHx, SocialHx, medications, and allergies and updated history as appropriate. Patient here for routine follow up of medical problems. 1. Neuropathy of hands and feet. a1c 5.5 (with microcytic anemia). Will obtain vitamin b12, folate, irone panel. 2. Obesity - currently doing better with portion control. Exercise tolerance limited because of #3  3. Hx of covid infection with persistent shortness of breath. Patient referred to Dr Shorty Rand and there was concern for fibrosis post covid. HRCT ordered but not done - will reorder today. Appears to have pleuritic chest pain  4. HTN remains above goal since January 2022 - increase lisinopril and monitor renal function  5. Pseudotumor cerebri and migranes are well controlled. Continue medications and follow up with Neurology  6. CHERELLE on supplementation   7. Cervical, breast, colon cancer screening    Remainder of medical problems as per resident note. Max Hummel MD  5/5/2022 2:23 PM

## 2022-05-19 ENCOUNTER — HOSPITAL ENCOUNTER (OUTPATIENT)
Dept: CT IMAGING | Age: 47
Discharge: HOME | End: 2022-05-19

## 2022-05-19 ENCOUNTER — HOSPITAL ENCOUNTER (OUTPATIENT)
Age: 47
Discharge: HOME | End: 2022-05-19

## 2022-05-19 ENCOUNTER — HOSPITAL ENCOUNTER (OUTPATIENT)
Dept: MAMMOGRAPHY | Age: 47
Discharge: HOME | End: 2022-05-21

## 2022-05-19 VITALS — WEIGHT: 293 LBS | HEIGHT: 68 IN | BODY MASS INDEX: 44.41 KG/M2

## 2022-05-19 DIAGNOSIS — Z11.59 ENCOUNTER FOR HEPATITIS C SCREENING TEST FOR LOW RISK PATIENT: ICD-10-CM

## 2022-05-19 DIAGNOSIS — Z12.31 BREAST CANCER SCREENING BY MAMMOGRAM: ICD-10-CM

## 2022-05-19 DIAGNOSIS — D50.8 IRON DEFICIENCY ANEMIA SECONDARY TO INADEQUATE DIETARY IRON INTAKE: ICD-10-CM

## 2022-05-19 DIAGNOSIS — U09.9 POST-COVID SYNDROME: ICD-10-CM

## 2022-05-19 DIAGNOSIS — R07.81 RIB PAIN: ICD-10-CM

## 2022-05-19 LAB
BASOPHILS ABSOLUTE: 0.05 E9/L (ref 0–0.2)
BASOPHILS RELATIVE PERCENT: 0.5 % (ref 0–2)
EOSINOPHILS ABSOLUTE: 0.11 E9/L (ref 0.05–0.5)
EOSINOPHILS RELATIVE PERCENT: 1.1 % (ref 0–6)
FERRITIN: 32 NG/ML
FOLATE: 8.7 NG/ML (ref 4.8–24.2)
HCT VFR BLD CALC: 43.6 % (ref 34–48)
HEMOGLOBIN: 12.7 G/DL (ref 11.5–15.5)
IMMATURE GRANULOCYTES #: 0.04 E9/L
IMMATURE GRANULOCYTES %: 0.4 % (ref 0–5)
IRON SATURATION: 9 % (ref 15–50)
IRON: 27 MCG/DL (ref 37–145)
LYMPHOCYTES ABSOLUTE: 1.79 E9/L (ref 1.5–4)
LYMPHOCYTES RELATIVE PERCENT: 18.4 % (ref 20–42)
MCH RBC QN AUTO: 22.8 PG (ref 26–35)
MCHC RBC AUTO-ENTMCNC: 29.1 % (ref 32–34.5)
MCV RBC AUTO: 78.1 FL (ref 80–99.9)
MONOCYTES ABSOLUTE: 0.68 E9/L (ref 0.1–0.95)
MONOCYTES RELATIVE PERCENT: 7 % (ref 2–12)
NEUTROPHILS ABSOLUTE: 7.07 E9/L (ref 1.8–7.3)
NEUTROPHILS RELATIVE PERCENT: 72.6 % (ref 43–80)
PDW BLD-RTO: 17.8 FL (ref 11.5–15)
PLATELET # BLD: 390 E9/L (ref 130–450)
PMV BLD AUTO: 11.1 FL (ref 7–12)
RBC # BLD: 5.58 E12/L (ref 3.5–5.5)
TOTAL IRON BINDING CAPACITY: 317 MCG/DL (ref 250–450)
VITAMIN B-12: 511 PG/ML (ref 211–946)
VITAMIN D 25-HYDROXY: 11 NG/ML (ref 30–100)
WBC # BLD: 9.7 E9/L (ref 4.5–11.5)

## 2022-05-19 PROCEDURE — 83540 ASSAY OF IRON: CPT

## 2022-05-19 PROCEDURE — 82306 VITAMIN D 25 HYDROXY: CPT

## 2022-05-19 PROCEDURE — 82728 ASSAY OF FERRITIN: CPT

## 2022-05-19 PROCEDURE — 85025 COMPLETE CBC W/AUTO DIFF WBC: CPT

## 2022-05-19 PROCEDURE — 82607 VITAMIN B-12: CPT

## 2022-05-19 PROCEDURE — 71250 CT THORAX DX C-: CPT

## 2022-05-19 PROCEDURE — 83550 IRON BINDING TEST: CPT

## 2022-05-19 PROCEDURE — 82746 ASSAY OF FOLIC ACID SERUM: CPT

## 2022-05-19 PROCEDURE — 77067 SCR MAMMO BI INCL CAD: CPT

## 2022-05-19 PROCEDURE — 86803 HEPATITIS C AB TEST: CPT

## 2022-05-19 PROCEDURE — 36415 COLL VENOUS BLD VENIPUNCTURE: CPT

## 2022-05-20 LAB
HEPATITIS C ANTIBODY INTERPRETATION: NORMAL
PATHOLOGIST REVIEW: NORMAL

## 2022-07-08 ENCOUNTER — OFFICE VISIT (OUTPATIENT)
Dept: NEUROLOGY | Age: 47
End: 2022-07-08

## 2022-07-08 VITALS
TEMPERATURE: 97.3 F | DIASTOLIC BLOOD PRESSURE: 101 MMHG | SYSTOLIC BLOOD PRESSURE: 181 MMHG | HEART RATE: 87 BPM | OXYGEN SATURATION: 96 %

## 2022-07-08 DIAGNOSIS — G43.019 INTRACTABLE MIGRAINE WITHOUT AURA AND WITHOUT STATUS MIGRAINOSUS: Primary | ICD-10-CM

## 2022-07-08 DIAGNOSIS — G93.2 PSEUDOTUMOR CEREBRI: ICD-10-CM

## 2022-07-08 PROCEDURE — 99214 OFFICE O/P EST MOD 30 MIN: CPT | Performed by: NURSE PRACTITIONER

## 2022-07-08 RX ORDER — ACETAZOLAMIDE 250 MG/1
250 TABLET ORAL DAILY
Qty: 30 TABLET | Refills: 11 | Status: SHIPPED
Start: 2022-07-08 | End: 2022-07-19 | Stop reason: ALTCHOICE

## 2022-07-08 RX ORDER — RIMEGEPANT SULFATE 75 MG/75MG
75 TABLET, ORALLY DISINTEGRATING ORAL PRN
Qty: 16 TABLET | Refills: 0 | COMMUNITY
Start: 2022-07-08

## 2022-07-08 NOTE — PROGRESS NOTES
[facility]. Freedom Paris M.D., F.A.C.P. Yesi Mtz, APRIL, APRN, ACNS-BC  Williams Landaverde. Manav Sharp, MSN, APRN-FNP-C  Rosibel Bullock, MSN, APRN-FNP-C  SHIKHA Simons, PA-C  Maria Fernanda De La O, MSN, APRN-FNP-C  [address] [place] [address]  [place], [address] [place]ira [place]  Phone: [phone]  Fax: [phone]       Mell Steward is a 52 y.o. right handed female     Patient follows for headaches    Onset of headache started in 5th grade. Patient notes having Covid virus back in October 2020 and being hospitalized for 3 days. Recently she was seen in ED for pneumonia. She wascomplaining of a pressure like feeling behind her eyes. She said it feels like it goes across her eyes and she have blurry vision at times that is intermittent. She has not had her eyes examined in years. Patient notes back in 2014 she had a severe migraine and had to go to the ED. 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 She has no deficits from this stroke. She notes no repair of her aneurysm such as coiling or clipping. She was life flighted to a larger hospital from [facility].      Benefit from [address] due to the decrease in headache frequency and pain but has noticed that the Ajovy starts wearing off after the third week of injection. She states that the Nurtec works better than Ubrelvy for acute migraine relief. LP was completed to rule out pseudotumor cerebri opening pressure was normal.  Patient continues on her Diamox 250 mg but taking it daily instead of twice daily due to it making her drowsy. She has complaints of left posterior head pain and tenderness with a lump. Pain radiates to the side of her head into her left cheek. Headache description below.     Description of Headaches:  Location of pain: right-sided unilateral, left-sided unilateral, temporal, frontal, retro-orbital  Radiation of pain?:right-sided unilateral, temporal  Character of pain:burning, pressure, sharp and throbbing  Severity of pain: 10  Accompanying symptoms: nausea, vomiting, sonophobia, photophobia, mental status changes, decreased social functioning, vertigo, lacrimation, rhinorrhea  Prodromal sx?: decreased social functioning, cannot get comfortable  --- include increased yawning, euphoria, depression, irritability, food cravings, constipation, and neck stiffness. Rapidity of onset: sudden  Typical duration of individual headache: 4 days  Frequency of headaches: > 15 headaches/monthly  Are most headaches similar in presentation? yes  Typical precipitants: stress, odors (perfume and tar)  --- menstruation, fasting, lack of sleep     Temporal Pattern of Headaches:  Started having HA's several years ago  Worst time of day: all the time  Awaken from sleep?: yes   Seasonal pattern?: no  Clustering of HA's over time? no  Overall pattern since problem began: stable    Degree of Functional Impairment: severe    Current Use of Meds to Treat HA:  Abortive meds? acetaminophen, NSAIDs (ibuprofen), aspirin/acetaminophen/caffeine, sumatriptan PO, butalbital/caffeine/aspirin, Nurtec, Ubrelvy   Daily use? no  Prophylactic meds? beta-blockers (metropolol), Topamax, amitriptyline, Ajovy     Additional Relevant History:  History of head/neck trauma? no  History of head/neck surgery? no  Family h/o headache problems?  yes - maternal   Use of meds that might worsen HA's (nitrates, exogenous estrogens,    Nifedipine)? no  Exposure to carbon monoxide? no  Substance use: alcohol: mixed drinks    No issues with chewing or swallowing  No chest pain or palpitations  No falls, tripping or stumbling  No incontinence of bowels or bladder  No itching or bruising appreciated  No numbness, tingling or focal arm/leg weakness    ROS is otherwise negative    Objective:     BP (!) 181/101 (Site: Right Upper Arm)   Pulse 87   Temp 97.3 °F (36.3 °C)   SpO2 96%     General appearance: alert, appears stated age, cooperative and in no distress  Head: normocephalic, without obvious abnormality, atraumatic, bump to left posterior of head tender to touch  Eyes: conjunctivae/corneas clear; no drainage  Neck: no adenopathy, supple, symmetrical, trachea midline   Lungs: clear to auscultation bilaterally  Heart: regular rate and rhythm, S1, S2 normal, no murmur  Abdomen: obese, soft, non-tender; bowel sounds normal  Extremities: normal, atraumatic, no cyanosis or edema  Skin:  color, texture, turgor normal--no rashes or lesions      Mental Status: alert and oriented x 4    Appropriate attention/concentration  Intact fundus of knowledge  Repetition intact  Memories intact    Speech: no dysarthria  Language: no aphasias---reading, writing, repetition, and object identification intact    Cranial Nerves:  I: smell    II: visual acuity     II: visual fields Full    II: pupils KOBE   III,VII: ptosis None   III,IV,VI: extraocular muscles  EOMI without nystagmus   V: mastication Normal   V: facial light touch sensation  Normal   V,VII: corneal reflex     VII: facial muscle function - upper  Normal   VII: facial muscle function - lower Normal   VIII: hearing Normal   IX: soft palate elevation  Normal   IX,X: gag reflex    XI: trapezius strength  5/5   XI: sternocleidomastoid strength 5/5   XI: neck extension strength  5/5   XII: tongue strength  Normal     Motor:  5/5 throughout  Normal bulk and tone  No drift   No abnormal movements    Sensory:  LT normal    Coordination:   FN, FFM and BARBARA normal    Gait:  Normal    DTR:   2+ throughout     Laboratory/Radiology:  ry/Radiology:     Results for Lindbergh Collet (MRN [id number]) as of 3/4/2022 12:16   Ref.  Range 10/22/2021 10:20   Appearance, CSF Latest Ref Range: Clear  Clear   RBC, CSF Latest Units: /uL <2000   WBC, CSF Latest Ref Range: 0 - 2 /uL 3 (H)   Neutrophils, CSF Latest Ref Range: 0 - 10 % 0   Monocytes, CSF Latest Ref Range: 10 - 70 % 100 (H)   Color, CSF Unknown Colorless   Tube Number + CELL CT + DIFF-CSF Unknown Tube 3     Opening pressure was 10 cm of water. All labs personally reviewed at the time of this visit    Assessment:     Migraine without aura in patient with long history of migraine headaches   ---episodic and chronic  ---disability and lost productivity substantial  ---headache lasting 4 to 72 hours   ---2 of the following: throbbing, unilateral headaches, worsening with activity (walking, bending, standing etc), moderate to severe pain  ---associated with at least one of the following: Nausea; photophobia   --- > 15 headache days/month for more than 3 months  ---At least 8 days of headache consistent with migraine  --- on Ajovy and has noticed headaches are decreasing     Preventative headache medications tried: beta-blockers (metropolol), Topamax, amitriptyline, Ajovy   Abortive headache medications tried: acetaminophen, NSAIDs (ibuprofen), aspirin/acetaminophen/caffeine, sumatriptan PO, butalbital/caffeine/aspirin, Nurtec, Ubrelvy     Pseudotumor cerebri  --- retro orbital pressure with occasional blurry vision, headache, photopsia, retrobulbar pain  --- female with obesity   --- on Diamox 250 mg BID but taking it daily due to side effect of drowsiness   --- LP opening pressure normal    Referred pt to PCP or urgent care for her left posterior head pain appears to be a cyst that has not come to head. Plan:     Therapeutic lifestyle measures may be beneficial for controlling migraine, including good sleep hygiene, routine meal schedules, regular exercise, and managing migraine triggers.     Continue Diamox 250 mg BID but takes daily     Continue Ajovy 225 mg SQ every 30 days  --- sample given   --- needs samples     Samples of Nurtec given     Patient is self pay so I advised we can provide her with samples if cost is too high    Follow up in 4 months     Call with any questions or concerns      Monster Santana, APRN - CNP  2:51 PM  7/8/2022

## 2022-07-19 ENCOUNTER — TELEPHONE (OUTPATIENT)
Dept: INTERNAL MEDICINE | Age: 47
End: 2022-07-19

## 2022-07-19 ENCOUNTER — HOSPITAL ENCOUNTER (OUTPATIENT)
Age: 47
Setting detail: OBSERVATION
Discharge: HOME | End: 2022-07-21
Attending: EMERGENCY MEDICINE | Admitting: STUDENT IN AN ORGANIZED HEALTH CARE EDUCATION/TRAINING PROGRAM

## 2022-07-19 ENCOUNTER — APPOINTMENT (OUTPATIENT)
Dept: GENERAL RADIOLOGY | Age: 47
End: 2022-07-19

## 2022-07-19 DIAGNOSIS — U07.1 COVID-19: Primary | ICD-10-CM

## 2022-07-19 DIAGNOSIS — R06.00 DYSPNEA, UNSPECIFIED TYPE: ICD-10-CM

## 2022-07-19 LAB
ALBUMIN SERPL-MCNC: 3.8 G/DL (ref 3.5–5.2)
ALP BLD-CCNC: 111 U/L (ref 35–104)
ALT SERPL-CCNC: 15 U/L (ref 0–32)
ANION GAP SERPL CALCULATED.3IONS-SCNC: 8 MMOL/L (ref 7–16)
APTT: 29 SEC (ref 24.5–35.1)
AST SERPL-CCNC: 22 U/L (ref 0–31)
BASOPHILS ABSOLUTE: 0.04 E9/L (ref 0–0.2)
BASOPHILS RELATIVE PERCENT: 0.5 % (ref 0–2)
BETA-HYDROXYBUTYRATE: 0.12 MMOL/L (ref 0.02–0.27)
BILIRUB SERPL-MCNC: <0.2 MG/DL (ref 0–1.2)
BUN BLDV-MCNC: 12 MG/DL (ref 6–20)
C-REACTIVE PROTEIN: 8.3 MG/DL (ref 0–0.4)
CALCIUM SERPL-MCNC: 8.5 MG/DL (ref 8.6–10.2)
CHLORIDE BLD-SCNC: 103 MMOL/L (ref 98–107)
CO2: 27 MMOL/L (ref 22–29)
CREAT SERPL-MCNC: 1 MG/DL (ref 0.5–1)
D DIMER: 263 NG/ML DDU
EOSINOPHILS ABSOLUTE: 0.06 E9/L (ref 0.05–0.5)
EOSINOPHILS RELATIVE PERCENT: 0.7 % (ref 0–6)
GFR AFRICAN AMERICAN: >60
GFR NON-AFRICAN AMERICAN: >60 ML/MIN/1.73
GLUCOSE BLD-MCNC: 120 MG/DL (ref 74–99)
HCG QUALITATIVE: NEGATIVE
HCT VFR BLD CALC: 33.9 % (ref 34–48)
HEMOGLOBIN: 10.1 G/DL (ref 11.5–15.5)
IMMATURE GRANULOCYTES #: 0.04 E9/L
IMMATURE GRANULOCYTES %: 0.5 % (ref 0–5)
INR BLD: 1.2
LACTATE DEHYDROGENASE: 222 U/L (ref 135–214)
LACTIC ACID, SEPSIS: 1.2 MMOL/L (ref 0.5–1.9)
LYMPHOCYTES ABSOLUTE: 1.4 E9/L (ref 1.5–4)
LYMPHOCYTES RELATIVE PERCENT: 16.1 % (ref 20–42)
MAGNESIUM: 1.9 MG/DL (ref 1.6–2.6)
MCH RBC QN AUTO: 22.2 PG (ref 26–35)
MCHC RBC AUTO-ENTMCNC: 29.8 % (ref 32–34.5)
MCV RBC AUTO: 74.7 FL (ref 80–99.9)
MONOCYTES ABSOLUTE: 1.07 E9/L (ref 0.1–0.95)
MONOCYTES RELATIVE PERCENT: 12.3 % (ref 2–12)
NEUTROPHILS ABSOLUTE: 6.09 E9/L (ref 1.8–7.3)
NEUTROPHILS RELATIVE PERCENT: 69.9 % (ref 43–80)
PDW BLD-RTO: 15.7 FL (ref 11.5–15)
PH VENOUS: 7.38 (ref 7.35–7.45)
PLATELET # BLD: 353 E9/L (ref 130–450)
PMV BLD AUTO: 11 FL (ref 7–12)
POTASSIUM SERPL-SCNC: 3.6 MMOL/L (ref 3.5–5)
PROTHROMBIN TIME: 13.2 SEC (ref 9.3–12.4)
RBC # BLD: 4.54 E12/L (ref 3.5–5.5)
SARS-COV-2, NAAT: DETECTED
SEDIMENTATION RATE, ERYTHROCYTE: 19 MM/HR (ref 0–20)
SODIUM BLD-SCNC: 138 MMOL/L (ref 132–146)
TOTAL PROTEIN: 6.9 G/DL (ref 6.4–8.3)
TROPONIN, HIGH SENSITIVITY: 11 NG/L (ref 0–9)
TROPONIN, HIGH SENSITIVITY: 14 NG/L (ref 0–9)
WBC # BLD: 8.7 E9/L (ref 4.5–11.5)

## 2022-07-19 PROCEDURE — 2580000003 HC RX 258: Performed by: STUDENT IN AN ORGANIZED HEALTH CARE EDUCATION/TRAINING PROGRAM

## 2022-07-19 PROCEDURE — 84484 ASSAY OF TROPONIN QUANT: CPT

## 2022-07-19 PROCEDURE — 84703 CHORIONIC GONADOTROPIN ASSAY: CPT

## 2022-07-19 PROCEDURE — G0378 HOSPITAL OBSERVATION PER HR: HCPCS

## 2022-07-19 PROCEDURE — 6370000000 HC RX 637 (ALT 250 FOR IP): Performed by: STUDENT IN AN ORGANIZED HEALTH CARE EDUCATION/TRAINING PROGRAM

## 2022-07-19 PROCEDURE — 85730 THROMBOPLASTIN TIME PARTIAL: CPT

## 2022-07-19 PROCEDURE — 99219 PR INITIAL OBSERVATION CARE/DAY 50 MINUTES: CPT | Performed by: STUDENT IN AN ORGANIZED HEALTH CARE EDUCATION/TRAINING PROGRAM

## 2022-07-19 PROCEDURE — 99285 EMERGENCY DEPT VISIT HI MDM: CPT

## 2022-07-19 PROCEDURE — 85025 COMPLETE CBC W/AUTO DIFF WBC: CPT

## 2022-07-19 PROCEDURE — 85378 FIBRIN DEGRADE SEMIQUANT: CPT

## 2022-07-19 PROCEDURE — 83605 ASSAY OF LACTIC ACID: CPT

## 2022-07-19 PROCEDURE — 82800 BLOOD PH: CPT

## 2022-07-19 PROCEDURE — 96361 HYDRATE IV INFUSION ADD-ON: CPT

## 2022-07-19 PROCEDURE — 96372 THER/PROPH/DIAG INJ SC/IM: CPT

## 2022-07-19 PROCEDURE — 83615 LACTATE (LD) (LDH) ENZYME: CPT

## 2022-07-19 PROCEDURE — 87635 SARS-COV-2 COVID-19 AMP PRB: CPT

## 2022-07-19 PROCEDURE — 93005 ELECTROCARDIOGRAM TRACING: CPT | Performed by: EMERGENCY MEDICINE

## 2022-07-19 PROCEDURE — 6370000000 HC RX 637 (ALT 250 FOR IP): Performed by: EMERGENCY MEDICINE

## 2022-07-19 PROCEDURE — 85651 RBC SED RATE NONAUTOMATED: CPT

## 2022-07-19 PROCEDURE — 87040 BLOOD CULTURE FOR BACTERIA: CPT

## 2022-07-19 PROCEDURE — 96360 HYDRATION IV INFUSION INIT: CPT

## 2022-07-19 PROCEDURE — 80053 COMPREHEN METABOLIC PANEL: CPT

## 2022-07-19 PROCEDURE — 94664 DEMO&/EVAL PT USE INHALER: CPT

## 2022-07-19 PROCEDURE — 86140 C-REACTIVE PROTEIN: CPT

## 2022-07-19 PROCEDURE — 2580000003 HC RX 258: Performed by: EMERGENCY MEDICINE

## 2022-07-19 PROCEDURE — 6370000000 HC RX 637 (ALT 250 FOR IP): Performed by: INTERNAL MEDICINE

## 2022-07-19 PROCEDURE — 85610 PROTHROMBIN TIME: CPT

## 2022-07-19 PROCEDURE — 83735 ASSAY OF MAGNESIUM: CPT

## 2022-07-19 PROCEDURE — 71045 X-RAY EXAM CHEST 1 VIEW: CPT

## 2022-07-19 PROCEDURE — 82010 KETONE BODYS QUAN: CPT

## 2022-07-19 PROCEDURE — 6360000002 HC RX W HCPCS: Performed by: STUDENT IN AN ORGANIZED HEALTH CARE EDUCATION/TRAINING PROGRAM

## 2022-07-19 RX ORDER — BUDESONIDE AND FORMOTEROL FUMARATE DIHYDRATE 80; 4.5 UG/1; UG/1
2 AEROSOL RESPIRATORY (INHALATION) 2 TIMES DAILY
Status: DISCONTINUED | OUTPATIENT
Start: 2022-07-19 | End: 2022-07-21 | Stop reason: HOSPADM

## 2022-07-19 RX ORDER — VENLAFAXINE HYDROCHLORIDE 37.5 MG/1
37.5 CAPSULE, EXTENDED RELEASE ORAL NIGHTLY
Status: ON HOLD | COMMUNITY
End: 2022-07-21 | Stop reason: HOSPADM

## 2022-07-19 RX ORDER — ONDANSETRON 4 MG/1
4 TABLET, ORALLY DISINTEGRATING ORAL EVERY 8 HOURS PRN
Status: DISCONTINUED | OUTPATIENT
Start: 2022-07-19 | End: 2022-07-21 | Stop reason: HOSPADM

## 2022-07-19 RX ORDER — FERROUS SULFATE 325(65) MG
325 TABLET ORAL 2 TIMES DAILY WITH MEALS
Status: DISCONTINUED | OUTPATIENT
Start: 2022-07-20 | End: 2022-07-21 | Stop reason: HOSPADM

## 2022-07-19 RX ORDER — ASCORBIC ACID 500 MG
500 TABLET ORAL DAILY
Status: DISCONTINUED | OUTPATIENT
Start: 2022-07-20 | End: 2022-07-21 | Stop reason: HOSPADM

## 2022-07-19 RX ORDER — ONDANSETRON 2 MG/ML
4 INJECTION INTRAMUSCULAR; INTRAVENOUS EVERY 6 HOURS PRN
Status: DISCONTINUED | OUTPATIENT
Start: 2022-07-19 | End: 2022-07-21 | Stop reason: HOSPADM

## 2022-07-19 RX ORDER — HYDROCHLOROTHIAZIDE 25 MG/1
25 TABLET ORAL EVERY MORNING
Status: DISCONTINUED | OUTPATIENT
Start: 2022-07-20 | End: 2022-07-20

## 2022-07-19 RX ORDER — SODIUM CHLORIDE 0.9 % (FLUSH) 0.9 %
5-40 SYRINGE (ML) INJECTION PRN
Status: DISCONTINUED | OUTPATIENT
Start: 2022-07-19 | End: 2022-07-21 | Stop reason: HOSPADM

## 2022-07-19 RX ORDER — ACETAZOLAMIDE 250 MG/1
250 TABLET ORAL NIGHTLY
Status: ON HOLD | COMMUNITY
End: 2022-07-21 | Stop reason: HOSPADM

## 2022-07-19 RX ORDER — ZINC SULFATE 50(220)MG
50 CAPSULE ORAL DAILY
Status: DISCONTINUED | OUTPATIENT
Start: 2022-07-20 | End: 2022-07-21 | Stop reason: HOSPADM

## 2022-07-19 RX ORDER — IPRATROPIUM BROMIDE AND ALBUTEROL SULFATE 2.5; .5 MG/3ML; MG/3ML
1 SOLUTION RESPIRATORY (INHALATION)
Status: DISCONTINUED | OUTPATIENT
Start: 2022-07-20 | End: 2022-07-19 | Stop reason: CLARIF

## 2022-07-19 RX ORDER — ENOXAPARIN SODIUM 100 MG/ML
40 INJECTION SUBCUTANEOUS 2 TIMES DAILY
Status: DISCONTINUED | OUTPATIENT
Start: 2022-07-19 | End: 2022-07-21 | Stop reason: HOSPADM

## 2022-07-19 RX ORDER — ACETAMINOPHEN 650 MG/1
650 SUPPOSITORY RECTAL EVERY 6 HOURS PRN
Status: DISCONTINUED | OUTPATIENT
Start: 2022-07-19 | End: 2022-07-21 | Stop reason: HOSPADM

## 2022-07-19 RX ORDER — CLONIDINE HYDROCHLORIDE 0.1 MG/1
0.1 TABLET ORAL EVERY 8 HOURS PRN
Status: DISCONTINUED | OUTPATIENT
Start: 2022-07-19 | End: 2022-07-21 | Stop reason: HOSPADM

## 2022-07-19 RX ORDER — SODIUM CHLORIDE 0.9 % (FLUSH) 0.9 %
5-40 SYRINGE (ML) INJECTION EVERY 12 HOURS SCHEDULED
Status: DISCONTINUED | OUTPATIENT
Start: 2022-07-19 | End: 2022-07-21 | Stop reason: HOSPADM

## 2022-07-19 RX ORDER — SODIUM CHLORIDE 9 MG/ML
INJECTION, SOLUTION INTRAVENOUS PRN
Status: DISCONTINUED | OUTPATIENT
Start: 2022-07-19 | End: 2022-07-21 | Stop reason: HOSPADM

## 2022-07-19 RX ORDER — ACETAMINOPHEN 325 MG/1
650 TABLET ORAL EVERY 6 HOURS PRN
Status: DISCONTINUED | OUTPATIENT
Start: 2022-07-19 | End: 2022-07-21 | Stop reason: HOSPADM

## 2022-07-19 RX ORDER — ACETAZOLAMIDE 250 MG/1
250 TABLET ORAL DAILY
Status: DISCONTINUED | OUTPATIENT
Start: 2022-07-20 | End: 2022-07-21 | Stop reason: HOSPADM

## 2022-07-19 RX ORDER — 0.9 % SODIUM CHLORIDE 0.9 %
1000 INTRAVENOUS SOLUTION INTRAVENOUS ONCE
Status: COMPLETED | OUTPATIENT
Start: 2022-07-19 | End: 2022-07-19

## 2022-07-19 RX ORDER — LANOLIN ALCOHOL/MO/W.PET/CERES
50 CREAM (GRAM) TOPICAL DAILY
Status: DISCONTINUED | OUTPATIENT
Start: 2022-07-20 | End: 2022-07-21 | Stop reason: HOSPADM

## 2022-07-19 RX ORDER — SODIUM CHLORIDE 9 MG/ML
INJECTION, SOLUTION INTRAVENOUS CONTINUOUS
Status: DISCONTINUED | OUTPATIENT
Start: 2022-07-19 | End: 2022-07-19 | Stop reason: ALTCHOICE

## 2022-07-19 RX ORDER — IPRATROPIUM BROMIDE AND ALBUTEROL SULFATE 2.5; .5 MG/3ML; MG/3ML
1 SOLUTION RESPIRATORY (INHALATION) ONCE
Status: COMPLETED | OUTPATIENT
Start: 2022-07-19 | End: 2022-07-19

## 2022-07-19 RX ORDER — POLYETHYLENE GLYCOL 3350 17 G/17G
17 POWDER, FOR SOLUTION ORAL DAILY PRN
Status: DISCONTINUED | OUTPATIENT
Start: 2022-07-19 | End: 2022-07-21 | Stop reason: HOSPADM

## 2022-07-19 RX ORDER — VENLAFAXINE HYDROCHLORIDE 37.5 MG/1
37.5 CAPSULE, EXTENDED RELEASE ORAL DAILY
Status: DISCONTINUED | OUTPATIENT
Start: 2022-07-20 | End: 2022-07-21 | Stop reason: HOSPADM

## 2022-07-19 RX ORDER — LISINOPRIL 10 MG/1
20 TABLET ORAL DAILY
Status: DISCONTINUED | OUTPATIENT
Start: 2022-07-19 | End: 2022-07-21 | Stop reason: HOSPADM

## 2022-07-19 RX ORDER — VITAMIN B COMPLEX
1000 TABLET ORAL DAILY
Status: DISCONTINUED | OUTPATIENT
Start: 2022-07-20 | End: 2022-07-21 | Stop reason: HOSPADM

## 2022-07-19 RX ORDER — ACETAMINOPHEN 325 MG/1
650 TABLET ORAL ONCE
Status: COMPLETED | OUTPATIENT
Start: 2022-07-19 | End: 2022-07-19

## 2022-07-19 RX ADMIN — CLONIDINE HYDROCHLORIDE 0.1 MG: 0.1 TABLET ORAL at 22:50

## 2022-07-19 RX ADMIN — SODIUM CHLORIDE, PRESERVATIVE FREE 10 ML: 5 INJECTION INTRAVENOUS at 22:49

## 2022-07-19 RX ADMIN — SODIUM CHLORIDE 1000 ML: 9 INJECTION, SOLUTION INTRAVENOUS at 15:45

## 2022-07-19 RX ADMIN — SODIUM CHLORIDE 1000 ML: 9 INJECTION, SOLUTION INTRAVENOUS at 13:47

## 2022-07-19 RX ADMIN — ENOXAPARIN SODIUM 40 MG: 100 INJECTION SUBCUTANEOUS at 22:49

## 2022-07-19 RX ADMIN — IPRATROPIUM BROMIDE AND ALBUTEROL SULFATE 1 AMPULE: 2.5; .5 SOLUTION RESPIRATORY (INHALATION) at 14:03

## 2022-07-19 RX ADMIN — LISINOPRIL 20 MG: 10 TABLET ORAL at 19:29

## 2022-07-19 RX ADMIN — ACETAMINOPHEN 325MG 650 MG: 325 TABLET ORAL at 13:46

## 2022-07-19 ASSESSMENT — ENCOUNTER SYMPTOMS
COUGH: 1
CHEST TIGHTNESS: 0
BACK PAIN: 0
EYE PAIN: 0
SORE THROAT: 0
NAUSEA: 0
WHEEZING: 1
DIARRHEA: 0
VOMITING: 0
EYE DISCHARGE: 0
ABDOMINAL PAIN: 0
SHORTNESS OF BREATH: 1
RHINORRHEA: 1
SINUS PRESSURE: 0

## 2022-07-19 ASSESSMENT — PAIN SCALES - GENERAL
PAINLEVEL_OUTOF10: 0
PAINLEVEL_OUTOF10: 9
PAINLEVEL_OUTOF10: 9
PAINLEVEL_OUTOF10: 7

## 2022-07-19 ASSESSMENT — LIFESTYLE VARIABLES: HOW OFTEN DO YOU HAVE A DRINK CONTAINING ALCOHOL: NEVER

## 2022-07-19 ASSESSMENT — PAIN - FUNCTIONAL ASSESSMENT: PAIN_FUNCTIONAL_ASSESSMENT: 0-10

## 2022-07-19 ASSESSMENT — PAIN DESCRIPTION - LOCATION
LOCATION: BACK
LOCATION: BACK;CHEST;HEAD

## 2022-07-19 NOTE — ED PROVIDER NOTES
Chief complaint:  [place]    HPI history provided by the patient  The patient was in complaining of about 7 days of URI symptoms with bodies, feverish symptoms with chills and sweats and fatigue and body pain and cough and nasal congestion and wheezing and short of breath symptoms are worsened with exertion. States she was at [facility] yesterday and tested positive for COVID. Symptoms worsened today, states that she called her doctor and was told to come into the ER. Her doctor did call ER had a patient arrival giving similar history and requesting patient admission for further treatment evaluation. Patient with a history of hypertension, obesity and possible long COVID syndrome from COVID infection she had over a year ago. Has some baseline respiratory dysfunction, had a sleep study done a few months ago. Patient with no specific treatment at home for symptoms prior to arrival.      Review of Systems   Constitutional:  Positive for chills, diaphoresis, fatigue and fever. HENT:  Positive for congestion and rhinorrhea. Negative for ear pain, sinus pressure and sore throat. Eyes:  Negative for pain and discharge. Respiratory:  Positive for cough, shortness of breath and wheezing. Negative for chest tightness. Cardiovascular:  Negative for chest pain and palpitations. Gastrointestinal:  Negative for abdominal pain, diarrhea, nausea and vomiting. Genitourinary:  Negative for dysuria, flank pain, frequency and urgency. Musculoskeletal:  Positive for arthralgias and myalgias. Negative for back pain, gait problem, joint swelling, neck pain and neck stiffness. Skin:  Negative for rash and wound. Neurological:  Negative for dizziness, seizures, syncope, weakness, light-headedness, numbness and headaches. Hematological:  Negative for adenopathy. All other systems reviewed and are negative. Physical Exam  Vitals and nursing note reviewed.    Constitutional:       General: She is awake. She is not in acute distress. Appearance: She is well-developed. She is morbidly obese. She is not ill-appearing, toxic-appearing or diaphoretic. HENT:      Head: Normocephalic and atraumatic. Nose: Mucosal edema and congestion present. No rhinorrhea. Mouth/Throat:      Pharynx: Oropharynx is clear. Uvula midline. Comments: No trismus or stridor  Eyes:      Pupils: Pupils are equal, round, and reactive to light. Cardiovascular:      Rate and Rhythm: Normal rate and regular rhythm. Heart sounds: Normal heart sounds. No murmur heard. Pulmonary:      Effort: Pulmonary effort is normal. No respiratory distress. Breath sounds: No stridor, decreased air movement or transmitted upper airway sounds. Wheezing present. No decreased breath sounds, rhonchi or rales. Comments: Cough during exam, mild scattered wheeze. No respiratory distress. Chest:      Chest wall: No tenderness. Abdominal:      General: Bowel sounds are normal. There is no distension. Palpations: Abdomen is soft. Tenderness: There is no abdominal tenderness. There is no right CVA tenderness, left CVA tenderness, guarding or rebound. Musculoskeletal:         General: No swelling, tenderness, deformity or signs of injury. Cervical back: Normal range of motion and neck supple. No rigidity. No spinous process tenderness or muscular tenderness. Normal range of motion. Right lower leg: No edema. Left lower leg: No edema. Comments: Arms legs are neurovascular intact. No pretibial edema or calf pain. Skin:     General: Skin is warm and dry. Coloration: Skin is not cyanotic, jaundiced, mottled or pale. Findings: No erythema or rash. Neurological:      General: No focal deficit present. Mental Status: She is alert and oriented to person, place, and time. GCS: GCS eye subscore is 4. GCS verbal subscore is 5. GCS motor subscore is 6.       Cranial Nerves: No cranial nerve deficit. Coordination: Coordination normal.   Psychiatric:         Behavior: Behavior is cooperative. Procedures     [facility]       ED Course as of 07/19/22 1655 Tue Jul 19, 2022 1654 Case discussed with Dr. Maxwell Lesches, detailed overview given, he will admit the patient. [NC]      ED Course User Index  [NC] Mickeal Gambles, DO        EKG Interpretation    Interpreted by emergency department physician    Rhythm: normal sinus   Rate: 79  Axis: normal  Ectopy: none  Conduction: normal  ST Segments: no acute change  T Waves: no acute change  Q Waves: none    Clinical Impression: no acute changes    Mickeal Gambles, DO    ED Course as of 07/19/22 1655 Tue Jul 19, 2022 1654 Case discussed with Dr. Maxwell Lesches, detailed overview given, he will admit the patient. [NC]      ED Course User Index  [NC] Mickeal Gambles, DO       --------------------------------------------- PAST HISTORY ---------------------------------------------  Past Medical History:  has a past medical history of Anxiety, Blood transfusion, Chronic fatigue, Chronic fatigue, Class 3 severe obesity due to excess calories with serious comorbidity and body mass index (BMI) of 50.0 to 59.9 in [facility]), Hx of blood clots, Hypertriglyceridemia, Iron deficiency anemia due to chronic blood loss, Knee pain, bilateral, Leg pain, Low back pain, Migraine headache without aura, Morbid obesity ([address] Muscle cramps, Perennial allergic rhinitis, Prediabetes, Superficial thrombophlebitis of right leg, Ulcerative colitis ([address] and Vertigo. Past Surgical History:  has a past surgical history that includes Adenoidectomy. Social History:  reports that she has never smoked. She has never used smokeless tobacco. She reports current alcohol use. She reports that she does not use drugs.     Family History: family history includes Cancer (age of onset: 35) in her sister; Clotting Disorder in her father and mother; High Blood Pressure in her father and mother; Stroke in her mother. The patients home medications have been reviewed.     Allergies: Aspirin, Coconut oil, Ibuprofen, Iodides, Motrin [ibuprofen micronized], Robitussin (alcohol free) [guaifenesin], Codeine, Iodine, and Tramadol    -------------------------------------------------- RESULTS -------------------------------------------------    LABS:  Results for orders placed or performed during the hospital encounter of 07/19/22   COVID-19, Rapid    Specimen: Nasopharyngeal Swab   Result Value Ref Range    SARS-CoV-2, NAAT DETECTED (A) Not Detected   Lactate, Sepsis   Result Value Ref Range    Lactic Acid, Sepsis 1.2 0.5 - 1.9 mmol/L   CBC with Auto Differential   Result Value Ref Range    WBC 8.7 4.5 - 11.5 E9/L    RBC 4.54 3.50 - 5.50 E12/L    Hemoglobin 10.1 (L) 11.5 - 15.5 g/dL    Hematocrit 33.9 (L) 34.0 - 48.0 %    MCV 74.7 (L) 80.0 - 99.9 fL    MCH 22.2 (L) 26.0 - 35.0 pg    MCHC 29.8 (L) 32.0 - 34.5 %    RDW 15.7 (H) 11.5 - 15.0 fL    Platelets 072 258 - 532 E9/L    MPV 11.0 7.0 - 12.0 fL    Neutrophils % 69.9 43.0 - 80.0 %    Immature Granulocytes % 0.5 0.0 - 5.0 %    Lymphocytes % 16.1 (L) 20.0 - 42.0 %    Monocytes % 12.3 (H) 2.0 - 12.0 %    Eosinophils % 0.7 0.0 - 6.0 %    Basophils % 0.5 0.0 - 2.0 %    Neutrophils Absolute 6.09 1.80 - 7.30 E9/L    Immature Granulocytes # 0.04 E9/L    Lymphocytes Absolute 1.40 (L) 1.50 - 4.00 E9/L    Monocytes Absolute 1.07 (H) 0.10 - 0.95 E9/L    Eosinophils Absolute 0.06 0.05 - 0.50 E9/L    Basophils Absolute 0.04 0.00 - 0.20 E9/L   Comprehensive Metabolic Panel   Result Value Ref Range    Sodium 138 132 - 146 mmol/L    Potassium 3.6 3.5 - 5.0 mmol/L    Chloride 103 98 - 107 mmol/L    CO2 27 22 - 29 mmol/L    Anion Gap 8 7 - 16 mmol/L    Glucose 120 (H) 74 - 99 mg/dL    BUN 12 6 - 20 mg/dL    CREATININE 1.0 0.5 - 1.0 mg/dL    GFR Non-African American >60 >=60 mL/min/1.73    GFR African American >60     Calcium 8.5 (L) 8.6 - 10.2 mg/dL    Total Protein 6.9 6.4 - 8.3 g/dL    Albumin 3.8 3.5 - 5.2 g/dL    Total Bilirubin <0.2 0.0 - 1.2 mg/dL    Alkaline Phosphatase 111 (H) 35 - 104 U/L    ALT 15 0 - 32 U/L    AST 22 0 - 31 U/L   Protime-INR   Result Value Ref Range    Protime 13.2 (H) 9.3 - 12.4 sec    INR 1.2    APTT   Result Value Ref Range    aPTT 29.0 24.5 - 35.1 sec   Magnesium   Result Value Ref Range    Magnesium 1.9 1.6 - 2.6 mg/dL   Beta-Hydroxybutyrate   Result Value Ref Range    Beta-Hydroxybutyrate 0.12 0.02 - 0.27 mmol/L   pH, venous   Result Value Ref Range    pH, [place] 7.38 7.35 - 7.45   Troponin   Result Value Ref Range    Troponin, High Sensitivity 14 (H) 0 - 9 ng/L   HCG, SERUM, QUALITATIVE   Result Value Ref Range    hCG Qual NEGATIVE NEGATIVE   Troponin   Result Value Ref Range    Troponin, High Sensitivity 11 (H) 0 - 9 ng/L   EKG 12 Lead   Result Value Ref Range    Ventricular Rate 79 BPM    Atrial Rate 79 BPM    P-R Interval 140 ms    QRS Duration 88 ms    Q-T Interval 434 ms    QTc Calculation (Bazett) 497 ms    P Axis 46 degrees    R Axis 14 degrees    T Axis 84 degrees       RADIOLOGY:  XR CHEST 1 VIEW   Final Result   No acute process. ------------------------- NURSING NOTES AND VITALS REVIEWED ---------------------------  Date / Time Roomed:  7/19/2022 12:38 PM  ED Bed Assignment:  15/15    The nursing notes within the ED encounter and vital signs as below have been reviewed.      Patient Vitals for the past 24 hrs:   BP Temp Temp src Pulse Resp SpO2 Height Weight   07/19/22 1420 119/77 -- -- 84 22 99 % -- --   07/19/22 1246 (!) 140/93 99.5 °F (37.5 °C) Oral (!) 101 24 94 % 5' 8\" (1.727 m) (!) 330 lb (149.7 kg)       Oxygen Saturation Interpretation: Normal    ------------------------------------------ PROGRESS NOTES ------------------------------------------  Re-evaluation(s):  Time: 2813  Patients symptoms show no change  Repeat physical examination is not changed    Counseling:  I have spoken with the patient and discussed todays results, in addition to providing specific details for the plan of care and counseling regarding the diagnosis and prognosis. Their questions are answered at this time and they are agreeable with the plan of admission.    --------------------------------- ADDITIONAL PROVIDER NOTES ---------------------------------  Consultations: This patient's ED course included: a personal history and physicial examination and re-evaluation prior to disposition    This patient has remained hemodynamically stable during their ED course. Diagnosis:  1. COVID-19    2. Dyspnea, unspecified type        Disposition:  Patient's disposition: Admit to med/surg floor  Patient's condition is stable.            Adithya Davis DO  07/19/22 1654

## 2022-07-19 NOTE — ED NOTES
Ambulated PT with no oxygen.  O2 sats stayed between 97%-98% for the duration of the ambulation and heart rate was between  BPM.      Hawk Parnell  07/19/22 1520

## 2022-07-19 NOTE — ED NOTES
Patient placed on 3L nasal cannula due to O2 sat dropping to 85-90%     Estuardo Bryant  07/19/22 4074

## 2022-07-19 NOTE — H&P
[address] [place], [place] [address]ist Group   History and Physical      CHIEF COMPLAINT:  SOB, cough    History of Present Illness:  52 y.o. female with a history of asthma, iron deficiency anemia, migraines, HTN, anxiety/depression presents with recently diagnosed COVID. The patient states that about a week ago she started having SOB and productive cough along with fevers/chills, runny nose, sore throat and nausea. She states that these symptoms kept getting progressively worse and worse so she went to [facility] ER yesterday. She was diagnosed with COVID at that time but was not hypoxic so was sent home and told to FU with PCP. She did FU with her PCP who told her to go back to the ER, so she went to [address] [place], [place] [address] ER. In the ER here she was not hypoxic and vitals and labs were stable. The patient and PCP did not feel comfortable with patient going home at this time due to worsening symptoms and history of asthma. Informant(s) for H&P:Patient, ER physician, EMR review    REVIEW OF SYSTEMS:  no fevers, chills, cp, sob, n/v, ha, vision/hearing changes, wt changes, hot/cold flashes, other open skin lesions, diarrhea, constipation, dysuria/hematuria unless noted in HPI. Complete ROS performed with the patient and is otherwise negative.       PMH:  Past Medical History:   Diagnosis Date    Anxiety 2009    Blood transfusion     Chronic fatigue 2007    Chronic fatigue     Class 3 severe obesity due to excess calories with serious comorbidity and body mass index (BMI) of 50.0 to 59.9 in adult [facility])     Hx of blood clots     Hypertriglyceridemia     Iron deficiency anemia due to chronic blood loss 2007    non-compliant with iron replacement    Knee pain, bilateral 2011    Leg pain     Low back pain 2015    Pain is getting worse    Migraine headache without aura 2009    Morbid obesity ([address] 2007    Muscle cramps 2011    Perennial allergic rhinitis 2007    Prediabetes     Superficial thrombophlebitis of right leg 2009 and 2011 Ulcerative colitis ([facility] [address]     Vertigo        Surgical History:  Past Surgical History:   Procedure Laterality Date    ADENOIDECTOMY         Medications Prior to Admission:    Prior to Admission medications    Medication Sig Start Date End Date Taking? Authorizing Provider   Rimegepant Sulfate (NURTEC) 75 MG TBDP Take 75 mg by mouth as needed (severe migraine) 7/8/22   SURJIT Cole CNP   acetaZOLAMIDE (DIAMOX) 250 MG tablet Take 1 tablet by mouth daily 7/8/22   SURJIT Cole CNP   lisinopril (PRINIVIL;ZESTRIL) 10 MG tablet Take 2 tablets by mouth daily 5/5/22   Lionel Lyles MD   Fremanezumab-vfrm 225 MG/1.5ML SOAJ Inject 225 mg into the skin every 30 days 3/4/22   SURJIT Cole CNP   tiotropium (SPIRIVA RESPIMAT) 1.25 MCG/ACT AERS inhaler Inhale 2 puffs into the lungs daily 11/24/21   Joy Cardenas DO   PROVENTIL  (90 Base) MCG/ACT inhaler INHALE TWO PUFFS BY MOUTH EVERY 6 HOURS AS NEEDED 11/2/21   Ag Mensah DO   ferrous sulfate (IRON 325) 325 (65 Fe) MG tablet Take 1 tablet by mouth 2 times daily (with meals) 10/7/21   Lionel Lyles MD   hydroCHLOROthiazide (HYDRODIURIL) 25 MG tablet Take 1 tablet by mouth every morning 10/7/21   Lionel Lyles MD   venlafaxine (EFFEXOR XR) 37.5 MG extended release capsule Take 1 capsule by mouth daily 10/7/21   Lionel Lyles MD   Misc.  Devices MISC Thumb Spica splint for right hand 10/7/21   Lionel Lyles MD   ondansetron (ZOFRAN ODT) 4 MG disintegrating tablet Take 1 tablet by mouth every 8 hours as needed for Nausea or Vomiting 9/30/21   Chantal Leavitt PA-C   Prenatal Vit-Fe Fumarate-FA (PRENATAL VITAMIN PO) Take by mouth     Historical Provider, MD   BIOTIN PO Take by mouth     Historical Provider, MD   mometasone-formoterol (DULERA) 100-5 MCG/ACT inhaler Inhale 2 puffs into the lungs 2 times daily 7/23/21   Katty Becerra MD   acetaminophen (TYLENOL) 500 MG tablet Take 1,000 mg by mouth every 6 hours as needed for Pain Historical Provider, MD       Allergies:    Aspirin, Coconut oil, Ibuprofen, Iodides, Motrin [ibuprofen micronized], Robitussin (alcohol free) [guaifenesin], Codeine, Iodine, and Tramadol    Social History:    reports that she has never smoked. She has never used smokeless tobacco. She reports current alcohol use. She reports that she does not use drugs. Family History:   family history includes Cancer (age of onset: 35) in her sister; Clotting Disorder in her father and mother; High Blood Pressure in her father and mother; Stroke in her mother. PHYSICAL EXAM:  Vitals:  /77   Pulse 84   Temp 99.5 °F (37.5 °C) (Oral)   Resp 22   Ht 5' 8\" (1.727 m)   Wt (!) 330 lb (149.7 kg)   LMP 07/01/2022   SpO2 99%   BMI 50.18 kg/m²     General Appearance: alert and oriented to person, place and time and in no acute distress, morbidly obese  Skin: warm and dry  Head: normocephalic and atraumatic  Eyes: pupils equal, round, and reactive to light, extraocular eye movements intact, conjunctivae normal  Neck: neck supple and non tender without mass   Pulmonary/Chest: clear to auscultation bilaterally- no wheezes, rales or rhonchi, diminished throughout, no respiratory distress on RA  Cardiovascular: normal rate, normal S1 and S2 and no carotid bruits  Abdomen: soft, non-tender, non-distended, normal bowel sounds, no masses or organomegaly  Extremities: no cyanosis, no clubbing and no edema  Neurologic: no cranial nerve deficit and speech normal    LABS:  Recent Labs     07/19/22  1333      K 3.6      CO2 27   BUN 12   CREATININE 1.0   GLUCOSE 120*   CALCIUM 8.5*       Recent Labs     07/19/22  1333   WBC 8.7   RBC 4.54   HGB 10.1*   HCT 33.9*   MCV 74.7*   MCH 22.2*   MCHC 29.8*   RDW 15.7*      MPV 11.0       No results for input(s): POCGLU in the last 72 hours.     SARS-CoV-2, NAAT Not Detected DETECTED Abnormal        Radiology: XR CHEST 1 VIEW    Result Date: 7/19/2022  EXAMINATION: ONE XRAY VIEW OF THE CHEST 7/19/2022 2:06 pm COMPARISON: 08/21/2021 HISTORY: ORDERING SYSTEM PROVIDED HISTORY: cough TECHNOLOGIST PROVIDED HISTORY: Reason for exam:->cough FINDINGS: The lungs are without acute focal process. There is no effusion or pneumothorax. The cardiomediastinal silhouette is without acute process. The osseous structures are without acute process. No acute process. EKG: NSR    ASSESSMENT:      Principal Problem:    COVID  Resolved Problems:    * No resolved hospital problems. *      PLAN:    COVID  -Symptomatic for about a week, initially diagnosed yesterday  -Patient not hypoxic, CXR clear  -Check COVID labs, start vitamin protocol, gentle IVF and lovenox 40mg BID  -No need for steroids at this time as not hypoxic  -Monitor on telemetry and monitor VS    Asthma  -Home meds symbicort and PRN albuterol - continue with duonebs, incentive spirometer and PEP/flutter  -Currently saturating well on RA    HTN  -Home meds lisinopril and HCTZ, will continue for now  -BP on admission 119/77, continue to monitor    Iron deficiency anemia  -Takes supplemental iron at home, will continue  -Hgb 10.1 on admission, monitor CBC    Anxiety/depression  -Continue home effexor    Migraines  -Home meds Nurtec PRN and Fremanezumab injection every month - states she is due at the end of this month       Code Status: Full  DVT prophylaxis: Lovenox 40mg BID    NOTE: This report was transcribed using voice recognition software. Every effort was made to ensure accuracy; however, inadvertent computerized transcription errors may be present.      Electronically signed by Lianet Lay MD on 7/19/2022 at 5:34 PM

## 2022-07-19 NOTE — TELEPHONE ENCOUNTER
LATE ENTRY:  2:22 PM    Called patient at approximately 11:30 AM to discuss current symptoms and concerns. Patient was seen at [facility] yesterday- no records. States after assessment, was contacted to be informed of COVID positivity. Symptoms for approximately 7 days. States in last 24 hours- ongoing concerns with intermittent fevers, chills, worsening dyspnea, myalgias. Prior to contact, discussed with Clinical Pharmacist regarding possible options of outpatient management as patient meets criteria with concern for morbid obesity stage III. Given con-current Nurtec use- pt is not a candidate for Paxlovid and out of symptom window for eligibility as verified during independent hx assessment. In addition, we discussed possible outpatient infusion of monoclonal Ab. However, with further discussion and assessment- it became apparent that patient's symptoms meet criteria for moderate to severe presentation. Difficulty at times with full sentences, coughing, and dyspnea noted. Advised given severity of symptoms to go to nearest ED- which [facility] noted to be closest location per patient which she states she would like to go to. Patient was counseled to hang up and call 911 as patient has no access to a ride and dyspnea has worsened in the last 24 hours. Patient verbalized understanding. [address] [facility] ED and spoke to provider regarding patient and needs. Follow-up noted as patient is currently in [facility] ED undergoing evaluation. Oxygen desaturation noted and patient is currently on 3 L NC. Management per ED with likely need for admission.

## 2022-07-19 NOTE — TELEPHONE ENCOUNTER
Patient states she has COVID-19  as of yesterday and would like to know if there is any medication she can take. Patient states she is in pain, she has been taking tylenol & drinking powerade. She is very dehydrated. She was at the ER yesterday. She was told to call her physician office today.  Please Advise Pt [phone]

## 2022-07-20 ENCOUNTER — TELEPHONE (OUTPATIENT)
Dept: INTERNAL MEDICINE | Age: 47
End: 2022-07-20

## 2022-07-20 LAB
ALBUMIN SERPL-MCNC: 3.6 G/DL (ref 3.5–5.2)
ALP BLD-CCNC: 106 U/L (ref 35–104)
ALT SERPL-CCNC: 15 U/L (ref 0–32)
ANION GAP SERPL CALCULATED.3IONS-SCNC: 11 MMOL/L (ref 7–16)
AST SERPL-CCNC: 23 U/L (ref 0–31)
BASOPHILS ABSOLUTE: 0.06 E9/L (ref 0–0.2)
BASOPHILS RELATIVE PERCENT: 0.6 % (ref 0–2)
BILIRUB SERPL-MCNC: <0.2 MG/DL (ref 0–1.2)
BUN BLDV-MCNC: 10 MG/DL (ref 6–20)
CALCIUM SERPL-MCNC: 8.4 MG/DL (ref 8.6–10.2)
CHLORIDE BLD-SCNC: 104 MMOL/L (ref 98–107)
CO2: 24 MMOL/L (ref 22–29)
CREAT SERPL-MCNC: 0.9 MG/DL (ref 0.5–1)
EKG ATRIAL RATE: 79 BPM
EKG P AXIS: 46 DEGREES
EKG P-R INTERVAL: 140 MS
EKG Q-T INTERVAL: 434 MS
EKG QRS DURATION: 88 MS
EKG QTC CALCULATION (BAZETT): 497 MS
EKG R AXIS: 14 DEGREES
EKG T AXIS: 84 DEGREES
EKG VENTRICULAR RATE: 79 BPM
EOSINOPHILS ABSOLUTE: 0.07 E9/L (ref 0.05–0.5)
EOSINOPHILS RELATIVE PERCENT: 0.7 % (ref 0–6)
FERRITIN: 30 NG/ML
GFR AFRICAN AMERICAN: >60
GFR NON-AFRICAN AMERICAN: >60 ML/MIN/1.73
GLUCOSE BLD-MCNC: 118 MG/DL (ref 74–99)
HCT VFR BLD CALC: 34.5 % (ref 34–48)
HEMOGLOBIN: 10.1 G/DL (ref 11.5–15.5)
IMMATURE GRANULOCYTES #: 0.06 E9/L
IMMATURE GRANULOCYTES %: 0.6 % (ref 0–5)
LYMPHOCYTES ABSOLUTE: 1.7 E9/L (ref 1.5–4)
LYMPHOCYTES RELATIVE PERCENT: 16.4 % (ref 20–42)
MCH RBC QN AUTO: 22.1 PG (ref 26–35)
MCHC RBC AUTO-ENTMCNC: 29.3 % (ref 32–34.5)
MCV RBC AUTO: 75.7 FL (ref 80–99.9)
MONOCYTES ABSOLUTE: 1.2 E9/L (ref 0.1–0.95)
MONOCYTES RELATIVE PERCENT: 11.6 % (ref 2–12)
NEUTROPHILS ABSOLUTE: 7.29 E9/L (ref 1.8–7.3)
NEUTROPHILS RELATIVE PERCENT: 70.1 % (ref 43–80)
PDW BLD-RTO: 16.2 FL (ref 11.5–15)
PLATELET # BLD: 331 E9/L (ref 130–450)
PMV BLD AUTO: 11.1 FL (ref 7–12)
POTASSIUM REFLEX MAGNESIUM: 3.7 MMOL/L (ref 3.5–5)
PROCALCITONIN: 0.06 NG/ML (ref 0–0.08)
RBC # BLD: 4.56 E12/L (ref 3.5–5.5)
SODIUM BLD-SCNC: 139 MMOL/L (ref 132–146)
TOTAL PROTEIN: 7 G/DL (ref 6.4–8.3)
VITAMIN D 25-HYDROXY: 13 NG/ML (ref 30–100)
WBC # BLD: 10.4 E9/L (ref 4.5–11.5)

## 2022-07-20 PROCEDURE — G0378 HOSPITAL OBSERVATION PER HR: HCPCS

## 2022-07-20 PROCEDURE — 96372 THER/PROPH/DIAG INJ SC/IM: CPT

## 2022-07-20 PROCEDURE — 82306 VITAMIN D 25 HYDROXY: CPT

## 2022-07-20 PROCEDURE — 6370000000 HC RX 637 (ALT 250 FOR IP): Performed by: STUDENT IN AN ORGANIZED HEALTH CARE EDUCATION/TRAINING PROGRAM

## 2022-07-20 PROCEDURE — 84145 PROCALCITONIN (PCT): CPT

## 2022-07-20 PROCEDURE — 6360000002 HC RX W HCPCS: Performed by: STUDENT IN AN ORGANIZED HEALTH CARE EDUCATION/TRAINING PROGRAM

## 2022-07-20 PROCEDURE — 36415 COLL VENOUS BLD VENIPUNCTURE: CPT

## 2022-07-20 PROCEDURE — 94640 AIRWAY INHALATION TREATMENT: CPT

## 2022-07-20 PROCEDURE — 85025 COMPLETE CBC W/AUTO DIFF WBC: CPT

## 2022-07-20 PROCEDURE — 80053 COMPREHEN METABOLIC PANEL: CPT

## 2022-07-20 PROCEDURE — 99225 PR SBSQ OBSERVATION CARE/DAY 25 MINUTES: CPT | Performed by: STUDENT IN AN ORGANIZED HEALTH CARE EDUCATION/TRAINING PROGRAM

## 2022-07-20 PROCEDURE — 82728 ASSAY OF FERRITIN: CPT

## 2022-07-20 PROCEDURE — 2580000003 HC RX 258: Performed by: STUDENT IN AN ORGANIZED HEALTH CARE EDUCATION/TRAINING PROGRAM

## 2022-07-20 RX ORDER — BENZONATATE 100 MG/1
100 CAPSULE ORAL 3 TIMES DAILY PRN
Status: DISCONTINUED | OUTPATIENT
Start: 2022-07-20 | End: 2022-07-21 | Stop reason: HOSPADM

## 2022-07-20 RX ORDER — HYDROCHLOROTHIAZIDE 12.5 MG/1
12.5 TABLET ORAL ONCE
Status: COMPLETED | OUTPATIENT
Start: 2022-07-20 | End: 2022-07-20

## 2022-07-20 RX ADMIN — IPRATROPIUM BROMIDE AND ALBUTEROL 1 PUFF: 20; 100 SPRAY, METERED RESPIRATORY (INHALATION) at 07:20

## 2022-07-20 RX ADMIN — OXYCODONE HYDROCHLORIDE AND ACETAMINOPHEN 500 MG: 500 TABLET ORAL at 08:45

## 2022-07-20 RX ADMIN — BUDESONIDE AND FORMOTEROL FUMARATE DIHYDRATE 2 PUFF: 80; 4.5 AEROSOL RESPIRATORY (INHALATION) at 20:31

## 2022-07-20 RX ADMIN — IPRATROPIUM BROMIDE AND ALBUTEROL 1 PUFF: 20; 100 SPRAY, METERED RESPIRATORY (INHALATION) at 10:32

## 2022-07-20 RX ADMIN — BENZONATATE 100 MG: 100 CAPSULE ORAL at 21:59

## 2022-07-20 RX ADMIN — FERROUS SULFATE TAB 325 MG (65 MG ELEMENTAL FE) 325 MG: 325 (65 FE) TAB at 21:59

## 2022-07-20 RX ADMIN — IPRATROPIUM BROMIDE AND ALBUTEROL 1 PUFF: 20; 100 SPRAY, METERED RESPIRATORY (INHALATION) at 14:14

## 2022-07-20 RX ADMIN — ZINC SULFATE 220 MG (50 MG) CAPSULE 50 MG: CAPSULE at 08:45

## 2022-07-20 RX ADMIN — FERROUS SULFATE TAB 325 MG (65 MG ELEMENTAL FE) 325 MG: 325 (65 FE) TAB at 08:45

## 2022-07-20 RX ADMIN — SODIUM CHLORIDE, PRESERVATIVE FREE 10 ML: 5 INJECTION INTRAVENOUS at 08:46

## 2022-07-20 RX ADMIN — HYDROCHLOROTHIAZIDE 25 MG: 25 TABLET ORAL at 08:45

## 2022-07-20 RX ADMIN — ACETAZOLAMIDE 250 MG: 250 TABLET ORAL at 08:45

## 2022-07-20 RX ADMIN — BUDESONIDE AND FORMOTEROL FUMARATE DIHYDRATE 2 PUFF: 80; 4.5 AEROSOL RESPIRATORY (INHALATION) at 07:20

## 2022-07-20 RX ADMIN — LISINOPRIL 20 MG: 10 TABLET ORAL at 08:45

## 2022-07-20 RX ADMIN — SODIUM CHLORIDE, PRESERVATIVE FREE 10 ML: 5 INJECTION INTRAVENOUS at 21:59

## 2022-07-20 RX ADMIN — ENOXAPARIN SODIUM 40 MG: 100 INJECTION SUBCUTANEOUS at 08:45

## 2022-07-20 RX ADMIN — ACETAMINOPHEN 650 MG: 325 TABLET ORAL at 22:20

## 2022-07-20 RX ADMIN — VENLAFAXINE HYDROCHLORIDE 37.5 MG: 37.5 CAPSULE, EXTENDED RELEASE ORAL at 08:44

## 2022-07-20 RX ADMIN — Medication 1000 UNITS: at 08:45

## 2022-07-20 RX ADMIN — ACETAMINOPHEN 650 MG: 325 TABLET ORAL at 08:44

## 2022-07-20 RX ADMIN — HYDROCHLOROTHIAZIDE 12.5 MG: 12.5 TABLET ORAL at 11:57

## 2022-07-20 RX ADMIN — ENOXAPARIN SODIUM 40 MG: 100 INJECTION SUBCUTANEOUS at 21:59

## 2022-07-20 RX ADMIN — BENZONATATE 100 MG: 100 CAPSULE ORAL at 11:57

## 2022-07-20 RX ADMIN — IPRATROPIUM BROMIDE AND ALBUTEROL 1 PUFF: 20; 100 SPRAY, METERED RESPIRATORY (INHALATION) at 20:31

## 2022-07-20 RX ADMIN — PYRIDOXINE HCL TAB 50 MG 50 MG: 50 TAB at 08:45

## 2022-07-20 ASSESSMENT — PAIN DESCRIPTION - ORIENTATION: ORIENTATION: LEFT

## 2022-07-20 ASSESSMENT — PAIN DESCRIPTION - DESCRIPTORS: DESCRIPTORS: THROBBING;SHARP

## 2022-07-20 ASSESSMENT — PAIN SCALES - WONG BAKER: WONGBAKER_NUMERICALRESPONSE: 2

## 2022-07-20 ASSESSMENT — PAIN SCALES - GENERAL
PAINLEVEL_OUTOF10: 2
PAINLEVEL_OUTOF10: 6
PAINLEVEL_OUTOF10: 0

## 2022-07-20 ASSESSMENT — PAIN DESCRIPTION - LOCATION: LOCATION: CHEST;HEAD

## 2022-07-20 ASSESSMENT — PAIN - FUNCTIONAL ASSESSMENT: PAIN_FUNCTIONAL_ASSESSMENT: PREVENTS OR INTERFERES SOME ACTIVE ACTIVITIES AND ADLS

## 2022-07-20 NOTE — PROGRESS NOTES
[address] [place], [place] [address]ist   Progress Note    Admitting Date and Time: 7/19/2022 12:38 PM  Admit Dx: Dyspnea, unspecified type [R06.00]  COVID [U07.1]  COVID-19 [U07.1]    Subjective:    Pt feels the same as yesterday, still SOB with minimal exertion and coughing a lot. Felt hot this morning. Per RN: Had a temperature of 100.0 this morning and seems to tire out really easily, concerned that she might decline. ROS: denies fever, chills, cp, sob, n/v, HA unless stated above.      acetaZOLAMIDE  250 mg Oral Daily    ferrous sulfate  325 mg Oral BID WC    hydroCHLOROthiazide  25 mg Oral QAM    lisinopril  20 mg Oral Daily    budesonide-formoterol  2 puff Inhalation BID    venlafaxine  37.5 mg Oral Daily    sodium chloride flush  5-40 mL IntraVENous 2 times per day    enoxaparin  40 mg SubCUTAneous BID    zinc sulfate  50 mg Oral Daily    vitamin B-6  50 mg Oral Daily    Vitamin D  1,000 Units Oral Daily    ascorbic acid  500 mg Oral Daily    albuterol-ipratropium  1 puff Inhalation Q4H While awake     benzonatate, 100 mg, TID PRN  sodium chloride flush, 5-40 mL, PRN  sodium chloride, , PRN  ondansetron, 4 mg, Q8H PRN   Or  ondansetron, 4 mg, Q6H PRN  polyethylene glycol, 17 g, Daily PRN  acetaminophen, 650 mg, Q6H PRN   Or  acetaminophen, 650 mg, Q6H PRN  cloNIDine, 0.1 mg, Q8H PRN         Objective:    BP (!) 173/96   Pulse 94   Temp 100 °F (37.8 °C) (Oral)   Resp 23   Ht 5' 8\" (1.727 m)   Wt (!) 349 lb (158.3 kg)   LMP 07/01/2022   SpO2 95%   BMI 53.07 kg/m²     General Appearance: alert and oriented to person, place and time and in no acute distress, morbidly obese  Skin: warm and dry  Head: normocephalic and atraumatic  Eyes: pupils equal, round, and reactive to light, extraocular eye movements intact, conjunctivae normal  Neck: neck supple and non tender without mass  Pulmonary/Chest: clear to auscultation bilaterally- no wheezes, rales or rhonchi, diminished throughout, no respiratory distress on RA however does have conversational dyspnea  Cardiovascular: normal rate, normal S1 and S2 and no carotid bruits  Abdomen: soft, non-tender, non-distended, normal bowel sounds, no masses or organomegaly  Extremities: no cyanosis, no clubbing and no edema  Neurologic: no cranial nerve deficit and speech normal      Recent Labs     07/19/22  1333 07/20/22  0712    139   K 3.6 3.7    104   CO2 27 24   BUN 12 10   CREATININE 1.0 0.9   GLUCOSE 120* 118*   CALCIUM 8.5* 8.4*       Recent Labs     07/19/22  1333 07/20/22  0712   ALKPHOS 111* 106*   PROT 6.9 7.0   LABALBU 3.8 3.6   BILITOT <0.2 <0.2   AST 22 23   ALT 15 15       Recent Labs     07/19/22  1333 07/20/22  0712   WBC 8.7 10.4   RBC 4.54 4.56   HGB 10.1* 10.1*   HCT 33.9* 34.5   MCV 74.7* 75.7*   MCH 22.2* 22.1*   MCHC 29.8* 29.3*   RDW 15.7* 16.2*    331   MPV 11.0 11.1           Radiology:   XR CHEST 1 VIEW   Final Result   No acute process. Assessment:  Principal Problem:    COVID  Resolved Problems:    * No resolved hospital problems.  *      Plan:  COVID  -Symptomatic for about a week, initially diagnosed yesterday  -Patient not hypoxic, CXR clear however very easily tires out and has conversational dyspnea  -Continue vitamin protocol, gentle IVF and lovenox 40mg BID  -No need for steroids at this time as not hypoxic  -Monitor on telemetry and monitor VS    Asthma  -Home meds symbicort and PRN albuterol - continue with duonebs, incentive spirometer and PEP/flutter  -Currently saturating well on RA    HTN  -Home meds lisinopril and HCTZ, will continue for now  -BP has been elevated since last night, will increase HCTZ to 37.5mg QD    Iron deficiency anemia  -Takes supplemental iron at home, will continue  -Hgb 10.1 on admission and today, monitor CBC    Anxiety/depression  -Continue home effexor    Migraines  -Home meds Nurtec PRN and Fremanezumab injection every month - states she is due at the end of this month    NOTE: This report was transcribed using voice recognition software. Every effort was made to ensure accuracy; however, inadvertent computerized transcription errors may be present.      Electronically signed by Ekaterina Paulson MD on 7/20/2022 at 11:11 AM

## 2022-07-20 NOTE — PROGRESS NOTES
Educated patient on importance of incentive spirometry, patient was able to correctly demonstrate use. Patient also educated on importance of prone laying and if unable to, laying side to side. Patient agreeable, verbalized understanding of Covid education. Patient on room air.

## 2022-07-20 NOTE — ACP (ADVANCE CARE PLANNING)
Advance Care Planning     Advance Care Planning Activator (Inpatient)  Conversation Note      Date of ACP Conversation: 7/20/2022     Conversation Conducted with: Patient with Decision Making Capacity    ACP Activator: Ana Rosa Martin[place], [address] [facility] Decision Maker:     Current Designated Health Care Decision Maker:     Primary Decision Maker: Imelda Jackson - Domestic Partner - [phone]    Primary Decision Maker: Savanah Koroma - Niece/Nephew - [phone]  Click here to complete Healthcare Decision Makers including section of the Healthcare Decision Maker Relationship (ie \"Primary\")      Care Preferences    Ventilation: \"If you were in your present state of health and suddenly became very ill and were unable to breathe on your own, what would your preference be about the use of a ventilator (breathing machine) if it were available to you? \"      Would the patient desire the use of ventilator (breathing machine)?: yes    \"If your health worsens and it becomes clear that your chance of recovery is unlikely, what would your preference be about the use of a ventilator (breathing machine) if it were available to you? \"     Would the patient desire the use of ventilator (breathing machine)?: Yes      Resuscitation  \"CPR works best to restart the heart when there is a sudden event, like a heart attack, in someone who is otherwise healthy. Unfortunately, CPR does not typically restart the heart for people who have serious health conditions or who are very sick. \"    \"In the event your heart stopped as a result of an underlying serious health condition, would you want attempts to be made to restart your heart (answer \"yes\" for attempt to resuscitate) or would you prefer a natural death (answer \"no\" for do not attempt to resuscitate)? \" yes       [] Yes   [x] No   Educated Patient / Isa Neighbors regarding differences between Advance Directives and portable DNR orders.     Length of ACP Conversation in minutes:  5 minutes    Conversation Outcomes:  [x] ACP discussion completed  [] Existing advance directive reviewed with patient; no changes to patient's previously recorded wishes  [] New Advance Directive completed  [] Portable Do Not Rescitate prepared for Provider review and signature  [] POLST/POST/MOLST/MOST prepared for Provider review and signature      Follow-up plan:    [] Schedule follow-up conversation to continue planning  [] Referred individual to Provider for additional questions/concerns   [] Advised patient/agent/surrogate to review completed ACP document and update if needed with changes in condition, patient preferences or care setting    [x] This note routed to one or more involved healthcare providers

## 2022-07-20 NOTE — TELEPHONE ENCOUNTER
Called patient to confirm she is in the hospital for Covid 19 as she was scheduled a virtual visit today

## 2022-07-20 NOTE — CARE COORDINATION
7/20/2022 1225 CM note: POSITIVE COVID 7/19/22. ACP completed. CM spoke with patient for transition of care needs via phone. Pt resides with her domestic partner Leidy Fonseca and her nephew So Almaguer. She is independent with ADLs and drives. Pt on room air. No hx DME/HHC. PCP is Dr Ana Fraire and uses [facility]. Pt plans to return home at [place] and family will provide ride. No needs identified at this time.  Geeta CHONG

## 2022-07-21 VITALS
TEMPERATURE: 98.5 F | RESPIRATION RATE: 20 BRPM | HEART RATE: 96 BPM | OXYGEN SATURATION: 96 % | HEIGHT: 68 IN | BODY MASS INDEX: 44.41 KG/M2 | SYSTOLIC BLOOD PRESSURE: 145 MMHG | WEIGHT: 293 LBS | DIASTOLIC BLOOD PRESSURE: 99 MMHG

## 2022-07-21 PROCEDURE — 99217 PR OBSERVATION CARE DISCHARGE MANAGEMENT: CPT | Performed by: INTERNAL MEDICINE

## 2022-07-21 PROCEDURE — 6360000002 HC RX W HCPCS: Performed by: STUDENT IN AN ORGANIZED HEALTH CARE EDUCATION/TRAINING PROGRAM

## 2022-07-21 PROCEDURE — G0378 HOSPITAL OBSERVATION PER HR: HCPCS

## 2022-07-21 PROCEDURE — 6370000000 HC RX 637 (ALT 250 FOR IP): Performed by: STUDENT IN AN ORGANIZED HEALTH CARE EDUCATION/TRAINING PROGRAM

## 2022-07-21 PROCEDURE — APPSS45 APP SPLIT SHARED TIME 31-45 MINUTES: Performed by: NURSE PRACTITIONER

## 2022-07-21 PROCEDURE — 94640 AIRWAY INHALATION TREATMENT: CPT

## 2022-07-21 PROCEDURE — 2580000003 HC RX 258: Performed by: STUDENT IN AN ORGANIZED HEALTH CARE EDUCATION/TRAINING PROGRAM

## 2022-07-21 PROCEDURE — 96372 THER/PROPH/DIAG INJ SC/IM: CPT

## 2022-07-21 RX ORDER — ZINC SULFATE 50(220)MG
50 CAPSULE ORAL DAILY
Qty: 30 CAPSULE | Refills: 3 | Status: ON HOLD | COMMUNITY
Start: 2022-07-22 | End: 2022-10-20

## 2022-07-21 RX ORDER — VENLAFAXINE HYDROCHLORIDE 37.5 MG/1
37.5 CAPSULE, EXTENDED RELEASE ORAL DAILY
Qty: 30 CAPSULE | Refills: 3 | Status: ON HOLD | OUTPATIENT
Start: 2022-07-22 | End: 2022-10-20

## 2022-07-21 RX ORDER — HYDROCHLOROTHIAZIDE 12.5 MG/1
37.5 TABLET ORAL EVERY MORNING
Qty: 30 TABLET | Refills: 3 | Status: SHIPPED | OUTPATIENT
Start: 2022-07-22 | End: 2022-08-25

## 2022-07-21 RX ORDER — PYRIDOXINE HCL (VITAMIN B6) 50 MG
50 TABLET ORAL DAILY
Qty: 30 TABLET | Refills: 0 | Status: ON HOLD | COMMUNITY
Start: 2022-07-22 | End: 2022-10-20

## 2022-07-21 RX ORDER — ASCORBIC ACID 500 MG
500 TABLET ORAL DAILY
Qty: 30 TABLET | Refills: 3 | Status: ON HOLD | COMMUNITY
Start: 2022-07-22 | End: 2022-10-20

## 2022-07-21 RX ORDER — BENZONATATE 100 MG/1
100 CAPSULE ORAL 3 TIMES DAILY PRN
Qty: 21 CAPSULE | Refills: 0 | Status: SHIPPED | OUTPATIENT
Start: 2022-07-21 | End: 2022-07-26 | Stop reason: SDUPTHER

## 2022-07-21 RX ORDER — CHOLECALCIFEROL (VITAMIN D3) 25 MCG
1000 TABLET ORAL DAILY
Qty: 30 TABLET | Refills: 0 | COMMUNITY
Start: 2022-07-22 | End: 2022-08-02

## 2022-07-21 RX ADMIN — PYRIDOXINE HCL TAB 50 MG 50 MG: 50 TAB at 07:51

## 2022-07-21 RX ADMIN — ZINC SULFATE 220 MG (50 MG) CAPSULE 50 MG: CAPSULE at 07:50

## 2022-07-21 RX ADMIN — ENOXAPARIN SODIUM 40 MG: 100 INJECTION SUBCUTANEOUS at 07:52

## 2022-07-21 RX ADMIN — Medication 1000 UNITS: at 07:51

## 2022-07-21 RX ADMIN — ACETAMINOPHEN 650 MG: 325 TABLET ORAL at 07:50

## 2022-07-21 RX ADMIN — ACETAZOLAMIDE 250 MG: 250 TABLET ORAL at 09:45

## 2022-07-21 RX ADMIN — VENLAFAXINE HYDROCHLORIDE 37.5 MG: 37.5 CAPSULE, EXTENDED RELEASE ORAL at 07:51

## 2022-07-21 RX ADMIN — HYDROCHLOROTHIAZIDE 37.5 MG: 25 TABLET ORAL at 07:51

## 2022-07-21 RX ADMIN — IPRATROPIUM BROMIDE AND ALBUTEROL 1 PUFF: 20; 100 SPRAY, METERED RESPIRATORY (INHALATION) at 04:48

## 2022-07-21 RX ADMIN — BUDESONIDE AND FORMOTEROL FUMARATE DIHYDRATE 2 PUFF: 80; 4.5 AEROSOL RESPIRATORY (INHALATION) at 04:48

## 2022-07-21 RX ADMIN — IPRATROPIUM BROMIDE AND ALBUTEROL 1 PUFF: 20; 100 SPRAY, METERED RESPIRATORY (INHALATION) at 12:32

## 2022-07-21 RX ADMIN — SODIUM CHLORIDE, PRESERVATIVE FREE 10 ML: 5 INJECTION INTRAVENOUS at 13:03

## 2022-07-21 RX ADMIN — OXYCODONE HYDROCHLORIDE AND ACETAMINOPHEN 500 MG: 500 TABLET ORAL at 07:51

## 2022-07-21 RX ADMIN — BENZONATATE 100 MG: 100 CAPSULE ORAL at 07:51

## 2022-07-21 RX ADMIN — FERROUS SULFATE TAB 325 MG (65 MG ELEMENTAL FE) 325 MG: 325 (65 FE) TAB at 07:51

## 2022-07-21 RX ADMIN — LISINOPRIL 20 MG: 10 TABLET ORAL at 07:51

## 2022-07-21 RX ADMIN — IPRATROPIUM BROMIDE AND ALBUTEROL 1 PUFF: 20; 100 SPRAY, METERED RESPIRATORY (INHALATION) at 08:27

## 2022-07-21 NOTE — CARE COORDINATION
7/21/2022 1102 CM note: POSITIVE COVID 7/19/22. ACP completed. Pt is a SELF PAY-per Public Benefits, she is over income for Medicaid and is HFA eligible only. Pt has a Good Rx card. She is independent with ADLs and drives. Pt on room air. Pt [place] [facility] and family will provide ride.   Geeta CHONG

## 2022-07-21 NOTE — DISCHARGE SUMMARY
[facility] Physician Discharge Summary       Pratima Mullins MD  [address] [facility]  [phone]    Schedule an appointment as soon as possible for a visit in 1 week(s)  Please call for follow up post hospital stay appt. Activity level: As Toelrated    Diet: ADULT DIET; Regular      Condition at discharge: Stable    Dispo:Home      Patient ID:  Trinity Hale  [id number]  46 y.o.  1975    Admit date: 7/19/2022    Discharge date and time:  7/21/2022  3:51 PM    Admission Diagnoses: Principal Problem:    COVID  Resolved Problems:    * No resolved hospital problems. *      Discharge Diagnoses: Principal Problem:    COVID  Resolved Problems:    * No resolved hospital problems. *      Consults:  None    Procedures: none    Hospital Course:   919/2022 52year-old female presented to ED with complaints of URI symptoms body aches, fevers, chills, cough and wheezing. Previously at [facility] tested positive for COVID. At that time not hypoxic vitals and labs were stable. She was discharged home. She presented back to the ER [facility] symptoms worsen prior to presentation. CXR unremarkable. IVF hydration vitamin supplementation given. Duo nebs continued. Pep/flutter valve. Incentive spirometry. Patient remained on room air no noted hypoxia. Patient is stable for discharge. Patient will be instructed to follow-up with PCP upon DC.   Discharge Exam:  Vitals:    07/20/22 1145 07/20/22 1200 07/20/22 2200 07/21/22 0745   BP: (!) 178/110 (!) 176/92 (!) 142/105 (!) 145/99   Pulse: 95 94 (!) 101 96   Resp:   18 20   Temp:   99.3 °F (37.4 °C) 98.5 °F (36.9 °C)   TempSrc:   Oral Oral   SpO2: 94%  98% 96%   Weight:       Height:           General Appearance: alert and oriented to person, place and time and in no acute distress  Skin: warm and dry  Head: normocephalic and atraumatic  Eyes: pupils equal, round, and reactive to light, extraocular eye movements intact, conjunctivae normal  Neck: neck supple and non tender without mass  Pulmonary/Chest: clear to auscultation bilaterally- no wheezes, rales or rhonchi, normal air movement, no respiratory distress  Cardiovascular: normal rate, normal S1 and S2 and no carotid bruits  Abdomen: soft, non-tender, non-distended, normal bowel sounds, no masses or organomegaly  Extremities: no cyanosis, no clubbing and no edema  Neurologic: no cranial nerve deficit and speech normal  I/O last 3 completed shifts: In: 4447 [P.O.:1060; I.V.:10]  Out: -   I/O this shift: In: 480 [P.O.:480]  Out: -       LABS:  Recent Labs     07/19/22  1333 07/20/22  0712    139   K 3.6 3.7    104   CO2 27 24   BUN 12 10   CREATININE 1.0 0.9   GLUCOSE 120* 118*   CALCIUM 8.5* 8.4*       Recent Labs     07/19/22  1333 07/20/22  0712   WBC 8.7 10.4   RBC 4.54 4.56   HGB 10.1* 10.1*   HCT 33.9* 34.5   MCV 74.7* 75.7*   MCH 22.2* 22.1*   MCHC 29.8* 29.3*   RDW 15.7* 16.2*    331   MPV 11.0 11.1       No results for input(s): POCGLU in the last 72 hours. Imaging:  XR CHEST 1 VIEW    Result Date: 7/19/2022  EXAMINATION: ONE XRAY VIEW OF THE CHEST 7/19/2022 2:06 pm COMPARISON: 08/21/2021 HISTORY: ORDERING SYSTEM PROVIDED HISTORY: cough TECHNOLOGIST PROVIDED HISTORY: Reason for exam:->cough FINDINGS: The lungs are without acute focal process. There is no effusion or pneumothorax. The cardiomediastinal silhouette is without acute process. The osseous structures are without acute process. No acute process. Patient Instructions:   Current Discharge Medication List        START taking these medications    Details   benzonatate (TESSALON) 100 MG capsule Take 1 capsule by mouth 3 times daily as needed for Cough  Qty: 21 capsule, Refills: 0      ascorbic acid (VITAMIN C) 500 MG tablet Take 1 tablet by mouth in the morning. Qty: 30 tablet, Refills: 3      vitamin B-6 (B-6) 50 MG tablet Take 1 tablet by mouth in the morning.   Qty: 30 tablet, Refills: 0      Cholecalciferol (VITAMIN D) 25 MCG TABS Take 1 tablet by mouth in the morning. Qty: 30 tablet, Refills: 0    Comments: Labeling may look different. 25 mcg=1000 Units. Please double check dosages. zinc sulfate (ZINCATE) 220 (50 Zn) MG capsule Take 1 capsule by mouth in the morning. Qty: 30 capsule, Refills: 3           CONTINUE these medications which have CHANGED    Details   hydroCHLOROthiazide (HYDRODIURIL) 12.5 MG tablet Take 3 tablets by mouth every morning  Qty: 30 tablet, Refills: 3      venlafaxine (EFFEXOR XR) 37.5 MG extended release capsule Take 1 capsule by mouth in the morning.   Qty: 30 capsule, Refills: 3           CONTINUE these medications which have NOT CHANGED    Details   Rimegepant Sulfate (NURTEC) 75 MG TBDP Take 75 mg by mouth as needed (severe migraine)  Qty: 16 tablet, Refills: 0    Comments: Lot [id number]  Exp 02/2025      lisinopril (PRINIVIL;ZESTRIL) 10 MG tablet Take 2 tablets by mouth daily  Qty: 60 tablet, Refills: 2    Associated Diagnoses: Essential hypertension      Fremanezumab-vfrm 225 MG/1.5ML SOAJ Inject 225 mg into the skin every 30 days  Qty: 4 pen, Refills: 0    Comments: [id number]  10/22      tiotropium (SPIRIVA RESPIMAT) 1.25 MCG/ACT AERS inhaler Inhale 2 puffs into the lungs daily  Qty: 3 each, Refills: 1      PROVENTIL  (90 Base) MCG/ACT inhaler INHALE TWO PUFFS BY MOUTH EVERY 6 HOURS AS NEEDED  Qty: 18 g, Refills: 2    Associated Diagnoses: SOB (shortness of breath)      ferrous sulfate (IRON 325) 325 (65 Fe) MG tablet Take 1 tablet by mouth 2 times daily (with meals)  Qty: 60 tablet, Refills: 2    Associated Diagnoses: Iron deficiency anemia, unspecified iron deficiency anemia type      ondansetron (ZOFRAN ODT) 4 MG disintegrating tablet Take 1 tablet by mouth every 8 hours as needed for Nausea or Vomiting  Qty: 10 tablet, Refills: 0      BIOTIN PO Take 1 tablet by mouth daily      mometasone-formoterol (DULERA) 100-5 MCG/ACT inhaler Inhale 2 puffs into the lungs 2 times daily  Qty: 1 Inhaler, Refills: 5    Associated Diagnoses: Moderate persistent asthma with exacerbation      acetaminophen (TYLENOL) 500 MG tablet Take 1,000 mg by mouth every 6 hours as needed for Pain            STOP taking these medications       acetaZOLAMIDE (DIAMOX) 250 MG tablet Comments:   Reason for Stopping:         acetaZOLAMIDE (DIAMOX) 250 MG tablet Comments:   Reason for Stopping:         Prenatal Vit-Fe Fumarate-FA (PRENATAL VITAMIN PO) Comments:   Reason for Stopping:                 Note that more than 30 minutes was spent in preparing discharge papers, discussing discharge with patient, medication review, etc.    NOTE: This report was transcribed using voice recognition software. Every effort was made to ensure accuracy; however, inadvertent computerized transcription errors may be present. Signed:  Electronically signed by Raoul Zuluaga CNP on 7/21/2022 at 3:51 PM

## 2022-07-21 NOTE — PROGRESS NOTES
Pulse ox was 95% on room air at rest.  Ambulated patient on room air. Oxygen saturation was 90% on room air while ambulating. Patient ambulated for 200ft on room air.

## 2022-07-21 NOTE — DISCHARGE INSTRUCTIONS
Your information:  Name: Danial Perales  : 1975    Your instructions:     YOU ARE BEING DISCHARGED HOME. PLEASE MAKE AND KEEP YOUR FOLLOW UP APPOINTMENTS. What to do after you leave the hospital:    Recommended diet: regular diet    Recommended activity: activity as tolerated    IF YOU EXPERIENCE ANY OF THE FOLLOWING SYMPTOMS, CHEST PAIN, SHORTNESS OF BREATH, COUGHING UP BLOOD OR BLOODY SPUTUM, STOMACH PAIN OR CRAMPING, DARK, TARRY STOOLS, LOSS OF APPETITE, GENERAL NOT FEELING WELL, SIGNS AND SYMPTOMS OF INFECTION LIKE FEVER AND OR CHILLS, PLEASE CALL DR oDnna Chairez MD OR RETURN TO THE EMERGENCY ROOM. The following personal items were collected during your admission and were returned to you:    Belongings  Dental Appliances: None  Vision - Corrective Lenses: Eyeglasses, At bedside  Hearing Aid: None  Clothing: Footwear, At bedside, Dress  Jewelry: Ring, At bedside (6 rings)  Body Piercings Removed: N/A  Electronic Devices: Cell Phone, At bedside (2 cell phones)  Weapons (Notify Protective Services/Security): None  Other Valuables: Wallet, At bedside  Home Medications: None  Valuables Given To: Patient  Provide Name(s) of Who Valuable(s) Were Given To: patient  Responsible person(s) in the waiting room: none  Patient approves for provider to speak to responsible person post operatively: Yes    Information obtained by:  By signing below, I understand that if any problems occur once I leave the hospital I am to contact  Donna Chairez MD or go to emergency room. I understand and acknowledge receipt of the instructions indicated above.

## 2022-07-22 ENCOUNTER — TELEPHONE (OUTPATIENT)
Dept: INTERNAL MEDICINE | Age: 47
End: 2022-07-22

## 2022-07-22 NOTE — TELEPHONE ENCOUNTER
Patient calling in requesting a  excuse for work, due to recent dx and hospital stay, she did schedule a f/u appt next week. It will need on [facility] letter head and faxed to [phone]. Please Advise.

## 2022-07-22 NOTE — LETTER
[facility] Internal Medicine  [address]  [place] [address]  Phone: [phone]  Fax: [phone] Davion Haywood , DO        July 22, 2022     Patient: Alea Asencio   YOB: 1975   Date of Visit: 7/22/2022       To Whom It May Concern:     Alexy Lay was medically ill requiring hospitalization from  starting July 19 - July 21 and will need reassessment by a physician in the office on August 2, 2022 for determination of return to work. If you have any questions or concerns, please don't hesitate to call.     Sincerely,        Jose Daniel Luu, DO

## 2022-07-24 LAB
BLOOD CULTURE, ROUTINE: NORMAL
CULTURE, BLOOD 2: NORMAL

## 2022-07-26 ENCOUNTER — TELEPHONE (OUTPATIENT)
Dept: INTERNAL MEDICINE | Age: 47
End: 2022-07-26

## 2022-07-26 ENCOUNTER — TELEMEDICINE (OUTPATIENT)
Dept: INTERNAL MEDICINE | Age: 47
End: 2022-07-26

## 2022-07-26 DIAGNOSIS — U09.9 POST-COVID SYNDROME: Primary | ICD-10-CM

## 2022-07-26 PROCEDURE — 99213 OFFICE O/P EST LOW 20 MIN: CPT | Performed by: STUDENT IN AN ORGANIZED HEALTH CARE EDUCATION/TRAINING PROGRAM

## 2022-07-26 RX ORDER — BENZONATATE 100 MG/1
100 CAPSULE ORAL 3 TIMES DAILY PRN
Qty: 21 CAPSULE | Refills: 0 | Status: SHIPPED | OUTPATIENT
Start: 2022-07-26 | End: 2022-08-02

## 2022-07-26 ASSESSMENT — ENCOUNTER SYMPTOMS
SHORTNESS OF BREATH: 1
ALLERGIC/IMMUNOLOGIC NEGATIVE: 1
GASTROINTESTINAL NEGATIVE: 1
EYES NEGATIVE: 1
COUGH: 1

## 2022-07-26 NOTE — PROGRESS NOTES
[facility]  Internal Medicine Clinic    Attending Physician's Statement      TeleMedicine Patient Consent    This visit was performed as phone visit. Patient identification was verified at the start of the visit, including the patient's telephone number and physical location. I discussed with the patient the nature of our telehealth visits, that:     I would evaluate the patient and recommend diagnostics and treatments based on my assessment. If it is felt that the patient should be evaluated in the clinic or an emergency room setting, then they would be directed there. Our sessions are not being recorded and that personal health information is protected. Our team would provide follow up care in person if/when the patient needs it. Patient does agree to proceed with telemedicine consultation. Patient's location: [place]    I have discussed the case, including pertinent history with the medical resident. I agree with the assessment, plan and orders as documented by the resident. I have reviewed all the pertinent PMHx, PSHx, Family Hx, Social Hx, medications and allergies, and updated history as appropriate. COVID infection in July 19th was in [facility] for few days, DC 21st, reported persistent symptom, desires to return to work, still has coughing and fatigued, has been working at home for [employer]. OK for more Tessalon nya and trial of return to work as pt does sedentary job. Total time 10 minutes spent    Patient presents for telephone visit. Pertinent lab results and imaging studies have been reviewed. Medical problems, assessment and plan per medical resident's note. Time spent: 10 mins      Leonides Burkitt, MD.  Faculty, Internal Medicine Residency  7/26/2022      The patient is being evaluated by a telephone encounter to address concerns as mentioned above. A caregiver was present when appropriate.  Due to this being a TeleHealth encounter (During XPCYQ-76 public health emergency), evaluation of the following organ systems was limited: Vitals/Constitutional/EENT/Resp/CV/GI//MS/Neuro/Skin/Heme-Lymph-Imm. Pursuant to the emergency declaration under the [address] [place] [address], [address] [place] [address] authority and the [employer] Act, this Virtual Visit was conducted with patient's (and/or legal guardian's) consent, to reduce the patient's risk of exposure to COVID-19 and provide necessary medical care. The patient (and/or legal guardian) has also been advised to contact this office for worsening conditions or problems, and seek emergency medical treatment and/or call 911 if deemed necessary. Services were provided through a video synchronous discussion virtually to substitute for in-person clinic visit. Patient and provider were located at their individual homes.

## 2022-07-26 NOTE — PATIENT INSTRUCTIONS
Dear Audrey Keating,        Thank you for coming to your appointment today. I hope we have addressed all of your needs. Please make sure to do the following:  - Continue your medications as listed. - We will see each other again in 1 week for in person visit    Call for a sooner appointment if you develop any acute concerns    Have a great day!         Sincerely,  Darline Kitchen M.D PGY-2  7/26/2022  4:30 PM

## 2022-07-26 NOTE — LETTER
[address] [facility] Internal Medicine  [address] [place] [address]  Phone: [phone]  Fax: [phone]    Darline Kitchen MD        July 26, 2022     Patient: Audrey Keating   YOB: 1975   Date of Visit: 7/26/2022       To Whom It May Concern: It is my medical opinion that Briana Luis may return to work on 7/30/22. If you have any questions or concerns, please don't hesitate to call.     Sincerely,        Darline Kitchen MD

## 2022-07-26 NOTE — PROGRESS NOTES
Adia Ledezma (:  1975) is a Established patient, here for evaluation of the following:    Assessment & Plan   Below is the assessment and plan developed based on review of pertinent history, physical exam, labs, studies, and medications. 1. Post-COVID syndrome  -     benzonatate (TESSALON) 100 MG capsule; Take 1 capsule by mouth 3 times daily as needed for Cough, Disp-21 capsule, R-0Normal  No follow-ups on file. Subjective   HPI  Patient is a 53yo F who is calling for concerns of Virtual Visit for [address] [place] [address]. [facility] with symptoms consisting of body aches, fever, chills, cough, wheezing. CXR was seen to be unremarkable. Patient was on room air with no noted hypoxia. Patient was admitted on 22 and discharged on 22  - Patient coming in today for HFU. Patient still complains of body aches with relief from PO tylenol which she takes q4-6hrs. Patient admits to back and shoulder pain as well as headache due to frequent episodes of cough. - Patient was discharged on Tesslon Perles and states mild relief with the medications. - Upon speaking with patient she still has prominent coughing spells and is unable to complete most sentences due to coughing spells  - Patient requesting to return to work. She was for [employer] as a [employer]. She works from home. She is requesting to return to work as she states [id number] and needs to work. - Upon further discussion with patient. Patient to rest for the next few days. Tesslon Perles to be ordered for her and she is to be able to return to work on 22. Patient also has in person appointment on 22 and she is to be re-evaluated in person on resolution of symptoms. Patient is compliant with plan        Review of Systems   Constitutional: Negative. HENT: Negative. Eyes: Negative. Respiratory:  Positive for cough and shortness of breath. Cardiovascular: Negative. Gastrointestinal: Negative. Endocrine: Negative. Genitourinary: Negative. Musculoskeletal: Negative. Skin: Negative. Allergic/Immunologic: Negative. Neurological: Negative. Hematological: Negative. Psychiatric/Behavioral: Negative. Objective   Patient-Reported Vitals  No data recorded            On this date 7/26/2022 I have spent 25 minutes reviewing previous notes, test results and face to face (virtual) with the patient discussing the diagnosis and importance of compliance with the treatment plan as well as documenting on the day of the visit. Belinda Millervinay, was evaluated through a synchronous (real-time) audio-video encounter. The patient (or guardian if applicable) is aware that this is a billable service, which includes applicable co-pays. This Virtual Visit was conducted with patient's (and/or legal guardian's) consent. The visit was conducted pursuant to the emergency declaration under the [address] [place] [address], [address] [place] [address] authority and the [employer] and [employer] Act. Patient identification was verified, and a caregiver was present when appropriate. The patient was located at Home: [facility]. Provider was located at [facility] (Appt Dept): [facility],  [address] [place] [address]         --Milla Black MD

## 2022-07-26 NOTE — TELEPHONE ENCOUNTER
Patient would like to know if she can get a return to [facility] letter for tomorrow. Please Advise.  Pt Ph [phone]

## 2022-08-02 ENCOUNTER — OFFICE VISIT (OUTPATIENT)
Dept: INTERNAL MEDICINE | Age: 47
End: 2022-08-02

## 2022-08-02 VITALS
TEMPERATURE: 98.1 F | WEIGHT: 293 LBS | RESPIRATION RATE: 18 BRPM | OXYGEN SATURATION: 98 % | HEART RATE: 95 BPM | HEIGHT: 68 IN | BODY MASS INDEX: 44.41 KG/M2 | SYSTOLIC BLOOD PRESSURE: 140 MMHG | DIASTOLIC BLOOD PRESSURE: 91 MMHG

## 2022-08-02 DIAGNOSIS — E55.9 VITAMIN D DEFICIENCY: ICD-10-CM

## 2022-08-02 DIAGNOSIS — Z09 HOSPITAL DISCHARGE FOLLOW-UP: Primary | ICD-10-CM

## 2022-08-02 DIAGNOSIS — K51.919 ULCERATIVE COLITIS WITH COMPLICATION, UNSPECIFIED LOCATION (HCC): ICD-10-CM

## 2022-08-02 DIAGNOSIS — I10 PRIMARY HYPERTENSION: ICD-10-CM

## 2022-08-02 PROCEDURE — 1111F DSCHRG MED/CURRENT MED MERGE: CPT | Performed by: INTERNAL MEDICINE

## 2022-08-02 PROCEDURE — 99495 TRANSJ CARE MGMT MOD F2F 14D: CPT | Performed by: INTERNAL MEDICINE

## 2022-08-02 PROCEDURE — 99212 OFFICE O/P EST SF 10 MIN: CPT | Performed by: INTERNAL MEDICINE

## 2022-08-02 RX ORDER — ERGOCALCIFEROL 1.25 MG/1
50000 CAPSULE ORAL WEEKLY
Qty: 8 CAPSULE | Refills: 0 | Status: ON HOLD
Start: 2022-08-02 | End: 2022-10-20

## 2022-08-02 SDOH — ECONOMIC STABILITY: HOUSING INSECURITY: IN THE LAST 12 MONTHS, HOW MANY PLACES HAVE YOU LIVED?: 2

## 2022-08-02 SDOH — ECONOMIC STABILITY: TRANSPORTATION INSECURITY
IN THE PAST 12 MONTHS, HAS THE LACK OF TRANSPORTATION KEPT YOU FROM MEDICAL APPOINTMENTS OR FROM GETTING MEDICATIONS?: NO

## 2022-08-02 SDOH — ECONOMIC STABILITY: TRANSPORTATION INSECURITY
IN THE PAST 12 MONTHS, HAS LACK OF TRANSPORTATION KEPT YOU FROM MEETINGS, WORK, OR FROM GETTING THINGS NEEDED FOR DAILY LIVING?: NO

## 2022-08-02 SDOH — ECONOMIC STABILITY: INCOME INSECURITY: IN THE LAST 12 MONTHS, WAS THERE A TIME WHEN YOU WERE NOT ABLE TO PAY THE MORTGAGE OR RENT ON TIME?: YES

## 2022-08-02 SDOH — ECONOMIC STABILITY: FOOD INSECURITY: WITHIN THE PAST 12 MONTHS, THE FOOD YOU BOUGHT JUST DIDN'T LAST AND YOU DIDN'T HAVE MONEY TO GET MORE.: NEVER TRUE

## 2022-08-02 SDOH — ECONOMIC STABILITY: FOOD INSECURITY: WITHIN THE PAST 12 MONTHS, YOU WORRIED THAT YOUR FOOD WOULD RUN OUT BEFORE YOU GOT MONEY TO BUY MORE.: NEVER TRUE

## 2022-08-02 SDOH — ECONOMIC STABILITY: HOUSING INSECURITY
IN THE LAST 12 MONTHS, WAS THERE A TIME WHEN YOU DID NOT HAVE A STEADY PLACE TO SLEEP OR SLEPT IN A SHELTER (INCLUDING NOW)?: NO

## 2022-08-02 ASSESSMENT — ENCOUNTER SYMPTOMS
NAUSEA: 0
RHINORRHEA: 0
DIARRHEA: 0
APNEA: 0
WHEEZING: 1
STRIDOR: 0
SINUS PRESSURE: 1
COUGH: 1
CONSTIPATION: 0
EYE DISCHARGE: 0
SHORTNESS OF BREATH: 1
COLOR CHANGE: 0
ABDOMINAL PAIN: 0
ABDOMINAL DISTENTION: 0
BLOOD IN STOOL: 0
SORE THROAT: 1
EYE ITCHING: 0
CHEST TIGHTNESS: 1
VOMITING: 0

## 2022-08-02 ASSESSMENT — PATIENT HEALTH QUESTIONNAIRE - PHQ9
1. LITTLE INTEREST OR PLEASURE IN DOING THINGS: SEVERAL DAYS
2. FEELING DOWN, DEPRESSED OR HOPELESS: SEVERAL DAYS
SUM OF ALL RESPONSES TO PHQ9 QUESTIONS 1 & 2: 2
DEPRESSION UNABLE TO ASSESS: YES

## 2022-08-02 ASSESSMENT — LIFESTYLE VARIABLES
HOW OFTEN DO YOU HAVE A DRINK CONTAINING ALCOHOL: MONTHLY OR LESS
HOW MANY STANDARD DRINKS CONTAINING ALCOHOL DO YOU HAVE ON A TYPICAL DAY: 1 OR 2

## 2022-08-02 ASSESSMENT — SOCIAL DETERMINANTS OF HEALTH (SDOH): HOW HARD IS IT FOR YOU TO PAY FOR THE VERY BASICS LIKE FOOD, HOUSING, MEDICAL CARE, AND HEATING?: SOMEWHAT HARD

## 2022-08-02 NOTE — PROGRESS NOTES
[address]  Internal Medicine Residency Program  [facility] Note      SUBJECTIVE:  CC: had concerns including Follow-Up from Hospital and Shortness of Breath. HPI:  Silvestre Marsh is a 52 y. o.female with PMH of essential HTN, Migraine, hemorrhagic stroke, cerebral aneurysm, CHERELLE, pseudotumor cerebri, severe obesity presenting to [facility] for F/U of post-COVID symptoms    Last seen by PCP (05/05/2022) in office: lisinopril dose increased to 20mg daily. Cx, breast, colon cancer screens ordered. HRCT ordered to evaluate for post-COVID fibrosis (initially COVID (+) 10/2020). Interval development:  -Seen in neurology office on 07/08/22 for migraine, pseudotumor cerebri. Nurtec (Rimepegant) samples were given; Ajovy Arletha ) 225mg SQ every 30 days was continued    -07/19/22-07/21/22: tested positive for COVID again, discharged after stable vitals, given: Tessalon, vit C, Vit B6, Vit D, and Zn supplements    -07/26/22: [facility] hospital follow-up: Return to work advised for 07/30. Tessalon pearls given. Today:  BP: 140/91, symptoms: resolving : no fevers, SOB: feel. Wheezing? Sometimes. Chest pain: slight, comes and goes, rest, breathing through helps. Anxiety makes it worse. cough: mixed. Pale yellow/white phlegm. No blood. Last saw Dr. Jorge A Swartz. HRCT done. Does not show any fibrosis but few new nodules. Rescue inhaler: 2-3 times a day since COVID re-infection. Not vaccinated. CHERELLE labs look appropriate, will continue iron supplementation  Vitamin B12 & Folate: WNL  -     CBC with Auto Differential: HB: 10.1, MCV: 75.7 (07/20/22)  -     Path Review, Smear: microcytosis and hypochromasia without anemia  -     Iron and TIBC; Future  -     Ferritin: 30    Review of Systems   Constitutional:  Positive for fatigue. Negative for chills, fever and unexpected weight change. HENT:  Positive for postnasal drip, sinus pressure and sore throat.  Negative for ear discharge, ear pain, rhinorrhea and sneezing. Eyes:  Negative for discharge and itching. Respiratory:  Positive for cough, chest tightness, shortness of breath and wheezing. Negative for apnea and stridor. Cardiovascular:  Negative for palpitations and leg swelling. Gastrointestinal:  Negative for abdominal distention, abdominal pain, blood in stool, constipation, diarrhea, nausea and vomiting. Endocrine: Negative for polydipsia, polyphagia and polyuria. Genitourinary:  Negative for difficulty urinating, dysuria and frequency. Musculoskeletal: Negative. Skin: Negative. Negative for color change. Neurological:  Positive for headaches. Negative for numbness. Headaches are better controlled   Hematological: Negative. Psychiatric/Behavioral: Negative. Outpatient Medications Marked as Taking for the 22 encounter (Office Visit) with Minal Hurt MD   Medication Sig Dispense Refill    vitamin D (ERGOCALCIFEROL) 1.25 MG (94875 UT) CAPS capsule Take 1 capsule by mouth once a week 8 capsule 0    [] benzonatate (TESSALON) 100 MG capsule Take 1 capsule by mouth 3 times daily as needed for Cough 21 capsule 0    hydroCHLOROthiazide (HYDRODIURIL) 12.5 MG tablet Take 3 tablets by mouth every morning 30 tablet 3    ascorbic acid (VITAMIN C) 500 MG tablet Take 1 tablet by mouth in the morning. 30 tablet 3    vitamin B-6 (B-6) 50 MG tablet Take 1 tablet by mouth in the morning. 30 tablet 0    venlafaxine (EFFEXOR XR) 37.5 MG extended release capsule Take 1 capsule by mouth in the morning. 30 capsule 3    zinc sulfate (ZINCATE) 220 (50 Zn) MG capsule Take 1 capsule by mouth in the morning.  30 capsule 3    Rimegepant Sulfate (NURTEC) 75 MG TBDP Take 75 mg by mouth as needed (severe migraine) 16 tablet 0    lisinopril (PRINIVIL;ZESTRIL) 10 MG tablet Take 2 tablets by mouth daily 60 tablet 2    Fremanezumab-vfrm 225 MG/1.5ML SOAJ Inject 225 mg into the skin every 30 days 4 pen 0    tiotropium (SPIRIVA RESPIMAT) 1.25 MCG/ACT AERS inhaler Inhale 2 puffs into the lungs daily 3 each 1    PROVENTIL  (90 Base) MCG/ACT inhaler INHALE TWO PUFFS BY MOUTH EVERY 6 HOURS AS NEEDED 18 g 2    ferrous sulfate (IRON 325) 325 (65 Fe) MG tablet Take 1 tablet by mouth 2 times daily (with meals) 60 tablet 2    ondansetron (ZOFRAN ODT) 4 MG disintegrating tablet Take 1 tablet by mouth every 8 hours as needed for Nausea or Vomiting 10 tablet 0    BIOTIN PO Take 1 tablet by mouth daily      mometasone-formoterol (DULERA) 100-5 MCG/ACT inhaler Inhale 2 puffs into the lungs 2 times daily 1 Inhaler 5    acetaminophen (TYLENOL) 500 MG tablet Take 1,000 mg by mouth every 6 hours as needed for Pain          OBJECTIVE:    VS:   BP (!) 140/91   Pulse 95   Temp 98.1 °F (36.7 °C) (Temporal)   Resp 18   Ht 5' 8\" (1.727 m)   Wt (!) 330 lb 14.4 oz (150.1 kg)   SpO2 98% Comment: ra  BMI 50.31 kg/m²     EXAM:  Physical Exam  Constitutional:       Appearance: She is obese. She is not ill-appearing, toxic-appearing or diaphoretic. HENT:      Nose: Nose normal. No congestion or rhinorrhea. Mouth/Throat:      Mouth: Mucous membranes are moist.      Pharynx: No oropharyngeal exudate or posterior oropharyngeal erythema. Eyes:      General:         Right eye: No discharge. Left eye: No discharge. Cardiovascular:      Rate and Rhythm: Normal rate and regular rhythm. Pulses: Normal pulses. Heart sounds: Normal heart sounds. Pulmonary:      Effort: Pulmonary effort is normal.      Breath sounds: Normal breath sounds. Abdominal:      Palpations: Abdomen is soft. Musculoskeletal:      Cervical back: Normal range of motion. Skin:     General: Skin is warm. Neurological:      Mental Status: She is alert and oriented to person, place, and time. ASSESSMENT/PLAN:  I have reviewed all pertinent PMH, PSH, FH, SH, medications and allergies and updated history as appropriate. Post-COVID syndrome: resolving  2.  Essential hypertension  -HCTZ 12.5mg daily supposed to be taking 37.5, taking only 12.5, advised to take 3 tabs daily  -Lisinopril 20mg daily to be continued  -BMP in 2 weeks before next visit with PCP    4. Intractable migraine without aura and without status migrainosus  -Nurtec (Rimepegant) samples were given; Ajovy (Fremanezumab) 225mg SQ every 30 days to be continued    5. ?FERNANDA  -will consider doing a sleep study once has recovered from this bout of COVID    6. Vitamin D deficiency  -Vit D level 11, started 50,000U weekly for the next 8 weeks    7. Class 3 severe obesity  -seen by bariatrics, does not want to count calories, but willing for portion control, has started eating smaller portions    8. Iron deficiency anemia secondary to inadequate dietary iron intake  -will continue Iron tabs    9. Neuropathy   -no complaints currently    10. HCM:  -hep C: neg  -PAP smear: OBGYN referral present, patient did not go; she is considering. Will call their office when she feels better  -Mammogram: [facility]ADS 1 (05/26/22), next mammo: 05/2023  -c-scope in 2018 mentions colitis, pathology mentions possible ulcerative colitis; pt is asymptomatic. Will refer to [address] [place] [address] for further evaluation. Rosina was seen today for follow-up from hospital and shortness of breath. Diagnoses and all orders for this visit:    Hospital discharge follow-up  -     LA DISCHARGE MEDS RECONCILED W/ CURRENT OUTPATIENT MED LIST    Primary hypertension  -     Basic Metabolic Panel; Future    Ulcerative colitis with complication, unspecified location [facility])  -     [facility] General Surgery    Vitamin D deficiency  -     vitamin D (ERGOCALCIFEROL) 1.25 MG (22395 UT) CAPS capsule;  Take 1 capsule by mouth once a week        RTC: with PCP in the next 2-3 weeks      I have reviewed my findings and recommendations with Hayley Odom and Dr Paola Cobb MD PGY-3  8/3/2022 9:32 PM

## 2022-08-02 NOTE — PROGRESS NOTES
[facility]  Internal Medicine Residency Clinic    Attending Physician Statement  I have discussed the case, including pertinent history and exam findings with the resident physician. I agree with the assessment, plan and orders as documented by the resident. I have reviewed all pertinent PMHx, PSHx, FamHx, SocialHx, medications, and allergies and updated history as appropriate. Patient here for routine follow up of medical problems. And HFU    HTN: patient not taking full dose of HCTZ (only 12.5 mg daily); plan to have patient take appropriate dosage; repeat BMP in 2 weeks     COVID PNA: multiple covid hospitalizations; improving/stable; not needing supplemental O2; no hemoptysis; exacerbated asthma; patient currently using LABA/ICS/LAMA; using albuterol 3 times/day; CT high resolution did not show signs of fibrosis; patient will need follow up with Pulmonology; TTE showed indeterminate DD; mild left ventricular hypertrophy; patient needs sleep study to evaluate for sleep apnea and pulmonary pressure not denoted on TTE    CHERELLE: repeat iron panel shows low iron and saturation; patient continue iron supplementation; ob/gyn referral; denies any dark or tarry stools;     HCM: patient needs to see Ob/GYN; patient had colonoscopy in 2018;     Remainder of medical problems as per resident note.     [address] Araya DO  8/2/2022 2:01 PM    Encounter time including independent chart review, discussion with patient, interpreting test results and/or external communications: 30'

## 2022-08-25 ENCOUNTER — OFFICE VISIT (OUTPATIENT)
Dept: INTERNAL MEDICINE | Age: 47
End: 2022-08-25

## 2022-08-25 VITALS
TEMPERATURE: 97.8 F | HEART RATE: 118 BPM | SYSTOLIC BLOOD PRESSURE: 141 MMHG | DIASTOLIC BLOOD PRESSURE: 94 MMHG | OXYGEN SATURATION: 98 % | BODY MASS INDEX: 44.41 KG/M2 | RESPIRATION RATE: 20 BRPM | HEIGHT: 68 IN | WEIGHT: 293 LBS

## 2022-08-25 DIAGNOSIS — E66.01 CLASS 3 SEVERE OBESITY DUE TO EXCESS CALORIES WITH SERIOUS COMORBIDITY AND BODY MASS INDEX (BMI) OF 50.0 TO 59.9 IN ADULT (HCC): ICD-10-CM

## 2022-08-25 DIAGNOSIS — I10 ESSENTIAL HYPERTENSION: ICD-10-CM

## 2022-08-25 DIAGNOSIS — F41.9 ANXIETY: ICD-10-CM

## 2022-08-25 DIAGNOSIS — E55.9 VITAMIN D DEFICIENCY: ICD-10-CM

## 2022-08-25 DIAGNOSIS — J45.41 MODERATE PERSISTENT ASTHMA WITH EXACERBATION: ICD-10-CM

## 2022-08-25 DIAGNOSIS — U09.9 POST-COVID SYNDROME: Primary | ICD-10-CM

## 2022-08-25 DIAGNOSIS — M54.16 LUMBAR BACK PAIN WITH RADICULOPATHY AFFECTING RIGHT LOWER EXTREMITY: ICD-10-CM

## 2022-08-25 DIAGNOSIS — G43.019 INTRACTABLE MIGRAINE WITHOUT AURA AND WITHOUT STATUS MIGRAINOSUS: ICD-10-CM

## 2022-08-25 DIAGNOSIS — G47.30 SLEEP APNEA, UNSPECIFIED TYPE: ICD-10-CM

## 2022-08-25 PROCEDURE — 99212 OFFICE O/P EST SF 10 MIN: CPT | Performed by: INTERNAL MEDICINE

## 2022-08-25 PROCEDURE — 99213 OFFICE O/P EST LOW 20 MIN: CPT | Performed by: INTERNAL MEDICINE

## 2022-08-25 RX ORDER — BENZONATATE 100 MG/1
100 CAPSULE ORAL 2 TIMES DAILY PRN
Qty: 20 CAPSULE | Refills: 0 | Status: SHIPPED | OUTPATIENT
Start: 2022-08-25 | End: 2022-09-01

## 2022-08-25 RX ORDER — LISINOPRIL 20 MG/1
20 TABLET ORAL DAILY
Qty: 30 TABLET | Refills: 5 | Status: SHIPPED
Start: 2022-08-25 | End: 2022-08-25

## 2022-08-25 RX ORDER — HYDROCHLOROTHIAZIDE 25 MG/1
25 TABLET ORAL EVERY MORNING
Qty: 30 TABLET | Refills: 5 | Status: SHIPPED
Start: 2022-08-25 | End: 2022-11-03 | Stop reason: SDUPTHER

## 2022-08-25 RX ORDER — LISINOPRIL 40 MG/1
40 TABLET ORAL DAILY
Qty: 30 TABLET | Refills: 5 | Status: SHIPPED
Start: 2022-08-25 | End: 2022-11-03

## 2022-08-25 RX ORDER — METHYLPREDNISOLONE 4 MG/1
TABLET ORAL
Qty: 1 KIT | Refills: 0 | Status: SHIPPED | OUTPATIENT
Start: 2022-08-25 | End: 2022-08-31

## 2022-08-25 NOTE — PATIENT INSTRUCTIONS
We are glad you are feeling better after discharge. For your worsening back pain and persistent shortness of breath, we will prescribe Medrol Dosepak which is a steroid. Please take the tablets as advised on the back of the pack. For your cough please continue Tessalon capsules. For your blood pressure we will adjust her blood pressure medications. Please take the hydrochlorothiazide pill 1 pill 25 mg every day. Please take the lisinopril 40 mg tablet 1 pill every day. Please get blood work done as we have ordered it as soon as possible. We will call you with results. We have spoken to prescription assistance today and they will help you with prescription assistance for the Effexor as well as Spiriva. Please follow-up with documentation with them. We will see you back in 2 months to see how you are doing.   Dr. Ana Fraire

## 2022-08-25 NOTE — PROGRESS NOTES
[facility]  Internal Medicine Residency Clinic    Attending Physician Statement  I have discussed the case, including pertinent history and exam findings with the resident physician. I have seen and examined the patient and the key elements of the encounter have been performed by me. I agree with the assessment, plan and orders as documented by the resident. I have reviewed all pertinent PMHx, PSHx, FamHx, SocialHx, medications, and allergies and updated history as appropriate. Patient presents for routine follow up of medical problems. HTN -- lisinopril increased to 20 mg, decrease HCTZ  to 25 mg    Chronic lumbar pain with right sciatica -- ok for medrol stew, tylenol PRN; recommended gabapentin (cost prohibitive)    Asthma COPD overlap --- started Spiriva / Loyce , increased SHWETHA use post hospital discharge    Obesity class 3 (BMI 49) -- lifestyle changes    Hx of COVID 19 pneumonia complicated with chronic cough -- slowly resolving, symptomatic therapy     Migraine -- controlled on Nurtec prn    Anxiety / depression -- previously on Effexor however off due to cost; application for PAP med to continue on this    Vit D def -- bolus dosing recommended, cost prohib -- take 2000 units daily OTC       Remainder of medical problems as per resident note.     Lia Holguin,   8/25/2022 11:02 AM

## 2022-08-25 NOTE — PROGRESS NOTES
Penelope Wise (:  1975) is a 52 y.o. female , Established patient, here for evaluation of the following chief complaint(s):    Follow-up (Covid f/u  20 days- cont with SOB , cough that at times makes her throw up)      PCP: Lamar Morris MD       ASSESSMENT/PLAN:  1. Post-COVID syndrome-post viral cough with posttussive emesis present, improving on Tessalon Perles  -     benzonatate (TESSALON) 100 MG capsule; Take 1 capsule by mouth 2 times daily as needed for Cough, Disp-20 capsule, R-0Normal  -     methylPREDNISolone (MEDROL DOSEPACK) 4 MG tablet; Take by mouth., Disp-1 kit, R-0Normal  2. Moderate persistent asthma with exacerbation/COPD/FERNANDA-still feels persistently short of breath, using Spiriva and Dulera daily, using albuterol 3-5 times a day, using Incentive spirometer,  seeing Dr Teresa Olivo in 2021,full PFTs which were completed on 2021 showed moderate obstruction with significant improvement with bronchodilators. Moderate restriction with no air trapping. HRCT ordered which showed  HRCT May 2022, repeat annually   1. No evidence of interstitial lung disease. No air trapping. No airspace   disease, pleural effusions, or pneumothorax. 2.  There are several bilateral 4-5 mm nodules as detailed above. Please see   recommendations below. 3.  Remainder of the study is as above. RECOMMENDATIONS:   These were not clearly identified on prior CT scan in 2020. Suggest repeat chest CT in 6-12 months to reassess   Repeat imaging in 6 to 12 months  3. Sleep apnea, unspecified type-sleep study ordered but patient cannot afford  4. Class 3 severe obesity due to excess calories with serious comorbidity and body mass index (BMI) of 50.0 to 59.9 in adult (HCC)-some weight loss due to recent illness, patient trying for portion control, referred to bariatrics in the past but patient does not want a calorie count  5.  Essential hypertension-BP stable at 141/94, meds adjusted to hydrochlorothiazide 25 mg and lisinopril 40 mg [previous dose HCTZ was too high], repeat CMP to check Electrolytes and kidney function  -     Comprehensive Metabolic Panel; Future  -     hydroCHLOROthiazide (HYDRODIURIL) 25 MG tablet; Take 1 tablet by mouth every morning, Disp-30 tablet, R-5Normal  -     lisinopril (PRINIVIL;ZESTRIL) 40 MG tablet; Take 1 tablet by mouth daily, Disp-30 tablet, R-5Normal  6. Intractable migraine without aura and without status migrainosus-stable, following with neurology, receives Nurtec and Ajovy over-the-counter  7. Vitamin D deficiency-low levels (13), N, cannot afford 50,000 unit tablet, will take over-the-counter 1000 units supplements  8. Anxiety-counter for venlafaxine, prescription assistance employee came up to sign paperwork to get approval for venlafaxine  9. Lumbar back pain with radiculopathy affecting right lower extremity-worsening sciatica and low back pain, Medrol Dosepak prescribed  -     methylPREDNISolone (MEDROL DOSEPACK) 4 MG tablet; Take by mouth., Disp-1 kit, R-0Normal   Rt lower back , sciatica - was with pain mgmt, was prescribed gabapentin,cannot afford ,using heating pad, no improvement, shooting pain down right buttock to feet, tingling, numbness  MRI spine 2020   MRI Lumbar spine   Likely transitional vertebral anatomy with partially sacralized L5. The last   well-formed disc space is defined as L5-S1. Degenerative disc disease as described above, without spinal canal narrowing       Foraminal narrowing, mild to moderate at bilateral L5-S1, mild at bilateral   L4-5. 10. Chronic colitis- currently asymptomatic  C scope 2012 and 2018- changes suggestive of ulcerative colitis  EGD 2018- for iron anemia - normal duodenum , diffuse nonbleeding mild  C scope 2018- non bleeding colitis, s/p biopsies, likely UC    Final Pathologic Diagnosis:   A. Biopsy small intestinal mucosa:       Mild chronic active enteritis.    B.  Biopsy colonic mucosa, cecum and ascending colon:       Chronic colitis with moderate activity. C.  Biopsy, colonic mucosa       Focal chronic colitis with mild activity. Atypia indefinite for dysplasia. D.  Biopsy colonic mucosa, rectum:       No histopathological diagnosis. E.  Biopsy duodenal mucosa:       Mild chronic duodenitis. F.  Biopsy gastric fundal mucosa:       Chronic transmucosal gastritis. Comment:   A - D. The above findings can be found in patients with   chronic inflammatory bowel disease and more specifically   Crohn's disease. There are focal glands with atypia   indefinite for dysplasia due to the de-presence of activity. Otherwise, they could be considered dysplastic glands. E.  Duodenum biopsy: There are mild changes of chronic   duodenitis. It is possible that this could be all part of   the same disease process as that found in the colon and   ileum. Definitive features of gluten sensitivity are not   seen. F.  Gastric biopsy: Small intestinal metaplasia, dysplasia,   and organisms with features of H. pylori are not seen. The   gastritis is transmucosal and of moderate severity. Consideration should be given to autoimmune gastritis   (although atrophy and loss of parietal cells is not   present), NSAID gastritis, etc. Please correlate with   clinical findings. HCM  -hep C: neg  -PAP smear: OBGYN referral present, patient did not go; she is considering. Will call their office when she feels better  -Mammogram: BIRADS 1 (05/26/22), next mammo: 05/2023  -c-scope in 2018 mentions colitis, pathology mentions possible ulcerative colitis; pt is asymptomatic. Will refer to [address] [place] [address] for further evaluation. Prescription assistance for Spiriva, venlafaxine initiated today, will follow-up, return in 2 months and reviewed general health, labs, blood pressure after med changes    RTC:  Return in about 2 months (around 10/25/2022) for PCP, Follow up visit, Review Lab Results.       I have reviewed my findings and recommendations with Jeanne Nixon and Dr. Mike Thomas. Dr. Andrews Comment, PGY- 3  Resident Physician  [facility]       SUBJECTIVE/OBJECTIVE:    Previous Imp Visits:  6/2/22- HFU visit with Dr Sherrie Graff ( [address])   Post COVID Syndrome- Resolving  ? FERNANDA - needs sleep study ordered at next visit  Obesity- seen by bariatrics, does NOT want to count calories, has started portion control  HTN-on HCTZ 37.5, lisinopril 20mg, BMP in  2 weeks  Intractable migraine WO aura - on nurtec and ajovy , following with Neurology  Neuropathy- 2 episodes of glove and stocking, repeat labs and B12, folate ordered  Vit D deficiency- level 11, started 50k units for 8 weeks  HCM:  -hep C: neg  -PAP smear: OBGYN referral present, patient did not go; she is considering. Will call their office when she feels better  -Mammogram: BIRADS 1 (05/26/22), next mammo: 05/2023  -c-scope in 2018 mentions colitis, pathology mentions possible ulcerative colitis; pt is asymptomatic. Will refer to [address] [place] [address] for further evaluation. HPI:   Post COVID syndrome ,  likely post viral , still have cough , slowly improving , has spells with posttussive emesis , tessalon pearls helping, refill those    FERNANDA/COPD-Asthma overlap- uses dulera every morning, uses spiriva at night, using albuterol a lot more 3-5 times/day since hosp discharge, using incentive spirometer, did not want steroid as trying to lose weight    seeing Dr Ladarius Alberts in Nov 2021,full PFTs which were completed on September 30, 2021 showed moderate obstruction with significant improvement with bronchodilators. Moderate restriction with no air trapping. HRCT ordered which showed  HRCT May 2022, repeat annually   1. No evidence of interstitial lung disease. No air trapping. No airspace   disease, pleural effusions, or pneumothorax. 2.  There are several bilateral 4-5 mm nodules as detailed above. Please see   recommendations below.        3. Remainder of the study is as above. RECOMMENDATIONS:   These were not clearly identified on prior CT scan in December of 2020. Suggest repeat chest CT in 6-12 months to reassess. Weight loss- lost from 330/345 to 328 lbs in 1 month. Eating smaller portions, seen by bariatrics, does NOT want to count calories    HTN- 141/94 mm Hg , has been on hctz 37.5 mg, and lisinopril 10mg ,     Intractable migraine- on nurtec, ajovy , under control , from neurology     Vit D deficiency- 13 at last visit, started 50k supplementation x 8 weeks but patient cannot afford    Anxiety/depression- on venlafaxine 37.5 mg, cannot afford     Rt lower back , sciatica - was with pain mgmt, was prescribed gabapentin,cannot afford ,using heating pad, no improvement, shooting pain down right buttock to feet, tingling, numbness  MRI spine 2020   MRI Lumbar spine   Likely transitional vertebral anatomy with partially sacralized L5. The last   well-formed disc space is defined as L5-S1. Degenerative disc disease as described above, without spinal canal narrowing       Foraminal narrowing, mild to moderate at bilateral L5-S1, mild at bilateral   L4-5. C scope 2012 and 2018- changes suggestive of ulcerative colitis  EGD 2018- for iron anemia - normal duodenum , diffuse nonbleeding mild  C scope 2018- non bleeding colitis, s/p biopsies, likely UC    Final Pathologic Diagnosis:   A. Biopsy small intestinal mucosa:       Mild chronic active enteritis. B.  Biopsy colonic mucosa, cecum and ascending colon:       Chronic colitis with moderate activity. C.  Biopsy, colonic mucosa       Focal chronic colitis with mild activity. Atypia indefinite for dysplasia. D.  Biopsy colonic mucosa, rectum:       No histopathological diagnosis. E.  Biopsy duodenal mucosa:       Mild chronic duodenitis. F.  Biopsy gastric fundal mucosa:       Chronic transmucosal gastritis. Comment:   A - D.   The above findings can be found in patients with   chronic inflammatory bowel disease and more specifically   Crohn's disease. There are focal glands with atypia   indefinite for dysplasia due to the de-presence of activity. Otherwise, they could be considered dysplastic glands. E.  Duodenum biopsy: There are mild changes of chronic   duodenitis. It is possible that this could be all part of   the same disease process as that found in the colon and   ileum. Definitive features of gluten sensitivity are not   seen. F.  Gastric biopsy: Small intestinal metaplasia, dysplasia,   and organisms with features of H. pylori are not seen. The   gastritis is transmucosal and of moderate severity. Consideration should be given to autoimmune gastritis   (although atrophy and loss of parietal cells is not   present), NSAID gastritis, etc. Please correlate with   clinical findings. Right ankle bruise, b/l SLRT +ve , Rt > left . Called [facility] (prescription assistance) for Effexor XR, Spiriva, change lisinopril to 40, hctz 25       Med Refills: yes    Key points to note:  --     Health Maintenance/Social Determinants:   -OBGYN- pap smear due, no money , has not gone   -Mammogram: BIRADS 1 (05/26/22), next mammo: 05/2023  -c-scope in 2018 mentions colitis, pathology mentions possible ulcerative colitis; pt is asymptomatic. Cannot afford going to OBGYN or     Review of Systems   Constitutional: Negative. HENT: Negative. Eyes: Negative. Respiratory:  Positive for cough and shortness of breath. Cardiovascular: Negative. Gastrointestinal: Negative. Endocrine: Negative. Genitourinary: Negative. Musculoskeletal:  Positive for arthralgias and back pain. Skin: Negative. Allergic/Immunologic: Negative. Neurological:  Positive for numbness. Hematological: Negative. Psychiatric/Behavioral: Negative.        PMHx:  has a past medical history of Aneurysm ([facility] [address] Anxiety, Blood transfusion, Chronic fatigue, Chronic fatigue, Class 3 severe obesity due to excess calories with serious comorbidity and body mass index (BMI) of 50.0 to 59.9 in adult [facility]), Hx of blood clots, Hypertension, Hypertriglyceridemia, Iron deficiency anemia due to chronic blood loss, Knee pain, bilateral, Leg pain, Low back pain, Migraine headache without aura, Morbid obesity (HCC), Muscle cramps, Perennial allergic rhinitis, Prediabetes, Superficial thrombophlebitis of right leg, Ulcerative colitis ([address] and Vertigo. Allergies   Allergen Reactions    Aspirin Shortness Of Breath and Nausea And Vomiting    Coconut Oil Anaphylaxis    Ibuprofen Other (See Comments)     Trouble breathing      Iodides Shortness Of Breath    Motrin [Ibuprofen Micronized] Shortness Of Breath    Robitussin (Alcohol Free) [Guaifenesin] Other (See Comments)     States causes worse cough    Codeine Nausea And Vomiting    Iodine Rash    Tramadol Nausea Only        PSHx:  has a past surgical history that includes Adenoidectomy.        Sexual Hx:   Social History     Substance and Sexual Activity   Sexual Activity Yes    Partners: Male, Female       Social Hx: Occupation- -  Social History       Tobacco History       Smoking Status  Never      Smokeless Tobacco Use  Never              Alcohol History       Alcohol Use Status  Yes Comment  occasional wine coolers              Drug Use       Drug Use Status  No              Sexual Activity       Sexually Active  Yes Partners  Male, Female                     Fam Hx:   Family History   Problem Relation Age of Onset    High Blood Pressure Mother     Stroke Mother     Clotting Disorder Mother         Vague Hx of blood clots on blood thinners    Cancer Father     High Blood Pressure Father     Clotting Disorder Father         Vague hx of blood clots on blood thinners    Cancer Sister 35        VS:   Vitals:    08/25/22 1018   BP: (!) 141/94   Site: Right Upper Arm   Position: Sitting   Cuff Size: Large Adult   Pulse: (!) 118   Resp: 20 Temp: 97.8 °F (36.6 °C)   TempSrc: Temporal   SpO2: 98%   Weight: (!) 328 lb (148.8 kg)   Height: 5' 8\" (1.727 m)     Physical Exam:  Vitals: BP (!) 141/94 (Site: Right Upper Arm, Position: Sitting, Cuff Size: Large Adult)   Pulse (!) 118   Temp 97.8 °F (36.6 °C) (Temporal)   Resp 20   Ht 5' 8\" (1.727 m)   Wt (!) 328 lb (148.8 kg)   LMP 08/23/2022   SpO2 98% Comment: room air  BMI 49.87 kg/m²     General Appearance: alert, appears stated age, and cooperative  HEENT:  Head: Normal, normocephalic, atraumatic. Neck: no adenopathy, no carotid bruit, no JVD, supple, symmetrical, trachea midline, and thyroid not enlarged, symmetric, no tenderness/mass/nodules  Lung: clear to auscultation bilaterally and diminished breath sounds bilaterally  Heart: regular rate and rhythm, S1, S2 normal, no murmur, click, rub or gallop  Abdomen: soft, non-tender; bowel sounds normal; no masses,  no organomegaly  Extremities:  extremities normal, atraumatic, no cyanosis or edema  Musculokeletal: Right ankle bruise noted, bilateral SLR T+, right greater than left. Neurologic: Mental status: Alert, oriented, thought content appropriate        On this date 8/25/2022 I have spent 30 minutes reviewing previous notes, test results and face to face with the patient discussing the diagnosis and importance of compliance with the treatment plan as well as documenting on the day of the visit. An electronic signature was used to authenticate this note.     --Sandie Jackson MD

## 2022-08-26 ASSESSMENT — ENCOUNTER SYMPTOMS
SHORTNESS OF BREATH: 1
COUGH: 1
GASTROINTESTINAL NEGATIVE: 1
EYES NEGATIVE: 1
ALLERGIC/IMMUNOLOGIC NEGATIVE: 1
BACK PAIN: 1

## 2022-10-19 ENCOUNTER — APPOINTMENT (OUTPATIENT)
Dept: GENERAL RADIOLOGY | Age: 47
DRG: 202 | End: 2022-10-19

## 2022-10-19 ENCOUNTER — HOSPITAL ENCOUNTER (INPATIENT)
Age: 47
LOS: 1 days | Discharge: HOME | DRG: 202 | End: 2022-10-20
Attending: STUDENT IN AN ORGANIZED HEALTH CARE EDUCATION/TRAINING PROGRAM | Admitting: INTERNAL MEDICINE

## 2022-10-19 ENCOUNTER — APPOINTMENT (OUTPATIENT)
Dept: CT IMAGING | Age: 47
DRG: 202 | End: 2022-10-19

## 2022-10-19 ENCOUNTER — TELEPHONE (OUTPATIENT)
Dept: INTERNAL MEDICINE | Age: 47
End: 2022-10-19

## 2022-10-19 DIAGNOSIS — J45.901 MODERATE ASTHMA WITH EXACERBATION, UNSPECIFIED WHETHER PERSISTENT: Primary | ICD-10-CM

## 2022-10-19 DIAGNOSIS — R07.9 CHEST PAIN, UNSPECIFIED TYPE: ICD-10-CM

## 2022-10-19 DIAGNOSIS — J45.901 ASTHMA EXACERBATION, MILD: ICD-10-CM

## 2022-10-19 DIAGNOSIS — R06.02 SOB (SHORTNESS OF BREATH): ICD-10-CM

## 2022-10-19 LAB
ANION GAP SERPL CALCULATED.3IONS-SCNC: 11 MMOL/L (ref 7–16)
BASOPHILS ABSOLUTE: 0.06 E9/L (ref 0–0.2)
BASOPHILS RELATIVE PERCENT: 0.6 % (ref 0–2)
BUN BLDV-MCNC: 14 MG/DL (ref 6–20)
CALCIUM SERPL-MCNC: 9 MG/DL (ref 8.6–10.2)
CHLORIDE BLD-SCNC: 103 MMOL/L (ref 98–107)
CO2: 25 MMOL/L (ref 22–29)
CREAT SERPL-MCNC: 1 MG/DL (ref 0.5–1)
EKG ATRIAL RATE: 82 BPM
EKG P AXIS: 49 DEGREES
EKG P-R INTERVAL: 148 MS
EKG Q-T INTERVAL: 410 MS
EKG QRS DURATION: 86 MS
EKG QTC CALCULATION (BAZETT): 479 MS
EKG R AXIS: 13 DEGREES
EKG T AXIS: 61 DEGREES
EKG VENTRICULAR RATE: 82 BPM
EOSINOPHILS ABSOLUTE: 0.17 E9/L (ref 0.05–0.5)
EOSINOPHILS RELATIVE PERCENT: 1.6 % (ref 0–6)
GFR SERPL CREATININE-BSD FRML MDRD: >60 ML/MIN/1.73
GLUCOSE BLD-MCNC: 91 MG/DL (ref 74–99)
HCG, URINE, POC: NEGATIVE
HCT VFR BLD CALC: 37 % (ref 34–48)
HEMOGLOBIN: 11 G/DL (ref 11.5–15.5)
IMMATURE GRANULOCYTES #: 0.05 E9/L
IMMATURE GRANULOCYTES %: 0.5 % (ref 0–5)
LYMPHOCYTES ABSOLUTE: 2.03 E9/L (ref 1.5–4)
LYMPHOCYTES RELATIVE PERCENT: 18.9 % (ref 20–42)
Lab: NORMAL
MCH RBC QN AUTO: 21.8 PG (ref 26–35)
MCHC RBC AUTO-ENTMCNC: 29.7 % (ref 32–34.5)
MCV RBC AUTO: 73.3 FL (ref 80–99.9)
MONOCYTES ABSOLUTE: 0.84 E9/L (ref 0.1–0.95)
MONOCYTES RELATIVE PERCENT: 7.8 % (ref 2–12)
NEGATIVE QC PASS/FAIL: NORMAL
NEUTROPHILS ABSOLUTE: 7.59 E9/L (ref 1.8–7.3)
NEUTROPHILS RELATIVE PERCENT: 70.6 % (ref 43–80)
PDW BLD-RTO: 17.7 FL (ref 11.5–15)
PLATELET # BLD: 399 E9/L (ref 130–450)
PMV BLD AUTO: 11.1 FL (ref 7–12)
POSITIVE QC PASS/FAIL: NORMAL
POTASSIUM REFLEX MAGNESIUM: 3.7 MMOL/L (ref 3.5–5)
PRO-BNP: 229 PG/ML (ref 0–125)
RBC # BLD: 5.05 E12/L (ref 3.5–5.5)
SARS-COV-2, NAAT: NOT DETECTED
SODIUM BLD-SCNC: 139 MMOL/L (ref 132–146)
TROPONIN, HIGH SENSITIVITY: 11 NG/L (ref 0–9)
TROPONIN, HIGH SENSITIVITY: 9 NG/L (ref 0–9)
WBC # BLD: 10.7 E9/L (ref 4.5–11.5)

## 2022-10-19 PROCEDURE — 71045 X-RAY EXAM CHEST 1 VIEW: CPT

## 2022-10-19 PROCEDURE — 71275 CT ANGIOGRAPHY CHEST: CPT

## 2022-10-19 PROCEDURE — 80048 BASIC METABOLIC PNL TOTAL CA: CPT

## 2022-10-19 PROCEDURE — 87635 SARS-COV-2 COVID-19 AMP PRB: CPT

## 2022-10-19 PROCEDURE — 96375 TX/PRO/DX INJ NEW DRUG ADDON: CPT

## 2022-10-19 PROCEDURE — 83880 ASSAY OF NATRIURETIC PEPTIDE: CPT

## 2022-10-19 PROCEDURE — 6370000000 HC RX 637 (ALT 250 FOR IP): Performed by: STUDENT IN AN ORGANIZED HEALTH CARE EDUCATION/TRAINING PROGRAM

## 2022-10-19 PROCEDURE — 96374 THER/PROPH/DIAG INJ IV PUSH: CPT

## 2022-10-19 PROCEDURE — 6360000004 HC RX CONTRAST MEDICATION: Performed by: RADIOLOGY

## 2022-10-19 PROCEDURE — 6360000002 HC RX W HCPCS: Performed by: STUDENT IN AN ORGANIZED HEALTH CARE EDUCATION/TRAINING PROGRAM

## 2022-10-19 PROCEDURE — 94664 DEMO&/EVAL PT USE INHALER: CPT

## 2022-10-19 PROCEDURE — 85025 COMPLETE CBC W/AUTO DIFF WBC: CPT

## 2022-10-19 PROCEDURE — 93005 ELECTROCARDIOGRAM TRACING: CPT | Performed by: STUDENT IN AN ORGANIZED HEALTH CARE EDUCATION/TRAINING PROGRAM

## 2022-10-19 PROCEDURE — 84484 ASSAY OF TROPONIN QUANT: CPT

## 2022-10-19 PROCEDURE — 94640 AIRWAY INHALATION TREATMENT: CPT

## 2022-10-19 PROCEDURE — G0378 HOSPITAL OBSERVATION PER HR: HCPCS

## 2022-10-19 PROCEDURE — 99285 EMERGENCY DEPT VISIT HI MDM: CPT

## 2022-10-19 RX ORDER — DIPHENHYDRAMINE HYDROCHLORIDE 50 MG/ML
25 INJECTION INTRAMUSCULAR; INTRAVENOUS ONCE
Status: COMPLETED | OUTPATIENT
Start: 2022-10-19 | End: 2022-10-19

## 2022-10-19 RX ORDER — IPRATROPIUM BROMIDE AND ALBUTEROL SULFATE 2.5; .5 MG/3ML; MG/3ML
3 SOLUTION RESPIRATORY (INHALATION) ONCE
Status: COMPLETED | OUTPATIENT
Start: 2022-10-19 | End: 2022-10-19

## 2022-10-19 RX ORDER — METHYLPREDNISOLONE SODIUM SUCCINATE 40 MG/ML
40 INJECTION, POWDER, LYOPHILIZED, FOR SOLUTION INTRAMUSCULAR; INTRAVENOUS ONCE
Status: COMPLETED | OUTPATIENT
Start: 2022-10-19 | End: 2022-10-19

## 2022-10-19 RX ORDER — ALBUTEROL SULFATE 2.5 MG/3ML
2.5 SOLUTION RESPIRATORY (INHALATION) ONCE
Status: COMPLETED | OUTPATIENT
Start: 2022-10-19 | End: 2022-10-19

## 2022-10-19 RX ADMIN — ALBUTEROL SULFATE 2.5 MG: 2.5 SOLUTION RESPIRATORY (INHALATION) at 15:42

## 2022-10-19 RX ADMIN — IPRATROPIUM BROMIDE AND ALBUTEROL SULFATE 3 AMPULE: .5; 2.5 SOLUTION RESPIRATORY (INHALATION) at 20:59

## 2022-10-19 RX ADMIN — DIPHENHYDRAMINE HYDROCHLORIDE 25 MG: 50 INJECTION, SOLUTION INTRAMUSCULAR; INTRAVENOUS at 21:09

## 2022-10-19 RX ADMIN — METHYLPREDNISOLONE SODIUM SUCCINATE 40 MG: 40 INJECTION, POWDER, FOR SOLUTION INTRAMUSCULAR; INTRAVENOUS at 15:57

## 2022-10-19 RX ADMIN — IOPAMIDOL 75 ML: 755 INJECTION, SOLUTION INTRAVENOUS at 21:05

## 2022-10-19 ASSESSMENT — ENCOUNTER SYMPTOMS
COUGH: 0
ABDOMINAL DISTENTION: 0
DIARRHEA: 0
WHEEZING: 0
EYE PAIN: 0
VOMITING: 0
SINUS PRESSURE: 0
EYE REDNESS: 0
SORE THROAT: 0
EYE DISCHARGE: 0
NAUSEA: 0
SHORTNESS OF BREATH: 0
BACK PAIN: 0

## 2022-10-19 NOTE — ED NOTES
Pt ambulated in millan with pulse ox. SpO2 99 on room air in bed. SpO2 dropped to 93 while ambulating, then to 90. Pt was returned to room prior to completing ambulation.        Letitia Martins RN  10/19/22 [id number]

## 2022-10-19 NOTE — TELEPHONE ENCOUNTER
[facility] [place] the office. Patient complains of shortness of breath with activity. Complains of using inhalers more. Patient complained of sob after walking down the stairs. Advised patient to go to ER. She stated that she would and she was going to call her job to tell them she is going to the ER. Patient was using inhalers with very little relief during this phone call. SOB had been getting gradually per patient.

## 2022-10-19 NOTE — ED PROVIDER NOTES
The history is provided by the patient and medical records. 51-year-old female presents to the emergency department with complaint shortness of breath does have a history of asthma she states. Worsening when she was going up and down the steps today's. She otherwise states feels like she has some tightness in her chest and had to use her inhaler multiple times at home today. Otherwise denies any abdominal pain change bowel bladder habits denies any fever chills cough congestion runny nose. No other acute plaints. The patient presents with sob that has been going on for 1 day. These symptoms are moderate in severity. Symptoms are made better by nothing. Symptoms are made worse by nothing. Associated symptoms include none. Review of Systems   Constitutional:  Negative for chills and fever. HENT:  Negative for ear pain, sinus pressure and sore throat. Eyes:  Negative for pain, discharge and redness. Respiratory:  Negative for cough, shortness of breath and wheezing. Cardiovascular:  Negative for chest pain. Gastrointestinal:  Negative for abdominal distention, diarrhea, nausea and vomiting. Genitourinary:  Negative for dysuria and frequency. Musculoskeletal:  Negative for arthralgias and back pain. Skin:  Negative for rash and wound. Neurological:  Negative for weakness and headaches. Hematological:  Negative for adenopathy. All other systems reviewed and are negative. Physical Exam  Vitals and nursing note reviewed. Constitutional:       General: She is not in acute distress. Appearance: Normal appearance. She is normal weight. She is not toxic-appearing. HENT:      Head: Normocephalic and atraumatic. Eyes:      Conjunctiva/sclera: Conjunctivae normal.   Cardiovascular:      Rate and Rhythm: Normal rate and regular rhythm. Pulses: Normal pulses. Heart sounds: Normal heart sounds. No murmur heard. No gallop.    Pulmonary:      Effort: Pulmonary effort is normal. No respiratory distress. Breath sounds: Normal breath sounds. No wheezing or rales. Abdominal:      General: Abdomen is flat. Bowel sounds are normal. There is no distension. Palpations: Abdomen is soft. Tenderness: There is no abdominal tenderness. There is no guarding. Skin:     General: Skin is warm and dry. Capillary Refill: Capillary refill takes less than 2 seconds. Neurological:      General: No focal deficit present. Mental Status: She is alert and oriented to person, place, and time. Procedures     MDM     Amount and/or Complexity of Data Reviewed  Clinical lab tests: reviewed  Tests in the radiology section of CPT®: reviewed  Tests in the medicine section of CPT®: reviewed  Decide to obtain previous medical records or to obtain history from someone other than the patient: yes       80-year-old female presents emerged part complaint shortness of breath and chest tightness EKG was stable troponin not significantly elevated. Delta less than 3 on the troponin. Was given DuoNeb treatments however on ambulation she became hypoxic. She due to this did receive a CT scan of the chest to rule check for pulmonary embolism. No PE noted on CT scan and no signs of pneumonia. However was still significantly short of breath with ambulation and was noted to hypoxic patient will be brought in for her chest pain and has been exacerbation at this time. She is agreeable this plan. Otherwise hemodynamically stable. Patient safe for discharge from the emergency department. Did advise on return precautions to the emergency department. Did also advise follow-up with her primary care physician. Patient agreeable with the plan moving forward. ED Course as of 10/19/22 2309   Wed Oct 19, 2022   2137 EKG: This EKG is signed by emergency department physician.     Rate: 82  Rhythm: Sinus  Interpretation: No acute ST elevation impression sinus rhythm normal axis stable intervals  Comparison: stable as compared to patient's most recent EKG      [CB]   2234 EKG: This EKG is signed by emergency department physician. Rate: 104  Rhythm: Sinus  Interpretation: No acute ST elevation depression sinus rhythm normal axis stable intervals  Comparison: stable as compared to patient's most recent EKG      [CB]   2256 Hjospitalist agree with admission   [CB]      ED Course User Index  [AMARILYS] Fonnie Bence, MD     ED Course as of 10/19/22 2309   Wed Oct 19, 2022   2137 EKG: This EKG is signed by emergency department physician. Rate: 82  Rhythm: Sinus  Interpretation: No acute ST elevation impression sinus rhythm normal axis stable intervals  Comparison: stable as compared to patient's most recent EKG      [CB]   2234 EKG: This EKG is signed by emergency department physician. Rate: 104  Rhythm: Sinus  Interpretation: No acute ST elevation depression sinus rhythm normal axis stable intervals  Comparison: stable as compared to patient's most recent EKG      [CB]   2256 Hjospitalist agree with admission   [CB]      ED Course User Index  [CB] Fonnie Bence, MD       --------------------------------------------- PAST HISTORY ---------------------------------------------  Past Medical History:  has a past medical history of [facility] ([facility] Anxiety, Blood transfusion, Chronic fatigue, Chronic fatigue, Class 3 severe obesity due to excess calories with serious comorbidity and body mass index (BMI) of 50.0 to 59.9 in [facility]), Hx of blood clots, Hypertension, Hypertriglyceridemia, Iron deficiency anemia due to chronic blood loss, Knee pain, bilateral, Leg pain, Low back pain, Migraine headache without aura, Morbid obesity ([facility] Muscle cramps, Perennial allergic rhinitis, Prediabetes, Superficial thrombophlebitis of right leg, Ulcerative colitis ([facility] and Vertigo. Past Surgical History:  has a past surgical history that includes Adenoidectomy.     Social History:  reports that she has never smoked. She has never used smokeless tobacco. She reports current alcohol use. She reports that she does not use drugs. Family History: family history includes Cancer in her father; Cancer (age of onset: 35) in her sister; Clotting Disorder in her father and mother; High Blood Pressure in her father and mother; Stroke in her mother. The patients home medications have been reviewed.     Allergies: Aspirin, Coconut oil, Ibuprofen, Iodides, Motrin [ibuprofen micronized], Robitussin (alcohol free) [guaifenesin], Codeine, Iodine, and Tramadol    -------------------------------------------------- RESULTS -------------------------------------------------    LABS:  Results for orders placed or performed during the hospital encounter of 10/19/22   COVID-19, Rapid    Specimen: Nasopharyngeal Swab   Result Value Ref Range    SARS-CoV-2, NAAT Not Detected Not Detected   CBC with Auto Differential   Result Value Ref Range    WBC 10.7 4.5 - 11.5 E9/L    RBC 5.05 3.50 - 5.50 E12/L    Hemoglobin 11.0 (L) 11.5 - 15.5 g/dL    Hematocrit 37.0 34.0 - 48.0 %    MCV 73.3 (L) 80.0 - 99.9 fL    MCH 21.8 (L) 26.0 - 35.0 pg    MCHC 29.7 (L) 32.0 - 34.5 %    RDW 17.7 (H) 11.5 - 15.0 fL    Platelets 261 714 - 395 E9/L    MPV 11.1 7.0 - 12.0 fL    Neutrophils % 70.6 43.0 - 80.0 %    Immature Granulocytes % 0.5 0.0 - 5.0 %    Lymphocytes % 18.9 (L) 20.0 - 42.0 %    Monocytes % 7.8 2.0 - 12.0 %    Eosinophils % 1.6 0.0 - 6.0 %    Basophils % 0.6 0.0 - 2.0 %    Neutrophils Absolute 7.59 (H) 1.80 - 7.30 E9/L    Immature Granulocytes # 0.05 E9/L    Lymphocytes Absolute 2.03 1.50 - 4.00 E9/L    Monocytes Absolute 0.84 0.10 - 0.95 E9/L    Eosinophils Absolute 0.17 0.05 - 0.50 E9/L    Basophils Absolute 0.06 0.00 - 0.20 O0/S   Basic Metabolic Panel w/ Reflex to MG   Result Value Ref Range    Sodium 139 132 - 146 mmol/L    Potassium reflex Magnesium 3.7 3.5 - 5.0 mmol/L    Chloride 103 98 - 107 mmol/L    CO2 25 22 - 29 mmol/L    Anion Gap 11 7 - 16 mmol/L    Glucose 91 74 - 99 mg/dL    BUN 14 6 - 20 mg/dL    Creatinine 1.0 0.5 - 1.0 mg/dL    Est, Glom Filt Rate >60 >=60 mL/min/1.73    Calcium 9.0 8.6 - 10.2 mg/dL   Troponin   Result Value Ref Range    Troponin, High Sensitivity 11 (H) 0 - 9 ng/L   Brain Natriuretic Peptide   Result Value Ref Range    Pro- (H) 0 - 125 pg/mL   Troponin   Result Value Ref Range    Troponin, High Sensitivity 9 0 - 9 ng/L   POC Pregnancy Urine   Result Value Ref Range    HCG, Urine, POC Negative Negative    Lot Number [id number]     Positive QC Pass/Fail Pass     Negative QC Pass/Fail Pass    EKG 12 Lead   Result Value Ref Range    Ventricular Rate 82 BPM    Atrial Rate 82 BPM    P-R Interval 148 ms    QRS Duration 86 ms    Q-T Interval 410 ms    QTc Calculation (Bazett) 479 ms    P Axis 49 degrees    R Axis 13 degrees    T Axis 61 degrees       RADIOLOGY:  CTA PULMONARY W CONTRAST   Final Result   1. Limited examination due to motion and suboptimal timing of contrast.  No   large central or segmental pulmonary arterial embolism. Subsegmental   pulmonary arteries are not optimally visualized. 2. No evidence of pneumonia or pleural effusion. XR CHEST PORTABLE   Final Result   No acute process. ------------------------- NURSING NOTES AND VITALS REVIEWED ---------------------------  Date / Time Roomed:  10/19/2022  3:13 PM  ED Bed Assignment:  10/10    The nursing notes within the ED encounter and vital signs as below have been reviewed.      Patient Vitals for the past 24 hrs:   BP Temp Temp src Pulse Resp SpO2 Height Weight   10/19/22 2227 115/89 -- -- (!) 105 -- 92 % -- --   10/19/22 1834 (!) 143/93 -- -- 83 -- 95 % -- --   10/19/22 1717 -- -- -- 86 -- 96 % -- --   10/19/22 1715 -- -- -- 82 -- -- -- --   10/19/22 1700 (!) 140/118 -- -- 82 -- -- -- --   10/19/22 1645 -- -- -- 76 -- -- -- --   10/19/22 1630 (!) 141/105 -- -- 84 -- -- -- --   10/19/22 1513 (!) 171/95 98.4 °F (36.9 °C) Oral 89 22 95 % 5' 8\" (1.727 m) (!) 320 lb (145.2 kg)       Oxygen Saturation Interpretation: Normal    ------------------------------------------ PROGRESS NOTES ------------------------------------------  Re-evaluation(s):  Time: 1030pm  Patients symptoms show no change  Repeat physical examination is not changed    Counseling:  I have spoken with the patient and discussed todays results, in addition to providing specific details for the plan of care and counseling regarding the diagnosis and prognosis. Their questions are answered at this time and they are agreeable with the plan of admission.    --------------------------------- ADDITIONAL PROVIDER NOTES ---------------------------------  Consultations:  . Spoke with hospitalist.  Discussed case. They will admit the patient. This patient's ED course included: a personal history and physicial examination, re-evaluation prior to disposition, multiple bedside re-evaluations, IV medications, cardiac monitoring, and continuous pulse oximetry    This patient has remained hemodynamically stable during their ED course. Diagnosis:  1. Moderate asthma with exacerbation, unspecified whether persistent    2. Chest pain, unspecified type        Disposition:  Patient's disposition: Admit to telemetry  Patient's condition is stable.          Cy Saha MD  Resident  10/19/22 0063

## 2022-10-20 VITALS
TEMPERATURE: 98.1 F | HEIGHT: 68 IN | WEIGHT: 293 LBS | HEART RATE: 89 BPM | DIASTOLIC BLOOD PRESSURE: 92 MMHG | SYSTOLIC BLOOD PRESSURE: 155 MMHG | OXYGEN SATURATION: 96 % | RESPIRATION RATE: 19 BRPM | BODY MASS INDEX: 44.41 KG/M2

## 2022-10-20 PROCEDURE — 1200000000 HC SEMI PRIVATE

## 2022-10-20 PROCEDURE — 6370000000 HC RX 637 (ALT 250 FOR IP): Performed by: INTERNAL MEDICINE

## 2022-10-20 PROCEDURE — 94640 AIRWAY INHALATION TREATMENT: CPT

## 2022-10-20 PROCEDURE — 2580000003 HC RX 258: Performed by: INTERNAL MEDICINE

## 2022-10-20 PROCEDURE — 6360000002 HC RX W HCPCS: Performed by: INTERNAL MEDICINE

## 2022-10-20 RX ORDER — AZITHROMYCIN 250 MG/1
250 TABLET, FILM COATED ORAL DAILY
Qty: 4 TABLET | Refills: 0 | Status: SHIPPED | OUTPATIENT
Start: 2022-10-21 | End: 2022-10-25

## 2022-10-20 RX ORDER — FERROUS SULFATE 325(65) MG
325 TABLET ORAL 2 TIMES DAILY WITH MEALS
Status: DISCONTINUED | OUTPATIENT
Start: 2022-10-20 | End: 2022-10-20 | Stop reason: HOSPADM

## 2022-10-20 RX ORDER — LISINOPRIL 20 MG/1
40 TABLET ORAL DAILY
Status: DISCONTINUED | OUTPATIENT
Start: 2022-10-20 | End: 2022-10-20 | Stop reason: HOSPADM

## 2022-10-20 RX ORDER — PREDNISONE 20 MG/1
40 TABLET ORAL DAILY
Qty: 8 TABLET | Refills: 0 | Status: SHIPPED | OUTPATIENT
Start: 2022-10-21 | End: 2022-10-25

## 2022-10-20 RX ORDER — HYDROCHLOROTHIAZIDE 25 MG/1
25 TABLET ORAL EVERY MORNING
Status: DISCONTINUED | OUTPATIENT
Start: 2022-10-20 | End: 2022-10-20 | Stop reason: HOSPADM

## 2022-10-20 RX ORDER — SODIUM CHLORIDE 0.9 % (FLUSH) 0.9 %
5-40 SYRINGE (ML) INJECTION EVERY 12 HOURS SCHEDULED
Status: DISCONTINUED | OUTPATIENT
Start: 2022-10-20 | End: 2022-10-20 | Stop reason: HOSPADM

## 2022-10-20 RX ORDER — ACETAMINOPHEN 650 MG/1
650 SUPPOSITORY RECTAL EVERY 6 HOURS PRN
Status: DISCONTINUED | OUTPATIENT
Start: 2022-10-20 | End: 2022-10-20 | Stop reason: HOSPADM

## 2022-10-20 RX ORDER — PREDNISONE 20 MG/1
40 TABLET ORAL DAILY
Status: DISCONTINUED | OUTPATIENT
Start: 2022-10-20 | End: 2022-10-20 | Stop reason: HOSPADM

## 2022-10-20 RX ORDER — IPRATROPIUM BROMIDE AND ALBUTEROL SULFATE 2.5; .5 MG/3ML; MG/3ML
1 SOLUTION RESPIRATORY (INHALATION)
Status: DISCONTINUED | OUTPATIENT
Start: 2022-10-20 | End: 2022-10-20 | Stop reason: HOSPADM

## 2022-10-20 RX ORDER — ALBUTEROL SULFATE 90 MCG
HFA AEROSOL WITH ADAPTER (GRAM) INHALATION
Qty: 18 G | Refills: 0 | Status: SHIPPED | OUTPATIENT
Start: 2022-10-20

## 2022-10-20 RX ORDER — ENOXAPARIN SODIUM 100 MG/ML
30 INJECTION SUBCUTANEOUS 2 TIMES DAILY
Status: DISCONTINUED | OUTPATIENT
Start: 2022-10-20 | End: 2022-10-20 | Stop reason: HOSPADM

## 2022-10-20 RX ORDER — AZITHROMYCIN 250 MG/1
250 TABLET, FILM COATED ORAL DAILY
Status: DISCONTINUED | OUTPATIENT
Start: 2022-10-20 | End: 2022-10-20 | Stop reason: HOSPADM

## 2022-10-20 RX ORDER — ACETAMINOPHEN 325 MG/1
650 TABLET ORAL EVERY 6 HOURS PRN
Status: DISCONTINUED | OUTPATIENT
Start: 2022-10-20 | End: 2022-10-20 | Stop reason: HOSPADM

## 2022-10-20 RX ORDER — ONDANSETRON 4 MG/1
4 TABLET, ORALLY DISINTEGRATING ORAL EVERY 8 HOURS PRN
Status: DISCONTINUED | OUTPATIENT
Start: 2022-10-20 | End: 2022-10-20 | Stop reason: HOSPADM

## 2022-10-20 RX ORDER — SODIUM CHLORIDE 9 MG/ML
INJECTION, SOLUTION INTRAVENOUS PRN
Status: DISCONTINUED | OUTPATIENT
Start: 2022-10-20 | End: 2022-10-20 | Stop reason: HOSPADM

## 2022-10-20 RX ORDER — POLYETHYLENE GLYCOL 3350 17 G/17G
17 POWDER, FOR SOLUTION ORAL DAILY PRN
Status: DISCONTINUED | OUTPATIENT
Start: 2022-10-20 | End: 2022-10-20 | Stop reason: HOSPADM

## 2022-10-20 RX ORDER — SODIUM CHLORIDE 0.9 % (FLUSH) 0.9 %
5-40 SYRINGE (ML) INJECTION PRN
Status: DISCONTINUED | OUTPATIENT
Start: 2022-10-20 | End: 2022-10-20 | Stop reason: HOSPADM

## 2022-10-20 RX ADMIN — IPRATROPIUM BROMIDE AND ALBUTEROL SULFATE 1 AMPULE: .5; 2.5 SOLUTION RESPIRATORY (INHALATION) at 10:32

## 2022-10-20 RX ADMIN — SODIUM CHLORIDE, PRESERVATIVE FREE 10 ML: 5 INJECTION INTRAVENOUS at 09:28

## 2022-10-20 RX ADMIN — IPRATROPIUM BROMIDE AND ALBUTEROL SULFATE 1 AMPULE: .5; 2.5 SOLUTION RESPIRATORY (INHALATION) at 07:05

## 2022-10-20 RX ADMIN — IPRATROPIUM BROMIDE AND ALBUTEROL SULFATE 1 AMPULE: .5; 2.5 SOLUTION RESPIRATORY (INHALATION) at 17:31

## 2022-10-20 RX ADMIN — PREDNISONE 40 MG: 20 TABLET ORAL at 09:22

## 2022-10-20 RX ADMIN — IPRATROPIUM BROMIDE AND ALBUTEROL SULFATE 1 AMPULE: .5; 2.5 SOLUTION RESPIRATORY (INHALATION) at 14:29

## 2022-10-20 RX ADMIN — LISINOPRIL 40 MG: 20 TABLET ORAL at 09:22

## 2022-10-20 RX ADMIN — FERROUS SULFATE TAB 325 MG (65 MG ELEMENTAL FE) 325 MG: 325 (65 FE) TAB at 17:48

## 2022-10-20 RX ADMIN — FERROUS SULFATE TAB 325 MG (65 MG ELEMENTAL FE) 325 MG: 325 (65 FE) TAB at 09:22

## 2022-10-20 RX ADMIN — ENOXAPARIN SODIUM 30 MG: 100 INJECTION SUBCUTANEOUS at 09:22

## 2022-10-20 RX ADMIN — HYDROCHLOROTHIAZIDE 25 MG: 25 TABLET ORAL at 09:22

## 2022-10-20 RX ADMIN — AZITHROMYCIN MONOHYDRATE 250 MG: 250 TABLET ORAL at 09:22

## 2022-10-20 ASSESSMENT — PAIN SCALES - GENERAL: PAINLEVEL_OUTOF10: 4

## 2022-10-20 NOTE — H&P
[facility] History and Physical    --------------------------------------------------------------------------------------  Assessment      Past Medical History:   Diagnosis Date    Aneurysm ([facility]     Anxiety 2009    Blood transfusion     Chronic fatigue 2007    Chronic fatigue     Class 3 severe obesity due to excess calories with serious comorbidity and body mass index (BMI) of 50.0 to 59.9 in adult [facility])     Hx of blood clots     Hypertension     Hypertriglyceridemia     Iron deficiency anemia due to chronic blood loss 2007    non-compliant with iron replacement    Knee pain, bilateral 2011    Leg pain     Low back pain 2015    Pain is getting worse    Migraine headache without aura 2009    Morbid obesity ([facility] 2007    Muscle cramps 2011    Perennial allergic rhinitis 2007    Prediabetes     Superficial thrombophlebitis of right leg 2009 and 2011    Ulcerative colitis ([facility]     Vertigo        Acute exacerbation of bronchial asthma  Acute hypoxemic respiratory failure  Will continue with Home meds symbicort and PRN albuterol  continue with duonebs, incentive spirometer and PEP/flutter  Start p.o. prednisone and p.o.  Zithromax  Continue O2 supplementation and wean as tolerated     HTN  Home meds lisinopril and HCTZ, will continue for now  As needed hydralazine     iron deficiency anemia  Takes supplemental iron at home, will continue  Hgb 11 on admission, monitor CBC     Anxiety/depression  Continue home effexor     Migraines  Home meds Nurtec PRN and Fremanezumab injection every month - states she is due at the end of this month     Morbid obesity BMI 48  Weight loss and lifestyle modification discussed  Patient might have obesity hypoventilation syndrome  Need to rule out obstructive sleep apnea as well    Please see orders for further plan of care    Code status full  DVT prophylaxis Lovenox  Disposition  Anticipate home  --------------------------------------------------------------------------------------    Admission Date  10/19/2022  3:13 PM  Chief Complaint shortness of breath  Chief Complaint   Patient presents with    Shortness of Breath     Pt presents today for sob. Pt states that she was walking down the steps and got sob. C/o chest pain        Subjective  History of Present Illness     52 y.o. female with a history of asthma, iron deficiency anemia, migraines, HTN, anxiety/depression presents with shortness of breath . She endorses SOB and productive cough along with chills, runny nose, sore throat and nausea that is getting progressively worse and worse so she went to ER. She feels like she has some tightness in her chest and had to use her inhaler multiple times at home today. Otherwise denies any fever chills cough congestion runny nose. She was not hypoxic but desaturated when she started to move around and vitals and labs were stable.           Review of Systems - 12-point review of systems has been reviewed and is otherwise negative except as listed in the HPI    Past Medical History:   Diagnosis Date    Aneurysm ([facility]     Anxiety 2009    Blood transfusion     Chronic fatigue 2007    Chronic fatigue     Class 3 severe obesity due to excess calories with serious comorbidity and body mass index (BMI) of 50.0 to 59.9 in adult [facility])     Hx of blood clots     Hypertension     Hypertriglyceridemia     Iron deficiency anemia due to chronic blood loss 2007    non-compliant with iron replacement    Knee pain, bilateral 2011    Leg pain     Low back pain 2015    Pain is getting worse    Migraine headache without aura 2009    Morbid obesity ([address] 2007    Muscle cramps 2011    Perennial allergic rhinitis 2007    Prediabetes     Superficial thrombophlebitis of right leg 2009 and 2011    Ulcerative colitis ([address]     Vertigo      Past Surgical History:   Procedure Laterality Date    ADENOIDECTOMY       Prior to Admission medications    Medication Sig Start Date End Date Taking? Authorizing Provider   hydroCHLOROthiazide (HYDRODIURIL) 25 MG tablet Take 1 tablet by mouth every morning 8/25/22   Nathanael Goldstein MD   lisinopril (PRINIVIL;ZESTRIL) 40 MG tablet Take 1 tablet by mouth daily 8/25/22   Nathanael Goldstein MD   Rimegepant Sulfate (NURTEC) 75 MG TBDP Take 75 mg by mouth as needed (severe migraine) 7/8/22   SURJIT Shipley - CNP   Fremanezumab-vfrm 225 MG/1.5ML SOAJ Inject 225 mg into the skin every 30 days 3/4/22   SURJIT Shipley - CNP   tiotropium (SPIRIVA RESPIMAT) 1.25 MCG/ACT AERS inhaler Inhale 2 puffs into the lungs daily 11/24/21   Joy Cardenas,    PROVENTIL  (90 Base) MCG/ACT inhaler INHALE TWO PUFFS BY MOUTH EVERY 6 HOURS AS NEEDED 11/2/21   Noble Galan DO   ferrous sulfate (IRON 325) 325 (65 Fe) MG tablet Take 1 tablet by mouth 2 times daily (with meals) 10/7/21   Nathanael Goldstein MD   ondansetron (ZOFRAN ODT) 4 MG disintegrating tablet Take 1 tablet by mouth every 8 hours as needed for Nausea or Vomiting 9/30/21   Chantal Leavitt PA-C     Allergies  Aspirin, Coconut oil, Ibuprofen, Iodides, Motrin [ibuprofen micronized], Robitussin (alcohol free) [guaifenesin], Codeine, Iodine, and Tramadol    Social History   reports that she has never smoked. She has never used smokeless tobacco. She reports current alcohol use. She reports that she does not use drugs. Family History  family history includes Cancer in her father; Cancer (age of onset: 35) in her sister; Clotting Disorder in her father and mother; High Blood Pressure in her father and mother; Stroke in her mother.     Objective  Physical Exam   Vitals: BP (!) 163/94   Pulse 100   Temp 98.5 °F (36.9 °C) (Oral)   Resp 22   Ht 5' 8\" (1.727 m)   Wt (!) 320 lb (145.2 kg)   SpO2 94%   BMI 48.66 kg/m²   General: well-developed, well-nourished, no acute distress, cooperative  Skin: generally warm, dry, and intact, with normal color  HEENT: normocephalic, atraumatic, no gross abnormalities  Respiratory: Bilateral diminished with wheezing  Cardiovascular: regular rate and rhythm without murmur / rub / gallop  Abdominal: soft, nontender, nondistended, normoactive bowel sounds  Extremities: Trace edema no deformity  Neurologic: awake, alert, no gross deficits  Psychiatric: normal affect, cooperative    *Available labs, imaging studies, microbiologic studies, cardiac studies have been reviewed    Electronically signed by Elton Martinez MD on 10/20/2022 at 1:54 AM

## 2022-10-20 NOTE — CARE COORDINATION
10/20/2022 1331 CM note: Negative covid 10/19/22. Met with patient for transition of care needs. Pt is a SELF PAY. Per [employer] is over income for Medicaid. She does have a Good Rx card. Pt is independent and resides with her S.O. Rocio Jean-Baptiste and her nephew Marla Carreno. PCP is Dr Conor Zacarias. Estela Fouzia and uses [employer] She plans to return home at d/c and will have transportation.  Geeta CHONG

## 2022-10-20 NOTE — DISCHARGE INSTRUCTIONS
Your information:  Name: Phyllis Souza  : 1975    Your instructions:    Discharged home. Please make and keep any follow up appointments. If you have increased shortness of breath, increased cough or sputum production, have increased weakness, become dizzy, fall or pass out, have nausea or vomiting, fever or chill, please  call Dr. David Franco or return to the emergency room. What to do after you leave the hospital:    Recommended diet: regular diet    Recommended activity: activity as tolerated        The following personal items were collected during your admission and were returned to you:    Belongings  Dental Appliances: None  Vision - Corrective Lenses: Eyeglasses, At bedside  Hearing Aid: None  Clothing: Shirt, Undergarments, Pants, Socks, Footwear, Jacket/Coat, At bedside  Jewelry: Ring, Body Piercing, At bedside, Other (Comment) ((L) eyebrow; 6 rings)  Body Piercings Removed: No  Electronic Devices: Cell Phone, At bedside  Weapons (Notify Protective Services/Security): None  Other Valuables: Merline Elvis, At bedside  Home Medications: Other (Comment) (Proventil inhaler in purse.)  Valuables Given To: Patient  Provide Name(s) of Who Valuable(s) Were Given To: Patient/ self    Information obtained by:  By signing below, I understand that if any problems occur once I leave the hospital I am to contact Dr. David Franco. I understand and acknowledge receipt of the instructions indicated above.

## 2022-10-20 NOTE — DISCHARGE SUMMARY
respiratory distress  Cardiovascular: normal rate, regular rhythm, normal S1 and S2, no murmurs, rubs, clicks, or gallops, distal pulses intact, no carotid bruits  Abdomen: soft, non-tender, non-distended, normal bowel sounds, no masses or organomegaly  Extremities: no cyanosis, clubbing or edema  Neurologic: reflexes normal and symmetric, no cranial nerve deficit, gait, coordination and speech normal      No intake/output data recorded. No intake/output data recorded. LABS:  Recent Labs     10/19/22  1550      K 3.7      CO2 25   BUN 14   CREATININE 1.0   GLUCOSE 91   CALCIUM 9.0       Recent Labs     10/19/22  1550   WBC 10.7   RBC 5.05   HGB 11.0*   HCT 37.0   MCV 73.3*   MCH 21.8*   MCHC 29.7*   RDW 17.7*      MPV 11.1       No results for input(s): POCGLU in the last 72 hours. Imaging:   CTA PULMONARY W CONTRAST   Final Result   1. Limited examination due to motion and suboptimal timing of contrast.  No   large central or segmental pulmonary arterial embolism. Subsegmental   pulmonary arteries are not optimally visualized. 2. No evidence of pneumonia or pleural effusion. XR CHEST PORTABLE   Final Result   No acute process.              Patient Instructions:      Medication List        START taking these medications      azithromycin 250 MG tablet  Commonly known as: ZITHROMAX  Take 1 tablet by mouth daily for 4 doses  Start taking on: October 21, 2022     predniSONE 20 MG tablet  Commonly known as: DELTASONE  Take 2 tablets by mouth daily for 4 doses  Start taking on: October 21, 2022            Darnell Chandra taking these medications      ferrous sulfate 325 (65 Fe) MG tablet  Commonly known as: IRON 325  Take 1 tablet by mouth 2 times daily (with meals)     Fremanezumab-vfrm 225 MG/1.5ML Soaj  Inject 225 mg into the skin every 30 days     hydroCHLOROthiazide 25 MG tablet  Commonly known as: HYDRODIURIL  Take 1 tablet by mouth every morning     lisinopril 40 MG tablet  Commonly known as: PRINIVIL;ZESTRIL  Take 1 tablet by mouth daily     Nurtec 75 MG Tbdp  Generic drug: Rimegepant Sulfate  Take 75 mg by mouth as needed (severe migraine)     ondansetron 4 MG disintegrating tablet  Commonly known as: Zofran ODT  Take 1 tablet by mouth every 8 hours as needed for Nausea or Vomiting     Proventil  (90 Base) MCG/ACT inhaler  Generic drug: albuterol sulfate HFA  INHALE TWO PUFFS BY MOUTH EVERY 6 HOURS AS NEEDED     Spiriva Respimat 1.25 MCG/ACT Aers inhaler  Generic drug: tiotropium  Inhale 2 puffs into the lungs daily               Where to Get Your Medications        These medications were sent to [address] [place] [address] [address] [facility] -  [phone] [place] [phone]  [address] [place] [id number]      Phone: [phone]   azithromycin 250 MG tablet  predniSONE 20 MG tablet  Proventil  (90 Base) MCG/ACT inhaler           Signed:  Electronically signed by Antonio Sanabria MD on 10/20/2022 at 6:24 PM    NOTE: This report was transcribed using voice recognition software. Every effort was made to ensure accuracy; however, inadvertent computerized transcription errors may be present.

## 2022-10-20 NOTE — PROGRESS NOTES
Firm, mass-like structure to (R), medial ankle/lower leg w/vascular discoloration & tenderness on palpation. Pt states that the area has been present since most recent COVID-19 diagnosis & that her PCP is aware. Bilateral pedal pulses were equal upon assessment.

## 2022-10-20 NOTE — PROGRESS NOTES
Patient O2 tested by this nurse room air. Pt was 97% sitting at bedside. Pt walked from room to nurses station for approximately 100 feet. Pt maintained spO2 between 93-96% while ambulating. Pt sat back at bedside and promptly recovered to 98% on room air. Dr. Sawyer Crespo notified.

## 2022-10-21 LAB
EKG ATRIAL RATE: 104 BPM
EKG P AXIS: 48 DEGREES
EKG P-R INTERVAL: 162 MS
EKG Q-T INTERVAL: 302 MS
EKG QRS DURATION: 86 MS
EKG QTC CALCULATION (BAZETT): 397 MS
EKG R AXIS: 6 DEGREES
EKG T AXIS: 96 DEGREES
EKG VENTRICULAR RATE: 104 BPM

## 2022-10-24 ENCOUNTER — TELEPHONE (OUTPATIENT)
Dept: INTERNAL MEDICINE | Age: 47
End: 2022-10-24

## 2022-10-24 NOTE — TELEPHONE ENCOUNTER
[facility] Transitions Initial Follow Up Call    Outreach made within 2 business days of discharge: Yes    Patient: Osmani Ugalde Patient : 1975   MRN: [id number]  Reason for Admission: Asthma   Discharge Date: 10/20/22       Spoke with: Left message for patient to return my call.      Discharge department/facility: [facility]     Scheduled appointment with PCP within 7-14 days    Follow Up  Future Appointments   Date Time Provider Roberto Carlos Calhoun   2022 10:15 AM Dann Choi MD [facility]   11/3/2022  2:00 PM Dann Choi MD [facility]   2022  2:00 PM SURJIT Borrego - CNP [facility] NEURO [facility]       Rosina Lechuga

## 2022-11-03 ENCOUNTER — OFFICE VISIT (OUTPATIENT)
Dept: INTERNAL MEDICINE | Age: 47
End: 2022-11-03

## 2022-11-03 ENCOUNTER — TELEPHONE (OUTPATIENT)
Dept: NEUROLOGY | Age: 47
End: 2022-11-03

## 2022-11-03 VITALS
WEIGHT: 293 LBS | HEART RATE: 77 BPM | RESPIRATION RATE: 20 BRPM | BODY MASS INDEX: 44.41 KG/M2 | OXYGEN SATURATION: 100 % | SYSTOLIC BLOOD PRESSURE: 134 MMHG | TEMPERATURE: 97.8 F | HEIGHT: 68 IN | DIASTOLIC BLOOD PRESSURE: 84 MMHG

## 2022-11-03 DIAGNOSIS — J45.41 MODERATE PERSISTENT ASTHMA WITH EXACERBATION: ICD-10-CM

## 2022-11-03 DIAGNOSIS — D50.9 IRON DEFICIENCY ANEMIA, UNSPECIFIED IRON DEFICIENCY ANEMIA TYPE: ICD-10-CM

## 2022-11-03 DIAGNOSIS — D50.8 IRON DEFICIENCY ANEMIA SECONDARY TO INADEQUATE DIETARY IRON INTAKE: ICD-10-CM

## 2022-11-03 DIAGNOSIS — I10 ESSENTIAL HYPERTENSION: Primary | ICD-10-CM

## 2022-11-03 DIAGNOSIS — E66.01 CLASS 3 SEVERE OBESITY DUE TO EXCESS CALORIES WITH SERIOUS COMORBIDITY AND BODY MASS INDEX (BMI) OF 50.0 TO 59.9 IN ADULT (HCC): ICD-10-CM

## 2022-11-03 DIAGNOSIS — E66.01 CLASS 3 SEVERE OBESITY WITH SERIOUS COMORBIDITY AND BODY MASS INDEX (BMI) OF 50.0 TO 59.9 IN ADULT, UNSPECIFIED OBESITY TYPE (HCC): ICD-10-CM

## 2022-11-03 DIAGNOSIS — G47.30 SLEEP APNEA, UNSPECIFIED TYPE: ICD-10-CM

## 2022-11-03 DIAGNOSIS — R06.02 SOB (SHORTNESS OF BREATH): ICD-10-CM

## 2022-11-03 DIAGNOSIS — G43.019 INTRACTABLE MIGRAINE WITHOUT AURA AND WITHOUT STATUS MIGRAINOSUS: ICD-10-CM

## 2022-11-03 RX ORDER — ACETAZOLAMIDE 250 MG/1
TABLET ORAL
Qty: 90 TABLET | Refills: 1 | Status: SHIPPED
Start: 2022-11-03 | End: 2022-11-22

## 2022-11-03 RX ORDER — FERROUS SULFATE 325(65) MG
325 TABLET ORAL 2 TIMES DAILY WITH MEALS
Qty: 60 TABLET | Refills: 2 | Status: SHIPPED | OUTPATIENT
Start: 2022-11-03

## 2022-11-03 RX ORDER — ACETAZOLAMIDE 250 MG/1
TABLET ORAL
COMMUNITY
Start: 2022-10-27 | End: 2022-11-03 | Stop reason: SDUPTHER

## 2022-11-03 RX ORDER — VENLAFAXINE HYDROCHLORIDE 37.5 MG/1
CAPSULE, EXTENDED RELEASE ORAL
COMMUNITY
Start: 2022-10-27 | End: 2022-11-03 | Stop reason: SDUPTHER

## 2022-11-03 RX ORDER — TIOTROPIUM BROMIDE INHALATION SPRAY 1.56 UG/1
2 SPRAY, METERED RESPIRATORY (INHALATION) DAILY
Qty: 3 EACH | Refills: 1 | Status: SHIPPED | OUTPATIENT
Start: 2022-11-03

## 2022-11-03 RX ORDER — LISINOPRIL 40 MG/1
40 TABLET ORAL DAILY
Status: CANCELLED | OUTPATIENT
Start: 2022-11-03

## 2022-11-03 RX ORDER — ONDANSETRON 4 MG/1
4 TABLET, ORALLY DISINTEGRATING ORAL EVERY 8 HOURS PRN
Qty: 10 TABLET | Refills: 0 | Status: SHIPPED | OUTPATIENT
Start: 2022-11-03

## 2022-11-03 RX ORDER — HYDROCHLOROTHIAZIDE 25 MG/1
25 TABLET ORAL EVERY MORNING
Qty: 30 TABLET | Refills: 5 | Status: SHIPPED | OUTPATIENT
Start: 2022-11-03

## 2022-11-03 RX ORDER — VENLAFAXINE HYDROCHLORIDE 37.5 MG/1
CAPSULE, EXTENDED RELEASE ORAL
Qty: 30 CAPSULE | Refills: 5 | Status: SHIPPED | OUTPATIENT
Start: 2022-11-03

## 2022-11-03 RX ORDER — LISINOPRIL 40 MG/1
40 TABLET ORAL DAILY
Qty: 30 TABLET | Refills: 3 | Status: SHIPPED | OUTPATIENT
Start: 2022-11-03

## 2022-11-03 ASSESSMENT — ENCOUNTER SYMPTOMS: SHORTNESS OF BREATH: 1

## 2022-11-03 NOTE — PROGRESS NOTES
[facility]  [facility]    Attending Physician Statement  I have discussed the case, including pertinent history and exam findings with the resident physician. I have seen and examined the patient and the key elements of the encounter have been performed by me. I agree with the assessment, plan and orders as documented by the resident. I have reviewed all pertinent PMHx, PSHx, FamHx, SocialHx, medications, and allergies and updated history as appropriate. Patient presents for hospital follow up appointment. Patient recently admitted for asthma exacerbation. She is doing well at this point. She is on optimal rx but agree with f/u with pulmonary. She does have what sounds like seasonal allergic rhinitis but denies GERD. She reports that her asthma seems worse since COVID infection. She also has worsening dyspnea with activity especially climbing steps, had echo which revealed EF 60%, mild LVH but sub-optimal study. Additional co-morbidities include HTN, obesity, migraine, benign intracranial HTN. Agree with need for sleep study. Remainder of medical problems as per resident note.     Donis Osler, DO  11/3/2022 3:33 PM Quality 130: Documentation Of Current Medications In The Medical Record: Current Medications Documented Detail Level: Detailed Quality 110: Preventive Care And Screening: Influenza Immunization: Influenza Immunization Administered during Influenza season Quality 111:Pneumonia Vaccination Status For Older Adults: Pneumococcal Vaccination Previously Received

## 2022-11-03 NOTE — PROGRESS NOTES
Blake Zepeda (:  1975) is a 52 y.o. female , Established patient, here for evaluation of the following chief complaint(s):    Follow-Up from Hospital (Asthma exacberation-continues to have trouble breathing, )      PCP: Garnell Castleman, MD       ASSESSMENT/PLAN:    Recent hospital admission on - for asthma exacerbation discharged after taking steroid course, antibiotics, aerosols. Acute complaint generalized weakness since 2 days, too tired to do any activity, also still feeling persistently short of breath with fatigue. No signs or symptoms of infection. History of iron deficiency and blood transfusion in the past.  Heavy periods lasting 7 to 14 days, will rule out recurrent iron deficiency anemia and abnormal thyroid function. Intermittent nonspecific right chest pain likely musculoskeletal.    For uncontrolled asthma/COPD, taking Spiriva and Dulera every day and albuterol 2-3 times per day. HRCT done by pulmonology earlier this year showing 6 pulmonary nodules. Will follow up with pulmonology to see what else can be done for COPD/asthma. FERNANDA likely contributing obesity contributing as well. Patient counseled on losing weight. Second referral made to weight loss clinic as patient is ready to try again. Sleep study not done in the past due to financial issues. Hypertension, vitamin D deficiency, anxiety on venlafaxine, lumbar pain with sciatica has been stable. Chronic colitis with EGD and C-scope done in . Biopsy results as below. It shows chronic active enteritis, gastritis, repeat colonoscopy needed with patient not scheduling yet. OBG referral made for Pap smear patient did not go. Mammogram done in  and was normal.    Return in 1 month for follow-up visit to discuss lab results, follow-up on shortness of breath and weakness. 1. Essential hypertension  -     hydroCHLOROthiazide (HYDRODIURIL) 25 MG tablet;  Take 1 tablet by mouth every morning, Disp-30 tablet, R-5Normal  -     lisinopril (PRINIVIL;ZESTRIL) 40 MG tablet; Take 1 tablet by mouth daily, Disp-30 tablet, R-3Normal  2. Class 3 severe obesity due to excess calories with serious comorbidity and body mass index (BMI) of 50.0 to 59.9 in adult ([facility] [address]  3. Sleep apnea, unspecified type  4. Moderate persistent asthma with exacerbation  5. Iron deficiency anemia, unspecified iron deficiency anemia type  -     ferrous sulfate (IRON 325) 325 (65 Fe) MG tablet; Take 1 tablet by mouth 2 times daily (with meals), Disp-60 tablet, R-2Normal  6. SOB (shortness of breath)  -     CBC with Auto Differential; Future  -     Iron and TIBC; Future  -     Ferritin; Future  -     [facility], DO Nathalie, Pulmonary, [place]  7. Iron deficiency anemia secondary to inadequate dietary iron intake  8. Intractable migraine without aura and without status migrainosus  -     acetaZOLAMIDE (DIAMOX) 250 MG tablet; TAKE ONE TABLET BY MOUTH DAILY, Disp-90 tablet, R-1Normal  9. Class 3 severe obesity with serious comorbidity and body mass index (BMI) of 50.0 to 59.9 in adult, unspecified obesity type [facility])  -     Devin Zuniga MD, Bariatrics, [facility]     RTC:  Return in about 1 month (around 12/3/2022) for Follow up visit. I have reviewed my findings and recommendations with Jerry Frank and Dr. Amrita Levin. Dr. Marie Hunter, PGY- 3  Resident Physician  Kim  Internal Medicine Residency Program       SUBJECTIVE/OBJECTIVE:    Previous Imp Visits:  8/25/22- Office Visit with Me  Post COVID Syndrome- medrol dose back and tessalon pearles given   Moderate persistent asthma- still feels persistently short of breath, using Spiriva and Dulera daily, using albuterol 3-5 times a day, using Incentive spirometer,  seeing Dr Teri Hart in Nov 2021,full PFTs which were completed on September 30, 2021 showed moderate obstruction with significant improvement with bronchodilators. Moderate restriction with no air trapping. HRCT ordered which showed  HRCT May 2022, repeat annually   1. No evidence of interstitial lung disease. No air trapping. No airspace   disease, pleural effusions, or pneumothorax. 2.  There are several bilateral 4-5 mm nodules as detailed above. Please see   recommendations below. 3.  Remainder of the study is as above. RECOMMENDATIONS:   These were not clearly identified on prior CT scan in December of 2020. Suggest repeat chest CT in 6-12 months to reassess   Repeat imaging in 6 to 12 months  Sleep Apnea- sleep study ordered but pt cannot afford  HTN-BP stable at 141/94, meds adjusted to hydrochlorothiazide 25 mg and lisinopril 40 mg [previous dose HCTZ was too high], repeat CMP to check Electrolytes and kidney function  Intractable migraine- gets nurtec and ajovy from Neuro  Vit D Deficiency- low levels , cannot afford 50k unit tablet, will take OTC supplements  Anxiety- prescription assistance , using venlafaxaine through this  Lumbar back pain -Rt lower back , sciatica - was with pain mgmt, was prescribed gabapentin,cannot afford ,using heating pad, no improvement, shooting pain down right buttock to feet, tingling, numbness  MRI spine 2020   MRI Lumbar spine   Likely transitional vertebral anatomy with partially sacralized L5. The last   well-formed disc space is defined as L5-S1. Degenerative disc disease as described above, without spinal canal narrowing       Foraminal narrowing, mild to moderate at bilateral L5-S1, mild at bilateral   L4-5. Chronic colitis  EGD 2018- for iron anemia - normal duodenum , diffuse nonbleeding mild  C scope 2018- non bleeding colitis, s/p biopsies, likely UC     Final Pathologic Diagnosis:   A. Biopsy small intestinal mucosa:       Mild chronic active enteritis. B.  Biopsy colonic mucosa, cecum and ascending colon:       Chronic colitis with moderate activity. C.  Biopsy, colonic mucosa       Focal chronic colitis with mild activity. Atypia indefinite for dysplasia. D.  Biopsy colonic mucosa, rectum:       No histopathological diagnosis. E.  Biopsy duodenal mucosa:       Mild chronic duodenitis. F.  Biopsy gastric fundal mucosa:       Chronic transmucosal gastritis. Comment:   A - D. The above findings can be found in patients with   chronic inflammatory bowel disease and more specifically   Crohn's disease. There are focal glands with atypia   indefinite for dysplasia due to the de-presence of activity. Otherwise, they could be considered dysplastic glands. E.  Duodenum biopsy: There are mild changes of chronic   duodenitis. It is possible that this could be all part of   the same disease process as that found in the colon and   ileum. Definitive features of gluten sensitivity are not   seen. F.  Gastric biopsy: Small intestinal metaplasia, dysplasia,   and organisms with features of H. pylori are not seen. The   gastritis is transmucosal and of moderate severity. Consideration should be given to autoimmune gastritis   (although atrophy and loss of parietal cells is not   present), NSAID gastritis, etc. Please correlate with   clinical findings. HCM  -hep C: neg  -PAP smear: OBGYN referral present, patient did not go; she is considering. Will call their office when she feels better  -Mammogram: BIRADS 1 (05/26/22), next mammo: 05/2023  -c-scope in 2018 mentions colitis, pathology mentions possible ulcerative colitis; pt is asymptomatic. Will refer to [address] [place] [address] for further evaluation. Prescription assistance for Spiriva, venlafaxine initiated today, will follow-up, return in 2 months and reviewed general health, labs, blood pressure after med changes    HPI:     Recent hospital admission 10/19-10/20 for asthma exacerbation , given prednisone, azithromycin, aerosols, incentive spirometer  Felt better after discharge.      Generalized weakness x2 days   Hx of iron deficiency and blood transfusion in the past  Heavy periods lasting 7 days-14 days, regular , no PICA  TSH normal 2 years ago       Feeling SOB still , generalized weakness, no fever/chills, cough, sputum  Chest discomfort initially x 2 weeks, now becoming a pain , more on right side . Spiriva and dulera every day, albuterol 2-3 times/ day ,   Episodic , no palpitations, intermittent, more       Cannot do sleep study due to financial issues    HTN- /84 mm Hg,     Vit D - over the counter    Anxiety- using venlafaxine    Lumbar back pain with sciatica - intermittently worse, could not afford gabapentin,       Med Refills: -    Key points to note:  --     Health Maintenance/Social Determinants:   -hep C: neg  -PAP smear: OBGYN referral present, patient did not go; she is considering. Will call their office when she feels better  -Mammogram: BIRADS 1 (05/26/22), next mammo: 05/2023  -c-scope in 2018 mentions colitis, pathology mentions possible ulcerative colitis; pt is asymptomatic. Will refer to [address] [place] [address] for further evaluation. Review of Systems   Constitutional:  Positive for fatigue. Respiratory:  Positive for shortness of breath. All other systems reviewed and are negative. PMHx:  has a past medical history of Aneurysm ([address] Anxiety, Blood transfusion, Chronic fatigue, Chronic fatigue, Class 3 severe obesity due to excess calories with serious comorbidity and body mass index (BMI) of 50.0 to 59.9 in adult [facility]), Hx of blood clots, Hypertension, Hypertriglyceridemia, Iron deficiency anemia due to chronic blood loss, Knee pain, bilateral, Leg pain, Low back pain, Migraine headache without aura, Morbid obesity (HCC), Muscle cramps, Perennial allergic rhinitis, Prediabetes, Superficial thrombophlebitis of right leg, Ulcerative colitis ([address] and Vertigo.      Allergies   Allergen Reactions    Aspirin Shortness Of Breath and Nausea And Vomiting    Coconut Oil Anaphylaxis    Ibuprofen Other (See Comments)     Trouble breathing      Iodides Shortness Of Breath Motrin [Ibuprofen Micronized] Shortness Of Breath    Robitussin (Alcohol Free) [Guaifenesin] Other (See Comments)     States causes worse cough    Codeine Nausea And Vomiting    Iodine Rash    Tramadol Nausea Only        PSHx:  has a past surgical history that includes Adenoidectomy. OBYGN Hx: heavy menstrual periods lasting 7-14 days    Sexual Hx:   Social History     Substance and Sexual Activity   Sexual Activity Yes    Partners: Male, Female       Social Hx: Occupation- -  Social History       Tobacco History       Smoking Status  Never      Smokeless Tobacco Use  Never              Alcohol History       Alcohol Use Status  Yes Comment  occasional wine coolers              Drug Use       Drug Use Status  No              Sexual Activity       Sexually Active  Yes Partners  Male, Female                     Fam Hx:   Family History   Problem Relation Age of Onset    High Blood Pressure Mother     Stroke Mother     Clotting Disorder Mother         Vague Hx of blood clots on blood thinners    Cancer Father     High Blood Pressure Father     Clotting Disorder Father         Vague hx of blood clots on blood thinners    Cancer Sister 35        VS:   Vitals:    11/03/22 1415   BP: 134/84   Site: Right Lower Arm   Position: Sitting   Cuff Size: Medium Adult   Pulse: 77   Resp: 20   Temp: 97.8 °F (36.6 °C)   TempSrc: Temporal   SpO2: 100%   Weight: (!) 338 lb (153.3 kg)   Height: 5' 8\" (1.727 m)     Physical Exam:  Vitals: /84 (Site: Right Lower Arm, Position: Sitting, Cuff Size: Medium Adult)   Pulse 77   Temp 97.8 °F (36.6 °C) (Temporal)   Resp 20   Ht 5' 8\" (1.727 m)   Wt (!) 338 lb (153.3 kg)   SpO2 100% Comment: room air  BMI 51.39 kg/m²     General Appearance: alert, appears stated age, and cooperative  HEENT:  Head: Normocephalic, no lesions, without obvious abnormality.   Neck: no adenopathy, no carotid bruit, no JVD, supple, symmetrical, trachea midline, and thyroid not enlarged, symmetric, no tenderness/mass/nodules  Lung: clear to auscultation bilaterally and diminished breath sounds bilaterally  Heart: regular rate and rhythm, S1, S2 normal, no murmur, click, rub or gallop  Abdomen: soft, non-tender; bowel sounds normal; no masses,  no organomegaly  Extremities:  extremities normal, atraumatic, no cyanosis or edema  Musculokeletal: No joint swelling, no muscle tenderness. ROM normal in all joints of extremities. Neurologic: Mental status: Alert, oriented, thought content appropriate        On this date 11/3/2022 I have spent 15 minutes reviewing previous notes, test results and face to face with the patient discussing the diagnosis and importance of compliance with the treatment plan as well as documenting on the day of the visit. An electronic signature was used to authenticate this note.     --Tigre Rogers MD

## 2022-11-03 NOTE — TELEPHONE ENCOUNTER
Patient called in samples of nurtec and 1 Ajovy given  Electronically signed by Ganesh Sims MA on 11/3/2022 at 3:52 PM

## 2022-11-03 NOTE — PATIENT INSTRUCTIONS
For generalized weakness with limited some blood test.  Please get them done as soon as possible. For your shortness of breath please continue the inhalers as previously. We will order an appointment with the pulmonologist.  They will call you to schedule an appointment. For all your other problems please continue medications as previously. We will see you back in 1 month to see how you are doing.     Dr. Estela Antoine

## 2022-11-08 ENCOUNTER — TELEPHONE (OUTPATIENT)
Dept: BARIATRICS/WEIGHT MGMT | Age: 47
End: 2022-11-08

## 2022-11-16 ENCOUNTER — TELEPHONE (OUTPATIENT)
Dept: BARIATRICS/WEIGHT MGMT | Age: 47
End: 2022-11-16

## 2022-11-22 ENCOUNTER — OFFICE VISIT (OUTPATIENT)
Dept: NEUROLOGY | Age: 47
End: 2022-11-22

## 2022-11-22 VITALS
BODY MASS INDEX: 44.41 KG/M2 | TEMPERATURE: 97.2 F | RESPIRATION RATE: 18 BRPM | OXYGEN SATURATION: 98 % | SYSTOLIC BLOOD PRESSURE: 128 MMHG | DIASTOLIC BLOOD PRESSURE: 68 MMHG | WEIGHT: 293 LBS | HEART RATE: 89 BPM | HEIGHT: 68 IN

## 2022-11-22 DIAGNOSIS — G93.2 PSEUDOTUMOR CEREBRI: Primary | ICD-10-CM

## 2022-11-22 DIAGNOSIS — G43.019 INTRACTABLE MIGRAINE WITHOUT AURA AND WITHOUT STATUS MIGRAINOSUS: ICD-10-CM

## 2022-11-22 PROCEDURE — 3078F DIAST BP <80 MM HG: CPT | Performed by: NURSE PRACTITIONER

## 2022-11-22 PROCEDURE — 3074F SYST BP LT 130 MM HG: CPT | Performed by: NURSE PRACTITIONER

## 2022-11-22 PROCEDURE — 99214 OFFICE O/P EST MOD 30 MIN: CPT | Performed by: NURSE PRACTITIONER

## 2022-11-22 RX ORDER — ACETAZOLAMIDE 250 MG/1
250 TABLET ORAL 2 TIMES DAILY
Qty: 180 TABLET | Refills: 3 | Status: SHIPPED | OUTPATIENT
Start: 2022-11-22

## 2022-11-22 RX ORDER — RIMEGEPANT SULFATE 75 MG/75MG
75 TABLET, ORALLY DISINTEGRATING ORAL PRN
Qty: 16 TABLET | Refills: 0 | COMMUNITY
Start: 2022-11-22

## 2022-11-22 NOTE — PROGRESS NOTES
[address] [facility]. Uziel Hobson M.D., F.A.C.P. Xavier Campoverde, APRIL, APRN, ACNS-[facility]. Kate Knox, MSN, APRN-FNP-C  Ileana Frazier, MSN, APRN-FNP-C  SHIKHA Rodrigues, PA-C  Gregg Velazquez, MSN, APRN-FNP-C  [address] [address]  [place], [address]  Phone: [phone]  Fax: [phone]       Chase Virgen is a 52 y.o. right handed female     Patient follows for headaches    Onset of headache started in 5th grade. Patient notes having Covid virus back in October 2020 and being hospitalized for 3 days. Recently she was seen in ED for pneumonia. She wascomplaining of a pressure like feeling behind her eyes. She said it feels like it goes across her eyes and she have blurry vision at times that is intermittent. She has not had her eyes examined in years. Patient notes back in 2014 she had a severe migraine and had to go to the ED. 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 She has no deficits from this stroke. She notes no repair of her aneurysm such as coiling or clipping. She was life flighted to a larger hospital from [facility].      Benefit from [address] [place] [address] Avenue due to the decrease in headache frequency and pain but has noticed that the Ajovy starts wearing off after the third week of injection. She states that the Nurtec works better than Ubrelvy for acute migraine relief. LP was completed to rule out pseudotumor cerebri opening pressure was normal.  Patient continues on her Diamox 250 mg but taking it daily instead of twice daily due to it making her drowsy. Today 11/22/22 she notes some blurry vision in both eyes. Currently only taking Diamox 250 mg once daily. She notes relief while taking Ajovy and Nurtec. No other complaints. Headache description below.     Description of Headaches:  Location of pain: right-sided unilateral, left-sided unilateral, temporal, frontal, retro-orbital  Radiation of pain?:right-sided unilateral, temporal  Character of pain:burning, pressure, sharp and throbbing  Severity of pain: 10  Accompanying symptoms: nausea, vomiting, sonophobia, photophobia, mental status changes, decreased social functioning, vertigo, lacrimation, rhinorrhea  Prodromal sx?: decreased social functioning, cannot get comfortable  --- include increased yawning, euphoria, depression, irritability, food cravings, constipation, and neck stiffness. Rapidity of onset: sudden  Typical duration of individual headache: 4 days  Frequency of headaches: > 15 headaches/monthly  Are most headaches similar in presentation? yes  Typical precipitants: stress, odors (perfume and tar)  --- menstruation, fasting, lack of sleep     Temporal Pattern of Headaches:  Started having HA's several years ago  Worst time of day: all the time  Awaken from sleep?: yes   Seasonal pattern?: no  Clustering of HA's over time? no  Overall pattern since problem began: stable    Degree of Functional Impairment: severe    Current Use of Meds to Treat HA:  Abortive meds? acetaminophen, NSAIDs (ibuprofen), aspirin/acetaminophen/caffeine, sumatriptan PO, butalbital/caffeine/aspirin, Nurtec, Ubrelvy   Daily use? no  Prophylactic meds? beta-blockers (metropolol), Topamax, amitriptyline, Ajovy     Additional Relevant History:  History of head/neck trauma? no  History of head/neck surgery? no  Family h/o headache problems?  yes - maternal   Use of meds that might worsen HA's (nitrates, exogenous estrogens,    Nifedipine)? no  Exposure to carbon monoxide? no  Substance use: alcohol: mixed drinks    No issues with chewing or swallowing  No chest pain or palpitations  No falls, tripping or stumbling  No incontinence of bowels or bladder  No itching or bruising appreciated  No numbness, tingling or focal arm/leg weakness    ROS is otherwise negative    Objective:     /68   Pulse 89   Temp 97.2 °F (36.2 °C)   Resp 18   Ht 5' 8\" (1.727 m)   Wt (!) 338 lb (153.3 kg)   SpO2 98%   BMI 51.39 kg/m²     General appearance: alert, appears stated age, cooperative and in no distress  Head: normocephalic, without obvious abnormality, atraumatic  Eyes: conjunctivae/corneas clear; no drainage  Neck: no adenopathy, supple, symmetrical, trachea midline   Lungs: clear to auscultation bilaterally  Heart: regular rate and rhythm, S1, S2 normal, no murmur  Abdomen: obese, soft, non-tender; bowel sounds normal  Extremities: normal, atraumatic, no cyanosis or edema  Skin:  color, texture, turgor normal--no rashes or lesions      Mental Status: alert and oriented x 4    Appropriate attention/concentration  Intact fundus of knowledge    Speech: no dysarthria  Language: no aphasias    Cranial Nerves:  I: smell    II: visual acuity     II: visual fields Full    II: pupils KOBE   III,VII: ptosis None   III,IV,VI: extraocular muscles  EOMI without nystagmus   V: mastication Normal   V: facial light touch sensation  Normal   V,VII: corneal reflex     VII: facial muscle function - upper  Normal   VII: facial muscle function - lower Normal   VIII: hearing Normal   IX: soft palate elevation  Normal   IX,X: gag reflex    XI: trapezius strength  5/5   XI: sternocleidomastoid strength 5/5   XI: neck extension strength  5/5   XII: tongue strength  Normal     Motor:  5/5 throughout  Normal bulk and tone  No drift   No abnormal movements    Sensory:  LT normal    Coordination:   FN, FFM and BARBARA normal  HS normal    Gait:  Normal    DTR:   2+ throughout     Laboratory/Radiology:  ry/Radiology:     Results for Inocencio Alcantara (MRN [id number]) as of 3/4/2022 12:16   Ref.  Range 10/22/2021 10:20   Appearance, CSF Latest Ref Range: Clear  Clear   RBC, CSF Latest Units: /uL <2000   WBC, CSF Latest Ref Range: 0 - 2 /uL 3 (H)   Neutrophils, CSF Latest Ref Range: 0 - 10 % 0   Monocytes, CSF Latest Ref Range: 10 - 70 % 100 (H)   Color, CSF Unknown Colorless   Tube Number + CELL CT + DIFF-CSF Unknown Tube 3     Opening pressure was 10 cm of water. All labs personally reviewed at the time of this visit    Assessment:     Migraine without aura in patient with long history of migraine headaches   ---episodic and chronic  ---disability and lost productivity substantial  ---headache lasting 4 to 72 hours   ---2 of the following: throbbing, unilateral headaches, worsening with activity (walking, bending, standing etc), moderate to severe pain  ---associated with at least one of the following: Nausea; photophobia   --- > 15 headache days/month for more than 3 months  ---At least 8 days of headache consistent with migraine  --- on Ajovy and has noticed headaches are decreased and Nurtec working as abortive      Preventative headache medications tried: beta-blockers (metropolol), Topamax, amitriptyline, Ajovy   Abortive headache medications tried: acetaminophen, NSAIDs (ibuprofen), aspirin/acetaminophen/caffeine, sumatriptan PO, butalbital/caffeine/aspirin, Nurtec, Ubrelvy     Pseudotumor cerebri  --- retro orbital pressure with occasional blurry vision, headache, photopsia, retrobulbar pain  --- female with obesity   --- on Diamox 250 mg BID but taking it daily will need to take BID now   --- LP opening pressure normal    Referred pt to PCP or urgent care for her left posterior head pain appears to be a cyst that has not come to head. Plan:     Therapeutic lifestyle measures may be beneficial for controlling migraine, including good sleep hygiene, routine meal schedules, regular exercise, and managing migraine triggers.     Continue Diamox 250 mg BID     Advised to see Optometry to check vision     Continue Ajovy 225 mg SQ every 30 days  --- sample given   --- needs samples     Samples of Nurtec given     Patient is self pay so I advised we can provide her with samples if cost is too high    Follow up in 6 months     Call with any questions or concerns      SURJIT Owens - CNP  11:15 AM  11/22/2022

## 2023-01-12 ENCOUNTER — OFFICE VISIT (OUTPATIENT)
Dept: INTERNAL MEDICINE | Age: 48
End: 2023-01-12

## 2023-01-12 VITALS
HEART RATE: 86 BPM | HEIGHT: 69 IN | DIASTOLIC BLOOD PRESSURE: 75 MMHG | SYSTOLIC BLOOD PRESSURE: 153 MMHG | BODY MASS INDEX: 43.4 KG/M2 | RESPIRATION RATE: 20 BRPM | TEMPERATURE: 98 F | WEIGHT: 293 LBS | OXYGEN SATURATION: 97 %

## 2023-01-12 DIAGNOSIS — R53.82 CHRONIC FATIGUE: ICD-10-CM

## 2023-01-12 DIAGNOSIS — M54.41 CHRONIC RIGHT-SIDED LOW BACK PAIN WITH RIGHT-SIDED SCIATICA: ICD-10-CM

## 2023-01-12 DIAGNOSIS — G89.29 CHRONIC RIGHT-SIDED LOW BACK PAIN WITH RIGHT-SIDED SCIATICA: ICD-10-CM

## 2023-01-12 DIAGNOSIS — I10 ESSENTIAL HYPERTENSION: ICD-10-CM

## 2023-01-12 DIAGNOSIS — J45.41 MODERATE PERSISTENT ASTHMA WITH EXACERBATION: ICD-10-CM

## 2023-01-12 DIAGNOSIS — S99.921A INJURY OF RIGHT FOOT, INITIAL ENCOUNTER: Primary | ICD-10-CM

## 2023-01-12 PROCEDURE — 99212 OFFICE O/P EST SF 10 MIN: CPT | Performed by: INTERNAL MEDICINE

## 2023-01-12 RX ORDER — METHYLPREDNISOLONE 4 MG/1
TABLET ORAL
Qty: 1 KIT | Refills: 0 | Status: SHIPPED | OUTPATIENT
Start: 2023-01-12 | End: 2023-01-18

## 2023-01-12 NOTE — PROGRESS NOTES
Erick Aburto (:  1975) is a 50 y.o. female , Established patient, here for evaluation of the following chief complaint(s):    Follow-up, Shortness of Breath, and Toe Injury (Patient complains right 4 th toe pain hit on the cough)      PCP: Chilango Joaquin MD       ASSESSMENT/PLAN:    Rt toe injury- initial encounter - red ness, swelling noted on right 5th toe, ordered Xrays to rule out fracture , allergic to NSAIDs, ordered Medrol dosepack for pain and swelling as well as for SOB   Moderate /severe asthma - spiriva and dulera every day , albuterol 3 times/ week, now 2 times/ day , HRCT done by pulmonology earlier in  showing 6 pulmonary nodules. Will follow up with pulmonology to see what else can be done for COPD/asthma. FERNANDA likely contributing obesity contributing as well. FERNANDA- cannot afford visit to Sleep medicine, but is agreeable to weight loss clinic. Referral was made at last visit but patient did NOT call and did not receive messages despite Weight loss clinic leaving a telephone encounter and voicemails. Number given to Patient so she can call and schedule an appointment . HTN- BP raised today likely due to foot pain, will continue same meds and reevaluate  Vit D deficiency, anxiety have been stable   Sciatica with lumbar pain - have offered physical therapy in the past but cannot go. Patient cannot afford gabapentin or lyrica. She has tried prescription assistance in the past but did not qualify   Main issue is generalized weakness, chronic SOB, and back pain. We discussed this in detail and said that weight loss would be the best way to improve all health problems. Encouraged patient to call weight loss clinic and pulmonology to follow up with them. Encouraged her to get labs done which have not been done after prior visit. Pt is agreeable with plan  RTC in 2 months to follow up       1. Injury of right foot, initial encounter  -     XR FOOT RIGHT (MIN 3 VIEWS);  Future  - methylPREDNISolone (MEDROL DOSEPACK) 4 MG tablet; Take by mouth., Disp-1 kit, R-0Normal  2. Essential hypertension  3. Moderate persistent asthma with exacerbation  4. Chronic right-sided low back pain with right-sided sciatica  5. Chronic fatigue     RTC:  Return in about 2 months (around 3/12/2023) for Follow up visit, Review Lab Results. I have reviewed my findings and recommendations with Beatrice Yang and Dr. Patel Wood. Dr. Cody Valverde, PGY- 3  Resident Physician  [facility] Internal Medicine Residency Program       SUBJECTIVE/OBJECTIVE:    Previous Imp Visits:  11/3/22- Office Visit with Me   -Recent hospital admission on 19th-20th October for asthma exacerbation discharged after taking steroid course, antibiotics, aerosols.  -Acute complaint generalized weakness since 2 days, too tired to do any activity, also still feeling persistently short of breath with fatigue. No signs or symptoms of infection. History of iron deficiency and blood transfusion in the past.  Heavy periods lasting 7 to 14 days, will rule out recurrent iron deficiency anemia and abnormal thyroid function.  -Intermittent nonspecific right chest pain likely musculoskeletal.  -For uncontrolled asthma/COPD, taking Spiriva and Dulera every day and albuterol 2-3 times per day. HRCT done by pulmonology earlier this year showing 6 pulmonary nodules. Will follow up with pulmonology to see what else can be done for COPD/asthma. FERNANDA likely contributing obesity contributing as well. Patient counseled on losing weight. Second referral made to weight loss clinic as patient is ready to try again. Sleep study not done in the past due to financial issues.  -Hypertension, vitamin D deficiency, anxiety on venlafaxine, lumbar pain with sciatica has been stable. -Chronic colitis with EGD and C-scope done in 2018. Biopsy results as below.   It shows chronic active enteritis, gastritis, repeat colonoscopy needed with patient not scheduling yet.  -[facility] referral made for Pap smear patient did not go. -Mammogram done in 2022 and was normal.  -Return in 1 month for follow-up visit to discuss lab results, follow-up on shortness of breath and weakness. 11/22/22- neurology office - advised optometry to check vision, continue ajovy and nurtec and diamox     HPI:   acute weakness- now chronic, does not feel like doing, body aching , when she walked at [address] , got really SOB , even when cleaning house   Echo 2021- EF 60%, indeterminate diastolic function     Asthma/COPD- spiriva and dulera every day , albuterol 3 times/ week, now 2 times/ day ,     HTN- BP higher than before but pt has acute back pain , stubbed toe last week    Intermittent tingling,numbness in right foot - episodic and right arm tingling numbness as well     Med Refills: -    Key points to note:  -=     Health Maintenance/Social Determinants:   --    Review of Systems   Constitutional:  Positive for fatigue. Respiratory:  Positive for shortness of breath. Musculoskeletal:  Positive for arthralgias and myalgias. Skin:  Positive for wound. Neurological:  Positive for weakness. All other systems reviewed and are negative. PMHx:  has a past medical history of Aneurysm ([address] Anxiety, Blood transfusion, Chronic fatigue, Chronic fatigue, Class 3 severe obesity due to excess calories with serious comorbidity and body mass index (BMI) of 50.0 to 59.9 in [facility]), Hx of blood clots, Hypertension, Hypertriglyceridemia, Iron deficiency anemia due to chronic blood loss, Knee pain, bilateral, Leg pain, Low back pain, Migraine headache without aura, Morbid obesity (HCC), Muscle cramps, Perennial allergic rhinitis, Prediabetes, Superficial thrombophlebitis of right leg, Ulcerative colitis ([address] and Vertigo.      Allergies   Allergen Reactions    Aspirin Shortness Of Breath and Nausea And Vomiting    Coconut Oil Anaphylaxis    Ibuprofen Other (See Comments)     Trouble breathing Iodides Shortness Of Breath    Motrin [Ibuprofen Micronized] Shortness Of Breath    Robitussin (Alcohol Free) [Guaifenesin] Other (See Comments)     States causes worse cough    Codeine Nausea And Vomiting    Iodine Rash    Tramadol Nausea Only        PSHx:  has a past surgical history that includes Adenoidectomy. Sexual Hx:   Social History     Substance and Sexual Activity   Sexual Activity Yes    Partners: Male, Female       Social Hx: Occupation- -  Social History       Tobacco History       Smoking Status  Never      Smokeless Tobacco Use  Never              Alcohol History       Alcohol Use Status  Yes Comment  occasional wine coolers              Drug Use       Drug Use Status  No              Sexual Activity       Sexually Active  Yes Partners  Male, Female                     Fam Hx:   Family History   Problem Relation Age of Onset    High Blood Pressure Mother     Stroke Mother     Clotting Disorder Mother         Vague Hx of blood clots on blood thinners    Cancer Father     High Blood Pressure Father     Clotting Disorder Father         Vague hx of blood clots on blood thinners    Cancer Sister 35        VS:   Vitals:    01/12/23 1337   BP: (!) 153/75   Site: Left Lower Arm   Position: Sitting   Cuff Size: Large Adult   Pulse: 86   Resp: 20   Temp: 98 °F (36.7 °C)   TempSrc: Temporal   SpO2: 97%   Weight: (!) 347 lb 14.4 oz (157.8 kg)   Height: 5' 9\" (1.753 m)     Physical Exam:  Vitals: BP (!) 153/75 (Site: Left Lower Arm, Position: Sitting, Cuff Size: Large Adult)   Pulse 86   Temp 98 °F (36.7 °C) (Temporal)   Resp 20   Ht 5' 9\" (1.753 m)   Wt (!) 347 lb 14.4 oz (157.8 kg)   SpO2 97%   BMI 51.38 kg/m²     General Appearance: alert, appears stated age, and cooperative  HEENT:  Head: Normal, normocephalic, atraumatic.   Neck: no adenopathy, no carotid bruit, no JVD, supple, symmetrical, trachea midline, and thyroid not enlarged, symmetric, no tenderness/mass/nodules  Lung: diminished breath sounds bilaterally  Heart: regular rate and rhythm, S1, S2 normal, no murmur, click, rub or gallop  Abdomen: soft, non-tender; bowel sounds normal; no masses,  no organomegaly  Extremities:  extremities normal, atraumatic, no cyanosis or edema  Musculokeletal: No joint swelling, no muscle tenderness. ROM normal in all joints of extremities. Neurologic: Mental status: Alert, oriented, thought content appropriate        On this date 1/12/2023 I have spent 15 minutes reviewing previous notes, test results and face to face with the patient discussing the diagnosis and importance of compliance with the treatment plan as well as documenting on the day of the visit. An electronic signature was used to authenticate this note.     --Dany Roland MD

## 2023-01-12 NOTE — PROGRESS NOTES
[facility]  Internal Medicine Residency Clinic    Attending Physician Statement  I have discussed the case, including pertinent history and exam findings with the resident physician. I agree with the assessment, plan and orders as documented by the resident. I have reviewed all pertinent PMHx, PSHx, FamHx, SocialHx, medications, and allergies and updated history as appropriate. Patient here for routine follow up of medical problems. COPD  -stable; patient needs to followup with pulmonology; patient to call their office to make appointment     Pseudotumor Cerebri   -continue current treatment; stable     Chronic Migraines   -follows with neurology; The current medical regimen is effective;  continue present plan and medications. Right Foot Pain  -xray; oral pain control 2/2 allergy to nsaids and tylenol not working     [employer] of medical problems as per resident note.     [address] DO Quiana  1/12/2023 2:06 PM    Encounter time including independent chart review, discussion with patient, interpreting test results and/or external communications: 30'

## 2023-01-12 NOTE — PATIENT INSTRUCTIONS
Please call the weight loss clinic and schedule an appointment. Here are their details:   [address] Weight [address], [address]   [place], [address]   [phone]    Please schedule an appointment with your lung doctor for your chronic SOB . Here are the details:   [address]   [address]   [place], [address]   IT:[phone]    Please get labs done. We will review them at them next visit. For your foot, we have prescribed a medrol dosepack. Please take it as per instructions on the kit. Please get Foot X rays done. We will review them . We will see you back in 2 months to see how you are doing.      Dr. Karlie Robison

## 2023-01-13 ENCOUNTER — HOSPITAL ENCOUNTER (OUTPATIENT)
Age: 48
Discharge: HOME | End: 2023-01-13

## 2023-01-13 ENCOUNTER — HOSPITAL ENCOUNTER (OUTPATIENT)
Dept: GENERAL RADIOLOGY | Age: 48
End: 2023-01-13

## 2023-01-13 ENCOUNTER — HOSPITAL ENCOUNTER (OUTPATIENT)
Age: 48
End: 2023-01-13

## 2023-01-13 DIAGNOSIS — R06.02 SOB (SHORTNESS OF BREATH): ICD-10-CM

## 2023-01-13 DIAGNOSIS — S99.921A INJURY OF RIGHT FOOT, INITIAL ENCOUNTER: ICD-10-CM

## 2023-01-13 LAB
BASOPHILS ABSOLUTE: 0.07 E9/L (ref 0–0.2)
BASOPHILS RELATIVE PERCENT: 0.7 % (ref 0–2)
EOSINOPHILS ABSOLUTE: 0.14 E9/L (ref 0.05–0.5)
EOSINOPHILS RELATIVE PERCENT: 1.5 % (ref 0–6)
FERRITIN: 21 NG/ML
HCT VFR BLD CALC: 37.8 % (ref 34–48)
HEMOGLOBIN: 11.3 G/DL (ref 11.5–15.5)
IMMATURE GRANULOCYTES #: 0.04 E9/L
IMMATURE GRANULOCYTES %: 0.4 % (ref 0–5)
IRON SATURATION: 32 % (ref 15–50)
IRON: 115 MCG/DL (ref 37–145)
LYMPHOCYTES ABSOLUTE: 1.62 E9/L (ref 1.5–4)
LYMPHOCYTES RELATIVE PERCENT: 16.9 % (ref 20–42)
MCH RBC QN AUTO: 21.6 PG (ref 26–35)
MCHC RBC AUTO-ENTMCNC: 29.9 % (ref 32–34.5)
MCV RBC AUTO: 72.4 FL (ref 80–99.9)
MONOCYTES ABSOLUTE: 0.66 E9/L (ref 0.1–0.95)
MONOCYTES RELATIVE PERCENT: 6.9 % (ref 2–12)
NEUTROPHILS ABSOLUTE: 7.04 E9/L (ref 1.8–7.3)
NEUTROPHILS RELATIVE PERCENT: 73.6 % (ref 43–80)
PDW BLD-RTO: 17.2 FL (ref 11.5–15)
PLATELET # BLD: 454 E9/L (ref 130–450)
PMV BLD AUTO: 11.3 FL (ref 7–12)
RBC # BLD: 5.22 E12/L (ref 3.5–5.5)
TOTAL IRON BINDING CAPACITY: 360 MCG/DL (ref 250–450)
WBC # BLD: 9.6 E9/L (ref 4.5–11.5)

## 2023-01-13 PROCEDURE — 36415 COLL VENOUS BLD VENIPUNCTURE: CPT

## 2023-01-13 PROCEDURE — 73630 X-RAY EXAM OF FOOT: CPT

## 2023-01-13 PROCEDURE — 85025 COMPLETE CBC W/AUTO DIFF WBC: CPT

## 2023-01-13 PROCEDURE — 83550 IRON BINDING TEST: CPT

## 2023-01-13 PROCEDURE — 82728 ASSAY OF FERRITIN: CPT

## 2023-01-13 PROCEDURE — 83540 ASSAY OF IRON: CPT

## 2023-01-13 ASSESSMENT — ENCOUNTER SYMPTOMS: SHORTNESS OF BREATH: 1

## 2023-03-23 ENCOUNTER — OFFICE VISIT (OUTPATIENT)
Dept: INTERNAL MEDICINE | Age: 48
End: 2023-03-23
Payer: COMMERCIAL

## 2023-03-23 VITALS
BODY MASS INDEX: 43.4 KG/M2 | OXYGEN SATURATION: 99 % | HEART RATE: 87 BPM | HEIGHT: 69 IN | TEMPERATURE: 97.1 F | DIASTOLIC BLOOD PRESSURE: 102 MMHG | SYSTOLIC BLOOD PRESSURE: 156 MMHG | RESPIRATION RATE: 20 BRPM | WEIGHT: 293 LBS

## 2023-03-23 DIAGNOSIS — F33.1 MODERATE EPISODE OF RECURRENT MAJOR DEPRESSIVE DISORDER (HCC): ICD-10-CM

## 2023-03-23 DIAGNOSIS — M54.41 CHRONIC RIGHT-SIDED LOW BACK PAIN WITH RIGHT-SIDED SCIATICA: ICD-10-CM

## 2023-03-23 DIAGNOSIS — I10 ESSENTIAL HYPERTENSION: ICD-10-CM

## 2023-03-23 DIAGNOSIS — R06.02 SOB (SHORTNESS OF BREATH): ICD-10-CM

## 2023-03-23 DIAGNOSIS — E55.9 VITAMIN D DEFICIENCY: ICD-10-CM

## 2023-03-23 DIAGNOSIS — H53.8 BLURRING OF VISION: Primary | ICD-10-CM

## 2023-03-23 DIAGNOSIS — G47.30 SLEEP APNEA, UNSPECIFIED TYPE: ICD-10-CM

## 2023-03-23 DIAGNOSIS — G54.2 CERVICAL NEUROPATHY: ICD-10-CM

## 2023-03-23 DIAGNOSIS — G89.29 CHRONIC RIGHT-SIDED LOW BACK PAIN WITH RIGHT-SIDED SCIATICA: ICD-10-CM

## 2023-03-23 DIAGNOSIS — D50.9 IRON DEFICIENCY ANEMIA, UNSPECIFIED IRON DEFICIENCY ANEMIA TYPE: ICD-10-CM

## 2023-03-23 PROCEDURE — 99213 OFFICE O/P EST LOW 20 MIN: CPT | Performed by: INTERNAL MEDICINE

## 2023-03-23 RX ORDER — LISINOPRIL 40 MG/1
40 TABLET ORAL DAILY
Qty: 30 TABLET | Refills: 3 | Status: SHIPPED | OUTPATIENT
Start: 2023-03-23

## 2023-03-23 RX ORDER — FERROUS SULFATE 325(65) MG
325 TABLET ORAL EVERY OTHER DAY
Qty: 60 TABLET | Refills: 1 | Status: SHIPPED | OUTPATIENT
Start: 2023-03-23

## 2023-03-23 RX ORDER — HYDROCHLOROTHIAZIDE 25 MG/1
25 TABLET ORAL EVERY MORNING
Qty: 30 TABLET | Refills: 5 | Status: SHIPPED | OUTPATIENT
Start: 2023-03-23

## 2023-03-23 RX ORDER — DULOXETIN HYDROCHLORIDE 60 MG/1
60 CAPSULE, DELAYED RELEASE ORAL DAILY
Qty: 90 CAPSULE | Refills: 1 | Status: SHIPPED | OUTPATIENT
Start: 2023-03-23

## 2023-03-23 RX ORDER — ALBUTEROL SULFATE 90 MCG
HFA AEROSOL WITH ADAPTER (GRAM) INHALATION
Qty: 18 G | Refills: 3 | Status: SHIPPED | OUTPATIENT
Start: 2023-03-23

## 2023-03-23 RX ORDER — TIOTROPIUM BROMIDE INHALATION SPRAY 1.56 UG/1
2 SPRAY, METERED RESPIRATORY (INHALATION) DAILY
Qty: 3 EACH | Refills: 3 | Status: SHIPPED | OUTPATIENT
Start: 2023-03-23

## 2023-03-23 ASSESSMENT — PATIENT HEALTH QUESTIONNAIRE - PHQ9
SUM OF ALL RESPONSES TO PHQ QUESTIONS 1-9: 16
2. FEELING DOWN, DEPRESSED OR HOPELESS: 1
6. FEELING BAD ABOUT YOURSELF - OR THAT YOU ARE A FAILURE OR HAVE LET YOURSELF OR YOUR FAMILY DOWN: 2
1. LITTLE INTEREST OR PLEASURE IN DOING THINGS: 3
SUM OF ALL RESPONSES TO PHQ9 QUESTIONS 1 & 2: 4
SUM OF ALL RESPONSES TO PHQ QUESTIONS 1-9: 15
3. TROUBLE FALLING OR STAYING ASLEEP: 2
SUM OF ALL RESPONSES TO PHQ QUESTIONS 1-9: 16
5. POOR APPETITE OR OVEREATING: 2
9. THOUGHTS THAT YOU WOULD BE BETTER OFF DEAD, OR OF HURTING YOURSELF: 1
SUM OF ALL RESPONSES TO PHQ QUESTIONS 1-9: 16
7. TROUBLE CONCENTRATING ON THINGS, SUCH AS READING THE NEWSPAPER OR WATCHING TELEVISION: 2
4. FEELING TIRED OR HAVING LITTLE ENERGY: 3
8. MOVING OR SPEAKING SO SLOWLY THAT OTHER PEOPLE COULD HAVE NOTICED. OR THE OPPOSITE, BEING SO FIGETY OR RESTLESS THAT YOU HAVE BEEN MOVING AROUND A LOT MORE THAN USUAL: 0
10. IF YOU CHECKED OFF ANY PROBLEMS, HOW DIFFICULT HAVE THESE PROBLEMS MADE IT FOR YOU TO DO YOUR WORK, TAKE CARE OF THINGS AT HOME, OR GET ALONG WITH OTHER PEOPLE: 1

## 2023-03-23 ASSESSMENT — COLUMBIA-SUICIDE SEVERITY RATING SCALE - C-SSRS
1. WITHIN THE PAST MONTH, HAVE YOU WISHED YOU WERE DEAD OR WISHED YOU COULD GO TO SLEEP AND NOT WAKE UP?: YES
6. HAVE YOU EVER DONE ANYTHING, STARTED TO DO ANYTHING, OR PREPARED TO DO ANYTHING TO END YOUR LIFE?: NO
2. HAVE YOU ACTUALLY HAD ANY THOUGHTS OF KILLING YOURSELF?: NO

## 2023-03-23 NOTE — PATIENT INSTRUCTIONS
We are glad your right toe is better. For your moderate/severe asthma and persistent shortness of breath-take Spiriva and Dulera every day, continue to use albuterol. We have set up an appointment with the lung doctor on May 23 at 11:20 PM.  Please follow-up with this appointment. For your high blood pressure, please buy the medications and start taking lisinopril and hydrochlorothiazide again. For your vitamin D deficiency, we have ordered repeat vitamin D level please get it done so we can follow-up and restart supplements if needed. For your sciatica with lumbar pain, we will now try duloxetine. Please start 60 mg duloxetine daily. It will also help with depression. We have ordered an xray of your neck to examine for neuropathy changes. For your depression, we are can refer you to our counselor Chip Rosa. Please start taking the venlafaxine. And please start taking duloxetine 60 mg daily. Blurring of vision and sudden eye pain we have put in a referral to the eye doctor. They will call you to schedule an appointment. Please follow-up with them. We have ordered a lung function test and sleep study. Please get them done as soon as possible. We will see you back in 2 months to see how you are doing.     Dr. Rell Martinez

## 2023-03-23 NOTE — PROGRESS NOTES
[facility]  Internal Medicine Residency Clinic    Attending Physician Statement  I have discussed the case, including pertinent history and exam findings with the resident physician. I have seen and examined the patient and the key elements of the encounter have been performed by me. I agree with the assessment, plan and orders as documented by the resident. I have reviewed all pertinent PMHx, PSHx, FamHx, SocialHx, medications, and allergies and updated history as appropriate. Patient presents for routine follow up of medical problems. Depression   Start Cymbalta, LISW referral. Denies SI/HI. Moderate persistent asthma, uncontrolled with post COVID syndrome   Continue spiriva and Dulera ( previously by PAP)   Pulmonary appointment scheduled by our office staff   Using albuterol 2-3 x daily     High risk FERNANDA   Refer for sleep study    Obesity class 3, BMI 51  She is considering weight loss clinic, advised caloric restriction    Hypertension, uncontrolled  Out of meds -- resume prior meds ACEi and HCTZ    Cervical and lumbar radiculopathy and paresthesias   Check x-rays c-spine for + Spurlings left   Continue OTC meds,    Hx migraines and IIH   Controlled on current regimen    Vitamin d deficiency   Recheck level    SDOH   She now has [employer] of medical problems as per resident note.     Leonarda Patel,   3/23/2023 2:01 PM
Systems   Constitutional:  Positive for fatigue. Eyes:  Positive for visual disturbance. Respiratory:  Positive for shortness of breath. Musculoskeletal:  Positive for arthralgias and myalgias. Neurological:  Positive for weakness and numbness. Negative for dizziness. All other systems reviewed and are negative. PMHx:  has a past medical history of Aneurysm ([facility] Anxiety, Blood transfusion, Chronic fatigue, Chronic fatigue, Class 3 severe obesity due to excess calories with serious comorbidity and body mass index (BMI) of 50.0 to 59.9 in adult [facility]), Hx of blood clots, Hypertension, Hypertriglyceridemia, Iron deficiency anemia due to chronic blood loss, Knee pain, bilateral, Leg pain, Low back pain, Migraine headache without aura, Morbid obesity (HCC), Muscle cramps, Perennial allergic rhinitis, Prediabetes, Superficial thrombophlebitis of right leg, Ulcerative colitis ([facility] [address] and Vertigo. Allergies   Allergen Reactions    Aspirin Shortness Of Breath and Nausea And Vomiting    Coconut Oil Anaphylaxis    Ibuprofen Other (See Comments)     Trouble breathing      Iodides Shortness Of Breath    Motrin [Ibuprofen Micronized] Shortness Of Breath    Robitussin (Alcohol Free) [Guaifenesin] Other (See Comments)     States causes worse cough    Codeine Nausea And Vomiting    Iodine Rash    Tramadol Nausea Only        PSHx:  has a past surgical history that includes Adenoidectomy.          Sexual Hx:   Social History     Substance and Sexual Activity   Sexual Activity Yes    Partners: Male, Female       Social Hx: Occupation- -  Social History       Tobacco History       Smoking Status  Never      Smokeless Tobacco Use  Never              Alcohol History       Alcohol Use Status  Yes Comment  occasional wine coolers              Drug Use       Drug Use Status  No              Sexual Activity       Sexually Active  Yes Partners  Male, Female                     Fam Hx:   Family History   Problem Relation Age of

## 2023-03-24 RX ORDER — RIMEGEPANT SULFATE 75 MG/75MG
75 TABLET, ORALLY DISINTEGRATING ORAL PRN
Qty: 16 TABLET | Refills: 0 | Status: SHIPPED | OUTPATIENT
Start: 2023-03-24

## 2023-03-24 ASSESSMENT — ENCOUNTER SYMPTOMS: SHORTNESS OF BREATH: 1

## 2023-03-27 ENCOUNTER — TELEPHONE (OUTPATIENT)
Dept: INTERNAL MEDICINE | Age: 48
End: 2023-03-27

## 2023-03-28 ENCOUNTER — TELEPHONE (OUTPATIENT)
Dept: INTERNAL MEDICINE | Age: 48
End: 2023-03-28

## 2023-03-28 ASSESSMENT — SLEEP AND FATIGUE QUESTIONNAIRES: NECK CIRCUMFERENCE (INCHES): 17

## 2023-04-28 ENCOUNTER — TELEPHONE (OUTPATIENT)
Dept: INTERNAL MEDICINE | Age: 48
End: 2023-04-28

## 2023-05-01 ENCOUNTER — TELEPHONE (OUTPATIENT)
Dept: INTERNAL MEDICINE | Age: 48
End: 2023-05-01

## 2023-05-09 ENCOUNTER — TELEPHONE (OUTPATIENT)
Dept: INTERNAL MEDICINE | Age: 48
End: 2023-05-09

## 2023-05-09 NOTE — TELEPHONE ENCOUNTER
EVERTON left message for pt related to referral x3. SW provided contact information for return call.

## 2023-05-31 ENCOUNTER — TELEPHONE (OUTPATIENT)
Dept: INTERNAL MEDICINE | Age: 48
End: 2023-05-31

## 2023-05-31 NOTE — TELEPHONE ENCOUNTER
Attempted to reach patient to reschedule missed appointment today. No answer and left message stating stating to return call to office.

## 2023-06-27 ENCOUNTER — TELEPHONE (OUTPATIENT)
Dept: INTERNAL MEDICINE | Age: 48
End: 2023-06-27

## 2023-07-31 NOTE — PROGRESS NOTES
[address] [facility] Internal Medicine      SUBJECTIVE:  Julien Das (:  1975) is a 50 y.o. female here for evaluation of the following chief complaint(s):  Migraine (States has appointment with neurologist), Tingling (States both feet and both hands), Numbness (States both hands and feet), and Health Maintenance (Declines vaccines)      HPI: patient has past medical history of Hypertension, Chronic migraine, Asthma, pseudotumor cerebri, depression, sleep apnea, Chronic LBP with sciatica, Iron deficiency anemia, Vitamin D deficiency, Morbid obesity presented today for 4 months follow up visit.     Chronic migraine-   Today she complaints of Headache from migraine got  worse for 3 weeks,   Usually she is on Rimegepant sulphate 75 mg, Fremanezumab-vfrm 225 mtg/1.5 ml,     she is out of meds since 2023, did not follow up with neurology,   her next appointment is on 10/2023    Neuropathy-   Numbness and tingling for couple of months,   tingling followed by numbness,   aggravated by socks, putting on blankets    Chronic LBP with sciatica- Lower back pain worsen , radiates to both legs, alleviated by sitting still, aggarvated by walking too long    Hypertension-   BP- 156/95, forgot to take her medication today,   usually she takes her medication regularly  Currently on HCTZ 25 mg oral, Lisinopril 40 mg    Asthma-   SOB twice a day durimg exertion,   nocturnal awakening twice a month,   Currently on Dulerra 100-5 mcg, Spiriva respimat 1.25 mcg, proventil HFA    Pseudotumor cerebri-   Acetazolamide 250 MG was started by neuro on 2022, last f/up on 2022    Sleep apnea-   patient wakes up from sleep due to apnea  Sleep study, never done,     CHERELLE-   Currently on Ferrous sulphate 325 mg,   no recent CBC, iron , TIBC, ferritin    Depression-   Currently on Cymbalta 60 Mg    Class III severe Obesity-   lost 21 lbs in last 4 months, trying to lose by herself, not following weight loss

## 2023-08-01 ENCOUNTER — OFFICE VISIT (OUTPATIENT)
Dept: INTERNAL MEDICINE | Age: 48
End: 2023-08-01
Payer: COMMERCIAL

## 2023-08-01 VITALS
WEIGHT: 293 LBS | HEART RATE: 94 BPM | DIASTOLIC BLOOD PRESSURE: 95 MMHG | OXYGEN SATURATION: 99 % | RESPIRATION RATE: 18 BRPM | HEIGHT: 68 IN | TEMPERATURE: 97.3 F | BODY MASS INDEX: 44.41 KG/M2 | SYSTOLIC BLOOD PRESSURE: 156 MMHG

## 2023-08-01 DIAGNOSIS — E53.8 VITAMIN B12 DEFICIENCY: ICD-10-CM

## 2023-08-01 DIAGNOSIS — G93.2 PSEUDOTUMOR CEREBRI: ICD-10-CM

## 2023-08-01 DIAGNOSIS — E78.1 HYPERTRIGLYCERIDEMIA: ICD-10-CM

## 2023-08-01 DIAGNOSIS — J45.901 ASTHMA EXACERBATION, MILD: Primary | ICD-10-CM

## 2023-08-01 DIAGNOSIS — R73.03 PREDIABETES: ICD-10-CM

## 2023-08-01 DIAGNOSIS — G43.019 INTRACTABLE MIGRAINE WITHOUT AURA AND WITHOUT STATUS MIGRAINOSUS: ICD-10-CM

## 2023-08-01 DIAGNOSIS — E66.01 CLASS 3 SEVERE OBESITY DUE TO EXCESS CALORIES WITH SERIOUS COMORBIDITY AND BODY MASS INDEX (BMI) OF 50.0 TO 59.9 IN ADULT (HCC): ICD-10-CM

## 2023-08-01 DIAGNOSIS — F33.1 MODERATE EPISODE OF RECURRENT MAJOR DEPRESSIVE DISORDER (HCC): ICD-10-CM

## 2023-08-01 DIAGNOSIS — G89.29 CHRONIC RIGHT-SIDED LOW BACK PAIN WITH RIGHT-SIDED SCIATICA: ICD-10-CM

## 2023-08-01 DIAGNOSIS — M54.41 CHRONIC RIGHT-SIDED LOW BACK PAIN WITH RIGHT-SIDED SCIATICA: ICD-10-CM

## 2023-08-01 DIAGNOSIS — I10 ESSENTIAL HYPERTENSION: ICD-10-CM

## 2023-08-01 DIAGNOSIS — D50.9 IRON DEFICIENCY ANEMIA, UNSPECIFIED IRON DEFICIENCY ANEMIA TYPE: ICD-10-CM

## 2023-08-01 DIAGNOSIS — R06.02 SOB (SHORTNESS OF BREATH): ICD-10-CM

## 2023-08-01 DIAGNOSIS — E55.9 VITAMIN D DEFICIENCY: ICD-10-CM

## 2023-08-01 DIAGNOSIS — Z91.89 AT RISK FOR OBSTRUCTIVE SLEEP APNEA: ICD-10-CM

## 2023-08-01 DIAGNOSIS — K51.00 UNIVERSAL ULCERATIVE COLITIS WITHOUT COMPLICATION (HCC): ICD-10-CM

## 2023-08-01 PROCEDURE — 3074F SYST BP LT 130 MM HG: CPT

## 2023-08-01 PROCEDURE — 3078F DIAST BP <80 MM HG: CPT

## 2023-08-01 PROCEDURE — 99212 OFFICE O/P EST SF 10 MIN: CPT

## 2023-08-01 PROCEDURE — 99214 OFFICE O/P EST MOD 30 MIN: CPT

## 2023-08-01 RX ORDER — ALBUTEROL SULFATE 90 MCG
HFA AEROSOL WITH ADAPTER (GRAM) INHALATION
Qty: 18 G | Refills: 3 | Status: SHIPPED | OUTPATIENT
Start: 2023-08-01

## 2023-08-01 RX ORDER — FERROUS SULFATE 325(65) MG
325 TABLET ORAL EVERY OTHER DAY
Qty: 60 TABLET | Refills: 1 | Status: SHIPPED | OUTPATIENT
Start: 2023-08-01

## 2023-08-01 RX ORDER — LISINOPRIL 40 MG/1
40 TABLET ORAL DAILY
Qty: 30 TABLET | Refills: 3 | Status: SHIPPED | OUTPATIENT
Start: 2023-08-01

## 2023-08-01 RX ORDER — DULOXETIN HYDROCHLORIDE 60 MG/1
60 CAPSULE, DELAYED RELEASE ORAL DAILY
Qty: 90 CAPSULE | Refills: 1 | Status: SHIPPED | OUTPATIENT
Start: 2023-08-01

## 2023-08-01 RX ORDER — HYDROCHLOROTHIAZIDE 25 MG/1
25 TABLET ORAL EVERY MORNING
Qty: 30 TABLET | Refills: 5 | Status: SHIPPED | OUTPATIENT
Start: 2023-08-01

## 2023-08-01 ASSESSMENT — ENCOUNTER SYMPTOMS
GASTROINTESTINAL NEGATIVE: 1
BACK PAIN: 1
RESPIRATORY NEGATIVE: 1

## 2023-08-01 NOTE — PATIENT INSTRUCTIONS
Dear Eugenia Sanchez,      Thank you for coming to your appointment today. I hope we have addressed all of your needs. Please make sure to do the following:  Continue your medications as listed. In this visit, the following additional orders/instructions were discussed:  Please continue taking all your medications regularly  Please follow up with your neurologist  Get ordered labs drawn before our next follow up. Continue to exercise to maintain good health      Other Follow-Ups:    Future Appointments   Date Time Provider [facility] [address]   9/8/2023  9:00 AM Han Shaffer MD [facility]   10/18/2023 10:40 AM Demetris Davila, [address] [facility] Neurology -       If a referral was placed on your behalf during your visit, you should expect to receive information about the date and time of your referral appointment within 7-10 days. If not, please call the office at [phone] and ask to speak to the . Should you have further questions in regards to this visit, you can review your clinical note and after visit summary document on your [employer] [address] [place] [address] account. Other questions can be directed to our nurse line at [phone]. Discharge instruction reviewed with Patient. Patient verbalizes understanding.       Sincerely,  Han Shaffer, PGY-1  8/1/2023  4:48 PM T

## 2023-08-01 NOTE — PROGRESS NOTES
[address] [facility]  Internal Medicine Residency Clinic    Attending Physician Statement  I have discussed the case, including pertinent history and exam findings with the resident physician. I have seen and examined the patient and the key elements of the encounter have been performed by me. I agree with the assessment, plan and orders as documented by the resident. I have reviewed all pertinent PMHx, PSHx, FamHx, SocialHx, medications, and allergies and updated history as appropriate. Patient here for routine follow up of medical problems. Patient has multiple medical problems including HTN, ulcerative pancolitis which appears to be in remission as patient states she has no GI complaints, iron deficiency anemia, lumbar radiculopathy, with right sided sciatica, obesity and probable FERNANDA. Patient's only c/o today is bilateral paresthesia and numbness of hands and feet. She also needs refills on migraine meds (Nurtec and Ajovy) and plans to stop by neurology office today (has appt. in October) to ask for refiills. Patient has negative Tinel sign bilaterally. Agree with checking HBA1c, (had a level of 6% in past), TSH and B 12. Remainder of medical problems as per resident note.     Janna Rogerss, DO  8/1/2023 2:05 PM

## 2023-08-08 ENCOUNTER — TELEPHONE (OUTPATIENT)
Dept: INTERNAL MEDICINE | Age: 48
End: 2023-08-08

## 2023-08-08 NOTE — TELEPHONE ENCOUNTER
Pt requesting fmla forms be filled out and sent to her place of employment  advised her that she needs to request that her job send the forms to us so they can be filled out  pt will call today  fax number given to her

## 2023-09-05 ENCOUNTER — OFFICE VISIT (OUTPATIENT)
Dept: INTERNAL MEDICINE | Age: 48
End: 2023-09-05

## 2023-09-05 VITALS
HEART RATE: 78 BPM | RESPIRATION RATE: 20 BRPM | DIASTOLIC BLOOD PRESSURE: 94 MMHG | BODY MASS INDEX: 44.41 KG/M2 | TEMPERATURE: 98.3 F | OXYGEN SATURATION: 98 % | SYSTOLIC BLOOD PRESSURE: 139 MMHG | HEIGHT: 68 IN | WEIGHT: 293 LBS

## 2023-09-05 DIAGNOSIS — G47.30 SLEEP APNEA, UNSPECIFIED TYPE: Primary | ICD-10-CM

## 2023-09-05 DIAGNOSIS — E66.01 CLASS 3 SEVERE OBESITY DUE TO EXCESS CALORIES WITH SERIOUS COMORBIDITY AND BODY MASS INDEX (BMI) OF 50.0 TO 59.9 IN ADULT (HCC): ICD-10-CM

## 2023-09-05 DIAGNOSIS — G89.29 CHRONIC BILATERAL THORACIC BACK PAIN: ICD-10-CM

## 2023-09-05 DIAGNOSIS — E55.9 VITAMIN D DEFICIENCY: ICD-10-CM

## 2023-09-05 DIAGNOSIS — M54.6 CHRONIC BILATERAL THORACIC BACK PAIN: ICD-10-CM

## 2023-09-05 DIAGNOSIS — N64.4 MASTALGIA: ICD-10-CM

## 2023-09-05 DIAGNOSIS — G43.019 INTRACTABLE MIGRAINE WITHOUT AURA AND WITHOUT STATUS MIGRAINOSUS: ICD-10-CM

## 2023-09-05 DIAGNOSIS — I10 ESSENTIAL HYPERTENSION: ICD-10-CM

## 2023-09-05 SDOH — ECONOMIC STABILITY: FOOD INSECURITY: WITHIN THE PAST 12 MONTHS, THE FOOD YOU BOUGHT JUST DIDN'T LAST AND YOU DIDN'T HAVE MONEY TO GET MORE.: NEVER TRUE

## 2023-09-05 SDOH — ECONOMIC STABILITY: FOOD INSECURITY: WITHIN THE PAST 12 MONTHS, YOU WORRIED THAT YOUR FOOD WOULD RUN OUT BEFORE YOU GOT MONEY TO BUY MORE.: NEVER TRUE

## 2023-09-05 SDOH — ECONOMIC STABILITY: INCOME INSECURITY: HOW HARD IS IT FOR YOU TO PAY FOR THE VERY BASICS LIKE FOOD, HOUSING, MEDICAL CARE, AND HEATING?: NOT VERY HARD

## 2023-09-05 ASSESSMENT — SLEEP AND FATIGUE QUESTIONNAIRES
HOW LIKELY ARE YOU TO NOD OFF OR FALL ASLEEP WHILE LYING DOWN TO REST IN THE AFTERNOON WHEN CIRCUMSTANCES PERMIT: 2
HOW LIKELY ARE YOU TO NOD OFF OR FALL ASLEEP WHEN YOU ARE A PASSENGER IN A CAR FOR AN HOUR WITHOUT A BREAK: 2
ESS TOTAL SCORE: 7
HOW LIKELY ARE YOU TO NOD OFF OR FALL ASLEEP WHILE WATCHING TV: 1
HOW LIKELY ARE YOU TO NOD OFF OR FALL ASLEEP WHILE SITTING INACTIVE IN A PUBLIC PLACE: 0
HOW LIKELY ARE YOU TO NOD OFF OR FALL ASLEEP WHILE SITTING AND TALKING TO SOMEONE: 0
HOW LIKELY ARE YOU TO NOD OFF OR FALL ASLEEP WHILE SITTING QUIETLY AFTER LUNCH WITHOUT ALCOHOL: 1
HOW LIKELY ARE YOU TO NOD OFF OR FALL ASLEEP WHILE SITTING AND READING: 1
HOW LIKELY ARE YOU TO NOD OFF OR FALL ASLEEP IN A CAR, WHILE STOPPED FOR A FEW MINUTES IN TRAFFIC: 0

## 2023-09-05 ASSESSMENT — ENCOUNTER SYMPTOMS
EYE REDNESS: 0
BACK PAIN: 1
EYE PAIN: 0
SHORTNESS OF BREATH: 0
WHEEZING: 0
NAUSEA: 1
VOMITING: 1
EYE ITCHING: 0
COUGH: 0

## 2023-09-05 NOTE — PATIENT INSTRUCTIONS
Dear Ari Valencia,        Thank you for coming to your appointment today. I hope we have addressed all of your needs. Please make sure to do the following:  - Continue your medications as listed. - Get labs drawn before our next follow up. We will call you with the results   - Referrals have been made to Plastic surgeon: If you do not hear from the office in 1 week, please call the number listed. - We will see each other again in 3 months      Have a great day!         Sincerely,  Michelle Latham MD  9/5/2023  5:09 PM

## 2023-09-05 NOTE — PROGRESS NOTES
[address] [facility]  Internal Medicine Residency Clinic    Attending Physician Statement  I have discussed the case, including pertinent history and exam findings with the resident physician. I have seen and examined the patient and the key elements of the encounter have been performed by me. I agree with the assessment, plan and orders as documented by the resident. I have reviewed the relevant PMHx, PSHx, FamHx, SocialHx, medications, and allergies and updated history as appropriate. Patient presents for routine follow up of medical problems. Headaches with hx migraines  Reports did take Nurtec yesterday, recommend take another dose today. Recommend sleep study as suspect untreated FERNANDA is contributing to her headaches    Bilateral thoracic back pain and bilateral breast pain  She requires cup size N and has chronic thoracic pain we feel related to weight of her breast tissue. Recommend assessment by plastic surgery for consideration breast reduction surgery. Continue lidocaine patch prn. Obesity class BMI 48   She is currently losing weight and made significant diet mods    High risk for sleep apnea  Recommend sleep study given he hypertension, chronic headaches    Bilateral upper / feet neuropathy  Agree with prior labs as ordered     Remainder of medical problems as per resident note.     Mell Agustin, DO  9/5/2023 4:25 PM

## 2023-09-05 NOTE — PROGRESS NOTES
[id number] [facility] Internal Medicine      SUBJECTIVE:  Zelalem Thomas (:  1975) is a 50 y.o. female here for evaluation of the following chief complaint(s):  Migraine (Patient complains of a migraine since last night. Pain level is a 7/10 ), Spasms (Patient complains of flank spasm x 7 days . Pain level is 4/10), 1 Month Follow-Up, and Tingling (Patient complains of tingling in her feet. Started last night )      Previous Visit Imp Points:   Chronic migraine- was out of refill since 2023     HPI: Patient has past medical history of Hypertension, Chronic migraine, Asthma, Pseudomotor cerebri, depression, sleep apnea, chronic LBP with sciatica, iron deficiency anemia, Vitamin D deficiency, Morbid obesity presented today for 5 weeks follow up visit. Chronic migraine:   Usually on Rimegepant sulphate 75 mg, Fremanezumab-vfrm 225 mg  Acute headache today, did not take Rimegepant today  Got refill from neurology    Neuropathy:  Tingling and numbness on both extremities    Chronic LBP with sciatica:   not on cymbalta, used to be on Lidocaine patch    Hypertension:   /94  Currently on HCTZ 25 mg PO, Lisinopril 40 Mg    Asthma:  On proventil and dulerra    Pseudotumor cerebri  Currently on Acetazolamide 250 mg. Following up neurology    Sleep apnea  Sleep study never done  Agreeable to do s    CHERELLE  Currently on ferrous sulphate 325 mg  No recent CBC, iron, TIBC, ferritin    Depression  Currently on Cymbalta    Class III severe obesity  Lost 8-9 lbs since 2023, BMI 48.50      Vit D deficiency  No recent labs    Pre-diabetes  No recent labs    Hypertriglyceridemia  No recent lipid panel      Health Maintenance/Social Determinants:   Cervical cancer screening  Lipid panel, hemoglobin A1c     Review of Systems   Constitutional:  Negative for chills, fatigue and fever. HENT:  Negative for congestion, ear discharge and hearing loss.     Eyes:  Positive for visual disturbance

## 2023-10-27 ENCOUNTER — TELEPHONE (OUTPATIENT)
Dept: INTERNAL MEDICINE | Age: 48
End: 2023-10-27

## 2023-10-27 NOTE — TELEPHONE ENCOUNTER
Left  with the [facility] number for pt to call and see if she can switch to there for her sleep study, and to return call to office if she has any further questions.

## 2023-10-27 NOTE — TELEPHONE ENCOUNTER
----- Message from Amara Varma sent at 10/27/2023 12:14 PM EDT -----  Subject: Message to Provider    QUESTIONS  Information for Provider? patient need the information for sleep study in   January and was wondering if there was a sleep study [place], [place]  ---------------------------------------------------------------------------  --------------  Benjamin [place] Geoff  [phone]; OK to leave message on voicemail  ---------------------------------------------------------------------------  --------------  SCRIPT ANSWERS  Relationship to Patient?  Self

## 2023-11-06 ENCOUNTER — TELEPHONE (OUTPATIENT)
Dept: INTERNAL MEDICINE | Age: 48
End: 2023-11-06

## 2023-11-06 NOTE — TELEPHONE ENCOUNTER
Received fmla forms for this patient  need to speak to pt to determine why fmla and what type of fmla  left message

## 2024-02-28 ASSESSMENT — ENCOUNTER SYMPTOMS
EYE DISCHARGE: 0
DIARRHEA: 0
SHORTNESS OF BREATH: 0
SINUS PAIN: 0
COUGH: 0
EYE PAIN: 0
CONSTIPATION: 0
BACK PAIN: 1
CHEST TIGHTNESS: 0
EYE ITCHING: 0
ABDOMINAL PAIN: 0

## 2024-02-29 ENCOUNTER — OFFICE VISIT (OUTPATIENT)
Dept: INTERNAL MEDICINE | Age: 49
End: 2024-02-29
Payer: COMMERCIAL

## 2024-02-29 VITALS
BODY MASS INDEX: 43.4 KG/M2 | TEMPERATURE: 98.1 F | RESPIRATION RATE: 20 BRPM | HEIGHT: 69 IN | OXYGEN SATURATION: 97 % | HEART RATE: 80 BPM | WEIGHT: 293 LBS | SYSTOLIC BLOOD PRESSURE: 142 MMHG | DIASTOLIC BLOOD PRESSURE: 88 MMHG

## 2024-02-29 DIAGNOSIS — D50.9 IRON DEFICIENCY ANEMIA, UNSPECIFIED IRON DEFICIENCY ANEMIA TYPE: ICD-10-CM

## 2024-02-29 DIAGNOSIS — H53.9 VISION DISTURBANCE: ICD-10-CM

## 2024-02-29 DIAGNOSIS — M54.16 LUMBAR BACK PAIN WITH RADICULOPATHY AFFECTING RIGHT LOWER EXTREMITY: Primary | ICD-10-CM

## 2024-02-29 PROBLEM — R07.81 RIB PAIN: Status: ACTIVE | Noted: 2024-02-29

## 2024-02-29 PROCEDURE — 99213 OFFICE O/P EST LOW 20 MIN: CPT

## 2024-02-29 PROCEDURE — 3077F SYST BP >= 140 MM HG: CPT

## 2024-02-29 PROCEDURE — 3079F DIAST BP 80-89 MM HG: CPT

## 2024-02-29 RX ORDER — CYCLOBENZAPRINE HCL 10 MG
5 TABLET ORAL 2 TIMES DAILY PRN
Qty: 10 TABLET | Refills: 0 | Status: SHIPPED | OUTPATIENT
Start: 2024-02-29 | End: 2024-03-10

## 2024-02-29 RX ORDER — LIDOCAINE 4 G/G
1 PATCH TOPICAL DAILY
Qty: 30 PATCH | Refills: 0 | Status: SHIPPED | OUTPATIENT
Start: 2024-02-29 | End: 2024-03-30

## 2024-02-29 RX ORDER — FERROUS SULFATE 325(65) MG
325 TABLET ORAL EVERY OTHER DAY
Qty: 90 TABLET | Refills: 1 | Status: SHIPPED | OUTPATIENT
Start: 2024-02-29

## 2024-02-29 NOTE — PROGRESS NOTES
[facility]  Internal Medicine Residency Clinic    Attending Physician Statement  I have discussed the case, including pertinent history and exam findings with the resident physician.  I agree with the assessment, plan and orders as documented by the resident. I have reviewed the relevant PMHx, PSHx, FamHx, SocialHx, medications, and allergies and updated history as appropriate.    Patient presents for routine follow up of medical problems.     Pseudotumor cerebri, Migraine, developed URI symptom last week, visited [facility] right lower back muscle spasm and asking flexeril X 1, also lidocaine patch renewed, continue vitamin D 50,000 unit once a week. CO left eye bulging sensation since last visit, need eye doctor appointment. For evaluation    Remainder of medical problems as per resident note.    Trever Nelson MD  2/29/2024 3:03 PM

## 2024-02-29 NOTE — PATIENT INSTRUCTIONS
Dear Rosina Morales,    Thank you for coming to your appointment today. I hope we have addressed all of your needs.     Please make sure to do the following:  - Continue your medications as listed.  - Get labs drawn before our next follow up. We will call you with the results   - Referrals have been made to eye doctor:  If you do not hear from the office in 1 week, please call the number listed.  - We will see each other again in 3 months    Call for a sooner appointment if you develop chest pain, leg swelling, palpitations, shortness of breath, or weakness    Have a great day!        Sincerely,  Rayne Hernandez MD  2/29/2024  3:10 PM

## 2024-02-29 NOTE — PROGRESS NOTES
[facility] Internal Medicine      SUBJECTIVE:  Rosina Morales (:  1975) is a 49 y.o. female here for evaluation of the following chief complaint(s):  3 Month Follow-Up and Pharyngitis (Patient was dx with pharyngitis 3/27 at [facility])    Previous Visit Imp Points: Last visit was on 2023, for follow up of chronic medical problem.    HPI:   Patient has past medical history of Hypertension, Chronic migraine, Asthma, Pseudomotor cerebri, depression, sleep apnea, chronic LBP with sciatica, iron deficiency anemia, Vitamin D deficiency, Morbid obesity presented today for follow up visit. She complained about having cold symptoms since  night, associated with sore throat, loss of voice, feverish, mild cough, nasal stuffiness, was seen in [facility], Flu, COVID, and strep throat was negative. Today, soreness improved, feels achy, shortness of breath. Also have a feeling of something pushing her eyes from back. Also mentioned about muscle spasms, since December.     Chronic migraine:   Usually on Rimegepant sulphate 75 mg, Fremanezumab-vfrm 225 mg  Acute headache today, did not take Rimegepant today  Got refill from neurology    Prediabetes  - HbA1c 6.4% on 2023  - not on any medications    Neuropathy:  Tingling and numbness on both extremities, monofilament test in the office was normal on 2023, MRI lumbar spine on 2021 showed patient have degenerative changes and foraminal narrowing, mild to moderate at bilateral L5-S1, and mild at L4-5.     Chronic LBP with sciatica:   on Lidocaine patch    Hypertension:   /88  Currently on HCTZ 25 mg PO, Lisinopril 40 Mg, will continue same    Asthma:  On proventil and dulerra, nocturnal awakening for 2-3 times in 2 weeks span, but she mentioned her breathing aggravates with physical activity.     Pseudotumor cerebri  Currently on Acetazolamide 250 mg twice daily. Following up neurology.  Last follow-up

## 2024-03-01 NOTE — PROGRESS NOTES
Please call in or print script for pt medication, she uses [facility]

## 2024-05-06 ENCOUNTER — HOSPITAL ENCOUNTER (EMERGENCY)
Age: 49
Discharge: HOME | End: 2024-05-06
Payer: COMMERCIAL

## 2024-05-06 ENCOUNTER — APPOINTMENT (OUTPATIENT)
Dept: CT IMAGING | Age: 49
End: 2024-05-06
Payer: COMMERCIAL

## 2024-05-06 ENCOUNTER — APPOINTMENT (OUTPATIENT)
Dept: INTERVENTIONAL RADIOLOGY/VASCULAR | Age: 49
End: 2024-05-06
Payer: COMMERCIAL

## 2024-05-06 VITALS
OXYGEN SATURATION: 96 % | RESPIRATION RATE: 22 BRPM | DIASTOLIC BLOOD PRESSURE: 94 MMHG | TEMPERATURE: 98.6 F | BODY MASS INDEX: 45.31 KG/M2 | SYSTOLIC BLOOD PRESSURE: 165 MMHG | HEART RATE: 93 BPM | WEIGHT: 293 LBS

## 2024-05-06 DIAGNOSIS — J18.9 PNEUMONIA DUE TO INFECTIOUS ORGANISM, UNSPECIFIED LATERALITY, UNSPECIFIED PART OF LUNG: Primary | ICD-10-CM

## 2024-05-06 DIAGNOSIS — D64.9 ANEMIA, UNSPECIFIED TYPE: ICD-10-CM

## 2024-05-06 LAB
ALBUMIN SERPL-MCNC: 3.7 G/DL (ref 3.5–5.2)
ALP SERPL-CCNC: 108 U/L (ref 35–104)
ALT SERPL-CCNC: 13 U/L (ref 0–32)
ANION GAP SERPL CALCULATED.3IONS-SCNC: 9 MMOL/L (ref 7–16)
AST SERPL-CCNC: 17 U/L (ref 0–31)
BASOPHILS # BLD: 0 K/UL (ref 0–0.2)
BASOPHILS NFR BLD: 0 % (ref 0–2)
BILIRUB SERPL-MCNC: 0.2 MG/DL (ref 0–1.2)
BNP SERPL-MCNC: 963 PG/ML (ref 0–450)
BUN SERPL-MCNC: 9 MG/DL (ref 6–20)
CALCIUM SERPL-MCNC: 7.9 MG/DL (ref 8.6–10.2)
CHLORIDE SERPL-SCNC: 107 MMOL/L (ref 98–107)
CO2 SERPL-SCNC: 24 MMOL/L (ref 22–29)
CREAT SERPL-MCNC: 1.1 MG/DL (ref 0.5–1)
EOSINOPHIL # BLD: 0.06 K/UL (ref 0.05–0.5)
EOSINOPHILS RELATIVE PERCENT: 1 % (ref 0–6)
ERYTHROCYTE [DISTWIDTH] IN BLOOD BY AUTOMATED COUNT: 21.1 % (ref 11.5–15)
GFR, ESTIMATED: 62 ML/MIN/1.73M2
GLUCOSE SERPL-MCNC: 97 MG/DL (ref 74–99)
HCT VFR BLD AUTO: 33.4 % (ref 34–48)
HGB BLD-MCNC: 8.8 G/DL (ref 11.5–15.5)
LYMPHOCYTES NFR BLD: 1.49 K/UL (ref 1.5–4)
LYMPHOCYTES RELATIVE PERCENT: 21 % (ref 20–42)
MCH RBC QN AUTO: 17.4 PG (ref 26–35)
MCHC RBC AUTO-ENTMCNC: 26.3 G/DL (ref 32–34.5)
MCV RBC AUTO: 65.9 FL (ref 80–99.9)
MONOCYTES NFR BLD: 0.19 K/UL (ref 0.1–0.95)
MONOCYTES NFR BLD: 3 % (ref 2–12)
NEUTROPHILS NFR BLD: 75 % (ref 43–80)
NEUTS SEG NFR BLD: 5.36 K/UL (ref 1.8–7.3)
PLATELET # BLD AUTO: 457 K/UL (ref 130–450)
PMV BLD AUTO: 10.6 FL (ref 7–12)
POTASSIUM SERPL-SCNC: 3.7 MMOL/L (ref 3.5–5)
PROT SERPL-MCNC: 7.1 G/DL (ref 6.4–8.3)
RBC # BLD AUTO: 5.07 M/UL (ref 3.5–5.5)
RBC # BLD: ABNORMAL 10*6/UL
SODIUM SERPL-SCNC: 140 MMOL/L (ref 132–146)
SPECIMEN SOURCE: NORMAL
STREP A, MOLECULAR: NEGATIVE
TROPONIN I SERPL HS-MCNC: 10 NG/L (ref 0–9)
TROPONIN I SERPL HS-MCNC: 9 NG/L (ref 0–9)
WBC OTHER # BLD: 7.1 K/UL (ref 4.5–11.5)

## 2024-05-06 PROCEDURE — 6360000002 HC RX W HCPCS: Performed by: PHYSICIAN ASSISTANT

## 2024-05-06 PROCEDURE — 80053 COMPREHEN METABOLIC PANEL: CPT

## 2024-05-06 PROCEDURE — 99285 EMERGENCY DEPT VISIT HI MDM: CPT

## 2024-05-06 PROCEDURE — 94640 AIRWAY INHALATION TREATMENT: CPT

## 2024-05-06 PROCEDURE — 2580000003 HC RX 258: Performed by: PHYSICIAN ASSISTANT

## 2024-05-06 PROCEDURE — 85025 COMPLETE CBC W/AUTO DIFF WBC: CPT

## 2024-05-06 PROCEDURE — 93971 EXTREMITY STUDY: CPT

## 2024-05-06 PROCEDURE — 6360000004 HC RX CONTRAST MEDICATION: Performed by: RADIOLOGY

## 2024-05-06 PROCEDURE — 84484 ASSAY OF TROPONIN QUANT: CPT

## 2024-05-06 PROCEDURE — 96374 THER/PROPH/DIAG INJ IV PUSH: CPT

## 2024-05-06 PROCEDURE — 83880 ASSAY OF NATRIURETIC PEPTIDE: CPT

## 2024-05-06 PROCEDURE — 71275 CT ANGIOGRAPHY CHEST: CPT

## 2024-05-06 PROCEDURE — 87651 STREP A DNA AMP PROBE: CPT

## 2024-05-06 PROCEDURE — 96375 TX/PRO/DX INJ NEW DRUG ADDON: CPT

## 2024-05-06 PROCEDURE — 93005 ELECTROCARDIOGRAM TRACING: CPT | Performed by: PHYSICIAN ASSISTANT

## 2024-05-06 PROCEDURE — 6370000000 HC RX 637 (ALT 250 FOR IP): Performed by: PHYSICIAN ASSISTANT

## 2024-05-06 RX ORDER — CEFDINIR 300 MG/1
300 CAPSULE ORAL 2 TIMES DAILY
Qty: 14 CAPSULE | Refills: 0 | Status: SHIPPED | OUTPATIENT
Start: 2024-05-06 | End: 2024-05-16

## 2024-05-06 RX ORDER — DOXYCYCLINE HYCLATE 100 MG/1
100 CAPSULE ORAL ONCE
Status: COMPLETED | OUTPATIENT
Start: 2024-05-06 | End: 2024-05-06

## 2024-05-06 RX ORDER — BENZONATATE 100 MG/1
100 CAPSULE ORAL 3 TIMES DAILY PRN
Qty: 21 CAPSULE | Refills: 0 | Status: SHIPPED | OUTPATIENT
Start: 2024-05-06 | End: 2024-05-13

## 2024-05-06 RX ORDER — DOXYCYCLINE HYCLATE 100 MG
100 TABLET ORAL 2 TIMES DAILY
Qty: 20 TABLET | Refills: 0 | Status: SHIPPED | OUTPATIENT
Start: 2024-05-06 | End: 2024-05-16

## 2024-05-06 RX ORDER — IPRATROPIUM BROMIDE AND ALBUTEROL SULFATE 2.5; .5 MG/3ML; MG/3ML
1 SOLUTION RESPIRATORY (INHALATION)
Status: COMPLETED | OUTPATIENT
Start: 2024-05-06 | End: 2024-05-06

## 2024-05-06 RX ORDER — DIPHENHYDRAMINE HYDROCHLORIDE 50 MG/ML
25 INJECTION INTRAMUSCULAR; INTRAVENOUS ONCE
Status: COMPLETED | OUTPATIENT
Start: 2024-05-06 | End: 2024-05-06

## 2024-05-06 RX ADMIN — IOPAMIDOL 70 ML: 755 INJECTION, SOLUTION INTRAVENOUS at 14:19

## 2024-05-06 RX ADMIN — WATER 125 MG: 1 INJECTION INTRAMUSCULAR; INTRAVENOUS; SUBCUTANEOUS at 12:55

## 2024-05-06 RX ADMIN — IPRATROPIUM BROMIDE AND ALBUTEROL SULFATE 1 DOSE: .5; 3 SOLUTION RESPIRATORY (INHALATION) at 12:35

## 2024-05-06 RX ADMIN — DIPHENHYDRAMINE HYDROCHLORIDE 25 MG: 50 INJECTION INTRAMUSCULAR; INTRAVENOUS at 12:55

## 2024-05-06 RX ADMIN — IPRATROPIUM BROMIDE AND ALBUTEROL SULFATE 1 DOSE: .5; 3 SOLUTION RESPIRATORY (INHALATION) at 12:30

## 2024-05-06 RX ADMIN — DOXYCYCLINE HYCLATE 100 MG: 100 CAPSULE ORAL at 15:51

## 2024-05-06 RX ADMIN — WATER 1000 MG: 1 INJECTION INTRAMUSCULAR; INTRAVENOUS; SUBCUTANEOUS at 15:52

## 2024-05-06 RX ADMIN — IPRATROPIUM BROMIDE AND ALBUTEROL SULFATE 1 DOSE: .5; 3 SOLUTION RESPIRATORY (INHALATION) at 12:40

## 2024-05-06 ASSESSMENT — PAIN - FUNCTIONAL ASSESSMENT: PAIN_FUNCTIONAL_ASSESSMENT: 0-10

## 2024-05-06 ASSESSMENT — LIFESTYLE VARIABLES: HOW OFTEN DO YOU HAVE A DRINK CONTAINING ALCOHOL: 4 OR MORE TIMES A WEEK

## 2024-05-06 ASSESSMENT — PAIN DESCRIPTION - LOCATION: LOCATION: CHEST

## 2024-05-06 ASSESSMENT — PAIN DESCRIPTION - DESCRIPTORS: DESCRIPTORS: DISCOMFORT

## 2024-05-06 NOTE — DISCHARGE INSTR - COC
Continuity of Care Form    Patient Name: Rosina Morales   :  1975  MRN:  [id number]    Admit date:  2024  Discharge date:  ***    Code Status Order: Prior   Advance Directives:     Admitting Physician:  No admitting provider for patient encounter.  PCP: Rayne Hernandez MD    Discharging Nurse: ***  Discharging Hospital Unit/Room#: UXAHCL70/INT-04  Discharging Unit Phone Number: ***    Emergency Contact:   Extended Emergency Contact Information  Primary Emergency Contact: Zaid Mayes  Address: [address] [place] [address]  Home Phone: [phone]  Mobile Phone: [phone]  Relation: Domestic Partner   needed? No  Secondary Emergency Contact: Nathaniel Morales  Address: [address] [place] [address]  Home Phone: [phone]  Mobile Phone: [phone]  Relation: Niece/Nephew    Past Surgical History:  Past Surgical History:   Procedure Laterality Date    ADENOIDECTOMY         Immunization History:   Immunization History   Administered Date(s) Administered    Influenza Virus Vaccine 10/15/2015    Influenza, FLUARIX, FLULAVAL, FLUZONE (age 6 mo+) AND AFLURIA, (age 3 y+), PF, 0.5mL 2017    Influenza, FLUCELVAX, (age 6 mo+), MDCK, PF, 0.5mL 2021    TD 2LF, TDVAX, (age 7y+), IM, 0.5mL 2021    Td, unspecified formulation 10/15/2015       Active Problems:  Patient Active Problem List   Diagnosis Code    Iron deficiency anemia D50.9    Ulcerative pancolitis K51.00    Essential hypertension I10    Class 3 severe obesity due to excess calories with serious comorbidity and body mass index (BMI) of 50.0 to 59.9 in adult (HCC) E66.01, Z68.43    Calculus of gallbladder K80.20    Intractable migraine without aura and without status migrainosus G43.019    Vitamin D deficiency E55.9    Sleep apnea G47.30    Microcytic anemia D50.9    Chest pain R07.9    Dyspnea R06.00    Bilateral lower extremity edema R60.0

## 2024-05-06 NOTE — ED PROVIDER NOTES
on Omnicef and Doxy.  She is continue with her albuterol inhaler as needed Tessalon Perles for cough she understands any worsening symptoms she is to return to the ER.    Amount and/or Complexity of Data Reviewed  External Data Reviewed: labs, ECG and notes.  Labs: ordered. Decision-making details documented in ED Course.  Radiology: ordered and independent interpretation performed. Decision-making details documented in ED Course.  ECG/medicine tests: ordered and independent interpretation performed. Decision-making details documented in ED Course.    Risk  Prescription drug management.       Medical Decision Making    Patient presents to the ER for sob .   Social Determinants include   Social Connections: Not on file    Social Determinants : None.   Chronic conditions    Past Medical History:   Diagnosis Date    Aneurysm ([facility])     Anxiety 2009    Blood transfusion     Chronic fatigue 2007    Chronic fatigue     Class 3 severe obesity due to excess calories with serious comorbidity and body mass index (BMI) of 50.0 to 59.9 in adult ([facility])     Hx of blood clots     Hypertension     Hypertriglyceridemia     Iron deficiency anemia due to chronic blood loss 2007    non-compliant with iron replacement    Knee pain, bilateral 2011    Leg pain     Low back pain 2015    Pain is getting worse    Migraine headache without aura 2009    Morbid obesity ([facility]) 2007    Muscle cramps 2011    Perennial allergic rhinitis 2007    Prediabetes     Superficial thrombophlebitis of right leg 2009 and 2011    Ulcerative colitis ([facility])     Vertigo    .    Physical exam   Constitutional/General: Alert and oriented x3, well appearing, non toxic in NAD  Head: Normocephalic and atraumatic  Eyes: PERRL, EOMI  Mouth: Oropharynx clear, handling secretions, no trismus  Neck: Supple, full ROM,   Pulmonary: Lungs diminished bilaterally. Not in respiratory distress.  No chest wall pain  Cardiovascular:  Regular rate and rhythm, no murmurs, gallops, or

## 2024-05-08 LAB
EKG ATRIAL RATE: 93 BPM
EKG P AXIS: 42 DEGREES
EKG P-R INTERVAL: 144 MS
EKG Q-T INTERVAL: 398 MS
EKG QRS DURATION: 84 MS
EKG QTC CALCULATION (BAZETT): 494 MS
EKG R AXIS: -5 DEGREES
EKG T AXIS: 75 DEGREES
EKG VENTRICULAR RATE: 93 BPM

## 2024-05-08 PROCEDURE — 93010 ELECTROCARDIOGRAM REPORT: CPT | Performed by: INTERNAL MEDICINE

## 2024-06-28 ENCOUNTER — HOSPITAL ENCOUNTER (INPATIENT)
Age: 49
LOS: 8 days | Discharge: ANOTHER HOSPITAL | DRG: 202 | End: 2024-07-08
Attending: EMERGENCY MEDICINE | Admitting: INTERNAL MEDICINE
Payer: COMMERCIAL

## 2024-06-28 ENCOUNTER — APPOINTMENT (OUTPATIENT)
Dept: GENERAL RADIOLOGY | Age: 49
DRG: 202 | End: 2024-06-28
Payer: COMMERCIAL

## 2024-06-28 ENCOUNTER — APPOINTMENT (OUTPATIENT)
Dept: CT IMAGING | Age: 49
DRG: 202 | End: 2024-06-28
Payer: COMMERCIAL

## 2024-06-28 DIAGNOSIS — J45.901 MODERATE ASTHMA WITH ACUTE EXACERBATION, UNSPECIFIED WHETHER PERSISTENT: ICD-10-CM

## 2024-06-28 DIAGNOSIS — R06.09 DYSPNEA ON EXERTION: Primary | ICD-10-CM

## 2024-06-28 DIAGNOSIS — J45.41 MODERATE PERSISTENT ASTHMA WITH ACUTE EXACERBATION: ICD-10-CM

## 2024-06-28 DIAGNOSIS — I50.9 NEW ONSET OF CONGESTIVE HEART FAILURE (HCC): ICD-10-CM

## 2024-06-28 DIAGNOSIS — R07.9 CHEST PAIN, UNSPECIFIED TYPE: ICD-10-CM

## 2024-06-28 DIAGNOSIS — J45.901 ASTHMA WITH ACUTE EXACERBATION, UNSPECIFIED ASTHMA SEVERITY, UNSPECIFIED WHETHER PERSISTENT: ICD-10-CM

## 2024-06-28 PROBLEM — D50.9 MICROCYTIC ANEMIA: Status: RESOLVED | Noted: 2018-08-16 | Resolved: 2024-06-28

## 2024-06-28 PROBLEM — M54.16 LUMBAR RADICULOPATHY: Status: RESOLVED | Noted: 2021-10-15 | Resolved: 2024-06-28

## 2024-06-28 PROBLEM — R53.82 CHRONIC FATIGUE: Status: RESOLVED | Noted: 2022-01-27 | Resolved: 2024-06-28

## 2024-06-28 PROBLEM — R06.00 DYSPNEA: Status: RESOLVED | Noted: 2019-11-17 | Resolved: 2024-06-28

## 2024-06-28 PROBLEM — R07.81 RIB PAIN: Status: RESOLVED | Noted: 2024-02-29 | Resolved: 2024-06-28

## 2024-06-28 PROBLEM — U07.1 COVID-19: Status: RESOLVED | Noted: 2022-07-19 | Resolved: 2024-06-28

## 2024-06-28 PROBLEM — J96.01 ACUTE RESPIRATORY FAILURE WITH HYPOXIA (HCC): Status: RESOLVED | Noted: 2020-10-21 | Resolved: 2024-06-28

## 2024-06-28 PROBLEM — R10.9 ABDOMINAL PAIN: Status: RESOLVED | Noted: 2020-10-21 | Resolved: 2024-06-28

## 2024-06-28 PROBLEM — J18.9 COMMUNITY ACQUIRED PNEUMONIA, UNSPECIFIED LATERALITY: Status: ACTIVE | Noted: 2024-06-28

## 2024-06-28 LAB
ALBUMIN SERPL-MCNC: 4.1 G/DL (ref 3.5–5.2)
ALP SERPL-CCNC: 116 U/L (ref 35–104)
ALT SERPL-CCNC: 13 U/L (ref 0–32)
ANION GAP SERPL CALCULATED.3IONS-SCNC: 8 MMOL/L (ref 7–16)
AST SERPL-CCNC: 16 U/L (ref 0–31)
BASOPHILS # BLD: 0.14 K/UL (ref 0–0.2)
BASOPHILS NFR BLD: 2 % (ref 0–2)
BILIRUB SERPL-MCNC: 0.3 MG/DL (ref 0–1.2)
BNP SERPL-MCNC: 626 PG/ML (ref 0–450)
BUN SERPL-MCNC: 10 MG/DL (ref 6–20)
CALCIUM SERPL-MCNC: 9 MG/DL (ref 8.6–10.2)
CHLORIDE SERPL-SCNC: 107 MMOL/L (ref 98–107)
CO2 SERPL-SCNC: 26 MMOL/L (ref 22–29)
CREAT SERPL-MCNC: 0.9 MG/DL (ref 0.5–1)
EKG ATRIAL RATE: 94 BPM
EKG P AXIS: 49 DEGREES
EKG P-R INTERVAL: 138 MS
EKG Q-T INTERVAL: 396 MS
EKG QRS DURATION: 84 MS
EKG QTC CALCULATION (BAZETT): 495 MS
EKG R AXIS: 0 DEGREES
EKG T AXIS: 79 DEGREES
EKG VENTRICULAR RATE: 94 BPM
EOSINOPHIL # BLD: 0.07 K/UL (ref 0.05–0.5)
EOSINOPHILS RELATIVE PERCENT: 1 % (ref 0–6)
ERYTHROCYTE [DISTWIDTH] IN BLOOD BY AUTOMATED COUNT: 20.1 % (ref 11.5–15)
GFR, ESTIMATED: 82 ML/MIN/1.73M2
GLUCOSE SERPL-MCNC: 101 MG/DL (ref 74–99)
HCT VFR BLD AUTO: 31.2 % (ref 34–48)
HGB BLD-MCNC: 8.7 G/DL (ref 11.5–15.5)
LYMPHOCYTES NFR BLD: 0.82 K/UL (ref 1.5–4)
LYMPHOCYTES RELATIVE PERCENT: 11 % (ref 20–42)
MAGNESIUM SERPL-MCNC: 1.9 MG/DL (ref 1.6–2.6)
MCH RBC QN AUTO: 18.2 PG (ref 26–35)
MCHC RBC AUTO-ENTMCNC: 27.9 G/DL (ref 32–34.5)
MCV RBC AUTO: 65.1 FL (ref 80–99.9)
MONOCYTES NFR BLD: 0.34 K/UL (ref 0.1–0.95)
MONOCYTES NFR BLD: 4 % (ref 2–12)
NEUTROPHILS NFR BLD: 83 % (ref 43–80)
NEUTS SEG NFR BLD: 6.43 K/UL (ref 1.8–7.3)
NUCLEATED RED BLOOD CELLS: 1 PER 100 WBC
PLATELET # BLD AUTO: 557 K/UL (ref 130–450)
PMV BLD AUTO: 10.6 FL (ref 7–12)
POTASSIUM SERPL-SCNC: 4.3 MMOL/L (ref 3.5–5)
PROT SERPL-MCNC: 7.3 G/DL (ref 6.4–8.3)
RBC # BLD AUTO: 4.79 M/UL (ref 3.5–5.5)
RBC # BLD: ABNORMAL 10*6/UL
SARS-COV-2 RDRP RESP QL NAA+PROBE: NOT DETECTED
SODIUM SERPL-SCNC: 141 MMOL/L (ref 132–146)
SPECIMEN DESCRIPTION: NORMAL
TROPONIN I SERPL HS-MCNC: 9 NG/L (ref 0–9)
WBC OTHER # BLD: 7.8 K/UL (ref 4.5–11.5)

## 2024-06-28 PROCEDURE — 93010 ELECTROCARDIOGRAM REPORT: CPT | Performed by: INTERNAL MEDICINE

## 2024-06-28 PROCEDURE — 2500000003 HC RX 250 WO HCPCS: Performed by: EMERGENCY MEDICINE

## 2024-06-28 PROCEDURE — 2580000003 HC RX 258: Performed by: FAMILY MEDICINE

## 2024-06-28 PROCEDURE — 87635 SARS-COV-2 COVID-19 AMP PRB: CPT

## 2024-06-28 PROCEDURE — 99285 EMERGENCY DEPT VISIT HI MDM: CPT

## 2024-06-28 PROCEDURE — 83880 ASSAY OF NATRIURETIC PEPTIDE: CPT

## 2024-06-28 PROCEDURE — 96365 THER/PROPH/DIAG IV INF INIT: CPT

## 2024-06-28 PROCEDURE — 94640 AIRWAY INHALATION TREATMENT: CPT

## 2024-06-28 PROCEDURE — 2580000003 HC RX 258: Performed by: EMERGENCY MEDICINE

## 2024-06-28 PROCEDURE — 6370000000 HC RX 637 (ALT 250 FOR IP): Performed by: FAMILY MEDICINE

## 2024-06-28 PROCEDURE — G0378 HOSPITAL OBSERVATION PER HR: HCPCS

## 2024-06-28 PROCEDURE — 6360000002 HC RX W HCPCS: Performed by: FAMILY MEDICINE

## 2024-06-28 PROCEDURE — 6370000000 HC RX 637 (ALT 250 FOR IP): Performed by: EMERGENCY MEDICINE

## 2024-06-28 PROCEDURE — 96375 TX/PRO/DX INJ NEW DRUG ADDON: CPT

## 2024-06-28 PROCEDURE — 99222 1ST HOSP IP/OBS MODERATE 55: CPT | Performed by: FAMILY MEDICINE

## 2024-06-28 PROCEDURE — 6360000002 HC RX W HCPCS: Performed by: EMERGENCY MEDICINE

## 2024-06-28 PROCEDURE — 6360000004 HC RX CONTRAST MEDICATION: Performed by: RADIOLOGY

## 2024-06-28 PROCEDURE — 85025 COMPLETE CBC W/AUTO DIFF WBC: CPT

## 2024-06-28 PROCEDURE — 83735 ASSAY OF MAGNESIUM: CPT

## 2024-06-28 PROCEDURE — 93005 ELECTROCARDIOGRAM TRACING: CPT | Performed by: EMERGENCY MEDICINE

## 2024-06-28 PROCEDURE — 80053 COMPREHEN METABOLIC PANEL: CPT

## 2024-06-28 PROCEDURE — 84484 ASSAY OF TROPONIN QUANT: CPT

## 2024-06-28 PROCEDURE — 71045 X-RAY EXAM CHEST 1 VIEW: CPT

## 2024-06-28 PROCEDURE — 71275 CT ANGIOGRAPHY CHEST: CPT

## 2024-06-28 RX ORDER — HYDROCHLOROTHIAZIDE 25 MG/1
25 TABLET ORAL EVERY MORNING
Status: DISCONTINUED | OUTPATIENT
Start: 2024-06-29 | End: 2024-07-08 | Stop reason: HOSPADM

## 2024-06-28 RX ORDER — PREDNISONE 20 MG/1
40 TABLET ORAL DAILY
Status: DISCONTINUED | OUTPATIENT
Start: 2024-06-29 | End: 2024-06-29

## 2024-06-28 RX ORDER — MAGNESIUM SULFATE IN WATER 40 MG/ML
2000 INJECTION, SOLUTION INTRAVENOUS ONCE
Status: COMPLETED | OUTPATIENT
Start: 2024-06-28 | End: 2024-06-28

## 2024-06-28 RX ORDER — SODIUM CHLORIDE 9 MG/ML
INJECTION, SOLUTION INTRAVENOUS PRN
Status: DISCONTINUED | OUTPATIENT
Start: 2024-06-28 | End: 2024-07-08 | Stop reason: HOSPADM

## 2024-06-28 RX ORDER — ACETAZOLAMIDE 250 MG/1
250 TABLET ORAL 2 TIMES DAILY
Status: DISCONTINUED | OUTPATIENT
Start: 2024-06-28 | End: 2024-07-08 | Stop reason: HOSPADM

## 2024-06-28 RX ORDER — ENOXAPARIN SODIUM 100 MG/ML
30 INJECTION SUBCUTANEOUS 2 TIMES DAILY
Status: DISCONTINUED | OUTPATIENT
Start: 2024-06-28 | End: 2024-07-08 | Stop reason: HOSPADM

## 2024-06-28 RX ORDER — SODIUM CHLORIDE 0.9 % (FLUSH) 0.9 %
5-40 SYRINGE (ML) INJECTION PRN
Status: DISCONTINUED | OUTPATIENT
Start: 2024-06-28 | End: 2024-07-08 | Stop reason: HOSPADM

## 2024-06-28 RX ORDER — ALBUTEROL SULFATE 2.5 MG/3ML
2.5 SOLUTION RESPIRATORY (INHALATION) EVERY 4 HOURS PRN
Status: DISCONTINUED | OUTPATIENT
Start: 2024-06-28 | End: 2024-07-08 | Stop reason: HOSPADM

## 2024-06-28 RX ORDER — ACETAMINOPHEN 325 MG/1
650 TABLET ORAL EVERY 6 HOURS PRN
Status: DISCONTINUED | OUTPATIENT
Start: 2024-06-28 | End: 2024-07-08 | Stop reason: HOSPADM

## 2024-06-28 RX ORDER — IPRATROPIUM BROMIDE AND ALBUTEROL SULFATE 2.5; .5 MG/3ML; MG/3ML
1 SOLUTION RESPIRATORY (INHALATION)
Status: DISCONTINUED | OUTPATIENT
Start: 2024-06-28 | End: 2024-07-08 | Stop reason: HOSPADM

## 2024-06-28 RX ORDER — LISINOPRIL 20 MG/1
40 TABLET ORAL DAILY
Status: DISCONTINUED | OUTPATIENT
Start: 2024-06-29 | End: 2024-07-08 | Stop reason: HOSPADM

## 2024-06-28 RX ORDER — DOXYCYCLINE HYCLATE 100 MG/1
100 CAPSULE ORAL EVERY 12 HOURS SCHEDULED
Status: DISCONTINUED | OUTPATIENT
Start: 2024-06-29 | End: 2024-06-29

## 2024-06-28 RX ORDER — SODIUM CHLORIDE 0.9 % (FLUSH) 0.9 %
5-40 SYRINGE (ML) INJECTION EVERY 12 HOURS SCHEDULED
Status: DISCONTINUED | OUTPATIENT
Start: 2024-06-28 | End: 2024-07-08 | Stop reason: HOSPADM

## 2024-06-28 RX ORDER — FERROUS SULFATE 325(65) MG
325 TABLET ORAL EVERY OTHER DAY
Status: DISCONTINUED | OUTPATIENT
Start: 2024-06-29 | End: 2024-07-08 | Stop reason: HOSPADM

## 2024-06-28 RX ORDER — DIPHENHYDRAMINE HYDROCHLORIDE 50 MG/ML
25 INJECTION INTRAMUSCULAR; INTRAVENOUS ONCE
Status: COMPLETED | OUTPATIENT
Start: 2024-06-28 | End: 2024-06-28

## 2024-06-28 RX ORDER — ACETAMINOPHEN 650 MG/1
650 SUPPOSITORY RECTAL EVERY 6 HOURS PRN
Status: DISCONTINUED | OUTPATIENT
Start: 2024-06-28 | End: 2024-07-08 | Stop reason: HOSPADM

## 2024-06-28 RX ORDER — DULOXETIN HYDROCHLORIDE 60 MG/1
60 CAPSULE, DELAYED RELEASE ORAL DAILY
Status: DISCONTINUED | OUTPATIENT
Start: 2024-06-29 | End: 2024-07-08 | Stop reason: HOSPADM

## 2024-06-28 RX ORDER — BENZONATATE 100 MG/1
100 CAPSULE ORAL 3 TIMES DAILY PRN
Status: DISCONTINUED | OUTPATIENT
Start: 2024-06-28 | End: 2024-07-08 | Stop reason: HOSPADM

## 2024-06-28 RX ORDER — IPRATROPIUM BROMIDE AND ALBUTEROL SULFATE 2.5; .5 MG/3ML; MG/3ML
3 SOLUTION RESPIRATORY (INHALATION) ONCE
Status: COMPLETED | OUTPATIENT
Start: 2024-06-28 | End: 2024-06-28

## 2024-06-28 RX ADMIN — IPRATROPIUM BROMIDE AND ALBUTEROL SULFATE 3 DOSE: 2.5; .5 SOLUTION RESPIRATORY (INHALATION) at 14:29

## 2024-06-28 RX ADMIN — IOPAMIDOL 70 ML: 755 INJECTION, SOLUTION INTRAVENOUS at 17:19

## 2024-06-28 RX ADMIN — DIPHENHYDRAMINE HYDROCHLORIDE 25 MG: 50 INJECTION INTRAMUSCULAR; INTRAVENOUS at 16:56

## 2024-06-28 RX ADMIN — DOXYCYCLINE 100 MG: 100 INJECTION, POWDER, LYOPHILIZED, FOR SOLUTION INTRAVENOUS at 22:46

## 2024-06-28 RX ADMIN — MAGNESIUM SULFATE HEPTAHYDRATE 2000 MG: 40 INJECTION, SOLUTION INTRAVENOUS at 19:01

## 2024-06-28 RX ADMIN — WATER 1000 MG: 1 INJECTION INTRAMUSCULAR; INTRAVENOUS; SUBCUTANEOUS at 22:41

## 2024-06-28 RX ADMIN — Medication 5 ML: at 22:47

## 2024-06-28 RX ADMIN — ENOXAPARIN SODIUM 30 MG: 100 INJECTION SUBCUTANEOUS at 22:46

## 2024-06-28 RX ADMIN — WATER 125 MG: 1 INJECTION INTRAMUSCULAR; INTRAVENOUS; SUBCUTANEOUS at 16:54

## 2024-06-28 RX ADMIN — IPRATROPIUM BROMIDE AND ALBUTEROL SULFATE 1 DOSE: 2.5; .5 SOLUTION RESPIRATORY (INHALATION) at 21:10

## 2024-06-28 ASSESSMENT — LIFESTYLE VARIABLES: HOW OFTEN DO YOU HAVE A DRINK CONTAINING ALCOHOL: MONTHLY OR LESS

## 2024-06-28 NOTE — ED PROVIDER NOTES
cramps 2011    Perennial allergic rhinitis 2007    Prediabetes     Superficial thrombophlebitis of right leg 2009 and 2011    Ulcerative colitis (HCC)     Vertigo        Past Surgical History:   Past Surgical History:   Procedure Laterality Date    ADENOIDECTOMY         Social History:   Social History     Socioeconomic History    Marital status: Single   Occupational History    Occupation: [employer]      Comment: Reservation Coordinator   Tobacco Use    Smoking status: Never    Smokeless tobacco: Never   Vaping Use    Vaping Use: Never used   Substance and Sexual Activity    Alcohol use: Yes     Comment: occasional wine coolers    Drug use: No    Sexual activity: Yes     Partners: Male, Female     Social Determinants of Health     Financial Resource Strain: Low Risk  (9/5/2023)    Overall Financial Resource Strain (CARDIA)     Difficulty of Paying Living Expenses: Not very hard   Transportation Needs: Unknown (9/5/2023)    PRAPARE - Transportation     Lack of Transportation (Non-Medical): No   Housing Stability: Unknown (9/5/2023)    Housing Stability Vital Sign     Unstable Housing in the Last Year: No       Family History:   Family History   Problem Relation Age of Onset    High Blood Pressure Mother     Stroke Mother     Clotting Disorder Mother         Vague Hx of blood clots on blood thinners    Cancer Father     High Blood Pressure Father     Clotting Disorder Father         Vague hx of blood clots on blood thinners    Cancer Sister 33       The patient’s home medications have been reviewed.    Allergies:   Allergies   Allergen Reactions    Aspirin Shortness Of Breath and Nausea And Vomiting    Coconut (Cocos Nucifera) Anaphylaxis    Ibuprofen Other (See Comments)     Trouble breathing      Iodides Shortness Of Breath    Motrin [Ibuprofen Micronized] Shortness Of Breath    Robitussin (Alcohol Free) [Guaifenesin] Other (See Comments)     States causes worse cough    Codeine Nausea And Vomiting

## 2024-06-28 NOTE — H&P
[facility] Hospitalist Group   History and Physical      CHIEF COMPLAINT:  SOB    History of Present Illness:  49 y.o. female with a history of asthma, VTE, HTN, HLD, ulcerative colitis, morbid obesity presents with SOB, cough, chest pressure since yesterday.  Her inhalers were not providing any relief.  Workup in ED significant for pro-, hgb 8.7 (stable from last month), platelet 557.  COVID negative.  CTA chest no PE, left mid lung and bilateral basilar patchy opacities.  Given benadryl, duoneb, magnesium, solu-medrol, rocephin/doxy in ED.    Informant(s) for H&P: patient, chart    REVIEW OF SYSTEMS:  no fevers, chills, n/v, ha, vision/hearing changes, wt changes, hot/cold flashes, other open skin lesions, diarrhea, constipation, dysuria/hematuria unless noted in HPI. Complete ROS performed with the patient and is otherwise negative.      PMH:  Past Medical History:   Diagnosis Date    Aneurysm ([facility])     Anxiety 2009    Blood transfusion     Chronic fatigue 2007    Chronic fatigue     Class 3 severe obesity due to excess calories with serious comorbidity and body mass index (BMI) of 50.0 to 59.9 in adult (HCC)     Hx of blood clots     Hypertension     Hypertriglyceridemia     Iron deficiency anemia due to chronic blood loss 2007    non-compliant with iron replacement    Knee pain, bilateral 2011    Leg pain     Low back pain 2015    Pain is getting worse    Migraine headache without aura 2009    Morbid obesity (HCC) 2007    Muscle cramps 2011    Perennial allergic rhinitis 2007    Prediabetes     Superficial thrombophlebitis of right leg 2009 and 2011    Ulcerative colitis (HCC)     Vertigo        Surgical History:  Past Surgical History:   Procedure Laterality Date    ADENOIDECTOMY         Medications Prior to Admission:    Prior to Admission medications    Medication Sig Start Date End Date Taking? Authorizing Provider   acetaZOLAMIDE (DIAMOX) 250 MG tablet Take 1 tablet by mouth 2

## 2024-06-29 PROBLEM — J45.901 ASTHMA WITH ACUTE EXACERBATION: Status: ACTIVE | Noted: 2024-06-29

## 2024-06-29 PROBLEM — F32.A ACUTE DEPRESSION: Status: ACTIVE | Noted: 2024-06-29

## 2024-06-29 LAB
ANION GAP SERPL CALCULATED.3IONS-SCNC: 11 MMOL/L (ref 7–16)
B PARAP IS1001 DNA NPH QL NAA+NON-PROBE: NOT DETECTED
B PERT DNA SPEC QL NAA+PROBE: NOT DETECTED
BASOPHILS # BLD: 0 K/UL (ref 0–0.2)
BASOPHILS NFR BLD: 0 % (ref 0–2)
BUN SERPL-MCNC: 11 MG/DL (ref 6–20)
C PNEUM DNA NPH QL NAA+NON-PROBE: NOT DETECTED
CALCIUM SERPL-MCNC: 9 MG/DL (ref 8.6–10.2)
CHLORIDE SERPL-SCNC: 106 MMOL/L (ref 98–107)
CO2 SERPL-SCNC: 20 MMOL/L (ref 22–29)
CREAT SERPL-MCNC: 0.8 MG/DL (ref 0.5–1)
EOSINOPHIL # BLD: 0 K/UL (ref 0.05–0.5)
EOSINOPHILS RELATIVE PERCENT: 0 % (ref 0–6)
ERYTHROCYTE [DISTWIDTH] IN BLOOD BY AUTOMATED COUNT: 19.5 % (ref 11.5–15)
FLUAV RNA NPH QL NAA+NON-PROBE: NOT DETECTED
FLUBV RNA NPH QL NAA+NON-PROBE: NOT DETECTED
GFR, ESTIMATED: >90 ML/MIN/1.73M2
GLUCOSE SERPL-MCNC: 194 MG/DL (ref 74–99)
HADV DNA NPH QL NAA+NON-PROBE: NOT DETECTED
HCOV 229E RNA NPH QL NAA+NON-PROBE: NOT DETECTED
HCOV HKU1 RNA NPH QL NAA+NON-PROBE: NOT DETECTED
HCOV NL63 RNA NPH QL NAA+NON-PROBE: NOT DETECTED
HCOV OC43 RNA NPH QL NAA+NON-PROBE: NOT DETECTED
HCT VFR BLD AUTO: 31.6 % (ref 34–48)
HGB BLD-MCNC: 8.9 G/DL (ref 11.5–15.5)
HMPV RNA NPH QL NAA+NON-PROBE: NOT DETECTED
HPIV1 RNA NPH QL NAA+NON-PROBE: NOT DETECTED
HPIV2 RNA NPH QL NAA+NON-PROBE: NOT DETECTED
HPIV3 RNA NPH QL NAA+NON-PROBE: NOT DETECTED
HPIV4 RNA NPH QL NAA+NON-PROBE: NOT DETECTED
L PNEUMO1 AG UR QL IA.RAPID: NEGATIVE
LYMPHOCYTES NFR BLD: 1.33 K/UL (ref 1.5–4)
LYMPHOCYTES RELATIVE PERCENT: 11 % (ref 20–42)
M PNEUMO DNA NPH QL NAA+NON-PROBE: NOT DETECTED
MCH RBC QN AUTO: 18.7 PG (ref 26–35)
MCHC RBC AUTO-ENTMCNC: 28.2 G/DL (ref 32–34.5)
MCV RBC AUTO: 66.5 FL (ref 80–99.9)
MONOCYTES NFR BLD: 0 % (ref 2–12)
MONOCYTES NFR BLD: 0 K/UL (ref 0.1–0.95)
NEUTROPHILS NFR BLD: 89 % (ref 43–80)
NEUTS SEG NFR BLD: 10.37 K/UL (ref 1.8–7.3)
PLATELET # BLD AUTO: 608 K/UL (ref 130–450)
PMV BLD AUTO: 10.9 FL (ref 7–12)
POTASSIUM SERPL-SCNC: 4.4 MMOL/L (ref 3.5–5)
PROCALCITONIN SERPL-MCNC: 0.03 NG/ML (ref 0–0.08)
RBC # BLD AUTO: 4.75 M/UL (ref 3.5–5.5)
RBC # BLD: ABNORMAL 10*6/UL
RSV RNA NPH QL NAA+NON-PROBE: NOT DETECTED
RV+EV RNA NPH QL NAA+NON-PROBE: NOT DETECTED
S PNEUM AG SPEC QL: NEGATIVE
SARS-COV-2 RNA NPH QL NAA+NON-PROBE: NOT DETECTED
SODIUM SERPL-SCNC: 137 MMOL/L (ref 132–146)
SPECIMEN DESCRIPTION: NORMAL
SPECIMEN SOURCE: NORMAL
WBC OTHER # BLD: 11.7 K/UL (ref 4.5–11.5)

## 2024-06-29 PROCEDURE — 6370000000 HC RX 637 (ALT 250 FOR IP): Performed by: FAMILY MEDICINE

## 2024-06-29 PROCEDURE — 85025 COMPLETE CBC W/AUTO DIFF WBC: CPT

## 2024-06-29 PROCEDURE — 6360000002 HC RX W HCPCS: Performed by: FAMILY MEDICINE

## 2024-06-29 PROCEDURE — G0378 HOSPITAL OBSERVATION PER HR: HCPCS

## 2024-06-29 PROCEDURE — 2580000003 HC RX 258: Performed by: NURSE PRACTITIONER

## 2024-06-29 PROCEDURE — 99233 SBSQ HOSP IP/OBS HIGH 50: CPT | Performed by: INTERNAL MEDICINE

## 2024-06-29 PROCEDURE — 0202U NFCT DS 22 TRGT SARS-COV-2: CPT

## 2024-06-29 PROCEDURE — 6360000002 HC RX W HCPCS: Performed by: NURSE PRACTITIONER

## 2024-06-29 PROCEDURE — 87899 AGENT NOS ASSAY W/OPTIC: CPT

## 2024-06-29 PROCEDURE — 84145 PROCALCITONIN (PCT): CPT

## 2024-06-29 PROCEDURE — 94640 AIRWAY INHALATION TREATMENT: CPT

## 2024-06-29 PROCEDURE — 80048 BASIC METABOLIC PNL TOTAL CA: CPT

## 2024-06-29 PROCEDURE — 2580000003 HC RX 258: Performed by: FAMILY MEDICINE

## 2024-06-29 PROCEDURE — 87449 NOS EACH ORGANISM AG IA: CPT

## 2024-06-29 PROCEDURE — APPSS30 APP SPLIT SHARED TIME 16-30 MINUTES: Performed by: NURSE PRACTITIONER

## 2024-06-29 RX ORDER — SODIUM CHLORIDE, SODIUM LACTATE, POTASSIUM CHLORIDE, CALCIUM CHLORIDE 600; 310; 30; 20 MG/100ML; MG/100ML; MG/100ML; MG/100ML
INJECTION, SOLUTION INTRAVENOUS
Status: DISPENSED
Start: 2024-06-29 | End: 2024-06-29

## 2024-06-29 RX ADMIN — WATER 40 MG: 1 INJECTION INTRAMUSCULAR; INTRAVENOUS; SUBCUTANEOUS at 18:37

## 2024-06-29 RX ADMIN — PREDNISONE 40 MG: 20 TABLET ORAL at 09:33

## 2024-06-29 RX ADMIN — ACETAZOLAMIDE 250 MG: 250 TABLET ORAL at 00:40

## 2024-06-29 RX ADMIN — ACETAMINOPHEN 650 MG: 325 TABLET ORAL at 03:38

## 2024-06-29 RX ADMIN — DOXYCYCLINE HYCLATE 100 MG: 100 CAPSULE ORAL at 09:29

## 2024-06-29 RX ADMIN — LISINOPRIL 40 MG: 20 TABLET ORAL at 09:32

## 2024-06-29 RX ADMIN — HYDROCHLOROTHIAZIDE 25 MG: 25 TABLET ORAL at 09:30

## 2024-06-29 RX ADMIN — IPRATROPIUM BROMIDE AND ALBUTEROL SULFATE 1 DOSE: 2.5; .5 SOLUTION RESPIRATORY (INHALATION) at 13:06

## 2024-06-29 RX ADMIN — IPRATROPIUM BROMIDE AND ALBUTEROL SULFATE 1 DOSE: 2.5; .5 SOLUTION RESPIRATORY (INHALATION) at 18:44

## 2024-06-29 RX ADMIN — ACETAMINOPHEN 650 MG: 325 TABLET ORAL at 18:53

## 2024-06-29 RX ADMIN — ACETAZOLAMIDE 250 MG: 250 TABLET ORAL at 14:03

## 2024-06-29 RX ADMIN — ENOXAPARIN SODIUM 30 MG: 100 INJECTION SUBCUTANEOUS at 21:08

## 2024-06-29 RX ADMIN — FERROUS SULFATE TAB 325 MG (65 MG ELEMENTAL FE) 325 MG: 325 (65 FE) TAB at 09:30

## 2024-06-29 RX ADMIN — IPRATROPIUM BROMIDE AND ALBUTEROL SULFATE 1 DOSE: 2.5; .5 SOLUTION RESPIRATORY (INHALATION) at 05:25

## 2024-06-29 RX ADMIN — DULOXETINE HYDROCHLORIDE 60 MG: 60 CAPSULE, DELAYED RELEASE ORAL at 09:34

## 2024-06-29 RX ADMIN — ACETAZOLAMIDE 250 MG: 250 TABLET ORAL at 21:08

## 2024-06-29 RX ADMIN — ENOXAPARIN SODIUM 30 MG: 100 INJECTION SUBCUTANEOUS at 09:29

## 2024-06-29 RX ADMIN — Medication 10 ML: at 21:13

## 2024-06-29 ASSESSMENT — PAIN SCALES - GENERAL
PAINLEVEL_OUTOF10: 3
PAINLEVEL_OUTOF10: 0
PAINLEVEL_OUTOF10: 8

## 2024-06-29 ASSESSMENT — PAIN DESCRIPTION - LOCATION
LOCATION: ABDOMEN
LOCATION: CHEST;ABDOMEN

## 2024-06-29 ASSESSMENT — PAIN - FUNCTIONAL ASSESSMENT: PAIN_FUNCTIONAL_ASSESSMENT: NONE - DENIES PAIN

## 2024-06-29 ASSESSMENT — PAIN DESCRIPTION - ORIENTATION: ORIENTATION: INNER

## 2024-06-30 PROBLEM — J15.9 COMMUNITY ACQUIRED BACTERIAL PNEUMONIA: Status: ACTIVE | Noted: 2024-06-30

## 2024-06-30 LAB
ANION GAP SERPL CALCULATED.3IONS-SCNC: 12 MMOL/L (ref 7–16)
BASOPHILS # BLD: 0.01 K/UL (ref 0–0.2)
BASOPHILS NFR BLD: 0 % (ref 0–2)
BUN SERPL-MCNC: 14 MG/DL (ref 6–20)
CALCIUM SERPL-MCNC: 9.3 MG/DL (ref 8.6–10.2)
CHLORIDE SERPL-SCNC: 105 MMOL/L (ref 98–107)
CO2 SERPL-SCNC: 18 MMOL/L (ref 22–29)
CREAT SERPL-MCNC: 0.9 MG/DL (ref 0.5–1)
EOSINOPHIL # BLD: 0 K/UL (ref 0.05–0.5)
EOSINOPHILS RELATIVE PERCENT: 0 % (ref 0–6)
ERYTHROCYTE [DISTWIDTH] IN BLOOD BY AUTOMATED COUNT: 20.8 % (ref 11.5–15)
GFR, ESTIMATED: 79 ML/MIN/1.73M2
GLUCOSE SERPL-MCNC: 135 MG/DL (ref 74–99)
HBA1C MFR BLD: 6.3 % (ref 4–5.6)
HCT VFR BLD AUTO: 33.5 % (ref 34–48)
HGB BLD-MCNC: 8.7 G/DL (ref 11.5–15.5)
IMM GRANULOCYTES # BLD AUTO: 0.1 K/UL (ref 0–0.58)
IMM GRANULOCYTES NFR BLD: 1 % (ref 0–5)
LYMPHOCYTES NFR BLD: 0.9 K/UL (ref 1.5–4)
LYMPHOCYTES RELATIVE PERCENT: 4 % (ref 20–42)
MCH RBC QN AUTO: 17.7 PG (ref 26–35)
MCHC RBC AUTO-ENTMCNC: 26 G/DL (ref 32–34.5)
MCV RBC AUTO: 68.1 FL (ref 80–99.9)
MONOCYTES NFR BLD: 0.33 K/UL (ref 0.1–0.95)
MONOCYTES NFR BLD: 2 % (ref 2–12)
NEUTROPHILS NFR BLD: 94 % (ref 43–80)
NEUTS SEG NFR BLD: 19.61 K/UL (ref 1.8–7.3)
PLATELET # BLD AUTO: 618 K/UL (ref 130–450)
PMV BLD AUTO: 10.5 FL (ref 7–12)
POTASSIUM SERPL-SCNC: 4.9 MMOL/L (ref 3.5–5)
RBC # BLD AUTO: 4.92 M/UL (ref 3.5–5.5)
RBC # BLD: ABNORMAL 10*6/UL
SODIUM SERPL-SCNC: 135 MMOL/L (ref 132–146)
WBC OTHER # BLD: 21 K/UL (ref 4.5–11.5)

## 2024-06-30 PROCEDURE — G0378 HOSPITAL OBSERVATION PER HR: HCPCS

## 2024-06-30 PROCEDURE — 2580000003 HC RX 258: Performed by: NURSE PRACTITIONER

## 2024-06-30 PROCEDURE — 1200000000 HC SEMI PRIVATE

## 2024-06-30 PROCEDURE — 80048 BASIC METABOLIC PNL TOTAL CA: CPT

## 2024-06-30 PROCEDURE — 6370000000 HC RX 637 (ALT 250 FOR IP): Performed by: FAMILY MEDICINE

## 2024-06-30 PROCEDURE — 6360000002 HC RX W HCPCS: Performed by: NURSE PRACTITIONER

## 2024-06-30 PROCEDURE — 83036 HEMOGLOBIN GLYCOSYLATED A1C: CPT

## 2024-06-30 PROCEDURE — 2580000003 HC RX 258: Performed by: FAMILY MEDICINE

## 2024-06-30 PROCEDURE — 6360000002 HC RX W HCPCS: Performed by: FAMILY MEDICINE

## 2024-06-30 PROCEDURE — 99232 SBSQ HOSP IP/OBS MODERATE 35: CPT | Performed by: INTERNAL MEDICINE

## 2024-06-30 PROCEDURE — APPSS30 APP SPLIT SHARED TIME 16-30 MINUTES: Performed by: NURSE PRACTITIONER

## 2024-06-30 PROCEDURE — 36415 COLL VENOUS BLD VENIPUNCTURE: CPT

## 2024-06-30 PROCEDURE — 94640 AIRWAY INHALATION TREATMENT: CPT

## 2024-06-30 PROCEDURE — 85025 COMPLETE CBC W/AUTO DIFF WBC: CPT

## 2024-06-30 RX ADMIN — WATER 40 MG: 1 INJECTION INTRAMUSCULAR; INTRAVENOUS; SUBCUTANEOUS at 05:53

## 2024-06-30 RX ADMIN — WATER 40 MG: 1 INJECTION INTRAMUSCULAR; INTRAVENOUS; SUBCUTANEOUS at 17:41

## 2024-06-30 RX ADMIN — HYDROCHLOROTHIAZIDE 25 MG: 25 TABLET ORAL at 08:58

## 2024-06-30 RX ADMIN — ACETAZOLAMIDE 250 MG: 250 TABLET ORAL at 08:59

## 2024-06-30 RX ADMIN — LISINOPRIL 40 MG: 20 TABLET ORAL at 08:58

## 2024-06-30 RX ADMIN — ACETAZOLAMIDE 250 MG: 250 TABLET ORAL at 20:14

## 2024-06-30 RX ADMIN — ENOXAPARIN SODIUM 30 MG: 100 INJECTION SUBCUTANEOUS at 20:13

## 2024-06-30 RX ADMIN — IPRATROPIUM BROMIDE AND ALBUTEROL SULFATE 1 DOSE: 2.5; .5 SOLUTION RESPIRATORY (INHALATION) at 18:58

## 2024-06-30 RX ADMIN — IPRATROPIUM BROMIDE AND ALBUTEROL SULFATE 1 DOSE: 2.5; .5 SOLUTION RESPIRATORY (INHALATION) at 13:37

## 2024-06-30 RX ADMIN — ACETAMINOPHEN 650 MG: 325 TABLET ORAL at 17:45

## 2024-06-30 RX ADMIN — DULOXETINE HYDROCHLORIDE 60 MG: 60 CAPSULE, DELAYED RELEASE ORAL at 08:59

## 2024-06-30 RX ADMIN — Medication 10 ML: at 09:08

## 2024-06-30 RX ADMIN — IPRATROPIUM BROMIDE AND ALBUTEROL SULFATE 1 DOSE: 2.5; .5 SOLUTION RESPIRATORY (INHALATION) at 09:22

## 2024-06-30 RX ADMIN — ENOXAPARIN SODIUM 30 MG: 100 INJECTION SUBCUTANEOUS at 08:59

## 2024-06-30 RX ADMIN — IPRATROPIUM BROMIDE AND ALBUTEROL SULFATE 1 DOSE: 2.5; .5 SOLUTION RESPIRATORY (INHALATION) at 04:37

## 2024-06-30 ASSESSMENT — PAIN DESCRIPTION - LOCATION: LOCATION: ABDOMEN

## 2024-06-30 ASSESSMENT — PAIN SCALES - GENERAL
PAINLEVEL_OUTOF10: 0
PAINLEVEL_OUTOF10: 3
PAINLEVEL_OUTOF10: 0

## 2024-06-30 ASSESSMENT — PAIN DESCRIPTION - ORIENTATION: ORIENTATION: UPPER

## 2024-06-30 ASSESSMENT — PAIN DESCRIPTION - DESCRIPTORS: DESCRIPTORS: DISCOMFORT;GNAWING;HEAVINESS

## 2024-06-30 NOTE — PLAN OF CARE
Problem: Discharge Planning  Goal: Discharge to home or other facility with appropriate resources  Outcome: Progressing  Flowsheets (Taken 6/29/2024 1113 by Twila Hollins, RN)  Discharge to home or other facility with appropriate resources:   Identify discharge learning needs (meds, wound care, etc)   Refer to discharge planning if patient needs post-hospital services based on physician order or complex needs related to functional status, cognitive ability or social support system     Problem: Safety - Adult  Goal: Free from fall injury  Outcome: Progressing

## 2024-06-30 NOTE — PLAN OF CARE
Problem: Discharge Planning  Goal: Discharge to home or other facility with appropriate resources  6/30/2024 1006 by Twila Hollins, RN  Outcome: Progressing  6/29/2024 2152 by Prema Medina, ARLETTE  Outcome: Progressing  Flowsheets (Taken 6/29/2024 1113 by Twila Hollins, RN)  Discharge to home or other facility with appropriate resources:   Identify discharge learning needs (meds, wound care, etc)   Refer to discharge planning if patient needs post-hospital services based on physician order or complex needs related to functional status, cognitive ability or social support system     Problem: Safety - Adult  Goal: Free from fall injury  6/30/2024 1006 by Twila Hollins, RN  Outcome: Progressing  6/29/2024 2152 by Prema Medina, ARLETTE  Outcome: Progressing

## 2024-07-01 LAB
ANION GAP SERPL CALCULATED.3IONS-SCNC: 10 MMOL/L (ref 7–16)
BUN SERPL-MCNC: 19 MG/DL (ref 6–20)
CALCIUM SERPL-MCNC: 9.4 MG/DL (ref 8.6–10.2)
CHLORIDE SERPL-SCNC: 106 MMOL/L (ref 98–107)
CO2 SERPL-SCNC: 21 MMOL/L (ref 22–29)
CREAT SERPL-MCNC: 0.9 MG/DL (ref 0.5–1)
GFR, ESTIMATED: 83 ML/MIN/1.73M2
GLUCOSE SERPL-MCNC: 133 MG/DL (ref 74–99)
POTASSIUM SERPL-SCNC: 4.4 MMOL/L (ref 3.5–5)
SODIUM SERPL-SCNC: 137 MMOL/L (ref 132–146)

## 2024-07-01 PROCEDURE — 6370000000 HC RX 637 (ALT 250 FOR IP): Performed by: FAMILY MEDICINE

## 2024-07-01 PROCEDURE — 36415 COLL VENOUS BLD VENIPUNCTURE: CPT

## 2024-07-01 PROCEDURE — 6360000002 HC RX W HCPCS: Performed by: NURSE PRACTITIONER

## 2024-07-01 PROCEDURE — 80048 BASIC METABOLIC PNL TOTAL CA: CPT

## 2024-07-01 PROCEDURE — 6360000002 HC RX W HCPCS: Performed by: FAMILY MEDICINE

## 2024-07-01 PROCEDURE — 2580000003 HC RX 258: Performed by: NURSE PRACTITIONER

## 2024-07-01 PROCEDURE — APPSS30 APP SPLIT SHARED TIME 16-30 MINUTES: Performed by: NURSE PRACTITIONER

## 2024-07-01 PROCEDURE — 94640 AIRWAY INHALATION TREATMENT: CPT

## 2024-07-01 PROCEDURE — 1200000000 HC SEMI PRIVATE

## 2024-07-01 PROCEDURE — 99232 SBSQ HOSP IP/OBS MODERATE 35: CPT | Performed by: INTERNAL MEDICINE

## 2024-07-01 RX ADMIN — HYDROCHLOROTHIAZIDE 25 MG: 25 TABLET ORAL at 08:46

## 2024-07-01 RX ADMIN — ACETAMINOPHEN 650 MG: 325 TABLET ORAL at 08:49

## 2024-07-01 RX ADMIN — LISINOPRIL 40 MG: 20 TABLET ORAL at 08:46

## 2024-07-01 RX ADMIN — ENOXAPARIN SODIUM 30 MG: 100 INJECTION SUBCUTANEOUS at 21:36

## 2024-07-01 RX ADMIN — DULOXETINE HYDROCHLORIDE 60 MG: 60 CAPSULE, DELAYED RELEASE ORAL at 08:46

## 2024-07-01 RX ADMIN — ENOXAPARIN SODIUM 30 MG: 100 INJECTION SUBCUTANEOUS at 08:46

## 2024-07-01 RX ADMIN — IPRATROPIUM BROMIDE AND ALBUTEROL SULFATE 1 DOSE: 2.5; .5 SOLUTION RESPIRATORY (INHALATION) at 14:36

## 2024-07-01 RX ADMIN — ACETAZOLAMIDE 250 MG: 250 TABLET ORAL at 21:37

## 2024-07-01 RX ADMIN — ACETAMINOPHEN 650 MG: 325 TABLET ORAL at 00:47

## 2024-07-01 RX ADMIN — ACETAZOLAMIDE 250 MG: 250 TABLET ORAL at 08:49

## 2024-07-01 RX ADMIN — WATER 40 MG: 1 INJECTION INTRAMUSCULAR; INTRAVENOUS; SUBCUTANEOUS at 05:39

## 2024-07-01 RX ADMIN — IPRATROPIUM BROMIDE AND ALBUTEROL SULFATE 1 DOSE: 2.5; .5 SOLUTION RESPIRATORY (INHALATION) at 11:03

## 2024-07-01 RX ADMIN — WATER 40 MG: 1 INJECTION INTRAMUSCULAR; INTRAVENOUS; SUBCUTANEOUS at 17:40

## 2024-07-01 RX ADMIN — IPRATROPIUM BROMIDE AND ALBUTEROL SULFATE 1 DOSE: 2.5; .5 SOLUTION RESPIRATORY (INHALATION) at 07:29

## 2024-07-01 RX ADMIN — FERROUS SULFATE TAB 325 MG (65 MG ELEMENTAL FE) 325 MG: 325 (65 FE) TAB at 08:46

## 2024-07-01 RX ADMIN — ACETAMINOPHEN 650 MG: 325 TABLET ORAL at 17:44

## 2024-07-01 RX ADMIN — IPRATROPIUM BROMIDE AND ALBUTEROL SULFATE 1 DOSE: 2.5; .5 SOLUTION RESPIRATORY (INHALATION) at 19:23

## 2024-07-01 ASSESSMENT — PULMONARY FUNCTION TESTS: PEFR_L/MIN: 350

## 2024-07-01 ASSESSMENT — PAIN SCALES - GENERAL
PAINLEVEL_OUTOF10: 3
PAINLEVEL_OUTOF10: 3

## 2024-07-01 NOTE — DISCHARGE INSTRUCTIONS
Your information:  Name: Rosina Morales  : 1975    Your instructions:  Discharge Home    What to do after you leave the hospital:    Recommended diet: regular diet    Recommended activity: activity as tolerated    The following personal items were collected during your admission and were returned to you:    Belongings  Dental Appliances: None  Vision - Corrective Lenses: Eyeglasses  Hearing Aid: None  Clothing: Undergarments, Pants, Shirt, At bedside, Footwear, Socks  Jewelry: Ring  Body Piercings Removed: Yes  Electronic Devices: Cell Phone,   Weapons (Notify Protective Services/Security): None  Other Valuables: Purse, Wallet, Keys, Credit/Debit Card  Home Medications: None  Valuables Given To: Patient  Provide Name(s) of Who Valuable(s) Were Given To: norma  Responsible person(s) in the waiting room: norma  Patient approves for provider to speak to responsible person post operatively: Yes    Information obtained by:  By signing below, I understand that if any problems occur once I leave the hospital I am to contact PCP.  I understand and acknowledge receipt of the instructions indicated above.   Asthma in Adults: Care Instructions  Overview     Asthma makes it hard for you to breathe. During an asthma attack, the airways swell and narrow. Severe asthma attacks can be dangerous, but you can usually prevent them. Controlling asthma and treating symptoms before they get bad can help you avoid bad attacks. You may also avoid future trips to the doctor.  Follow-up care is a key part of your treatment and safety. Be sure to make and go to all appointments, and call your doctor if you are having problems. It's also a good idea to know your test results and keep a list of the medicines you take.  How can you care for yourself at home?  Follow your asthma action plan so you can manage your symptoms at home. An asthma action plan will help you prevent and control airway reactions and will tell you what to do during an

## 2024-07-01 NOTE — PLAN OF CARE
Problem: Discharge Planning  Goal: Discharge to home or other facility with appropriate resources  6/30/2024 2116 by Prema Medina RN  Outcome: Progressing  6/30/2024 1006 by Twila Hollins, RN  Outcome: Progressing     Problem: Safety - Adult  Goal: Free from fall injury  6/30/2024 2116 by Prema Medina RN  Outcome: Progressing  6/30/2024 1006 by Twila Hollins, RN  Outcome: Progressing

## 2024-07-02 LAB
ALBUMIN SERPL-MCNC: 4.1 G/DL (ref 3.5–5.2)
ALP SERPL-CCNC: 97 U/L (ref 35–104)
ALT SERPL-CCNC: 8 U/L (ref 0–32)
ANION GAP SERPL CALCULATED.3IONS-SCNC: 13 MMOL/L (ref 7–16)
AST SERPL-CCNC: 8 U/L (ref 0–31)
BASOPHILS # BLD: 0 K/UL (ref 0–0.2)
BASOPHILS NFR BLD: 0 % (ref 0–2)
BILIRUB SERPL-MCNC: <0.2 MG/DL (ref 0–1.2)
BUN SERPL-MCNC: 20 MG/DL (ref 6–20)
CALCIUM SERPL-MCNC: 9.1 MG/DL (ref 8.6–10.2)
CHLORIDE SERPL-SCNC: 106 MMOL/L (ref 98–107)
CO2 SERPL-SCNC: 19 MMOL/L (ref 22–29)
CREAT SERPL-MCNC: 1 MG/DL (ref 0.5–1)
EOSINOPHIL # BLD: 0 K/UL (ref 0.05–0.5)
EOSINOPHILS RELATIVE PERCENT: 0 % (ref 0–6)
ERYTHROCYTE [DISTWIDTH] IN BLOOD BY AUTOMATED COUNT: 21.1 % (ref 11.5–15)
GFR, ESTIMATED: 73 ML/MIN/1.73M2
GLUCOSE SERPL-MCNC: 138 MG/DL (ref 74–99)
HCT VFR BLD AUTO: 34.3 % (ref 34–48)
HGB BLD-MCNC: 9.5 G/DL (ref 11.5–15.5)
LYMPHOCYTES NFR BLD: 0.75 K/UL (ref 1.5–4)
LYMPHOCYTES RELATIVE PERCENT: 5 % (ref 20–42)
MCH RBC QN AUTO: 18.3 PG (ref 26–35)
MCHC RBC AUTO-ENTMCNC: 27.7 G/DL (ref 32–34.5)
MCV RBC AUTO: 66.1 FL (ref 80–99.9)
MONOCYTES NFR BLD: 1.37 K/UL (ref 0.1–0.95)
MONOCYTES NFR BLD: 10 % (ref 2–12)
NEUTROPHILS NFR BLD: 85 % (ref 43–80)
NEUTS SEG NFR BLD: 12.19 K/UL (ref 1.8–7.3)
PLATELET # BLD AUTO: 693 K/UL (ref 130–450)
PMV BLD AUTO: 10.3 FL (ref 7–12)
POTASSIUM SERPL-SCNC: 4 MMOL/L (ref 3.5–5)
PROT SERPL-MCNC: 7.8 G/DL (ref 6.4–8.3)
RBC # BLD AUTO: 5.19 M/UL (ref 3.5–5.5)
RBC # BLD: ABNORMAL 10*6/UL
SODIUM SERPL-SCNC: 138 MMOL/L (ref 132–146)
WBC OTHER # BLD: 14.3 K/UL (ref 4.5–11.5)

## 2024-07-02 PROCEDURE — 6370000000 HC RX 637 (ALT 250 FOR IP): Performed by: FAMILY MEDICINE

## 2024-07-02 PROCEDURE — 36415 COLL VENOUS BLD VENIPUNCTURE: CPT

## 2024-07-02 PROCEDURE — 80053 COMPREHEN METABOLIC PANEL: CPT

## 2024-07-02 PROCEDURE — 99232 SBSQ HOSP IP/OBS MODERATE 35: CPT | Performed by: INTERNAL MEDICINE

## 2024-07-02 PROCEDURE — 85025 COMPLETE CBC W/AUTO DIFF WBC: CPT

## 2024-07-02 PROCEDURE — 2580000003 HC RX 258: Performed by: NURSE PRACTITIONER

## 2024-07-02 PROCEDURE — 6360000002 HC RX W HCPCS: Performed by: NURSE PRACTITIONER

## 2024-07-02 PROCEDURE — APPSS30 APP SPLIT SHARED TIME 16-30 MINUTES: Performed by: NURSE PRACTITIONER

## 2024-07-02 PROCEDURE — 1200000000 HC SEMI PRIVATE

## 2024-07-02 PROCEDURE — 94640 AIRWAY INHALATION TREATMENT: CPT

## 2024-07-02 PROCEDURE — 6360000002 HC RX W HCPCS: Performed by: FAMILY MEDICINE

## 2024-07-02 RX ORDER — HYDRALAZINE HYDROCHLORIDE 20 MG/ML
5 INJECTION INTRAMUSCULAR; INTRAVENOUS EVERY 6 HOURS PRN
Status: DISCONTINUED | OUTPATIENT
Start: 2024-07-02 | End: 2024-07-08 | Stop reason: HOSPADM

## 2024-07-02 RX ADMIN — DULOXETINE HYDROCHLORIDE 60 MG: 60 CAPSULE, DELAYED RELEASE ORAL at 08:19

## 2024-07-02 RX ADMIN — IPRATROPIUM BROMIDE AND ALBUTEROL SULFATE 1 DOSE: 2.5; .5 SOLUTION RESPIRATORY (INHALATION) at 13:03

## 2024-07-02 RX ADMIN — ACETAZOLAMIDE 250 MG: 250 TABLET ORAL at 21:47

## 2024-07-02 RX ADMIN — IPRATROPIUM BROMIDE AND ALBUTEROL SULFATE 1 DOSE: 2.5; .5 SOLUTION RESPIRATORY (INHALATION) at 17:19

## 2024-07-02 RX ADMIN — IPRATROPIUM BROMIDE AND ALBUTEROL SULFATE 1 DOSE: 2.5; .5 SOLUTION RESPIRATORY (INHALATION) at 06:19

## 2024-07-02 RX ADMIN — LISINOPRIL 40 MG: 20 TABLET ORAL at 08:19

## 2024-07-02 RX ADMIN — WATER 40 MG: 1 INJECTION INTRAMUSCULAR; INTRAVENOUS; SUBCUTANEOUS at 17:39

## 2024-07-02 RX ADMIN — HYDROCHLOROTHIAZIDE 25 MG: 25 TABLET ORAL at 08:19

## 2024-07-02 RX ADMIN — ENOXAPARIN SODIUM 30 MG: 100 INJECTION SUBCUTANEOUS at 21:48

## 2024-07-02 RX ADMIN — IPRATROPIUM BROMIDE AND ALBUTEROL SULFATE 1 DOSE: 2.5; .5 SOLUTION RESPIRATORY (INHALATION) at 09:56

## 2024-07-02 RX ADMIN — WATER 40 MG: 1 INJECTION INTRAMUSCULAR; INTRAVENOUS; SUBCUTANEOUS at 06:08

## 2024-07-02 RX ADMIN — ACETAZOLAMIDE 250 MG: 250 TABLET ORAL at 08:19

## 2024-07-02 RX ADMIN — ENOXAPARIN SODIUM 30 MG: 100 INJECTION SUBCUTANEOUS at 08:19

## 2024-07-02 ASSESSMENT — PULMONARY FUNCTION TESTS
PEFR_L/MIN: 340
PEFR_L/MIN: 280
PEFR_L/MIN: 325

## 2024-07-02 NOTE — PLAN OF CARE
Problem: Discharge Planning  Goal: Discharge to home or other facility with appropriate resources  7/2/2024 1016 by Twila Hollins, RN  Outcome: Progressing  7/2/2024 0414 by Debby Cuadra, RN  Outcome: Progressing     Problem: Safety - Adult  Goal: Free from fall injury  7/2/2024 1016 by Twila Hollins, RN  Outcome: Progressing  7/2/2024 0414 by Debby Cuadra, RN  Outcome: Progressing

## 2024-07-02 NOTE — CARE COORDINATION
Case Management Assessment  Initial Evaluation    Date/Time of Evaluation: 7/2/2024 10:36 AM  Assessment Completed by: Eveline Benitez RN    If patient is discharged prior to next notation, then this note serves as note for discharge by case management.    Patient Name: Rosina Morales                   YOB: 1975  Diagnosis: Dyspnea on exertion [R06.09]  New onset of congestive heart failure (HCC) [I50.9]  Asthma with acute exacerbation, unspecified asthma severity, unspecified whether persistent [J45.901]  Community acquired pneumonia, unspecified laterality [J18.9]  Community acquired bacterial pneumonia [J15.9]                   Date / Time: 6/28/2024  2:05 PM    Patient Admission Status: Inpatient   Readmission Risk (Low < 19, Mod (19-27), High > 27): Readmission Risk Score: 13.3    Current PCP: Rayne Hernandez MD  PCP verified by CM? Yes    Chart Reviewed: Yes      History Provided by: Patient  Patient Orientation: Alert and Oriented    Patient Cognition: Alert    Hospitalization in the last 30 days (Readmission):  No    If yes, Readmission Assessment in CM Navigator will be completed.    Advance Directives:      Code Status: Full Code   Patient's Primary Decision Maker is: Legal Next of Kin    Primary Decision Maker: Zaid Mayes - Domestic Partner - [phone]    Primary Decision Maker: Nathaniel Morales - Niece/Nephew - [phone]    Discharge Planning:    Patient lives with: Spouse/Significant Other Type of Home: House  Primary Care Giver: Self  Patient Support Systems include: Spouse/Significant Other   Current Financial resources:    Current community resources:    Current services prior to admission: None            Current DME:              Type of Home Care services:  None    ADLS  Prior functional level: Independent in ADLs/IADLs  Current functional level: Independent in ADLs/IADLs    PT AM-PAC:   /24  OT AM-PAC:   /24    Family can provide assistance at DC: Yes  Would you like Case

## 2024-07-03 ENCOUNTER — APPOINTMENT (OUTPATIENT)
Age: 49
DRG: 202 | End: 2024-07-03
Attending: INTERNAL MEDICINE
Payer: COMMERCIAL

## 2024-07-03 PROCEDURE — 94640 AIRWAY INHALATION TREATMENT: CPT

## 2024-07-03 PROCEDURE — 6370000000 HC RX 637 (ALT 250 FOR IP): Performed by: FAMILY MEDICINE

## 2024-07-03 PROCEDURE — APPSS30 APP SPLIT SHARED TIME 16-30 MINUTES: Performed by: NURSE PRACTITIONER

## 2024-07-03 PROCEDURE — 99232 SBSQ HOSP IP/OBS MODERATE 35: CPT | Performed by: INTERNAL MEDICINE

## 2024-07-03 PROCEDURE — 93306 TTE W/DOPPLER COMPLETE: CPT

## 2024-07-03 PROCEDURE — 2580000003 HC RX 258: Performed by: NURSE PRACTITIONER

## 2024-07-03 PROCEDURE — 6360000002 HC RX W HCPCS: Performed by: NURSE PRACTITIONER

## 2024-07-03 PROCEDURE — 6360000002 HC RX W HCPCS: Performed by: FAMILY MEDICINE

## 2024-07-03 PROCEDURE — 2580000003 HC RX 258: Performed by: FAMILY MEDICINE

## 2024-07-03 PROCEDURE — 1200000000 HC SEMI PRIVATE

## 2024-07-03 RX ORDER — IPRATROPIUM BROMIDE AND ALBUTEROL SULFATE 2.5; .5 MG/3ML; MG/3ML
3 SOLUTION RESPIRATORY (INHALATION)
Qty: 360 ML | Refills: 0 | Status: SHIPPED | OUTPATIENT
Start: 2024-07-03

## 2024-07-03 RX ORDER — PREDNISONE 20 MG/1
40 TABLET ORAL DAILY
Status: DISCONTINUED | OUTPATIENT
Start: 2024-07-04 | End: 2024-07-07

## 2024-07-03 RX ORDER — BENZONATATE 100 MG/1
100 CAPSULE ORAL 3 TIMES DAILY PRN
Qty: 21 CAPSULE | Refills: 0 | Status: SHIPPED | OUTPATIENT
Start: 2024-07-03 | End: 2024-07-10

## 2024-07-03 RX ORDER — PREDNISONE 20 MG/1
TABLET ORAL
Qty: 15 TABLET | Refills: 0 | Status: SHIPPED | OUTPATIENT
Start: 2024-07-04 | End: 2024-07-16

## 2024-07-03 RX ADMIN — Medication 10 ML: at 09:59

## 2024-07-03 RX ADMIN — WATER 40 MG: 1 INJECTION INTRAMUSCULAR; INTRAVENOUS; SUBCUTANEOUS at 06:39

## 2024-07-03 RX ADMIN — HYDROCHLOROTHIAZIDE 25 MG: 25 TABLET ORAL at 09:59

## 2024-07-03 RX ADMIN — DULOXETINE HYDROCHLORIDE 60 MG: 60 CAPSULE, DELAYED RELEASE ORAL at 09:59

## 2024-07-03 RX ADMIN — ACETAZOLAMIDE 250 MG: 250 TABLET ORAL at 09:58

## 2024-07-03 RX ADMIN — ENOXAPARIN SODIUM 30 MG: 100 INJECTION SUBCUTANEOUS at 09:59

## 2024-07-03 RX ADMIN — Medication 10 ML: at 20:30

## 2024-07-03 RX ADMIN — ENOXAPARIN SODIUM 30 MG: 100 INJECTION SUBCUTANEOUS at 20:28

## 2024-07-03 RX ADMIN — LISINOPRIL 40 MG: 20 TABLET ORAL at 09:58

## 2024-07-03 RX ADMIN — FERROUS SULFATE TAB 325 MG (65 MG ELEMENTAL FE) 325 MG: 325 (65 FE) TAB at 09:58

## 2024-07-03 RX ADMIN — IPRATROPIUM BROMIDE AND ALBUTEROL SULFATE 1 DOSE: 2.5; .5 SOLUTION RESPIRATORY (INHALATION) at 06:49

## 2024-07-03 RX ADMIN — IPRATROPIUM BROMIDE AND ALBUTEROL SULFATE 1 DOSE: 2.5; .5 SOLUTION RESPIRATORY (INHALATION) at 10:46

## 2024-07-03 RX ADMIN — ACETAZOLAMIDE 250 MG: 250 TABLET ORAL at 20:29

## 2024-07-03 RX ADMIN — IPRATROPIUM BROMIDE AND ALBUTEROL SULFATE 1 DOSE: 2.5; .5 SOLUTION RESPIRATORY (INHALATION) at 16:12

## 2024-07-03 RX ADMIN — ACETAMINOPHEN 650 MG: 325 TABLET ORAL at 18:05

## 2024-07-03 ASSESSMENT — PAIN DESCRIPTION - ORIENTATION
ORIENTATION: UPPER
ORIENTATION: UPPER

## 2024-07-03 ASSESSMENT — PAIN SCALES - GENERAL
PAINLEVEL_OUTOF10: 3
PAINLEVEL_OUTOF10: 3

## 2024-07-03 ASSESSMENT — PAIN DESCRIPTION - DESCRIPTORS
DESCRIPTORS: DISCOMFORT;PRESSURE;HEAVINESS
DESCRIPTORS: DISCOMFORT;SORE

## 2024-07-03 ASSESSMENT — PULMONARY FUNCTION TESTS
PEFR_L/MIN: 350
PEFR_L/MIN: 340

## 2024-07-03 ASSESSMENT — PAIN DESCRIPTION - LOCATION
LOCATION: ABDOMEN
LOCATION: ABDOMEN

## 2024-07-03 NOTE — CARE COORDINATION
KULWINDER note: Order noted for home nebulizer.  Referral given to [facility] and equipment will be delivered to patient's room today for anticipated morning discharge.  No other discharge needs, patient will return home with her SO.

## 2024-07-03 NOTE — PLAN OF CARE
Problem: Safety - Adult  Goal: Free from fall injury  7/3/2024 1015 by Akua Acuna RN  Outcome: Progressing     Problem: Pain  Goal: Verbalizes/displays adequate comfort level or baseline comfort level  Outcome: Progressing

## 2024-07-03 NOTE — PLAN OF CARE
Problem: Discharge Planning  Goal: Discharge to home or other facility with appropriate resources  7/2/2024 2132 by Blaine Tomorrmary, RN  Outcome: Progressing     Problem: Safety - Adult  Goal: Free from fall injury  7/2/2024 2132 by Mele Valladaresorrow, RN  Outcome: Progressing

## 2024-07-04 LAB
ECHO AO ASC DIAM: 2.9 CM
ECHO AO ASCENDING AORTA INDEX: 1.18 CM/M2
ECHO AV AREA PEAK VELOCITY: 2.5 CM2
ECHO AV AREA VTI: 2.6 CM2
ECHO AV AREA/BSA PEAK VELOCITY: 1 CM2/M2
ECHO AV AREA/BSA VTI: 1.1 CM2/M2
ECHO AV CUSP MM: 2 CM
ECHO AV MEAN GRADIENT: 5 MMHG
ECHO AV MEAN VELOCITY: 1.1 M/S
ECHO AV PEAK GRADIENT: 10 MMHG
ECHO AV PEAK VELOCITY: 1.6 M/S
ECHO AV VELOCITY RATIO: 0.81
ECHO AV VTI: 22.6 CM
ECHO BSA: 2.58 M2
ECHO EST RA PRESSURE: 3 MMHG
ECHO LA DIAMETER INDEX: 1.71 CM/M2
ECHO LA DIAMETER: 4.2 CM
ECHO LA VOL A-L A2C: 40 ML (ref 22–52)
ECHO LA VOL A-L A4C: 38 ML (ref 22–52)
ECHO LA VOL BP: 36 ML (ref 22–52)
ECHO LA VOL MOD A2C: 38 ML (ref 22–52)
ECHO LA VOL MOD A4C: 34 ML (ref 22–52)
ECHO LA VOL/BSA BIPLANE: 15 ML/M2 (ref 16–34)
ECHO LA VOLUME AREA LENGTH: 40 ML
ECHO LA VOLUME INDEX A-L A2C: 16 ML/M2 (ref 16–34)
ECHO LA VOLUME INDEX A-L A4C: 16 ML/M2 (ref 16–34)
ECHO LA VOLUME INDEX AREA LENGTH: 16 ML/M2 (ref 16–34)
ECHO LA VOLUME INDEX MOD A2C: 16 ML/M2 (ref 16–34)
ECHO LA VOLUME INDEX MOD A4C: 14 ML/M2 (ref 16–34)
ECHO LV EDV A2C: 87 ML
ECHO LV EDV A4C: 80 ML
ECHO LV EDV BP: 93 ML (ref 56–104)
ECHO LV EDV INDEX A4C: 33 ML/M2
ECHO LV EDV INDEX BP: 38 ML/M2
ECHO LV EDV NDEX A2C: 36 ML/M2
ECHO LV EJECTION FRACTION A2C: 67 %
ECHO LV EJECTION FRACTION A4C: 47 %
ECHO LV EJECTION FRACTION BIPLANE: 57 % (ref 55–100)
ECHO LV ESV A2C: 29 ML
ECHO LV ESV A4C: 43 ML
ECHO LV ESV BP: 40 ML (ref 19–49)
ECHO LV ESV INDEX A2C: 12 ML/M2
ECHO LV ESV INDEX A4C: 18 ML/M2
ECHO LV ESV INDEX BP: 16 ML/M2
ECHO LV FRACTIONAL SHORTENING: 30 % (ref 28–44)
ECHO LV INTERNAL DIMENSION DIASTOLE INDEX: 2.04 CM/M2
ECHO LV INTERNAL DIMENSION DIASTOLIC: 5 CM (ref 3.9–5.3)
ECHO LV INTERNAL DIMENSION SYSTOLIC INDEX: 1.43 CM/M2
ECHO LV INTERNAL DIMENSION SYSTOLIC: 3.5 CM
ECHO LV ISOVOLUMETRIC RELAXATION TIME (IVRT): 122.7 MS
ECHO LV IVSD: 1.2 CM (ref 0.6–0.9)
ECHO LV IVSS: 1.5 CM
ECHO LV MASS 2D: 207.1 G (ref 67–162)
ECHO LV MASS INDEX 2D: 84.5 G/M2 (ref 43–95)
ECHO LV POSTERIOR WALL DIASTOLIC: 1 CM (ref 0.6–0.9)
ECHO LV POSTERIOR WALL SYSTOLIC: 1.1 CM
ECHO LV RELATIVE WALL THICKNESS RATIO: 0.4
ECHO LVOT AREA: 3.1 CM2
ECHO LVOT AV VTI INDEX: 0.85
ECHO LVOT DIAM: 2 CM
ECHO LVOT MEAN GRADIENT: 3 MMHG
ECHO LVOT PEAK GRADIENT: 6 MMHG
ECHO LVOT PEAK VELOCITY: 1.3 M/S
ECHO LVOT STROKE VOLUME INDEX: 24.5 ML/M2
ECHO LVOT SV: 60 ML
ECHO LVOT VTI: 19.1 CM
ECHO MV A VELOCITY: 0.83 M/S
ECHO MV AREA PHT: 6.8 CM2
ECHO MV AREA VTI: 3.8 CM2
ECHO MV E DECELERATION TIME (DT): 145.3 MS
ECHO MV E VELOCITY: 0.48 M/S
ECHO MV E/A RATIO: 0.58
ECHO MV LVOT VTI INDEX: 0.82
ECHO MV MAX VELOCITY: 1 M/S
ECHO MV MEAN GRADIENT: 2 MMHG
ECHO MV MEAN VELOCITY: 0.6 M/S
ECHO MV PEAK GRADIENT: 4 MMHG
ECHO MV PRESSURE HALF TIME (PHT): 32.2 MS
ECHO MV VTI: 15.6 CM
ECHO PV MAX VELOCITY: 1.3 M/S
ECHO PV MEAN GRADIENT: 3 MMHG
ECHO PV MEAN VELOCITY: 0.8 M/S
ECHO PV PEAK GRADIENT: 6 MMHG
ECHO PV VTI: 20.5 CM
ECHO RIGHT VENTRICULAR SYSTOLIC PRESSURE (RVSP): 23 MMHG
ECHO RV INTERNAL DIMENSION: 3.3 CM
ECHO RV LONGITUDINAL DIMENSION: 6.9 CM
ECHO RV MID DIMENSION: 1.8 CM
ECHO TV REGURGITANT MAX VELOCITY: 2.21 M/S
ECHO TV REGURGITANT PEAK GRADIENT: 20 MMHG
HCG SERPL QL: NEGATIVE
TROPONIN I SERPL HS-MCNC: 12 NG/L (ref 0–9)
TROPONIN I SERPL HS-MCNC: 15 NG/L (ref 0–9)

## 2024-07-04 PROCEDURE — 94640 AIRWAY INHALATION TREATMENT: CPT

## 2024-07-04 PROCEDURE — 6370000000 HC RX 637 (ALT 250 FOR IP): Performed by: FAMILY MEDICINE

## 2024-07-04 PROCEDURE — 93306 TTE W/DOPPLER COMPLETE: CPT | Performed by: INTERNAL MEDICINE

## 2024-07-04 PROCEDURE — 6360000002 HC RX W HCPCS: Performed by: FAMILY MEDICINE

## 2024-07-04 PROCEDURE — 84703 CHORIONIC GONADOTROPIN ASSAY: CPT

## 2024-07-04 PROCEDURE — 93005 ELECTROCARDIOGRAM TRACING: CPT | Performed by: NURSE PRACTITIONER

## 2024-07-04 PROCEDURE — 36415 COLL VENOUS BLD VENIPUNCTURE: CPT

## 2024-07-04 PROCEDURE — APPSS30 APP SPLIT SHARED TIME 16-30 MINUTES: Performed by: NURSE PRACTITIONER

## 2024-07-04 PROCEDURE — 6370000000 HC RX 637 (ALT 250 FOR IP): Performed by: NURSE PRACTITIONER

## 2024-07-04 PROCEDURE — 99232 SBSQ HOSP IP/OBS MODERATE 35: CPT | Performed by: INTERNAL MEDICINE

## 2024-07-04 PROCEDURE — 1200000000 HC SEMI PRIVATE

## 2024-07-04 PROCEDURE — 84484 ASSAY OF TROPONIN QUANT: CPT

## 2024-07-04 PROCEDURE — 2580000003 HC RX 258: Performed by: FAMILY MEDICINE

## 2024-07-04 RX ORDER — REGADENOSON 0.08 MG/ML
0.4 INJECTION, SOLUTION INTRAVENOUS
Status: COMPLETED | OUTPATIENT
Start: 2024-07-04 | End: 2024-07-06

## 2024-07-04 RX ADMIN — IPRATROPIUM BROMIDE AND ALBUTEROL SULFATE 1 DOSE: 2.5; .5 SOLUTION RESPIRATORY (INHALATION) at 15:12

## 2024-07-04 RX ADMIN — ENOXAPARIN SODIUM 30 MG: 100 INJECTION SUBCUTANEOUS at 20:49

## 2024-07-04 RX ADMIN — Medication 10 ML: at 08:28

## 2024-07-04 RX ADMIN — ACETAMINOPHEN 650 MG: 325 TABLET ORAL at 21:09

## 2024-07-04 RX ADMIN — IPRATROPIUM BROMIDE AND ALBUTEROL SULFATE 1 DOSE: 2.5; .5 SOLUTION RESPIRATORY (INHALATION) at 19:17

## 2024-07-04 RX ADMIN — ACETAMINOPHEN 650 MG: 325 TABLET ORAL at 08:30

## 2024-07-04 RX ADMIN — ACETAZOLAMIDE 250 MG: 250 TABLET ORAL at 20:49

## 2024-07-04 RX ADMIN — LISINOPRIL 40 MG: 20 TABLET ORAL at 08:26

## 2024-07-04 RX ADMIN — ACETAZOLAMIDE 250 MG: 250 TABLET ORAL at 08:26

## 2024-07-04 RX ADMIN — ENOXAPARIN SODIUM 30 MG: 100 INJECTION SUBCUTANEOUS at 08:25

## 2024-07-04 RX ADMIN — HYDROCHLOROTHIAZIDE 25 MG: 25 TABLET ORAL at 08:26

## 2024-07-04 RX ADMIN — IPRATROPIUM BROMIDE AND ALBUTEROL SULFATE 1 DOSE: 2.5; .5 SOLUTION RESPIRATORY (INHALATION) at 09:28

## 2024-07-04 RX ADMIN — Medication 10 ML: at 20:49

## 2024-07-04 RX ADMIN — IPRATROPIUM BROMIDE AND ALBUTEROL SULFATE 1 DOSE: 2.5; .5 SOLUTION RESPIRATORY (INHALATION) at 04:32

## 2024-07-04 RX ADMIN — DULOXETINE HYDROCHLORIDE 60 MG: 60 CAPSULE, DELAYED RELEASE ORAL at 08:26

## 2024-07-04 RX ADMIN — PREDNISONE 40 MG: 20 TABLET ORAL at 08:26

## 2024-07-04 ASSESSMENT — PULMONARY FUNCTION TESTS
PEFR_L/MIN: 400
PEFR_L/MIN: 360

## 2024-07-04 ASSESSMENT — PAIN SCALES - GENERAL
PAINLEVEL_OUTOF10: 0
PAINLEVEL_OUTOF10: 0
PAINLEVEL_OUTOF10: 5
PAINLEVEL_OUTOF10: 3

## 2024-07-04 ASSESSMENT — PAIN DESCRIPTION - ORIENTATION: ORIENTATION: RIGHT;LEFT

## 2024-07-04 ASSESSMENT — PAIN DESCRIPTION - ONSET: ONSET: GRADUAL

## 2024-07-04 ASSESSMENT — PAIN DESCRIPTION - FREQUENCY: FREQUENCY: INTERMITTENT

## 2024-07-04 ASSESSMENT — PAIN DESCRIPTION - PAIN TYPE: TYPE: ACUTE PAIN

## 2024-07-04 ASSESSMENT — PAIN DESCRIPTION - LOCATION
LOCATION: BACK;OTHER (COMMENT)
LOCATION: RIB CAGE

## 2024-07-04 ASSESSMENT — PAIN DESCRIPTION - DESCRIPTORS
DESCRIPTORS: ACHING
DESCRIPTORS: ACHING;DULL

## 2024-07-04 ASSESSMENT — PAIN - FUNCTIONAL ASSESSMENT: PAIN_FUNCTIONAL_ASSESSMENT: ACTIVITIES ARE NOT PREVENTED

## 2024-07-04 NOTE — PLAN OF CARE
Problem: Discharge Planning  Goal: Discharge to home or other facility with appropriate resources  Outcome: Progressing     Problem: Safety - Adult  Goal: Free from fall injury  7/3/2024 2139 by John Valladares, RN  Outcome: Progressing     Problem: Pain  Goal: Verbalizes/displays adequate comfort level or baseline comfort level  7/3/2024 2139 by Mele Valladaresorrmary, RN  Outcome: Progressing

## 2024-07-05 ENCOUNTER — APPOINTMENT (OUTPATIENT)
Dept: NUCLEAR MEDICINE | Age: 49
DRG: 202 | End: 2024-07-05
Payer: COMMERCIAL

## 2024-07-05 LAB
EKG ATRIAL RATE: 98 BPM
EKG P AXIS: 51 DEGREES
EKG P-R INTERVAL: 134 MS
EKG Q-T INTERVAL: 362 MS
EKG QRS DURATION: 80 MS
EKG QTC CALCULATION (BAZETT): 462 MS
EKG R AXIS: 2 DEGREES
EKG T AXIS: 95 DEGREES
EKG VENTRICULAR RATE: 98 BPM
TROPONIN I SERPL HS-MCNC: 13 NG/L (ref 0–9)

## 2024-07-05 PROCEDURE — A9500 TC99M SESTAMIBI: HCPCS | Performed by: RADIOLOGY

## 2024-07-05 PROCEDURE — 6360000002 HC RX W HCPCS: Performed by: FAMILY MEDICINE

## 2024-07-05 PROCEDURE — 6370000000 HC RX 637 (ALT 250 FOR IP): Performed by: FAMILY MEDICINE

## 2024-07-05 PROCEDURE — 2580000003 HC RX 258: Performed by: FAMILY MEDICINE

## 2024-07-05 PROCEDURE — 6370000000 HC RX 637 (ALT 250 FOR IP): Performed by: INTERNAL MEDICINE

## 2024-07-05 PROCEDURE — 84484 ASSAY OF TROPONIN QUANT: CPT

## 2024-07-05 PROCEDURE — 99232 SBSQ HOSP IP/OBS MODERATE 35: CPT | Performed by: NURSE PRACTITIONER

## 2024-07-05 PROCEDURE — 3430000000 HC RX DIAGNOSTIC RADIOPHARMACEUTICAL: Performed by: RADIOLOGY

## 2024-07-05 PROCEDURE — 93010 ELECTROCARDIOGRAM REPORT: CPT | Performed by: INTERNAL MEDICINE

## 2024-07-05 PROCEDURE — 6360000002 HC RX W HCPCS: Performed by: INTERNAL MEDICINE

## 2024-07-05 PROCEDURE — 93017 CV STRESS TEST TRACING ONLY: CPT

## 2024-07-05 PROCEDURE — 6370000000 HC RX 637 (ALT 250 FOR IP): Performed by: NURSE PRACTITIONER

## 2024-07-05 PROCEDURE — 36415 COLL VENOUS BLD VENIPUNCTURE: CPT

## 2024-07-05 PROCEDURE — 2580000003 HC RX 258: Performed by: INTERNAL MEDICINE

## 2024-07-05 PROCEDURE — 78452 HT MUSCLE IMAGE SPECT MULT: CPT

## 2024-07-05 PROCEDURE — 1200000000 HC SEMI PRIVATE

## 2024-07-05 PROCEDURE — 94640 AIRWAY INHALATION TREATMENT: CPT

## 2024-07-05 RX ORDER — TETRAKIS(2-METHOXYISOBUTYLISOCYANIDE)COPPER(I) TETRAFLUOROBORATE 1 MG/ML
10 INJECTION, POWDER, LYOPHILIZED, FOR SOLUTION INTRAVENOUS
Status: COMPLETED | OUTPATIENT
Start: 2024-07-05 | End: 2024-07-05

## 2024-07-05 RX ADMIN — IPRATROPIUM BROMIDE AND ALBUTEROL SULFATE 1 DOSE: 2.5; .5 SOLUTION RESPIRATORY (INHALATION) at 06:51

## 2024-07-05 RX ADMIN — ACETAZOLAMIDE 250 MG: 250 TABLET ORAL at 13:08

## 2024-07-05 RX ADMIN — ACETAZOLAMIDE 250 MG: 250 TABLET ORAL at 21:12

## 2024-07-05 RX ADMIN — IPRATROPIUM BROMIDE AND ALBUTEROL SULFATE 1 DOSE: 2.5; .5 SOLUTION RESPIRATORY (INHALATION) at 16:58

## 2024-07-05 RX ADMIN — ENOXAPARIN SODIUM 30 MG: 100 INJECTION SUBCUTANEOUS at 13:21

## 2024-07-05 RX ADMIN — ALUMINUM HYDROXIDE, MAGNESIUM HYDROXIDE, AND SIMETHICONE: 200; 200; 20 SUSPENSION ORAL at 13:19

## 2024-07-05 RX ADMIN — Medication 10 MILLICURIE: at 08:45

## 2024-07-05 RX ADMIN — SODIUM CHLORIDE, PRESERVATIVE FREE 40 MG: 5 INJECTION INTRAVENOUS at 13:11

## 2024-07-05 RX ADMIN — HYDROCHLOROTHIAZIDE 25 MG: 25 TABLET ORAL at 13:08

## 2024-07-05 RX ADMIN — PREDNISONE 40 MG: 20 TABLET ORAL at 13:08

## 2024-07-05 RX ADMIN — FERROUS SULFATE TAB 325 MG (65 MG ELEMENTAL FE) 325 MG: 325 (65 FE) TAB at 13:08

## 2024-07-05 RX ADMIN — LISINOPRIL 40 MG: 20 TABLET ORAL at 13:08

## 2024-07-05 RX ADMIN — ENOXAPARIN SODIUM 30 MG: 100 INJECTION SUBCUTANEOUS at 21:12

## 2024-07-05 RX ADMIN — IPRATROPIUM BROMIDE AND ALBUTEROL SULFATE 1 DOSE: 2.5; .5 SOLUTION RESPIRATORY (INHALATION) at 14:24

## 2024-07-05 RX ADMIN — DULOXETINE HYDROCHLORIDE 60 MG: 60 CAPSULE, DELAYED RELEASE ORAL at 13:08

## 2024-07-05 RX ADMIN — Medication 10 ML: at 21:13

## 2024-07-05 RX ADMIN — Medication 10 ML: at 13:11

## 2024-07-05 RX ADMIN — ACETAMINOPHEN 650 MG: 325 TABLET ORAL at 21:11

## 2024-07-05 ASSESSMENT — PAIN DESCRIPTION - PAIN TYPE: TYPE: ACUTE PAIN

## 2024-07-05 ASSESSMENT — PAIN DESCRIPTION - LOCATION
LOCATION: BACK
LOCATION: BACK;OTHER (COMMENT)

## 2024-07-05 ASSESSMENT — PAIN DESCRIPTION - DESCRIPTORS
DESCRIPTORS: ACHING
DESCRIPTORS: DISCOMFORT;TENDER

## 2024-07-05 ASSESSMENT — PAIN SCALES - GENERAL
PAINLEVEL_OUTOF10: 2
PAINLEVEL_OUTOF10: 2
PAINLEVEL_OUTOF10: 0

## 2024-07-05 NOTE — PLAN OF CARE
Problem: Discharge Planning  Goal: Discharge to home or other facility with appropriate resources  7/5/2024 1029 by Akua Acuna, RN  Outcome: Progressing     Problem: Safety - Adult  Goal: Free from fall injury  7/5/2024 1029 by Akua Acuna, RN  Outcome: Progressing     Problem: Pain  Goal: Verbalizes/displays adequate comfort level or baseline comfort level  7/5/2024 1029 by Akua Acuna, RN  Outcome: Progressing

## 2024-07-05 NOTE — PLAN OF CARE
Problem: Discharge Planning  Goal: Discharge to home or other facility with appropriate resources  7/4/2024 2106 by Shin Chairez RN  Outcome: Progressing  7/4/2024 1250 by Teodora Amaro, RN  Outcome: Progressing     Problem: Safety - Adult  Goal: Free from fall injury  7/4/2024 2106 by Shin Chairez RN  Outcome: Progressing  7/4/2024 1250 by Teodora Amaro, RN  Outcome: Progressing     Problem: Pain  Goal: Verbalizes/displays adequate comfort level or baseline comfort level  7/4/2024 2106 by Shin Chairez RN  Outcome: Progressing  7/4/2024 1250 by Teodora Amaro, RN  Outcome: Progressing

## 2024-07-05 NOTE — CARE COORDINATION
Cm note; Pt for stress test today with plans to discharge home after if negative.  Home nebulizer machine ordered on 7/3 as pt was anticipated to discharge home yesterday.  No further needs identified by CM.  Family will provide transportation home.

## 2024-07-06 ENCOUNTER — HOSPITAL ENCOUNTER (INPATIENT)
Age: 49
Discharge: HOME | DRG: 202 | End: 2024-07-08
Payer: COMMERCIAL

## 2024-07-06 PROBLEM — R94.39 ABNORMAL STRESS TEST: Status: ACTIVE | Noted: 2024-07-06

## 2024-07-06 LAB
ECHO BSA: 2.58 M2
NUC STRESS EJECTION FRACTION: 58 %
STRESS BASELINE DIAS BP: 81 MMHG
STRESS BASELINE HR: 92 BPM
STRESS BASELINE SYS BP: 126 MMHG
STRESS O2 SAT REST: 98 %
STRESS STAGE 1 BP: NORMAL MMHG
STRESS STAGE 1 COMMENTS: NORMAL
STRESS STAGE 1 DURATION: 1 MIN:SEC
STRESS STAGE 1 HR: 103 BPM
STRESS STAGE RECOVERY 1 BP: NORMAL MMHG
STRESS STAGE RECOVERY 1 COMMENTS: NORMAL
STRESS STAGE RECOVERY 1 DURATION: 1 MIN:SEC
STRESS STAGE RECOVERY 1 HR: 107 BPM
STRESS STAGE RECOVERY 2 BP: NORMAL MMHG
STRESS STAGE RECOVERY 2 COMMENTS: NORMAL
STRESS STAGE RECOVERY 2 DURATION: 1 MIN:SEC
STRESS STAGE RECOVERY 2 HR: 103 BPM
STRESS STAGE RECOVERY 3 BP: NORMAL MMHG
STRESS STAGE RECOVERY 3 COMMENTS: NORMAL
STRESS STAGE RECOVERY 3 DURATION: 1 MIN:SEC
STRESS STAGE RECOVERY 3 HR: 105 BPM
STRESS STAGE RECOVERY 4 BP: NORMAL MMHG
STRESS STAGE RECOVERY 4 COMMENTS: NORMAL
STRESS STAGE RECOVERY 4 DURATION: 1 MIN:SEC
STRESS STAGE RECOVERY 4 HR: 92 BPM
STRESS TARGET HR: 171 BPM

## 2024-07-06 PROCEDURE — 6370000000 HC RX 637 (ALT 250 FOR IP): Performed by: FAMILY MEDICINE

## 2024-07-06 PROCEDURE — 94640 AIRWAY INHALATION TREATMENT: CPT

## 2024-07-06 PROCEDURE — 1200000000 HC SEMI PRIVATE

## 2024-07-06 PROCEDURE — 2580000003 HC RX 258: Performed by: FAMILY MEDICINE

## 2024-07-06 PROCEDURE — 93016 CV STRESS TEST SUPVJ ONLY: CPT | Performed by: INTERNAL MEDICINE

## 2024-07-06 PROCEDURE — 6360000002 HC RX W HCPCS: Performed by: NURSE PRACTITIONER

## 2024-07-06 PROCEDURE — 2580000003 HC RX 258: Performed by: INTERNAL MEDICINE

## 2024-07-06 PROCEDURE — A9500 TC99M SESTAMIBI: HCPCS | Performed by: RADIOLOGY

## 2024-07-06 PROCEDURE — 78452 HT MUSCLE IMAGE SPECT MULT: CPT | Performed by: INTERNAL MEDICINE

## 2024-07-06 PROCEDURE — APPSS30 APP SPLIT SHARED TIME 16-30 MINUTES: Performed by: NURSE PRACTITIONER

## 2024-07-06 PROCEDURE — 93018 CV STRESS TEST I&R ONLY: CPT | Performed by: INTERNAL MEDICINE

## 2024-07-06 PROCEDURE — 6360000002 HC RX W HCPCS: Performed by: INTERNAL MEDICINE

## 2024-07-06 PROCEDURE — 99232 SBSQ HOSP IP/OBS MODERATE 35: CPT | Performed by: INTERNAL MEDICINE

## 2024-07-06 PROCEDURE — 6360000002 HC RX W HCPCS: Performed by: FAMILY MEDICINE

## 2024-07-06 PROCEDURE — 3430000000 HC RX DIAGNOSTIC RADIOPHARMACEUTICAL: Performed by: RADIOLOGY

## 2024-07-06 RX ORDER — DIPHENHYDRAMINE HYDROCHLORIDE 50 MG/ML
25 INJECTION INTRAMUSCULAR; INTRAVENOUS ONCE
Status: COMPLETED | OUTPATIENT
Start: 2024-07-06 | End: 2024-07-06

## 2024-07-06 RX ORDER — METOCLOPRAMIDE HYDROCHLORIDE 5 MG/ML
5 INJECTION INTRAMUSCULAR; INTRAVENOUS ONCE
Status: COMPLETED | OUTPATIENT
Start: 2024-07-06 | End: 2024-07-06

## 2024-07-06 RX ORDER — 0.9 % SODIUM CHLORIDE 0.9 %
500 INTRAVENOUS SOLUTION INTRAVENOUS ONCE
Status: COMPLETED | OUTPATIENT
Start: 2024-07-06 | End: 2024-07-06

## 2024-07-06 RX ORDER — DICYCLOMINE HCL 20 MG
20 TABLET ORAL
Status: DISCONTINUED | OUTPATIENT
Start: 2024-07-06 | End: 2024-07-08 | Stop reason: HOSPADM

## 2024-07-06 RX ORDER — TETRAKIS(2-METHOXYISOBUTYLISOCYANIDE)COPPER(I) TETRAFLUOROBORATE 1 MG/ML
30 INJECTION, POWDER, LYOPHILIZED, FOR SOLUTION INTRAVENOUS
Status: COMPLETED | OUTPATIENT
Start: 2024-07-06 | End: 2024-07-06

## 2024-07-06 RX ADMIN — METOCLOPRAMIDE 5 MG: 5 INJECTION, SOLUTION INTRAMUSCULAR; INTRAVENOUS at 20:45

## 2024-07-06 RX ADMIN — ENOXAPARIN SODIUM 30 MG: 100 INJECTION SUBCUTANEOUS at 20:45

## 2024-07-06 RX ADMIN — DICYCLOMINE HYDROCHLORIDE 20 MG: 20 TABLET ORAL at 20:45

## 2024-07-06 RX ADMIN — ACETAZOLAMIDE 250 MG: 250 TABLET ORAL at 20:45

## 2024-07-06 RX ADMIN — Medication 10 ML: at 20:45

## 2024-07-06 RX ADMIN — REGADENOSON 0.4 MG: 0.08 INJECTION, SOLUTION INTRAVENOUS at 09:49

## 2024-07-06 RX ADMIN — SODIUM CHLORIDE, PRESERVATIVE FREE 40 MG: 5 INJECTION INTRAVENOUS at 12:08

## 2024-07-06 RX ADMIN — IPRATROPIUM BROMIDE AND ALBUTEROL SULFATE 1 DOSE: 2.5; .5 SOLUTION RESPIRATORY (INHALATION) at 13:13

## 2024-07-06 RX ADMIN — Medication 30 MILLICURIE: at 09:53

## 2024-07-06 RX ADMIN — IPRATROPIUM BROMIDE AND ALBUTEROL SULFATE 1 DOSE: 2.5; .5 SOLUTION RESPIRATORY (INHALATION) at 07:16

## 2024-07-06 RX ADMIN — ACETAMINOPHEN 650 MG: 325 TABLET ORAL at 12:09

## 2024-07-06 RX ADMIN — IPRATROPIUM BROMIDE AND ALBUTEROL SULFATE 1 DOSE: 2.5; .5 SOLUTION RESPIRATORY (INHALATION) at 18:09

## 2024-07-06 RX ADMIN — SODIUM CHLORIDE 500 ML: 9 INJECTION, SOLUTION INTRAVENOUS at 20:56

## 2024-07-06 RX ADMIN — DIPHENHYDRAMINE HYDROCHLORIDE 25 MG: 50 INJECTION INTRAMUSCULAR; INTRAVENOUS at 20:45

## 2024-07-06 ASSESSMENT — PAIN SCALES - GENERAL: PAINLEVEL_OUTOF10: 4

## 2024-07-06 NOTE — PLAN OF CARE
Problem: Discharge Planning  Goal: Discharge to home or other facility with appropriate resources  7/6/2024 1147 by Evelyn Omalley RN  Outcome: Progressing  7/6/2024 0034 by Shabana Ford RN  Outcome: Progressing     Problem: Safety - Adult  Goal: Free from fall injury  7/6/2024 1147 by Evelyn Omalley RN  Outcome: Progressing  7/6/2024 0034 by Shabana Ford RN  Outcome: Progressing     Problem: Pain  Goal: Verbalizes/displays adequate comfort level or baseline comfort level  7/6/2024 1147 by Evelyn Omalley RN  Outcome: Progressing  7/6/2024 0034 by Shabana Ford RN  Outcome: Progressing

## 2024-07-07 LAB
ANION GAP SERPL CALCULATED.3IONS-SCNC: 15 MMOL/L (ref 7–16)
BASOPHILS # BLD: 0 K/UL (ref 0–0.2)
BASOPHILS NFR BLD: 0 % (ref 0–2)
BUN SERPL-MCNC: 24 MG/DL (ref 6–20)
CALCIUM SERPL-MCNC: 8.8 MG/DL (ref 8.6–10.2)
CHLORIDE SERPL-SCNC: 102 MMOL/L (ref 98–107)
CO2 SERPL-SCNC: 20 MMOL/L (ref 22–29)
CREAT SERPL-MCNC: 1 MG/DL (ref 0.5–1)
EOSINOPHIL # BLD: 0 K/UL (ref 0.05–0.5)
EOSINOPHILS RELATIVE PERCENT: 0 % (ref 0–6)
ERYTHROCYTE [DISTWIDTH] IN BLOOD BY AUTOMATED COUNT: 20.6 % (ref 11.5–15)
GFR, ESTIMATED: 69 ML/MIN/1.73M2
GLUCOSE SERPL-MCNC: 126 MG/DL (ref 74–99)
HCG UR QL: NEGATIVE
HCT VFR BLD AUTO: 37.7 % (ref 34–48)
HGB BLD-MCNC: 10.6 G/DL (ref 11.5–15.5)
LYMPHOCYTES NFR BLD: 2.15 K/UL (ref 1.5–4)
LYMPHOCYTES RELATIVE PERCENT: 10 % (ref 20–42)
MCH RBC QN AUTO: 18.1 PG (ref 26–35)
MCHC RBC AUTO-ENTMCNC: 28.1 G/DL (ref 32–34.5)
MCV RBC AUTO: 64.3 FL (ref 80–99.9)
MONOCYTES NFR BLD: 1.96 K/UL (ref 0.1–0.95)
MONOCYTES NFR BLD: 9 % (ref 2–12)
NEUTROPHILS NFR BLD: 82 % (ref 43–80)
NEUTS SEG NFR BLD: 18.19 K/UL (ref 1.8–7.3)
PLATELET, FLUORESCENCE: 519 K/UL (ref 130–450)
PMV BLD AUTO: 10.5 FL (ref 7–12)
POTASSIUM SERPL-SCNC: 3.5 MMOL/L (ref 3.5–5)
RBC # BLD AUTO: 5.86 M/UL (ref 3.5–5.5)
RBC # BLD: ABNORMAL 10*6/UL
SODIUM SERPL-SCNC: 137 MMOL/L (ref 132–146)
WBC OTHER # BLD: 22.6 K/UL (ref 4.5–11.5)

## 2024-07-07 PROCEDURE — 2580000003 HC RX 258: Performed by: FAMILY MEDICINE

## 2024-07-07 PROCEDURE — 84703 CHORIONIC GONADOTROPIN ASSAY: CPT

## 2024-07-07 PROCEDURE — 85025 COMPLETE CBC W/AUTO DIFF WBC: CPT

## 2024-07-07 PROCEDURE — 36415 COLL VENOUS BLD VENIPUNCTURE: CPT

## 2024-07-07 PROCEDURE — 80048 BASIC METABOLIC PNL TOTAL CA: CPT

## 2024-07-07 PROCEDURE — 6370000000 HC RX 637 (ALT 250 FOR IP): Performed by: NURSE PRACTITIONER

## 2024-07-07 PROCEDURE — 94640 AIRWAY INHALATION TREATMENT: CPT

## 2024-07-07 PROCEDURE — 1200000000 HC SEMI PRIVATE

## 2024-07-07 PROCEDURE — 6370000000 HC RX 637 (ALT 250 FOR IP): Performed by: FAMILY MEDICINE

## 2024-07-07 PROCEDURE — 6370000000 HC RX 637 (ALT 250 FOR IP): Performed by: INTERNAL MEDICINE

## 2024-07-07 PROCEDURE — 99223 1ST HOSP IP/OBS HIGH 75: CPT | Performed by: INTERNAL MEDICINE

## 2024-07-07 PROCEDURE — 6360000002 HC RX W HCPCS: Performed by: INTERNAL MEDICINE

## 2024-07-07 PROCEDURE — APPSS30 APP SPLIT SHARED TIME 16-30 MINUTES: Performed by: NURSE PRACTITIONER

## 2024-07-07 PROCEDURE — 6360000002 HC RX W HCPCS: Performed by: FAMILY MEDICINE

## 2024-07-07 PROCEDURE — 2580000003 HC RX 258: Performed by: INTERNAL MEDICINE

## 2024-07-07 PROCEDURE — 99232 SBSQ HOSP IP/OBS MODERATE 35: CPT | Performed by: INTERNAL MEDICINE

## 2024-07-07 RX ORDER — DIPHENHYDRAMINE HCL 25 MG
50 TABLET ORAL ONCE
Status: COMPLETED | OUTPATIENT
Start: 2024-07-08 | End: 2024-07-08

## 2024-07-07 RX ORDER — CLOPIDOGREL BISULFATE 75 MG/1
75 TABLET ORAL DAILY
Status: DISCONTINUED | OUTPATIENT
Start: 2024-07-07 | End: 2024-07-08 | Stop reason: HOSPADM

## 2024-07-07 RX ORDER — ATORVASTATIN CALCIUM 40 MG/1
40 TABLET, FILM COATED ORAL NIGHTLY
Status: DISCONTINUED | OUTPATIENT
Start: 2024-07-07 | End: 2024-07-08 | Stop reason: HOSPADM

## 2024-07-07 RX ADMIN — SODIUM CHLORIDE, PRESERVATIVE FREE 40 MG: 5 INJECTION INTRAVENOUS at 08:33

## 2024-07-07 RX ADMIN — FERROUS SULFATE TAB 325 MG (65 MG ELEMENTAL FE) 325 MG: 325 (65 FE) TAB at 08:26

## 2024-07-07 RX ADMIN — Medication 10 ML: at 08:28

## 2024-07-07 RX ADMIN — CLOPIDOGREL BISULFATE 75 MG: 75 TABLET ORAL at 15:47

## 2024-07-07 RX ADMIN — DICYCLOMINE HYDROCHLORIDE 20 MG: 20 TABLET ORAL at 20:36

## 2024-07-07 RX ADMIN — DICYCLOMINE HYDROCHLORIDE 20 MG: 20 TABLET ORAL at 10:43

## 2024-07-07 RX ADMIN — IPRATROPIUM BROMIDE AND ALBUTEROL SULFATE 1 DOSE: 2.5; .5 SOLUTION RESPIRATORY (INHALATION) at 14:48

## 2024-07-07 RX ADMIN — DICYCLOMINE HYDROCHLORIDE 20 MG: 20 TABLET ORAL at 15:47

## 2024-07-07 RX ADMIN — ACETAZOLAMIDE 250 MG: 250 TABLET ORAL at 08:26

## 2024-07-07 RX ADMIN — HYDROCHLOROTHIAZIDE 25 MG: 25 TABLET ORAL at 08:28

## 2024-07-07 RX ADMIN — ACETAZOLAMIDE 250 MG: 250 TABLET ORAL at 20:36

## 2024-07-07 RX ADMIN — PREDNISONE 50 MG: 20 TABLET ORAL at 20:39

## 2024-07-07 RX ADMIN — IPRATROPIUM BROMIDE AND ALBUTEROL SULFATE 1 DOSE: 2.5; .5 SOLUTION RESPIRATORY (INHALATION) at 09:32

## 2024-07-07 RX ADMIN — DICYCLOMINE HYDROCHLORIDE 20 MG: 20 TABLET ORAL at 06:12

## 2024-07-07 RX ADMIN — IPRATROPIUM BROMIDE AND ALBUTEROL SULFATE 1 DOSE: 2.5; .5 SOLUTION RESPIRATORY (INHALATION) at 18:48

## 2024-07-07 RX ADMIN — ENOXAPARIN SODIUM 30 MG: 100 INJECTION SUBCUTANEOUS at 08:27

## 2024-07-07 RX ADMIN — DULOXETINE HYDROCHLORIDE 60 MG: 60 CAPSULE, DELAYED RELEASE ORAL at 08:26

## 2024-07-07 RX ADMIN — PREDNISONE 40 MG: 20 TABLET ORAL at 08:26

## 2024-07-07 RX ADMIN — IPRATROPIUM BROMIDE AND ALBUTEROL SULFATE 1 DOSE: 2.5; .5 SOLUTION RESPIRATORY (INHALATION) at 04:59

## 2024-07-07 RX ADMIN — LISINOPRIL 40 MG: 20 TABLET ORAL at 08:27

## 2024-07-07 RX ADMIN — ENOXAPARIN SODIUM 30 MG: 100 INJECTION SUBCUTANEOUS at 20:36

## 2024-07-07 RX ADMIN — ATORVASTATIN CALCIUM 40 MG: 40 TABLET, FILM COATED ORAL at 20:36

## 2024-07-07 ASSESSMENT — PAIN - FUNCTIONAL ASSESSMENT: PAIN_FUNCTIONAL_ASSESSMENT: ACTIVITIES ARE NOT PREVENTED

## 2024-07-07 ASSESSMENT — PAIN SCALES - GENERAL
PAINLEVEL_OUTOF10: 5
PAINLEVEL_OUTOF10: 5

## 2024-07-07 ASSESSMENT — PAIN DESCRIPTION - LOCATION
LOCATION: ABDOMEN
LOCATION: ABDOMEN

## 2024-07-07 ASSESSMENT — PAIN DESCRIPTION - PAIN TYPE: TYPE: ACUTE PAIN

## 2024-07-07 ASSESSMENT — PAIN DESCRIPTION - DESCRIPTORS
DESCRIPTORS: CRAMPING
DESCRIPTORS: CRAMPING;ACHING;JABBING

## 2024-07-07 ASSESSMENT — PULMONARY FUNCTION TESTS: PEFR_L/MIN: 360

## 2024-07-07 NOTE — PLAN OF CARE
Problem: Discharge Planning  Goal: Discharge to home or other facility with appropriate resources  Outcome: Progressing     Problem: Safety - Adult  Goal: Free from fall injury  Outcome: Progressing     Problem: Pain  Goal: Verbalizes/displays adequate comfort level or baseline comfort level  Outcome: Progressing     Problem: Nutrition Deficit:  Goal: Optimize nutritional status  Outcome: Progressing     Problem: Chronic Conditions and Co-morbidities  Goal: Patient's chronic conditions and co-morbidity symptoms are monitored and maintained or improved  Outcome: Progressing

## 2024-07-07 NOTE — PLAN OF CARE
Problem: Discharge Planning  Goal: Discharge to home or other facility with appropriate resources  7/6/2024 2237 by Timothy Jovel, RN  Outcome: Progressing     Problem: Safety - Adult  Goal: Free from fall injury  7/6/2024 2237 by Timothy Jovel, RN  Outcome: Progressing     Problem: Pain  Goal: Verbalizes/displays adequate comfort level or baseline comfort level  7/6/2024 2237 by Timothy Jovel, RN  Outcome: Progressing     Problem: Nutrition Deficit:  Goal: Optimize nutritional status  Outcome: Progressing

## 2024-07-07 NOTE — PROGRESS NOTES
[facility] Hospitalist   Progress Note    Admitting Date and Time: 6/28/2024  2:05 PM  Admit Dx: Community acquired pneumonia, unspecified laterality [J18.9]    Subjective:    Pt feels slightly better today. Pt states she is typically well controlled, however noted increased SOB with significant wheezing over the last day or so. States She has televisit with physician who recommended her to present to ED 2/2 conversational dyspnea. States she did attempt using her rescue inhaler at home, however it did not seem to help. States significantly worsening with exertion. States she has had COVID x 2 and a couple bouts of pneumonia. She is currently ORA. Receiving Ceftriaxone/Doxy tolerating well. Would like some breakfast this morning.         ROS: denies fever, chills, cp, n/v, HA unless stated above.     sodium chloride flush  5-40 mL IntraVENous 2 times per day    enoxaparin  30 mg SubCUTAneous BID    ipratropium 0.5 mg-albuterol 2.5 mg  1 Dose Inhalation Q4H WA RT    cefTRIAXone (ROCEPHIN) IV  1,000 mg IntraVENous Q24H    And    doxycycline  100 mg Oral 2 times per day    acetaZOLAMIDE  250 mg Oral BID    DULoxetine  60 mg Oral Daily    ferrous sulfate  325 mg Oral Every Other Day    hydroCHLOROthiazide  25 mg Oral QAM    lisinopril  40 mg Oral Daily    predniSONE  40 mg Oral Daily     lactated ringers IV soln, ,   sodium chloride flush, 5-40 mL, PRN  sodium chloride, , PRN  acetaminophen, 650 mg, Q6H PRN   Or  acetaminophen, 650 mg, Q6H PRN  albuterol, 2.5 mg, Q4H PRN  benzonatate, 100 mg, TID PRN         Objective:    BP (!) 148/90   Pulse (!) 105   Temp 97.9 °F (36.6 °C)   Resp 22   Wt (!) 138.3 kg (305 lb)   SpO2 98%   BMI 46.38 kg/m²   General Appearance: alert and oriented to person, place and time and in no acute distress  Skin: warm and dry  Head: normocephalic and atraumatic  Eyes: pupils equal, round, and reactive to light, extraocular eye movements intact, conjunctivae normal  Neck: 
       [facility] Hospitalist   Progress Note    Admitting Date and Time: 6/28/2024  2:05 PM  Admit Dx: Dyspnea on exertion [R06.09]  New onset of congestive heart failure (HCC) [I50.9]  Asthma with acute exacerbation, unspecified asthma severity, unspecified whether persistent [J45.901]  Community acquired pneumonia, unspecified laterality [J18.9]  Community acquired bacterial pneumonia [J15.9]    Subjective:    6/30: Pt feels slightly improved this morning. States she continues to have BATISTA. Still pretty bad just ambulating to the restroom. Remains ORA.   Per RN: No new concerns noted.     7/1: Pt sleeping, awakens easily to voice. She continues to exhibit conversational dyspnea & endorses ongoing BATISTA. Denies CP, no needs at this time.    ROS: denies fever, chills, cp,  n/v, HA unless stated above.     methylPREDNISolone  40 mg IntraVENous Q12H    sodium chloride flush  5-40 mL IntraVENous 2 times per day    enoxaparin  30 mg SubCUTAneous BID    ipratropium 0.5 mg-albuterol 2.5 mg  1 Dose Inhalation Q4H WA RT    acetaZOLAMIDE  250 mg Oral BID    DULoxetine  60 mg Oral Daily    ferrous sulfate  325 mg Oral Every Other Day    hydroCHLOROthiazide  25 mg Oral QAM    lisinopril  40 mg Oral Daily     sodium chloride flush, 5-40 mL, PRN  sodium chloride, , PRN  acetaminophen, 650 mg, Q6H PRN   Or  acetaminophen, 650 mg, Q6H PRN  albuterol, 2.5 mg, Q4H PRN  benzonatate, 100 mg, TID PRN       Objective:    /87   Pulse 80   Temp 98.8 °F (37.1 °C) (Oral)   Resp 14   Ht 1.727 m (5' 8\")   Wt (!) 138.3 kg (305 lb)   SpO2 97%   BMI 46.38 kg/m²     General Appearance: alert and oriented to person, place and time and in no acute distress  Skin: warm and dry  Head: normocephalic and atraumatic  Eyes: pupils equal, round, and reactive to light, extraocular eye movements intact, conjunctivae normal  Neck: neck supple and non tender without mass   Pulmonary/Chest: clear to auscultation bilaterally- no wheezes, 
       [facility] Hospitalist   Progress Note    Admitting Date and Time: 6/28/2024  2:05 PM  Admit Dx: Dyspnea on exertion [R06.09]  New onset of congestive heart failure (HCC) [I50.9]  Asthma with acute exacerbation, unspecified asthma severity, unspecified whether persistent [J45.901]  Community acquired pneumonia, unspecified laterality [J18.9]  Community acquired bacterial pneumonia [J15.9]    Subjective:    6/30: Pt feels slightly improved this morning. States she continues to have BATISTA. Still pretty bad just ambulating to the restroom. Remains ORA.   Per RN: No new concerns noted.     7/1: Pt sleeping, awakens easily to voice. She continues to exhibit conversational dyspnea & endorses ongoing BATISTA. Denies CP, no needs at this time.    7/2: Pt awake, A&O, sitting up in bed in no apparent distress. Pt states she is breathing a little better today, less dyspneic when ambulating to bathroom. She still has conversational dyspnea, and though she is moving better-still sounds pretty tight. Will keep solumedrol BID for today and hopefully can begin to wean tomorrow. Peak flows this am: 280 pre-tx, post-tx 370, later 325 and 405 post-tx.     ROS: denies fever, chills, cp,  n/v, HA unless stated above.     methylPREDNISolone  40 mg IntraVENous Q12H    sodium chloride flush  5-40 mL IntraVENous 2 times per day    enoxaparin  30 mg SubCUTAneous BID    ipratropium 0.5 mg-albuterol 2.5 mg  1 Dose Inhalation Q4H WA RT    acetaZOLAMIDE  250 mg Oral BID    DULoxetine  60 mg Oral Daily    ferrous sulfate  325 mg Oral Every Other Day    hydroCHLOROthiazide  25 mg Oral QAM    lisinopril  40 mg Oral Daily     sodium chloride flush, 5-40 mL, PRN  sodium chloride, , PRN  acetaminophen, 650 mg, Q6H PRN   Or  acetaminophen, 650 mg, Q6H PRN  albuterol, 2.5 mg, Q4H PRN  benzonatate, 100 mg, TID PRN       Objective:    BP (!) 151/88   Pulse 87   Temp 97.1 °F (36.2 °C) (Oral)   Resp 16   Ht 1.727 m (5' 8\")   Wt (!) 138.3 kg 
       [facility] Hospitalist   Progress Note    Admitting Date and Time: 6/28/2024  2:05 PM  Admit Dx: Dyspnea on exertion [R06.09]  New onset of congestive heart failure (HCC) [I50.9]  Asthma with acute exacerbation, unspecified asthma severity, unspecified whether persistent [J45.901]  Community acquired pneumonia, unspecified laterality [J18.9]    Subjective:    Pt feels slightly improved this morning. States she continues to have BATISTA. Still pretty bad just ambulating to the restroom. Remains ORA.     Per RN: No new concerns noted.     ROS: denies fever, chills, cp,  n/v, HA unless stated above.     methylPREDNISolone  40 mg IntraVENous Q12H    sodium chloride flush  5-40 mL IntraVENous 2 times per day    enoxaparin  30 mg SubCUTAneous BID    ipratropium 0.5 mg-albuterol 2.5 mg  1 Dose Inhalation Q4H WA RT    acetaZOLAMIDE  250 mg Oral BID    DULoxetine  60 mg Oral Daily    ferrous sulfate  325 mg Oral Every Other Day    hydroCHLOROthiazide  25 mg Oral QAM    lisinopril  40 mg Oral Daily     sodium chloride flush, 5-40 mL, PRN  sodium chloride, , PRN  acetaminophen, 650 mg, Q6H PRN   Or  acetaminophen, 650 mg, Q6H PRN  albuterol, 2.5 mg, Q4H PRN  benzonatate, 100 mg, TID PRN         Objective:    /73   Pulse 85   Temp 98.1 °F (36.7 °C) (Oral)   Resp 18   Ht 1.727 m (5' 8\")   Wt (!) 138.3 kg (305 lb)   SpO2 98%   BMI 46.38 kg/m²   General Appearance: alert and oriented to person, place and time and in no acute distress  Skin: warm and dry  Head: normocephalic and atraumatic  Eyes: pupils equal, round, and reactive to light, extraocular eye movements intact, conjunctivae normal  Neck: neck supple and non tender without mass   Pulmonary/Chest: clear to auscultation bilaterally- no wheezes, rales or rhonchi, normal air movement, no respiratory distress  Cardiovascular: normal rate, normal S1 and S2 and no carotid bruits  Abdomen: soft, non-tender, non-distended, normal bowel sounds, no masses 
       [facility] Hospitalist   Progress Note    Admitting Date and Time: 6/28/2024  2:05 PM  Admit Dx: Dyspnea on exertion [R06.09]  New onset of congestive heart failure (HCC) [I50.9]  Asthma with acute exacerbation, unspecified asthma severity, unspecified whether persistent [J45.901]  Community acquired pneumonia, unspecified laterality [J18.9]  Community acquired bacterial pneumonia [J15.9]    Subjective:    6/30: Pt feels slightly improved this morning. States she continues to have BATISTA. Still pretty bad just ambulating to the restroom. Remains ORA.   Per RN: No new concerns noted.     7/1: Pt sleeping, awakens easily to voice. She continues to exhibit conversational dyspnea & endorses ongoing BATISTA. Denies CP, no needs at this time.    7/2: Pt awake, A&O, sitting up in bed in no apparent distress. Pt states she is breathing a little better today, less dyspneic when ambulating to bathroom. She still has conversational dyspnea, and though she is moving better-still sounds pretty tight. Will keep solumedrol BID for today and hopefully can begin to wean tomorrow. Peak flows this am: 280 pre-tx, post-tx 370, later 325 and 405 post-tx.     7/3: Pt sitting off side of bed getting cleaned up. Conversational dyspnea somewhat improved today and she is moving better air on exam. Will switch her over to prednisone with intention for taper upon discharge. Spoke with Attending, will check an ECHO to eval for pulm HTN. Pt denies CP. No needs at this time.     7/4: Pt awake, A&O, lying on side in bed appearing very uncomfortable. Pt reports pain to left upper back with radiation across chest, described as pressure and states it has been intermittent since arrival. States she received tylenol a couple hours ago and it provided minimal relief. States she has never had a stress test, has never seen a cardiologist. Will check EKG & troponins. Pt willing to stay for stress test if necessary.     7/5: Pt awake, A&O, lying 
       [facility] Hospitalist   Progress Note    Admitting Date and Time: 6/28/2024  2:05 PM  Admit Dx: Dyspnea on exertion [R06.09]  New onset of congestive heart failure (HCC) [I50.9]  Asthma with acute exacerbation, unspecified asthma severity, unspecified whether persistent [J45.901]  Community acquired pneumonia, unspecified laterality [J18.9]  Community acquired bacterial pneumonia [J15.9]    Subjective:    6/30: Pt feels slightly improved this morning. States she continues to have BATISTA. Still pretty bad just ambulating to the restroom. Remains ORA.   Per RN: No new concerns noted.     7/1: Pt sleeping, awakens easily to voice. She continues to exhibit conversational dyspnea & endorses ongoing BATISTA. Denies CP, no needs at this time.    7/2: Pt awake, A&O, sitting up in bed in no apparent distress. Pt states she is breathing a little better today, less dyspneic when ambulating to bathroom. She still has conversational dyspnea, and though she is moving better-still sounds pretty tight. Will keep solumedrol BID for today and hopefully can begin to wean tomorrow. Peak flows this am: 280 pre-tx, post-tx 370, later 325 and 405 post-tx.     7/3: Pt sitting off side of bed getting cleaned up. Conversational dyspnea somewhat improved today and she is moving better air on exam. Will switch her over to prednisone with intention for taper upon discharge. Spoke with Attending, will check an ECHO to eval for pulm HTN. Pt denies CP. No needs at this time.     7/4: Pt awake, A&O, lying on side in bed appearing very uncomfortable. Pt reports pain to left upper back with radiation across chest, described as pressure and states it has been intermittent since arrival. States she received tylenol a couple hours ago and it provided minimal relief. States she has never had a stress test, has never seen a cardiologist. Will check EKG & troponins. Pt willing to stay for stress test if necessary.     7/5: Pt awake, A&O, lying 
       [facility] Hospitalist   Progress Note    Admitting Date and Time: 6/28/2024  2:05 PM  Admit Dx: Dyspnea on exertion [R06.09]  New onset of congestive heart failure (HCC) [I50.9]  Asthma with acute exacerbation, unspecified asthma severity, unspecified whether persistent [J45.901]  Community acquired pneumonia, unspecified laterality [J18.9]  Community acquired bacterial pneumonia [J15.9]    Subjective:    6/30: Pt feels slightly improved this morning. States she continues to have BATISTA. Still pretty bad just ambulating to the restroom. Remains ORA.   Per RN: No new concerns noted.     7/1: Pt sleeping, awakens easily to voice. She continues to exhibit conversational dyspnea & endorses ongoing BATISTA. Denies CP, no needs at this time.    7/2: Pt awake, A&O, sitting up in bed in no apparent distress. Pt states she is breathing a little better today, less dyspneic when ambulating to bathroom. She still has conversational dyspnea, and though she is moving better-still sounds pretty tight. Will keep solumedrol BID for today and hopefully can begin to wean tomorrow. Peak flows this am: 280 pre-tx, post-tx 370, later 325 and 405 post-tx.     7/3: Pt sitting off side of bed getting cleaned up. Conversational dyspnea somewhat improved today and she is moving better air on exam. Will switch her over to prednisone with intention for taper upon discharge. Spoke with Attending, will check an ECHO to eval for pulm HTN. Pt denies CP. No needs at this time.     ROS: denies fever, chills, cp,  n/v, HA unless stated above.     [START ON 7/4/2024] predniSONE  40 mg Oral Daily    sodium chloride flush  5-40 mL IntraVENous 2 times per day    enoxaparin  30 mg SubCUTAneous BID    ipratropium 0.5 mg-albuterol 2.5 mg  1 Dose Inhalation Q4H WA RT    acetaZOLAMIDE  250 mg Oral BID    DULoxetine  60 mg Oral Daily    ferrous sulfate  325 mg Oral Every Other Day    hydroCHLOROthiazide  25 mg Oral QAM    lisinopril  40 mg Oral 
4 Eyes Skin Assessment     NAME:  Rosina Morales  YOB: 1975  MEDICAL RECORD NUMBER:  [id number]    The patient is being assessed for  Admission    I agree that at least one RN has performed a thorough Head to Toe Skin Assessment on the patient. ALL assessment sites listed below have been assessed.      Areas assessed by both nurses:    Head, Face, Ears, Shoulders, Back, Chest, Arms, Elbows, Hands, Sacrum. Buttock, Coccyx, Ischium, and Legs. Feet and Heels        Does the Patient have a Wound? No noted wound(s)       Bird Prevention initiated by RN: No  Wound Care Orders initiated by RN: No    Pressure Injury (Stage 3,4, Unstageable, DTI, NWPT, and Complex wounds) if present, place Wound referral order by RN under : No    New Ostomies, if present place, Ostomy referral order under : No     Nurse 1 eSignature: Electronically signed by Twila Hollins RN on 6/29/24 at 4:36 PM EDT    **SHARE this note so that the co-signing nurse can place an eSignature**    Nurse 2 eSignature: Electronically signed by Rosy Aggarwal RN on 6/29/24 at 5:13 PM EDT    
Comprehensive Nutrition Assessment    Type and Reason for Visit:  Initial (LOS)    Nutrition Recommendations/Plan:   Continue current diet  Continue to encourage & monitor intake      Malnutrition Assessment:  Malnutrition Status:  No malnutrition (07/06/24 1526)    Context:  Acute Illness     Findings of the 6 clinical characteristics of malnutrition:  Energy Intake:  No significant decrease in energy intake  Weight Loss:  No significant weight loss (4.9% wt loss in the last 4 month (intentional wt loss per pt))     Body Fat Loss:  No significant body fat loss     Muscle Mass Loss:  No significant muscle mass loss    Fluid Accumulation:  No significant fluid accumulation     Strength:  Not Performed    Nutrition Assessment:    Pt w/ dx of dyspnea on exertion. PMHx: asthma w/acute exacerbation, iron deficiency anemia on iron supplement, essential HTN, severe obesity, hypertriglyceridemia, preDM, ulcerative colitis, pseudotumor cerebri, acute depression, blood clots. Pt reports good appetite PTA & over admit. Reports consuming % meals. Currently NPO for stress test. Will continue to monitor inpatient.    Nutrition Related Findings:    A&Ox4, I/Os +1.6L, no edema, Active BSx4, Endorses N/V today, Bglu 101-194, troponin 13, WBC 14.3, 4.9% wt loss in the last 4 months. Pt states that she has been trying to lose wt. Wound Type: None       Current Nutrition Intake & Therapies:    Average Meal Intake: %  Average Supplements Intake: None Ordered  ADULT DIET; Regular; Low Fat/Low Chol/High Fiber/CESAR    Anthropometric Measures:  Height: 172.7 cm (5' 8\")  Ideal Body Weight (IBW): 140 lbs (64 kg)    Admission Body Weight: 138.3 kg (305 lb) (6/28/24 no wt method listed per EMR)  Current Body Weight: 138 kg (304 lb 4.8 oz) (7/6/24), 217.4 % IBW. Weight Source: Bed Scale  Current BMI (kg/m2): 46.3  Usual Body Weight: 145.2 kg (320 lb) (2/29/24 no wt method listed, 347 lb 14 oz 1/12/23)  % Weight Change 
PEAK FLOW:  Pre tx: 280  Post tx: 370  
PEAK FLOW:  Pre tx: 325  Post tx: 405  
Patient resting images completed for Nuclear Lexiscan stress test. At 1120 up to BR and c/o 4/10 midsternal chest pain w/nausea and diaphoresis when having bowel movement.  12-lead EKG done and shown to Dr. Mayfield the nuclear reader.  /88. RA O2 sat 93%.  He canceled stress portion of test and Dr. Feldman notified of above at 1124.  Stated he will place orders and re-evaluate patient.  Report called to charge nurse and patient now pain free transferred back to room w/o incident at 1140.    Radha Pool RN, BSN  
Patient scheduled for heart cath at [facility] tomorrow at 9am. [facility] called to set up transportation to pick patient up at 8am. Patient notified.   
Peak flow:  Pre tx: 360 Lpm  Post tx: 420 Lpm  
Pre and post peak flow =340 lpm  
  CTA PULMONARY W CONTRAST   Final Result      1.  No evidence of acute pulmonary emboli.      2.  Some mild patchy opacity in the left mid lung and both lung bases similar   to the prior study favoring atelectasis and/or scarring over pneumonitis.  No   new pulmonary abnormality is noted.      3.  Previously noted prominent axillary and mediastinal lymph nodes have   somewhat diminished in size from the prior study suggesting they were likely   reactionary.  No new lymph node abnormality is noted.      4.  Otherwise unchanged.         XR CHEST PORTABLE   Final Result   Mild cardiomegaly with mild pulmonary vascular congestion.             Assessment:  Principal Problem:    Asthma with acute exacerbation  Active Problems:    Iron deficiency anemia    Essential hypertension    Severe obesity (BMI >= 40) (HCC)    Dyspnea on exertion    Hypertriglyceridemia    Prediabetes    Pseudotumor cerebri    Acute depression  Resolved Problems:    * No resolved hospital problems. *    Plan:    Asthma Exacerbation: Initially thought to be pna, but atbx stopped once ruled out. Transition to oral steroids, duonebs q4h while awake. Peak flows with RT. Outpatient referral to Dr. Jean. Will need DME nebulizer upon discharge.  ECHO ordered to al for pulm HTN given her slow improvement despite steroids.     Chest pain: pt reports intermittent pain to left upper back with radiation across chest, described as pressure and states it has been intermittent since arrival. EKG & troponins obtained, EKG with T wave abnormality. Troponins 15, 12. Pt does have cardiac risk factors and will order a lexiscan stress test. NPO after midnight    Pseudomotor cerebri: continue home diamox    Essential HTN: Lisinopril/HCTZ. Hydralazine IV prn     Iron Deficiency Anemia: Hgb has remained stable ~8.7. No overt signs of bleeding noted. Continue iron supplementation.     Hx Blood Clots: not currently on Anticoagulation.     Prediabetes: Current A1c 6.3. 
steroids.     Chest pain: Stress test completed earlier today, suggestive of partially reversible inferior perfusion defect. Cardiology consulted.     Pseudomotor cerebri: continue home diamox    Essential HTN: Lisinopril/HCTZ. Hydralazine IV prn     Iron Deficiency Anemia: Hgb has remained stable ~8.7. No overt signs of bleeding noted. Continue iron supplementation.     Hx Blood Clots: not currently on Anticoagulation.     Prediabetes: Current A1c 6.3.       Code Status: Full Code  DVT Prophylaxis: Lovenox 30mg BID    Disposition: From home. Anticipate another 1-2 days inpatient for treatment & monitoring to ensure safe discharge.    NOTE: This report was transcribed using voice recognition software. Every effort was made to ensure accuracy; however, inadvertent computerized transcription errors may be present.     Electronically signed by SURJIT Pinedo NP on 7/6/2024 at 2:12 PM

## 2024-07-07 NOTE — CONSULTS
TSH 1.58 10/01/2015 04:24 PM     Rhythm Strip: normal sinus rhythm.    EKG:  normal sinus rhythm, nonspecific ST and T waves changes.    Echo Summary 7/4/2024:    Left Ventricle: The EF by visual approximation is 55 - 60%. Findings consistent with mild concentric hypertrophy. Grade I diastolic dysfunction with normal LAP.    Right Ventricle: Right ventricle size is normal. Normal systolic function.    Aortic Valve: No stenosis. AV area by continuity VTI is 2.6 cm2. AV area by peak velocity is 2.5 cm2.    Mitral Valve: Mild regurgitation. No stenosis noted. MV area by PHT is 6.8 cm2. MV area by continuity equation is 3.8 cm2.    Tricuspid Valve: Mild regurgitation.    Image quality is adequate.    Stress Summary 7/6/2024:    Stress Test: A pharmacological stress test was performed using regadenoson (Lexiscan). PO caffeine given as a reversal agent. The patient reported felt weird , headache and anxious during the stress test. Symptoms began at minute 1 during recovery and ended at minute 4 during recovery. The patient reached the end of the protocol.    ECG: The stress ECG was negative for ischemia.    Stress Function: Left ventricular function post-stress is normal. Post-stress ejection fraction is 58%. The stress end diastolic cavity size is normal. The stress end systolic cavity size is normal.    Perfusion Comments: LV perfusion is abnormal. There suggestion of inducible ischemia. Significant soft tissue and gut attenuation noted.    Perfusion Defect: There is a moderate severity left ventricular stress perfusion defect that is small in size present in the inferior segment that is partially reversible.    Stress Combined Conclusion: The study is suggestive of a partially reversiblt inferior perfusion defect. Significant soft tissue and gut attenuation noted. Findings suggest a moderate risk of cardiac events.    ASSESSMENT AND PLAN:  Patient Active Problem List   Diagnosis    Iron deficiency anemia    Ulcerative

## 2024-07-08 ENCOUNTER — HOSPITAL ENCOUNTER (OUTPATIENT)
Age: 49
Setting detail: OUTPATIENT SURGERY
Discharge: HOME | End: 2024-07-08
Attending: INTERNAL MEDICINE | Admitting: INTERNAL MEDICINE
Payer: COMMERCIAL

## 2024-07-08 VITALS
DIASTOLIC BLOOD PRESSURE: 87 MMHG | WEIGHT: 293 LBS | HEART RATE: 106 BPM | TEMPERATURE: 98.2 F | BODY MASS INDEX: 44.41 KG/M2 | HEIGHT: 68 IN | SYSTOLIC BLOOD PRESSURE: 138 MMHG | OXYGEN SATURATION: 97 % | RESPIRATION RATE: 16 BRPM

## 2024-07-08 VITALS
OXYGEN SATURATION: 97 % | SYSTOLIC BLOOD PRESSURE: 127 MMHG | HEIGHT: 68 IN | HEART RATE: 94 BPM | DIASTOLIC BLOOD PRESSURE: 98 MMHG | BODY MASS INDEX: 44.41 KG/M2 | WEIGHT: 293 LBS | RESPIRATION RATE: 18 BRPM | TEMPERATURE: 98.1 F

## 2024-07-08 DIAGNOSIS — I21.4 NSTEMI (NON-ST ELEVATED MYOCARDIAL INFARCTION) (HCC): ICD-10-CM

## 2024-07-08 LAB
ABO + RH BLD: NORMAL
ANION GAP SERPL CALCULATED.3IONS-SCNC: 14 MMOL/L (ref 7–16)
ARM BAND NUMBER: NORMAL
BLOOD BANK SAMPLE EXPIRATION: NORMAL
BLOOD GROUP ANTIBODIES SERPL: NEGATIVE
BUN SERPL-MCNC: 20 MG/DL (ref 6–20)
CALCIUM SERPL-MCNC: 9.4 MG/DL (ref 8.6–10.2)
CHLORIDE SERPL-SCNC: 100 MMOL/L (ref 98–107)
CO2 SERPL-SCNC: 21 MMOL/L (ref 22–29)
CREAT SERPL-MCNC: 0.9 MG/DL (ref 0.5–1)
ECHO BSA: 2.57 M2
GFR, ESTIMATED: 78 ML/MIN/1.73M2
GLUCOSE SERPL-MCNC: 158 MG/DL (ref 74–99)
POTASSIUM SERPL-SCNC: 3.4 MMOL/L (ref 3.5–5)
SODIUM SERPL-SCNC: 135 MMOL/L (ref 132–146)

## 2024-07-08 PROCEDURE — 2580000003 HC RX 258: Performed by: INTERNAL MEDICINE

## 2024-07-08 PROCEDURE — 86901 BLOOD TYPING SEROLOGIC RH(D): CPT

## 2024-07-08 PROCEDURE — 2500000003 HC RX 250 WO HCPCS: Performed by: INTERNAL MEDICINE

## 2024-07-08 PROCEDURE — 6370000000 HC RX 637 (ALT 250 FOR IP): Performed by: FAMILY MEDICINE

## 2024-07-08 PROCEDURE — 7100000010 HC PHASE II RECOVERY - FIRST 15 MIN: Performed by: INTERNAL MEDICINE

## 2024-07-08 PROCEDURE — 86900 BLOOD TYPING SEROLOGIC ABO: CPT

## 2024-07-08 PROCEDURE — 2709999900 HC NON-CHARGEABLE SUPPLY: Performed by: INTERNAL MEDICINE

## 2024-07-08 PROCEDURE — 86850 RBC ANTIBODY SCREEN: CPT

## 2024-07-08 PROCEDURE — 6360000002 HC RX W HCPCS: Performed by: INTERNAL MEDICINE

## 2024-07-08 PROCEDURE — 94640 AIRWAY INHALATION TREATMENT: CPT

## 2024-07-08 PROCEDURE — C1894 INTRO/SHEATH, NON-LASER: HCPCS | Performed by: INTERNAL MEDICINE

## 2024-07-08 PROCEDURE — 6360000004 HC RX CONTRAST MEDICATION: Performed by: INTERNAL MEDICINE

## 2024-07-08 PROCEDURE — 80048 BASIC METABOLIC PNL TOTAL CA: CPT

## 2024-07-08 PROCEDURE — C1769 GUIDE WIRE: HCPCS | Performed by: INTERNAL MEDICINE

## 2024-07-08 PROCEDURE — 6370000000 HC RX 637 (ALT 250 FOR IP): Performed by: INTERNAL MEDICINE

## 2024-07-08 PROCEDURE — 2580000003 HC RX 258: Performed by: FAMILY MEDICINE

## 2024-07-08 PROCEDURE — 7100000011 HC PHASE II RECOVERY - ADDTL 15 MIN: Performed by: INTERNAL MEDICINE

## 2024-07-08 PROCEDURE — 93458 L HRT ARTERY/VENTRICLE ANGIO: CPT | Performed by: INTERNAL MEDICINE

## 2024-07-08 PROCEDURE — 36415 COLL VENOUS BLD VENIPUNCTURE: CPT

## 2024-07-08 RX ORDER — HYDROCHLOROTHIAZIDE 25 MG/1
25 TABLET ORAL EVERY MORNING
OUTPATIENT
Start: 2024-07-08

## 2024-07-08 RX ORDER — BENZONATATE 100 MG/1
100 CAPSULE ORAL 3 TIMES DAILY PRN
OUTPATIENT
Start: 2024-07-08

## 2024-07-08 RX ORDER — DIPHENHYDRAMINE HCL 25 MG
50 TABLET ORAL ONCE
OUTPATIENT
Start: 2024-07-08 | End: 2024-07-08

## 2024-07-08 RX ORDER — FENTANYL CITRATE 50 UG/ML
INJECTION, SOLUTION INTRAMUSCULAR; INTRAVENOUS PRN
Status: DISCONTINUED | OUTPATIENT
Start: 2024-07-08 | End: 2024-07-08 | Stop reason: HOSPADM

## 2024-07-08 RX ORDER — SODIUM CHLORIDE 9 MG/ML
INJECTION, SOLUTION INTRAVENOUS PRN
OUTPATIENT
Start: 2024-07-08

## 2024-07-08 RX ORDER — SODIUM CHLORIDE 0.9 % (FLUSH) 0.9 %
5-40 SYRINGE (ML) INJECTION EVERY 12 HOURS SCHEDULED
OUTPATIENT
Start: 2024-07-08

## 2024-07-08 RX ORDER — ACETAMINOPHEN 325 MG/1
650 TABLET ORAL EVERY 6 HOURS PRN
OUTPATIENT
Start: 2024-07-08

## 2024-07-08 RX ORDER — FERROUS SULFATE 325(65) MG
325 TABLET ORAL EVERY OTHER DAY
OUTPATIENT
Start: 2024-07-09

## 2024-07-08 RX ORDER — ALBUTEROL SULFATE 2.5 MG/3ML
2.5 SOLUTION RESPIRATORY (INHALATION) EVERY 4 HOURS PRN
OUTPATIENT
Start: 2024-07-08

## 2024-07-08 RX ORDER — HEPARIN SODIUM 10000 [USP'U]/ML
INJECTION, SOLUTION INTRAVENOUS; SUBCUTANEOUS PRN
Status: DISCONTINUED | OUTPATIENT
Start: 2024-07-08 | End: 2024-07-08 | Stop reason: HOSPADM

## 2024-07-08 RX ORDER — NITROGLYCERIN 20 MG/100ML
INJECTION INTRAVENOUS CONTINUOUS PRN
Status: COMPLETED | OUTPATIENT
Start: 2024-07-08 | End: 2024-07-08

## 2024-07-08 RX ORDER — LISINOPRIL 20 MG/1
40 TABLET ORAL DAILY
OUTPATIENT
Start: 2024-07-08

## 2024-07-08 RX ORDER — VERAPAMIL HYDROCHLORIDE 2.5 MG/ML
INJECTION, SOLUTION INTRAVENOUS PRN
Status: DISCONTINUED | OUTPATIENT
Start: 2024-07-08 | End: 2024-07-08 | Stop reason: HOSPADM

## 2024-07-08 RX ORDER — ACETAZOLAMIDE 250 MG/1
250 TABLET ORAL 2 TIMES DAILY
OUTPATIENT
Start: 2024-07-08

## 2024-07-08 RX ORDER — CLOPIDOGREL BISULFATE 75 MG/1
75 TABLET ORAL DAILY
COMMUNITY

## 2024-07-08 RX ORDER — ACETAMINOPHEN 650 MG/1
650 SUPPOSITORY RECTAL EVERY 6 HOURS PRN
OUTPATIENT
Start: 2024-07-08

## 2024-07-08 RX ORDER — CLOPIDOGREL BISULFATE 75 MG/1
75 TABLET ORAL DAILY
OUTPATIENT
Start: 2024-07-08

## 2024-07-08 RX ORDER — ENOXAPARIN SODIUM 100 MG/ML
30 INJECTION SUBCUTANEOUS 2 TIMES DAILY
OUTPATIENT
Start: 2024-07-08

## 2024-07-08 RX ORDER — SODIUM CHLORIDE 0.9 % (FLUSH) 0.9 %
5-40 SYRINGE (ML) INJECTION PRN
OUTPATIENT
Start: 2024-07-08

## 2024-07-08 RX ORDER — ACETAMINOPHEN 325 MG/1
650 TABLET ORAL EVERY 4 HOURS PRN
Status: DISCONTINUED | OUTPATIENT
Start: 2024-07-08 | End: 2024-07-08 | Stop reason: HOSPADM

## 2024-07-08 RX ORDER — DULOXETIN HYDROCHLORIDE 60 MG/1
60 CAPSULE, DELAYED RELEASE ORAL DAILY
OUTPATIENT
Start: 2024-07-08

## 2024-07-08 RX ORDER — HYDRALAZINE HYDROCHLORIDE 20 MG/ML
5 INJECTION INTRAMUSCULAR; INTRAVENOUS EVERY 6 HOURS PRN
OUTPATIENT
Start: 2024-07-08

## 2024-07-08 RX ORDER — ATORVASTATIN CALCIUM 40 MG/1
40 TABLET, FILM COATED ORAL NIGHTLY
OUTPATIENT
Start: 2024-07-08

## 2024-07-08 RX ORDER — DICYCLOMINE HCL 20 MG
20 TABLET ORAL
OUTPATIENT
Start: 2024-07-08

## 2024-07-08 RX ORDER — IPRATROPIUM BROMIDE AND ALBUTEROL SULFATE 2.5; .5 MG/3ML; MG/3ML
1 SOLUTION RESPIRATORY (INHALATION)
OUTPATIENT
Start: 2024-07-08

## 2024-07-08 RX ORDER — MIDAZOLAM HYDROCHLORIDE 1 MG/ML
INJECTION INTRAMUSCULAR; INTRAVENOUS PRN
Status: DISCONTINUED | OUTPATIENT
Start: 2024-07-08 | End: 2024-07-08 | Stop reason: HOSPADM

## 2024-07-08 RX ADMIN — CLOPIDOGREL BISULFATE 75 MG: 75 TABLET ORAL at 08:09

## 2024-07-08 RX ADMIN — SODIUM CHLORIDE, PRESERVATIVE FREE 40 MG: 5 INJECTION INTRAVENOUS at 08:09

## 2024-07-08 RX ADMIN — IPRATROPIUM BROMIDE AND ALBUTEROL SULFATE 1 DOSE: 2.5; .5 SOLUTION RESPIRATORY (INHALATION) at 04:53

## 2024-07-08 RX ADMIN — PREDNISONE 50 MG: 20 TABLET ORAL at 08:08

## 2024-07-08 RX ADMIN — DIPHENHYDRAMINE HCL 50 MG: 25 TABLET ORAL at 08:08

## 2024-07-08 RX ADMIN — PREDNISONE 50 MG: 20 TABLET ORAL at 03:17

## 2024-07-08 RX ADMIN — Medication 10 ML: at 08:09

## 2024-07-08 RX ADMIN — DULOXETINE HYDROCHLORIDE 60 MG: 60 CAPSULE, DELAYED RELEASE ORAL at 08:08

## 2024-07-08 RX ADMIN — IPRATROPIUM BROMIDE AND ALBUTEROL SULFATE 1 DOSE: 2.5; .5 SOLUTION RESPIRATORY (INHALATION) at 09:04

## 2024-07-08 ASSESSMENT — PAIN DESCRIPTION - PAIN TYPE: TYPE: ACUTE PAIN

## 2024-07-08 ASSESSMENT — PAIN DESCRIPTION - LOCATION: LOCATION: ABDOMEN

## 2024-07-08 ASSESSMENT — PAIN DESCRIPTION - ORIENTATION: ORIENTATION: UPPER

## 2024-07-08 ASSESSMENT — PAIN SCALES - GENERAL: PAINLEVEL_OUTOF10: 3

## 2024-07-08 ASSESSMENT — PAIN DESCRIPTION - DESCRIPTORS: DESCRIPTORS: CRAMPING;ACHING

## 2024-07-08 NOTE — H&P
See Dr. Palmer's note from [facility].  Patient seen and examined.  No changes.  CATH RISKS:  I discussed the risks and benefits of cardiac catheterization and percutaneous coronary intervention including but not limited to exposure to radiation, bleeding, infection, sedation, allergy, peripheral embolization, acute renal failure, vascular damage, emergent CABG, CVA, MI, and death.  He/she states that he/she understands this and agrees to proceed.

## 2024-07-08 NOTE — PLAN OF CARE
Problem: Safety - Adult  Goal: Free from fall injury  7/8/2024 0825 by Akua Acuna, RN  Outcome: Progressing     Problem: Pain  Goal: Verbalizes/displays adequate comfort level or baseline comfort level  7/8/2024 0825 by Akua Acuna, RN  Outcome: Progressing

## 2024-07-08 NOTE — PLAN OF CARE
Problem: Discharge Planning  Goal: Discharge to home or other facility with appropriate resources  7/8/2024 0000 by Timothy Jovel RN  Outcome: Progressing     Problem: Safety - Adult  Goal: Free from fall injury  7/8/2024 0000 by Timothy Jovel RN  Outcome: Progressing     Problem: Pain  Goal: Verbalizes/displays adequate comfort level or baseline comfort level  7/8/2024 0000 by Timothy Jovel RN  Outcome: Progressing     Problem: Nutrition Deficit:  Goal: Optimize nutritional status  7/8/2024 0000 by Timothy Jovel RN  Outcome: Progressing     Problem: Chronic Conditions and Co-morbidities  Goal: Patient's chronic conditions and co-morbidity symptoms are monitored and maintained or improved  7/8/2024 0000 by Timothy Jovel RN  Outcome: Progressing

## 2024-07-08 NOTE — PROGRESS NOTES
Vasc band removed. Dressing and arm board applied to right radial site. Site without bleeding or hematoma. IV's removed, catheter intact and hemostasis achieved. Discharge instructions reviewed with patient.

## 2024-07-08 NOTE — PROGRESS NOTES
Belongings reviewed with patient at discharge. Patient has all belongings with them at discharge.

## 2024-07-08 NOTE — DISCHARGE SUMMARY
[facility] Hospitalist       Hospitalist Physician Discharge Summary           Activity level: As Tolerated    Diet: No diet orders on file    Labs:None    Condition at discharge: Stable    Dispo:[facility] Cath Lab      Patient ID:  Rosina Morales  [id number]  49 y.o.  1975    Admit date: 7/8/2024    Discharge date and time:  7/8/2024  1:33 PM    Admission Diagnoses: Active Problems:    * No active hospital problems. *  Resolved Problems:    * No resolved hospital problems. *      Discharge Diagnoses: Active Problems:    * No active hospital problems. *  Resolved Problems:    * No resolved hospital problems. *      Consults:  None    Procedures: 7/3/2024 ECHO     Hospital Course:     6/28 Pt presented to ED with complaints of increased SOB and BATISTA. pro-, hgb 8.7 CTA chest no PE, left mid lung and bilateral basilar patchy opacities.  Received benadryl, duoneb, magnesium, solu-medrol, rocephin/doxy in ED. Decision to admit pt. Urinary antigens for strep and Legionella were negative. Viral Panel Negative. Procalcitonin 0.03. Abx Discontinued. Continued on IV Solumedrol and Nebulizers. Transitioned to prednisone taper. 7/3 ECHO LVEF 55-60%. Mild concentric hypertrophy. Grade I diastolic Dysfunction. Mild mitral and tricuspid regurgitation. 7/4 Reported left upper back pain with radiation to chest. EKG with T wave abnormality. Troponins 15>12. 7/6 Stress test completed suggesting reversible inferior perfusion defect Significant attenuation noted per Cardiology. Plavix/Statin therapy initiated. Cardiology recommended Cardiac cath to be performed at [facility]. Pt transported this morning to [facility] for Cardiac Cath 0900. Pt seen prior to Transfer.     Discharge Exam:  Vitals:    07/08/24 1031   BP: 131/89   Pulse: 97   Resp: 18   Temp: 98.1 °F (36.7 °C)   SpO2: 94%   Weight: (!) 137.9 kg (304 lb)   Height: 1.727 m (5' 8\")     General Appearance: alert and oriented to person, place and time and in no acute  Graft Donor Site Will Heal By Secondary Intention: No

## 2024-07-08 NOTE — PROGRESS NOTES
Patient arrived out to CVL holding area post heart catheterization. VSS. Right radial vasc band on, site without bleeding or hematoma. Patient A&Ox4. Tolerating PO intake. No complaints at this time.

## 2024-07-08 NOTE — DISCHARGE SUMMARY
[facility] Hospitalist       Hospitalist Physician Discharge Summary       Rayne Hernandez MD  [address] [address]  [phone]    Schedule an appointment as soon as possible for a visit in 1 week(s)        Activity level: As Tolerated     Diet: Diet NPO Exceptions are: Sips of Water with Meds    Labs:None    Condition at discharge: Stable    Dispo:[facility] Cath Lab      Patient ID:  Rosina Morales  [id number]  49 y.o.  1975    Admit date: 6/28/2024    Discharge date and time:  7/8/2024  8:02 AM    Admission Diagnoses: Principal Problem:    Asthma with acute exacerbation  Active Problems:    Iron deficiency anemia    Essential hypertension    Severe obesity (BMI >= 40) (HCC)    Dyspnea on exertion    Hypertriglyceridemia    Prediabetes    Pseudotumor cerebri    Acute depression    Abnormal stress test  Resolved Problems:    * No resolved hospital problems. *      Discharge Diagnoses: Principal Problem:    Asthma with acute exacerbation  Active Problems:    Iron deficiency anemia    Essential hypertension    Severe obesity (BMI >= 40) (HCC)    Dyspnea on exertion    Hypertriglyceridemia    Prediabetes    Pseudotumor cerebri    Acute depression    Abnormal stress test  Resolved Problems:    * No resolved hospital problems. *      Consults:  IP CONSULT TO CARDIOLOGY    Procedures: 7/3/2024 ECHO     Hospital Course: 6/28 Pt presented to ED with complaints of increased SOB and BATISTA. pro-, hgb 8.7 CTA chest no PE, left mid lung and bilateral basilar patchy opacities.  Received benadryl, duoneb, magnesium, solu-medrol, rocephin/doxy in ED. Decision to admit pt. Urinary antigens for strep and Legionella were negative. Viral Panel Negative. Procalcitonin 0.03. Abx Discontinued. Continued on IV Solumedrol and Nebulizers. Transitioned to prednisone taper. 7/3 ECHO LVEF 55-60%. Mild concentric hypertrophy. Grade I diastolic Dysfunction. Mild mitral and tricuspid regurgitation. 7/4 Reported

## 2024-07-17 ENCOUNTER — OFFICE VISIT (OUTPATIENT)
Dept: INTERNAL MEDICINE | Age: 49
End: 2024-07-17

## 2024-07-17 VITALS
RESPIRATION RATE: 20 BRPM | DIASTOLIC BLOOD PRESSURE: 77 MMHG | HEIGHT: 68 IN | SYSTOLIC BLOOD PRESSURE: 143 MMHG | BODY MASS INDEX: 44.41 KG/M2 | OXYGEN SATURATION: 98 % | HEART RATE: 89 BPM | TEMPERATURE: 97.7 F | WEIGHT: 293 LBS

## 2024-07-17 DIAGNOSIS — G62.9 PERIPHERAL POLYNEUROPATHY: ICD-10-CM

## 2024-07-17 DIAGNOSIS — I10 ESSENTIAL HYPERTENSION: ICD-10-CM

## 2024-07-17 DIAGNOSIS — M54.41 CHRONIC RIGHT-SIDED LOW BACK PAIN WITH RIGHT-SIDED SCIATICA: ICD-10-CM

## 2024-07-17 DIAGNOSIS — F32.A ACUTE DEPRESSION: ICD-10-CM

## 2024-07-17 DIAGNOSIS — G89.29 CHRONIC RIGHT-SIDED LOW BACK PAIN WITH RIGHT-SIDED SCIATICA: ICD-10-CM

## 2024-07-17 DIAGNOSIS — R06.02 SOB (SHORTNESS OF BREATH): ICD-10-CM

## 2024-07-17 DIAGNOSIS — G47.30 SLEEP APNEA, UNSPECIFIED TYPE: Primary | ICD-10-CM

## 2024-07-17 DIAGNOSIS — G43.019 INTRACTABLE MIGRAINE WITHOUT AURA AND WITHOUT STATUS MIGRAINOSUS: ICD-10-CM

## 2024-07-17 DIAGNOSIS — F33.1 MODERATE EPISODE OF RECURRENT MAJOR DEPRESSIVE DISORDER (HCC): ICD-10-CM

## 2024-07-17 RX ORDER — DULOXETIN HYDROCHLORIDE 60 MG/1
60 CAPSULE, DELAYED RELEASE ORAL DAILY
Qty: 90 CAPSULE | Refills: 1 | Status: SHIPPED | OUTPATIENT
Start: 2024-07-17

## 2024-07-17 RX ORDER — ONDANSETRON 4 MG/1
4 TABLET, ORALLY DISINTEGRATING ORAL EVERY 8 HOURS PRN
Qty: 90 TABLET | Refills: 1 | Status: SHIPPED | OUTPATIENT
Start: 2024-07-17

## 2024-07-17 RX ORDER — LISINOPRIL 40 MG/1
40 TABLET ORAL DAILY
Qty: 90 TABLET | Refills: 1 | Status: SHIPPED | OUTPATIENT
Start: 2024-07-17

## 2024-07-17 RX ORDER — HYDROCHLOROTHIAZIDE 25 MG/1
25 TABLET ORAL EVERY MORNING
Qty: 90 TABLET | Refills: 1 | Status: SHIPPED | OUTPATIENT
Start: 2024-07-17

## 2024-07-17 RX ORDER — ALBUTEROL SULFATE 90 MCG
HFA AEROSOL WITH ADAPTER (GRAM) INHALATION
Qty: 18 G | Refills: 3 | Status: CANCELLED | OUTPATIENT
Start: 2024-07-17

## 2024-07-17 NOTE — PATIENT INSTRUCTIONS
Dear Rosina Morales,        Thank you for coming to your appointment today. I hope we have addressed all of your needs.       Please make sure to do the following:  - Continue your medications as listed.  - Get labs drawn before our next follow up. We will call you with the results   - Referrals have been made to Martha Kessler:  If you do not hear from the office in 1 week, please call the number listed.  - See PCP in 1 month      Have a great day!        Sincerely,  Marlen Richardson MD  7/17/2024  3:27 PM

## 2024-07-17 NOTE — PROGRESS NOTES
[facility]  Internal Medicine Residency Clinic  Attending Physician Statement:  Nick Eddy M.D., F.A.C.P.  Patient is seen for fu visit today.  -- acute and chronic problems addressed  I have seen/discussed the case, including pertinent history and exam findings with the resident, review of last visit medical records/labs/imaging if applicable-     Addressed when applicable- Health maintenance issues of vaccinations, social determinants/depression/CA screening, tobacco cessation etc...    I agree with the assessment, plan and orders as documented by the resident.    -Billiing assessed by medical complexity of case  My assessment of high points of today's visit as follows:      Recent hospitalization:   DCd recently  Sob now improved,  less wheezing  Acute asthma exacerbation, dyspnea on exertion, pneumonia ruled out-significantly improved/resolving.  Continue prednisone burst.  Continue scheduled DuoNeb treatments for now.  Atypical chest pain now with abnormal stress test -stress test suggest ischemia.  Cardiology consulted and transferred to [facility] for left heart catheterization.-- negative obstructions noted  Pseudotumor cerebri-continue home Diamox  ---- LP opening pressure normal  +migraine hx on meds  Per recent neuro noted in March:  on Ajovy and has noticed headaches are decreased and Nurtec working as abortive  Preventative headache medications tried: beta-blockers (metropolol), Topamax, amitriptyline, Ajovy   Abortive headache medications tried: acetaminophen, NSAIDs (ibuprofen), aspirin/acetaminophen/caffeine, sumatriptan PO, butalbital/caffeine/aspirin, Nurtec, Ubrelvy   Pseudotumor cerebri--- retro orbital pressure with occasional blurry vision, headache, photopsia, retrobulbar pain    Hypertension-continue home antihypertensives.BP (!) 143/77 (Site: Right Lower Arm, Position: Sitting, Cuff Size: Large Adult)   Pulse 89   Temp 97.7 °F (36.5 °C) (Temporal)   Resp 20   Ht

## 2024-07-17 NOTE — PROGRESS NOTES
Post-Discharge Transitional Care  Follow Up      Rosina Morales   YOB: 1975    Date of Office Visit:  7/17/2024  Date of Hospital Admission: 7/8/24  Date of Hospital Discharge: 7/8/24  Risk of hospital readmission (high >=14%. Medium >=10%) :Readmission Risk Score: 12.1      Care management risk score Rising risk (score 2-5) and Complex Care (Scores >=6): No Risk Score On File     Non face to face  following discharge, date last encounter closed (first attempt may have been earlier): *No documented post hospital discharge outreach found in the last 14 days    Call initiated 2 business days of discharge: *No response recorded in the last 14 days    ASSESSMENT/PLAN:   Sleep apnea, unspecified type  -     Baseline Diagnostic Sleep Study; Future  Moderate episode of recurrent major depressive disorder (HCC)  -     DULoxetine (CYMBALTA) 60 MG extended release capsule; Take 1 capsule by mouth daily, Disp-90 capsule, R-1Normal  Chronic right-sided low back pain with right-sided sciatica  -     DULoxetine (CYMBALTA) 60 MG extended release capsule; Take 1 capsule by mouth daily, Disp-90 capsule, R-1Normal  Essential hypertension  -     hydroCHLOROthiazide (HYDRODIURIL) 25 MG tablet; Take 1 tablet by mouth every morning, Disp-90 tablet, R-1Normal  -     lisinopril (PRINIVIL;ZESTRIL) 40 MG tablet; Take 1 tablet by mouth daily, Disp-90 tablet, R-1Normal  SOB (shortness of breath)  -     mometasone-formoterol (DULERA) 100-5 MCG/ACT inhaler; Inhale 2 puffs into the lungs 2 times daily, Disp-13 g, R-3Normal  Intractable migraine without aura and without status migrainosus  -     ondansetron (ZOFRAN ODT) 4 MG disintegrating tablet; Take 1 tablet by mouth every 8 hours as needed for Nausea or Vomiting, Disp-90 tablet, R-1Normal  Peripheral polyneuropathy  -     Vitamin B12 & Folate; Future  -     TSH; Future  Acute depression  -     Martha Rashid LISW, Counseling Services, [facility]

## 2024-07-18 ENCOUNTER — TELEPHONE (OUTPATIENT)
Dept: INTERNAL MEDICINE | Age: 49
End: 2024-07-18

## 2024-07-18 NOTE — TELEPHONE ENCOUNTER
DARSHANW left message for pt related to referral, [facility] provided contact information for return call.

## 2024-08-02 ENCOUNTER — TELEPHONE (OUTPATIENT)
Dept: INTERNAL MEDICINE | Age: 49
End: 2024-08-02

## 2024-08-15 ENCOUNTER — HOSPITAL ENCOUNTER (OUTPATIENT)
Dept: SLEEP CENTER | Age: 49
Discharge: HOME | End: 2024-08-15
Payer: COMMERCIAL

## 2024-08-15 DIAGNOSIS — G47.30 SLEEP APNEA, UNSPECIFIED TYPE: ICD-10-CM

## 2024-08-15 PROCEDURE — 95811 POLYSOM 6/>YRS CPAP 4/> PARM: CPT

## 2024-08-19 ENCOUNTER — OFFICE VISIT (OUTPATIENT)
Dept: INTERNAL MEDICINE | Age: 49
End: 2024-08-19
Payer: COMMERCIAL

## 2024-08-19 VITALS
SYSTOLIC BLOOD PRESSURE: 163 MMHG | OXYGEN SATURATION: 97 % | HEART RATE: 94 BPM | BODY MASS INDEX: 44.41 KG/M2 | RESPIRATION RATE: 16 BRPM | HEIGHT: 68 IN | DIASTOLIC BLOOD PRESSURE: 104 MMHG | WEIGHT: 293 LBS

## 2024-08-19 DIAGNOSIS — I10 ESSENTIAL HYPERTENSION: Primary | ICD-10-CM

## 2024-08-19 DIAGNOSIS — G89.29 CHRONIC BILATERAL THORACIC BACK PAIN: ICD-10-CM

## 2024-08-19 DIAGNOSIS — G47.33 OSA (OBSTRUCTIVE SLEEP APNEA): ICD-10-CM

## 2024-08-19 DIAGNOSIS — M54.6 CHRONIC BILATERAL THORACIC BACK PAIN: ICD-10-CM

## 2024-08-19 PROCEDURE — 1036F TOBACCO NON-USER: CPT

## 2024-08-19 PROCEDURE — 3080F DIAST BP >= 90 MM HG: CPT

## 2024-08-19 PROCEDURE — 3077F SYST BP >= 140 MM HG: CPT

## 2024-08-19 PROCEDURE — G8417 CALC BMI ABV UP PARAM F/U: HCPCS

## 2024-08-19 PROCEDURE — G8427 DOCREV CUR MEDS BY ELIG CLIN: HCPCS

## 2024-08-19 PROCEDURE — 99213 OFFICE O/P EST LOW 20 MIN: CPT

## 2024-08-19 PROCEDURE — 99212 OFFICE O/P EST SF 10 MIN: CPT

## 2024-08-19 RX ORDER — AMLODIPINE BESYLATE 10 MG/1
10 TABLET ORAL DAILY
Qty: 90 TABLET | Refills: 0 | Status: SHIPPED | OUTPATIENT
Start: 2024-08-19

## 2024-08-19 RX ORDER — ADHESIVE BANDAGE 3/4"
BANDAGE TOPICAL
Qty: 1 EACH | Refills: 0 | Status: CANCELLED | OUTPATIENT
Start: 2024-08-19

## 2024-08-19 RX ORDER — LIDOCAINE 4 G/G
1 PATCH TOPICAL DAILY
Qty: 30 PATCH | Refills: 1 | Status: SHIPPED | OUTPATIENT
Start: 2024-08-19 | End: 2024-10-18

## 2024-08-19 ASSESSMENT — ENCOUNTER SYMPTOMS
DIARRHEA: 0
SINUS PAIN: 0
CHEST TIGHTNESS: 0
ABDOMINAL PAIN: 0
EYE DISCHARGE: 0
SHORTNESS OF BREATH: 0
EYE ITCHING: 0
EYE PAIN: 0
BACK PAIN: 1
COUGH: 0
CONSTIPATION: 0

## 2024-08-19 NOTE — PATIENT INSTRUCTIONS
Dear Rosina Morales,    Thank you for coming to your appointment today. I hope we have addressed all of your needs.     Please make sure to do the following:  - Continue your medications as listed.  - Start taking Amlodipine 10 mg daily  - DME order for CPAP machine, and BP cuff have ordered, you will get a call, if those have approved.  - We will see each other again in 4 days, to recheck your BP    Call for a sooner appointment if you develop     Have a great day!        Sincerely,  Rayne Hernandez MD  8/19/2024  3:11 PM

## 2024-08-19 NOTE — PROGRESS NOTES
[facility] - [facility] Internal Medicine      SUBJECTIVE:  Rosina Morales (:  1975) is a 49 y.o. female here for evaluation of the following chief complaint(s):  Follow-up and Results (Sleep Study)    Previous Visit Imp Points: Last visit was on 2023, for follow up of chronic medical problem.    HPI:   Patient has past medical history of Hypertension, Chronic migraine, Asthma, Pseudomotor cerebri, depression, sleep apnea, chronic LBP with sciatica, iron deficiency anemia, Vitamin D deficiency, Morbid obesity presented today for follow up visit. She complained about pain in collar bone since Friday, describes as pinch and pull, aggravated by the way she moves, alleviated by using a massage, heating pad, does move to right shoulder. Otherwise, no other acute concern today. Sleep study was done on 08/15/2024, showed Severe obstructive sleep , disordered with occasional oxygen desaturation, had no REM sleep, recommended to wear APAP.     Severe FERNANDA  Sleep study showed severe obstructive sleep disordered breathing with occasional oxygen desaturation, had no REM sleep. Then the patient underwent PAP titration and the optimal CPAP pressure was 13 cm, showed AHI of 2.4. No significant limb movements noticed   DME order for CPAP machine have placed.     Hypertension:   /113, patient on stress, she lost her job recently.   Repeat /104  Currently on HCTZ 25 mg PO, Lisinopril 40 Mg  Will add Amlodipine 10 mg today.  Referred to Hypertension class.    Chronic migraine:   Usually on Rimegepant sulphate 75 mg, Fremanezumab-vfrm 225 mg  Got refill from neurology    Prediabetes  - HbA1c 6.3% on 2023  - not on any medications    Neuropathy:  Tingling and numbness on both extremities, monofilament test in the office was normal on 2023, MRI lumbar spine on 2021 showed patient have degenerative changes and foraminal narrowing, mild to moderate at bilateral L5-S1, and mild at

## 2024-08-19 NOTE — PROGRESS NOTES
[facility]  Internal Medicine Residency Clinic    Attending Physician Statement  I have discussed the case, including pertinent history and exam findings with the resident physician.  I agree with the assessment, plan and orders as documented by the resident. I have reviewed all pertinent PMHx, PSHx, FamHx, SocialHx, medications, and allergies and updated history as appropriate.    Patient here for routine follow up of medical problems.   HTN  - BP is markedly elevated today. She is without symptoms. No HA/chest pain.  May be worsened by FERNANDA which is untreated.    Add amlodipine, 10 mg daily.    Recheck in 3-4 days   BP cuff to monitor .     Migraine - follows with neurology for this and pseudotumor.  On Nurtec and Ajovy.     Continue same as per neurology management.     Asthma - no current issues.    Continue PRN     FERNANDA - has sleep study which showed severe FERNANDA.  CPAP recommended.    Order auto-PAP to have delivered for patient.     Pseudotumor cerebri - management as per neuro.  On Diamonx    Remainder of medical problems as per resident note.    Flor Lee MD  8/19/2024 3:25 PM

## 2024-08-26 ENCOUNTER — TELEPHONE (OUTPATIENT)
Dept: INTERNAL MEDICINE | Age: 49
End: 2024-08-26

## 2024-08-26 NOTE — TELEPHONE ENCOUNTER
Letter signed and left with front office.     Electronically signed by Martha Leiva DO on 8/26/2024 at 3:31 PM

## 2024-08-27 ENCOUNTER — OFFICE VISIT (OUTPATIENT)
Dept: PULMONOLOGY | Age: 49
End: 2024-08-27
Payer: COMMERCIAL

## 2024-08-27 VITALS
OXYGEN SATURATION: 97 % | WEIGHT: 293 LBS | BODY MASS INDEX: 41.95 KG/M2 | HEART RATE: 105 BPM | HEIGHT: 70 IN | RESPIRATION RATE: 16 BRPM

## 2024-08-27 DIAGNOSIS — G47.33 OSA (OBSTRUCTIVE SLEEP APNEA): ICD-10-CM

## 2024-08-27 DIAGNOSIS — J45.50 SEVERE PERSISTENT ASTHMA, UNSPECIFIED WHETHER COMPLICATED: Primary | ICD-10-CM

## 2024-08-27 DIAGNOSIS — J45.50 SEVERE PERSISTENT ASTHMA, UNSPECIFIED WHETHER COMPLICATED: ICD-10-CM

## 2024-08-27 DIAGNOSIS — E66.01 MORBID OBESITY (HCC): ICD-10-CM

## 2024-08-27 PROCEDURE — 1036F TOBACCO NON-USER: CPT | Performed by: INTERNAL MEDICINE

## 2024-08-27 PROCEDURE — G8428 CUR MEDS NOT DOCUMENT: HCPCS | Performed by: INTERNAL MEDICINE

## 2024-08-27 PROCEDURE — 99204 OFFICE O/P NEW MOD 45 MIN: CPT | Performed by: INTERNAL MEDICINE

## 2024-08-27 PROCEDURE — G8417 CALC BMI ABV UP PARAM F/U: HCPCS | Performed by: INTERNAL MEDICINE

## 2024-08-27 ASSESSMENT — ENCOUNTER SYMPTOMS
GASTROINTESTINAL NEGATIVE: 1
EYES NEGATIVE: 1
SHORTNESS OF BREATH: 1
COUGH: 1
WHEEZING: 1

## 2024-08-27 NOTE — PROGRESS NOTES
Progress Note    Rosina Morales  1975    CC: Asthma, FERNANDA       HPI : 49 year old female has asthma since childhood, uses Dulera inhaler daily, uses rescue inhaler average 4-5 times a week and wakes up from sleep due to asthma symptoms. Her maternal ankle has asthma. She is allergic to cat, pollen and grass. She has no pets at home. She never smoked. She had split sleep study and the study showed severe FERNANDA, PAP therapy was recommended and she is yet to get CPAP. She has gained weight in last few years. She used corticosteroid for asthma exacerbation 3 times in last 1 year and was hospitalized in June 2024 for asthma exacerbation. History of chest tightness, sob on exertion, cough and wheezing. History of asthma flare up from ingestion ASA and NSAID. No history of Nasal polyps or atopic dermatitis.     Past Medical History:   Diagnosis Date    Acute depression     Aneurysm (HCC)     Anxiety 2009    Blood transfusion     Chronic fatigue 2007    Chronic fatigue     Class 3 severe obesity due to excess calories with serious comorbidity and body mass index (BMI) of 50.0 to 59.9 in adult (HCC)     Hx of blood clots     Hypertension     Hypertriglyceridemia     Iron deficiency anemia due to chronic blood loss 2007    non-compliant with iron replacement    Knee pain, bilateral 2011    Leg pain     Low back pain 2015    Pain is getting worse    Migraine headache without aura 2009    Morbid obesity (HCC) 2007    Muscle cramps 2011    Perennial allergic rhinitis 2007    Prediabetes     Superficial thrombophlebitis of right leg 2009 and 2011    Ulcerative colitis ([facility])     Vertigo       Past Surgical History:   Procedure Laterality Date    ADENOIDECTOMY      CARDIAC PROCEDURE N/A 7/8/2024    Left heart cath / coronary angiography performed by Arron Whitehead DO at [facility] CARDIAC CATH LAB      Family History   Problem Relation Age of Onset    High Blood Pressure Mother     Stroke Mother     Clotting Disorder Mother          depressants and the risk of driving.    Follow up in 4 weeks.   Electronically by Joshua Farley MD  on 8/27/24 at 1:56 PM EDT

## 2024-08-27 NOTE — PROGRESS NOTES
Patient to follow up with physician in 6 weeks.  Patient will be scheduled for a full PFT and a CPAP order was faxed to [facility].  Patient was also given blood slips to have done prior to coming in for her next office visit.

## 2024-08-28 LAB
BASOPHILS ABSOLUTE: 0.08 K/UL (ref 0–0.2)
BASOPHILS RELATIVE PERCENT: 1 % (ref 0–2)
EOSINOPHILS ABSOLUTE: 0.13 K/UL (ref 0.05–0.5)
EOSINOPHILS RELATIVE PERCENT: 1 % (ref 0–6)
HCT VFR BLD CALC: 35.9 % (ref 34–48)
HEMOGLOBIN: 9.6 G/DL (ref 11.5–15.5)
IMMATURE GRANULOCYTES %: 1 % (ref 0–5)
IMMATURE GRANULOCYTES ABSOLUTE: 0.05 K/UL (ref 0–0.58)
LYMPHOCYTES ABSOLUTE: 1.72 K/UL (ref 1.5–4)
LYMPHOCYTES RELATIVE PERCENT: 17 % (ref 20–42)
MCH RBC QN AUTO: 18 PG (ref 26–35)
MCHC RBC AUTO-ENTMCNC: 26.7 G/DL (ref 32–34.5)
MCV RBC AUTO: 67.2 FL (ref 80–99.9)
MONOCYTES ABSOLUTE: 1.08 K/UL (ref 0.1–0.95)
MONOCYTES RELATIVE PERCENT: 10 % (ref 2–12)
NEUTROPHILS ABSOLUTE: 7.3 K/UL (ref 1.8–7.3)
NEUTROPHILS RELATIVE PERCENT: 70 % (ref 43–80)
PDW BLD-RTO: 22 % (ref 11.5–15)
PLATELET, FLUORESCENCE: 501 K/UL (ref 130–450)
PMV BLD AUTO: 10.9 FL (ref 7–12)
RBC # BLD: 5.34 M/UL (ref 3.5–5.5)
RBC # BLD: ABNORMAL 10*6/UL
WBC # BLD: 10.4 K/UL (ref 4.5–11.5)

## 2024-08-29 LAB
ALLERGEN BERMUDA GRASS IGE: 3.32 KU/L (ref 0–0.34)
ALLERGEN BIRCH IGE: <0.1 KU/L (ref 0–0.34)
ALLERGEN DOG DANDER IGE: 0.69 KU/L (ref 0–0.34)
ALLERGEN GERMAN COCKROACH IGE: 0.37 KU/L (ref 0–0.34)
ALLERGEN HORMODENDRUM IGE: <0.1 KUL/L (ref 0–0.34)
ALLERGEN MOUSE EPITHELIA IGE: <0.1 KU/L (ref 0–0.34)
ALLERGEN OAK TREE IGE: <0.1 KU/L (ref 0–0.34)
ALLERGEN PECAN TREE IGE: <0.1 KU/L (ref 0–0.34)
ALLERGEN PIGWEED ROUGH IGE: <0.1 KU/L (ref 0–0.34)
ALLERGEN SHEEP SORREL (W18) IGE: <0.1 KU/L (ref 0–0.34)
ALLERGEN TREE SYCAMORE: <0.1 KU/L (ref 0–0.34)
ALLERGEN WALNUT TREE IGE: <0.1 KU/L (ref 0–0.34)
ALLERGEN WHITE MULBERRY TREE, IGE: <0.1 KU/L (ref 0–0.34)
ALLERGEN, TREE, WHITE ASH IGE: <0.1 KU/L (ref 0–0.34)
ALTERNARIA ALTERNATA: <0.1 KU/L (ref 0–0.34)
ASPERGILLUS FUMIGATUS: <0.1 KU/L (ref 0–0.34)
CAT DANDER ANTIBODY: 0.78 KU/L (ref 0–0.34)
COTTONWOOD TREE: <0.1 KU/L (ref 0–0.34)
D. FARINAE: 6.79 KU/L (ref 0–0.34)
D. PTERONYSSINUS: 5.57 KU/L (ref 0–0.34)
ELM TREE: <0.1 KU/L (ref 0–0.34)
IGE: 371 IU/ML (ref 0–100)
MAPLE/BOXELDER TREE: <0.1 KU/L (ref 0–0.34)
MOUNTAIN CEDAR TREE: <0.1 KU/L (ref 0–0.34)
MUCOR RACEMOSUS: <0.1 KU/L (ref 0–0.34)
P. NOTATUM: <0.1 KU/L (ref 0–0.34)
RUSSIAN THISTLE: <0.1 KU/L (ref 0–0.34)
SHORT RAGWD(A ARTEMIS.) IGE: 0.38 KU/L (ref 0–0.34)
TIMOTHY GRASS: 2.92 KU/L (ref 0–0.34)

## 2024-09-03 ENCOUNTER — TELEPHONE (OUTPATIENT)
Dept: INTERNAL MEDICINE | Age: 49
End: 2024-09-03

## 2024-09-03 NOTE — TELEPHONE ENCOUNTER
Call to client to remind client about the hypertension management class, client confirms she will attend class. Instructions given regarding location of class. Client verbalized understanding of instructions.

## 2024-09-11 ENCOUNTER — TELEPHONE (OUTPATIENT)
Dept: INTERNAL MEDICINE | Age: 49
End: 2024-09-11

## 2024-12-06 ENCOUNTER — HOSPITAL ENCOUNTER (INPATIENT)
Age: 49
LOS: 6 days | Discharge: HOME | DRG: 291 | End: 2024-12-12
Attending: EMERGENCY MEDICINE | Admitting: INTERNAL MEDICINE
Payer: COMMERCIAL

## 2024-12-06 ENCOUNTER — APPOINTMENT (OUTPATIENT)
Dept: CT IMAGING | Age: 49
DRG: 291 | End: 2024-12-06
Payer: COMMERCIAL

## 2024-12-06 ENCOUNTER — APPOINTMENT (OUTPATIENT)
Dept: GENERAL RADIOLOGY | Age: 49
DRG: 291 | End: 2024-12-06
Payer: COMMERCIAL

## 2024-12-06 DIAGNOSIS — I50.9 ACUTE CONGESTIVE HEART FAILURE, UNSPECIFIED HEART FAILURE TYPE (HCC): ICD-10-CM

## 2024-12-06 DIAGNOSIS — R06.00 DYSPNEA, UNSPECIFIED TYPE: ICD-10-CM

## 2024-12-06 DIAGNOSIS — I10 ESSENTIAL HYPERTENSION: ICD-10-CM

## 2024-12-06 DIAGNOSIS — I50.9 NEW ONSET OF CONGESTIVE HEART FAILURE (HCC): Primary | ICD-10-CM

## 2024-12-06 DIAGNOSIS — G43.019 INTRACTABLE MIGRAINE WITHOUT AURA AND WITHOUT STATUS MIGRAINOSUS: ICD-10-CM

## 2024-12-06 DIAGNOSIS — I42.8 NICM (NONISCHEMIC CARDIOMYOPATHY) (HCC): ICD-10-CM

## 2024-12-06 DIAGNOSIS — I50.21 ACUTE HFREF (HEART FAILURE WITH REDUCED EJECTION FRACTION) (HCC): ICD-10-CM

## 2024-12-06 PROBLEM — Z91.89 AT RISK FOR OBSTRUCTIVE SLEEP APNEA: Status: RESOLVED | Noted: 2021-04-09 | Resolved: 2024-12-06

## 2024-12-06 LAB
ANION GAP SERPL CALCULATED.3IONS-SCNC: 10 MMOL/L (ref 7–16)
ANION GAP SERPL CALCULATED.3IONS-SCNC: 17 MMOL/L (ref 7–16)
BASOPHILS # BLD: 0.08 K/UL (ref 0–0.2)
BASOPHILS NFR BLD: 1 % (ref 0–2)
BNP SERPL-MCNC: 1364 PG/ML (ref 0–450)
BUN SERPL-MCNC: 11 MG/DL (ref 6–20)
BUN SERPL-MCNC: 15 MG/DL (ref 6–20)
CALCIUM SERPL-MCNC: 9 MG/DL (ref 8.6–10.2)
CALCIUM SERPL-MCNC: 9.3 MG/DL (ref 8.6–10.2)
CHLORIDE SERPL-SCNC: 104 MMOL/L (ref 98–107)
CHLORIDE SERPL-SCNC: 99 MMOL/L (ref 98–107)
CO2 SERPL-SCNC: 22 MMOL/L (ref 22–29)
CO2 SERPL-SCNC: 25 MMOL/L (ref 22–29)
CREAT SERPL-MCNC: 0.9 MG/DL (ref 0.5–1)
CREAT SERPL-MCNC: 1 MG/DL (ref 0.5–1)
EKG ATRIAL RATE: 88 BPM
EKG P AXIS: 65 DEGREES
EKG P-R INTERVAL: 142 MS
EKG Q-T INTERVAL: 406 MS
EKG QRS DURATION: 80 MS
EKG QTC CALCULATION (BAZETT): 491 MS
EKG R AXIS: 53 DEGREES
EKG T AXIS: 95 DEGREES
EKG VENTRICULAR RATE: 88 BPM
EOSINOPHIL # BLD: 0 K/UL (ref 0.05–0.5)
EOSINOPHILS RELATIVE PERCENT: 0 % (ref 0–6)
ERYTHROCYTE [DISTWIDTH] IN BLOOD BY AUTOMATED COUNT: 22.2 % (ref 11.5–15)
FLUAV RNA RESP QL NAA+PROBE: NOT DETECTED
FLUBV RNA RESP QL NAA+PROBE: NOT DETECTED
GFR, ESTIMATED: 68 ML/MIN/1.73M2
GFR, ESTIMATED: 76 ML/MIN/1.73M2
GLUCOSE SERPL-MCNC: 109 MG/DL (ref 74–99)
GLUCOSE SERPL-MCNC: 179 MG/DL (ref 74–99)
HCT VFR BLD AUTO: 35.7 % (ref 34–48)
HGB BLD-MCNC: 10.4 G/DL (ref 11.5–15.5)
LYMPHOCYTES NFR BLD: 1.41 K/UL (ref 1.5–4)
LYMPHOCYTES RELATIVE PERCENT: 17 % (ref 20–42)
MAGNESIUM SERPL-MCNC: 2 MG/DL (ref 1.6–2.6)
MCH RBC QN AUTO: 20.8 PG (ref 26–35)
MCHC RBC AUTO-ENTMCNC: 29.1 G/DL (ref 32–34.5)
MCV RBC AUTO: 71.4 FL (ref 80–99.9)
MONOCYTES NFR BLD: 0.5 K/UL (ref 0.1–0.95)
MONOCYTES NFR BLD: 6 % (ref 2–12)
NEUTROPHILS NFR BLD: 76 % (ref 43–80)
NEUTS SEG NFR BLD: 6.31 K/UL (ref 1.8–7.3)
PLATELET, FLUORESCENCE: 369 K/UL (ref 130–450)
PMV BLD AUTO: 10.8 FL (ref 7–12)
POTASSIUM SERPL-SCNC: 3.6 MMOL/L (ref 3.5–5)
POTASSIUM SERPL-SCNC: 3.8 MMOL/L (ref 3.5–5)
RBC # BLD AUTO: 5 M/UL (ref 3.5–5.5)
RBC # BLD: ABNORMAL 10*6/UL
SARS-COV-2 RNA RESP QL NAA+PROBE: NOT DETECTED
SODIUM SERPL-SCNC: 138 MMOL/L (ref 132–146)
SODIUM SERPL-SCNC: 139 MMOL/L (ref 132–146)
SOURCE: NORMAL
SPECIMEN DESCRIPTION: NORMAL
TROPONIN I SERPL HS-MCNC: 11 NG/L (ref 0–9)
TROPONIN I SERPL HS-MCNC: 12 NG/L (ref 0–9)
WBC OTHER # BLD: 8.3 K/UL (ref 4.5–11.5)

## 2024-12-06 PROCEDURE — 93005 ELECTROCARDIOGRAM TRACING: CPT

## 2024-12-06 PROCEDURE — 94640 AIRWAY INHALATION TREATMENT: CPT

## 2024-12-06 PROCEDURE — 6370000000 HC RX 637 (ALT 250 FOR IP): Performed by: FAMILY MEDICINE

## 2024-12-06 PROCEDURE — 6360000004 HC RX CONTRAST MEDICATION: Performed by: RADIOLOGY

## 2024-12-06 PROCEDURE — 6360000002 HC RX W HCPCS

## 2024-12-06 PROCEDURE — 6360000002 HC RX W HCPCS: Performed by: NURSE PRACTITIONER

## 2024-12-06 PROCEDURE — 83735 ASSAY OF MAGNESIUM: CPT

## 2024-12-06 PROCEDURE — 84484 ASSAY OF TROPONIN QUANT: CPT

## 2024-12-06 PROCEDURE — 71275 CT ANGIOGRAPHY CHEST: CPT

## 2024-12-06 PROCEDURE — 2060000000 HC ICU INTERMEDIATE R&B

## 2024-12-06 PROCEDURE — 99223 1ST HOSP IP/OBS HIGH 75: CPT | Performed by: FAMILY MEDICINE

## 2024-12-06 PROCEDURE — 80048 BASIC METABOLIC PNL TOTAL CA: CPT

## 2024-12-06 PROCEDURE — 6370000000 HC RX 637 (ALT 250 FOR IP)

## 2024-12-06 PROCEDURE — 85025 COMPLETE CBC W/AUTO DIFF WBC: CPT

## 2024-12-06 PROCEDURE — 96374 THER/PROPH/DIAG INJ IV PUSH: CPT

## 2024-12-06 PROCEDURE — 71045 X-RAY EXAM CHEST 1 VIEW: CPT

## 2024-12-06 PROCEDURE — 93010 ELECTROCARDIOGRAM REPORT: CPT | Performed by: INTERNAL MEDICINE

## 2024-12-06 PROCEDURE — APPSS45 APP SPLIT SHARED TIME 31-45 MINUTES: Performed by: NURSE PRACTITIONER

## 2024-12-06 PROCEDURE — 87636 SARSCOV2 & INF A&B AMP PRB: CPT

## 2024-12-06 PROCEDURE — 96375 TX/PRO/DX INJ NEW DRUG ADDON: CPT

## 2024-12-06 PROCEDURE — 2580000003 HC RX 258

## 2024-12-06 PROCEDURE — 83880 ASSAY OF NATRIURETIC PEPTIDE: CPT

## 2024-12-06 PROCEDURE — 5A09357 ASSISTANCE WITH RESPIRATORY VENTILATION, LESS THAN 24 CONSECUTIVE HOURS, CONTINUOUS POSITIVE AIRWAY PRESSURE: ICD-10-PCS | Performed by: INTERNAL MEDICINE

## 2024-12-06 PROCEDURE — 6370000000 HC RX 637 (ALT 250 FOR IP): Performed by: NURSE PRACTITIONER

## 2024-12-06 PROCEDURE — 99285 EMERGENCY DEPT VISIT HI MDM: CPT

## 2024-12-06 PROCEDURE — 0202U NFCT DS 22 TRGT SARS-COV-2: CPT

## 2024-12-06 PROCEDURE — 83036 HEMOGLOBIN GLYCOSYLATED A1C: CPT

## 2024-12-06 RX ORDER — IPRATROPIUM BROMIDE AND ALBUTEROL SULFATE 2.5; .5 MG/3ML; MG/3ML
2 SOLUTION RESPIRATORY (INHALATION) ONCE
Status: COMPLETED | OUTPATIENT
Start: 2024-12-06 | End: 2024-12-06

## 2024-12-06 RX ORDER — ONDANSETRON 4 MG/1
4 TABLET, ORALLY DISINTEGRATING ORAL EVERY 8 HOURS PRN
Status: DISCONTINUED | OUTPATIENT
Start: 2024-12-06 | End: 2024-12-06

## 2024-12-06 RX ORDER — ONDANSETRON 2 MG/ML
4 INJECTION INTRAMUSCULAR; INTRAVENOUS EVERY 6 HOURS PRN
Status: DISCONTINUED | OUTPATIENT
Start: 2024-12-06 | End: 2024-12-06

## 2024-12-06 RX ORDER — FUROSEMIDE 10 MG/ML
40 INJECTION INTRAMUSCULAR; INTRAVENOUS DAILY
Status: DISCONTINUED | OUTPATIENT
Start: 2024-12-07 | End: 2024-12-08

## 2024-12-06 RX ORDER — DIPHENHYDRAMINE HCL 25 MG
50 TABLET ORAL ONCE
Status: COMPLETED | OUTPATIENT
Start: 2024-12-06 | End: 2024-12-06

## 2024-12-06 RX ORDER — PROCHLORPERAZINE EDISYLATE 5 MG/ML
10 INJECTION INTRAMUSCULAR; INTRAVENOUS EVERY 6 HOURS PRN
Status: DISCONTINUED | OUTPATIENT
Start: 2024-12-06 | End: 2024-12-12 | Stop reason: HOSPADM

## 2024-12-06 RX ORDER — SODIUM CHLORIDE 0.9 % (FLUSH) 0.9 %
5-40 SYRINGE (ML) INJECTION PRN
Status: DISCONTINUED | OUTPATIENT
Start: 2024-12-06 | End: 2024-12-12 | Stop reason: HOSPADM

## 2024-12-06 RX ORDER — SODIUM CHLORIDE 9 MG/ML
INJECTION, SOLUTION INTRAVENOUS PRN
Status: DISCONTINUED | OUTPATIENT
Start: 2024-12-06 | End: 2024-12-12 | Stop reason: HOSPADM

## 2024-12-06 RX ORDER — IPRATROPIUM BROMIDE AND ALBUTEROL SULFATE 2.5; .5 MG/3ML; MG/3ML
1 SOLUTION RESPIRATORY (INHALATION) ONCE
Status: COMPLETED | OUTPATIENT
Start: 2024-12-06 | End: 2024-12-06

## 2024-12-06 RX ORDER — HYDROCHLOROTHIAZIDE 25 MG/1
25 TABLET ORAL EVERY MORNING
Status: DISCONTINUED | OUTPATIENT
Start: 2024-12-07 | End: 2024-12-08

## 2024-12-06 RX ORDER — DULOXETIN HYDROCHLORIDE 60 MG/1
60 CAPSULE, DELAYED RELEASE ORAL DAILY
Status: DISCONTINUED | OUTPATIENT
Start: 2024-12-06 | End: 2024-12-12 | Stop reason: HOSPADM

## 2024-12-06 RX ORDER — ACETAMINOPHEN 325 MG/1
650 TABLET ORAL EVERY 6 HOURS PRN
Status: DISCONTINUED | OUTPATIENT
Start: 2024-12-06 | End: 2024-12-12 | Stop reason: HOSPADM

## 2024-12-06 RX ORDER — LISINOPRIL 20 MG/1
40 TABLET ORAL DAILY
Status: DISCONTINUED | OUTPATIENT
Start: 2024-12-06 | End: 2024-12-12 | Stop reason: HOSPADM

## 2024-12-06 RX ORDER — IOPAMIDOL 755 MG/ML
75 INJECTION, SOLUTION INTRAVASCULAR
Status: COMPLETED | OUTPATIENT
Start: 2024-12-06 | End: 2024-12-06

## 2024-12-06 RX ORDER — FERROUS SULFATE 325(65) MG
325 TABLET ORAL
Status: DISCONTINUED | OUTPATIENT
Start: 2024-12-06 | End: 2024-12-12 | Stop reason: HOSPADM

## 2024-12-06 RX ORDER — ENOXAPARIN SODIUM 100 MG/ML
30 INJECTION SUBCUTANEOUS 2 TIMES DAILY
Status: DISCONTINUED | OUTPATIENT
Start: 2024-12-06 | End: 2024-12-12 | Stop reason: HOSPADM

## 2024-12-06 RX ORDER — FUROSEMIDE 10 MG/ML
40 INJECTION INTRAMUSCULAR; INTRAVENOUS ONCE
Status: COMPLETED | OUTPATIENT
Start: 2024-12-06 | End: 2024-12-06

## 2024-12-06 RX ORDER — ACETAZOLAMIDE 250 MG/1
250 TABLET ORAL DAILY
Status: DISCONTINUED | OUTPATIENT
Start: 2024-12-07 | End: 2024-12-12 | Stop reason: HOSPADM

## 2024-12-06 RX ORDER — ACETAMINOPHEN 650 MG/1
650 SUPPOSITORY RECTAL EVERY 6 HOURS PRN
Status: DISCONTINUED | OUTPATIENT
Start: 2024-12-06 | End: 2024-12-12 | Stop reason: HOSPADM

## 2024-12-06 RX ORDER — POLYETHYLENE GLYCOL 3350 17 G/17G
17 POWDER, FOR SOLUTION ORAL DAILY PRN
Status: DISCONTINUED | OUTPATIENT
Start: 2024-12-06 | End: 2024-12-12 | Stop reason: HOSPADM

## 2024-12-06 RX ORDER — SODIUM CHLORIDE 0.9 % (FLUSH) 0.9 %
5-40 SYRINGE (ML) INJECTION EVERY 12 HOURS SCHEDULED
Status: DISCONTINUED | OUTPATIENT
Start: 2024-12-06 | End: 2024-12-12 | Stop reason: HOSPADM

## 2024-12-06 RX ORDER — IPRATROPIUM BROMIDE AND ALBUTEROL SULFATE 2.5; .5 MG/3ML; MG/3ML
1 SOLUTION RESPIRATORY (INHALATION) EVERY 4 HOURS PRN
Status: DISCONTINUED | OUTPATIENT
Start: 2024-12-06 | End: 2024-12-12 | Stop reason: HOSPADM

## 2024-12-06 RX ADMIN — IPRATROPIUM BROMIDE AND ALBUTEROL SULFATE 2 DOSE: .5; 2.5 SOLUTION RESPIRATORY (INHALATION) at 11:36

## 2024-12-06 RX ADMIN — ARFORMOTEROL TARTRATE: 15 SOLUTION RESPIRATORY (INHALATION) at 18:31

## 2024-12-06 RX ADMIN — PREDNISONE 50 MG: 20 TABLET ORAL at 21:25

## 2024-12-06 RX ADMIN — ENOXAPARIN SODIUM 30 MG: 100 INJECTION SUBCUTANEOUS at 21:25

## 2024-12-06 RX ADMIN — DIPHENHYDRAMINE HYDROCHLORIDE 50 MG: 25 TABLET ORAL at 21:25

## 2024-12-06 RX ADMIN — IOPAMIDOL 75 ML: 755 INJECTION, SOLUTION INTRAVENOUS at 21:42

## 2024-12-06 RX ADMIN — WATER 125 MG: 1 INJECTION INTRAMUSCULAR; INTRAVENOUS; SUBCUTANEOUS at 11:29

## 2024-12-06 RX ADMIN — IPRATROPIUM BROMIDE AND ALBUTEROL SULFATE 1 DOSE: .5; 2.5 SOLUTION RESPIRATORY (INHALATION) at 11:36

## 2024-12-06 RX ADMIN — FERROUS SULFATE TAB 325 MG (65 MG ELEMENTAL FE) 325 MG: 325 (65 FE) TAB at 21:25

## 2024-12-06 RX ADMIN — POTASSIUM BICARBONATE 50 MEQ: 978 TABLET, EFFERVESCENT ORAL at 12:49

## 2024-12-06 RX ADMIN — FUROSEMIDE 40 MG: 10 INJECTION, SOLUTION INTRAMUSCULAR; INTRAVENOUS at 13:13

## 2024-12-06 ASSESSMENT — PAIN - FUNCTIONAL ASSESSMENT: PAIN_FUNCTIONAL_ASSESSMENT: NONE - DENIES PAIN

## 2024-12-06 NOTE — H&P
[facility] Hospitalist Group   History and Physical    CHIEF COMPLAINT:  Shortness of breath    History of Present Illness:  Rosina Morales is a 49 y.o. female with a history of HTN, asthma andhx of VTE  who presents with Shortness of Breath (Sob hx asthma)  Pt admitted earlier this year for BATISTA, stress test was suggestive of inducible ischemia and she was transferred to [facility] for a left heart cath-which revealed no angiographically significant stenosis. ECHO at that time showed EF of 55-60% and grade I DD. She presents to ER today for BATISTA. CXR reveals cardiomegaly with pulmonary edema and small bilat pleural effusions. BNP 1,364.  Pt reports abrupt increase in her baseline dyspnea over the last couple days. She denies peripheral edema or significant wt gain over the last few days. States she has never been able to lie flat to sleep, but is usually able to sleep on her sides. The last couple nights she has only been able to tolerate being on her side for a few minutes before having to sit straight up. She also reports a productive (clear-white) cough for the last few days. She states this does not feel like an asthma exacerbation-citing her difficulty sleeping on her sides.   While in ER, pt received IV lasix, stacked duonebs, solumedrol and potassium supplementation for serum K of 3.6.    WORK UP SINCE ARRIVAL:  Results for orders placed or performed during the hospital encounter of 12/06/24   COVID-19 & Influenza Combo    Specimen: Nasopharyngeal Swab   Result Value Ref Range    Specimen Description .NASOPHARYNGEAL SWAB     Source .NASOPHARYNGEAL SWAB     SARS-CoV-2 RNA, RT PCR Not Detected Not Detected    Influenza A Not Detected Not Detected    Influenza B Not Detected Not Detected   CBC with Auto Differential   Result Value Ref Range    WBC 8.3 4.5 - 11.5 k/uL    RBC 5.00 3.50 - 5.50 m/uL    Hemoglobin 10.4 (L) 11.5 - 15.5 g/dL    Hematocrit 35.7 34.0 - 48.0 %    MCV 71.4 (L) 80.0 - 99.9 fL

## 2024-12-06 NOTE — ED PROVIDER NOTES
49-year-old female with history of asthma, obesity, hypertension presents to the emergency department with complaints of shortness of breath and cough.  She states that she started coughing yesterday morning and had increased shortness of breath.  This morning she woke up around 4:30 AM and was wheezing.  She gave her self a DuoNeb treatment and albuterol with minimal improvement.  She denies the following: Chest pain, chest pressure but does report chest tightness.  She denies any pleuritic chest pain.  She also denies the following: Productive cough, headache, neck pain, abdominal pain, nausea, vomiting, GI bleed, urinary symptoms, back pain.  No recent travel no leg swelling no calf tenderness.  Her last antibiotics and steroid use was more than a month ago.      Chief Complaint   Patient presents with    Shortness of Breath     Sob hx asthma       Review of Systems   Pert stated in the hpi above   Physical Exam  Vitals reviewed.   Constitutional:       General: She is not in acute distress.     Appearance: She is not ill-appearing.   HENT:      Head: Normocephalic.      Right Ear: External ear normal.      Left Ear: External ear normal.      Nose: Nose normal.      Mouth/Throat:      Mouth: Mucous membranes are moist.   Eyes:      General:         Right eye: No discharge.         Left eye: No discharge.      Conjunctiva/sclera: Conjunctivae normal.   Cardiovascular:      Rate and Rhythm: Normal rate and regular rhythm.      Heart sounds:      No friction rub. No gallop.   Pulmonary:      Effort: No respiratory distress.      Breath sounds: No stridor. Decreased breath sounds present.   Abdominal:      General: There is no distension.      Tenderness: There is no abdominal tenderness. There is no guarding or rebound.   Musculoskeletal:         General: No deformity or signs of injury.      Cervical back: Normal range of motion and neck supple. No rigidity or tenderness.   Skin:     Coloration: Skin is not  Detail Level: Detailed Add 40619 Cpt? (Important Note: In 2017 The Use Of 33885 Is Being Tracked By Cms To Determine Future Global Period Reimbursement For Global Periods): no

## 2024-12-07 ENCOUNTER — APPOINTMENT (OUTPATIENT)
Age: 49
DRG: 291 | End: 2024-12-07
Payer: COMMERCIAL

## 2024-12-07 LAB
B PARAP IS1001 DNA NPH QL NAA+NON-PROBE: NOT DETECTED
B PERT DNA SPEC QL NAA+PROBE: NOT DETECTED
BNP SERPL-MCNC: 1275 PG/ML (ref 0–450)
C PNEUM DNA NPH QL NAA+NON-PROBE: NOT DETECTED
ECHO BSA: 2.6 M2
ECHO LV EDV A2C: 121 ML
ECHO LV EDV A4C: 146 ML
ECHO LV EDV BP: 134 ML (ref 56–104)
ECHO LV EDV INDEX A4C: 59 ML/M2
ECHO LV EDV INDEX BP: 54 ML/M2
ECHO LV EDV NDEX A2C: 49 ML/M2
ECHO LV EF PHYSICIAN: 45 %
ECHO LV EJECTION FRACTION A2C: 57 %
ECHO LV EJECTION FRACTION A4C: 45 %
ECHO LV EJECTION FRACTION BIPLANE: 47 % (ref 55–100)
ECHO LV ESV A2C: 52 ML
ECHO LV ESV A4C: 81 ML
ECHO LV ESV BP: 71 ML (ref 19–49)
ECHO LV ESV INDEX A2C: 21 ML/M2
ECHO LV ESV INDEX A4C: 33 ML/M2
ECHO LV ESV INDEX BP: 29 ML/M2
ECHO LV FRACTIONAL SHORTENING: 22 % (ref 28–44)
ECHO LV INTERNAL DIMENSION DIASTOLE INDEX: 2.35 CM/M2
ECHO LV INTERNAL DIMENSION DIASTOLIC: 5.8 CM (ref 3.9–5.3)
ECHO LV INTERNAL DIMENSION SYSTOLIC INDEX: 1.82 CM/M2
ECHO LV INTERNAL DIMENSION SYSTOLIC: 4.5 CM
ECHO LV IVSD: 1.2 CM (ref 0.6–0.9)
ECHO LV MASS 2D: 297 G (ref 67–162)
ECHO LV MASS INDEX 2D: 120.2 G/M2 (ref 43–95)
ECHO LV POSTERIOR WALL DIASTOLIC: 1.2 CM (ref 0.6–0.9)
ECHO LV RELATIVE WALL THICKNESS RATIO: 0.41
ECHO LVOT AREA: 0.1 CM2
ECHO LVOT DIAM: 0.4 CM
ECHO RV INTERNAL DIMENSION: 2.2 CM
FLUAV RNA NPH QL NAA+NON-PROBE: NOT DETECTED
FLUBV RNA NPH QL NAA+NON-PROBE: NOT DETECTED
HADV DNA NPH QL NAA+NON-PROBE: NOT DETECTED
HBA1C MFR BLD: 5.7 % (ref 4–5.6)
HCOV 229E RNA NPH QL NAA+NON-PROBE: NOT DETECTED
HCOV HKU1 RNA NPH QL NAA+NON-PROBE: NOT DETECTED
HCOV NL63 RNA NPH QL NAA+NON-PROBE: NOT DETECTED
HCOV OC43 RNA NPH QL NAA+NON-PROBE: NOT DETECTED
HMPV RNA NPH QL NAA+NON-PROBE: NOT DETECTED
HPIV1 RNA NPH QL NAA+NON-PROBE: NOT DETECTED
HPIV2 RNA NPH QL NAA+NON-PROBE: NOT DETECTED
HPIV3 RNA NPH QL NAA+NON-PROBE: NOT DETECTED
HPIV4 RNA NPH QL NAA+NON-PROBE: NOT DETECTED
LEFT VENTRICULAR EJECTION FRACTION HIGH VALUE: 50 %
LEFT VENTRICULAR EJECTION FRACTION MODE: NORMAL
LV EF: 45 %
M PNEUMO DNA NPH QL NAA+NON-PROBE: NOT DETECTED
RSV RNA NPH QL NAA+NON-PROBE: NOT DETECTED
RV+EV RNA NPH QL NAA+NON-PROBE: NOT DETECTED
SARS-COV-2 RNA NPH QL NAA+NON-PROBE: NOT DETECTED
SPECIMEN DESCRIPTION: NORMAL

## 2024-12-07 PROCEDURE — 6360000002 HC RX W HCPCS: Performed by: NURSE PRACTITIONER

## 2024-12-07 PROCEDURE — 94640 AIRWAY INHALATION TREATMENT: CPT

## 2024-12-07 PROCEDURE — 83880 ASSAY OF NATRIURETIC PEPTIDE: CPT

## 2024-12-07 PROCEDURE — 6370000000 HC RX 637 (ALT 250 FOR IP): Performed by: NURSE PRACTITIONER

## 2024-12-07 PROCEDURE — 2060000000 HC ICU INTERMEDIATE R&B

## 2024-12-07 PROCEDURE — 99232 SBSQ HOSP IP/OBS MODERATE 35: CPT | Performed by: INTERNAL MEDICINE

## 2024-12-07 PROCEDURE — 94660 CPAP INITIATION&MGMT: CPT

## 2024-12-07 PROCEDURE — 2580000003 HC RX 258: Performed by: NURSE PRACTITIONER

## 2024-12-07 PROCEDURE — 93308 TTE F-UP OR LMTD: CPT | Performed by: INTERNAL MEDICINE

## 2024-12-07 PROCEDURE — 6370000000 HC RX 637 (ALT 250 FOR IP): Performed by: FAMILY MEDICINE

## 2024-12-07 PROCEDURE — 93308 TTE F-UP OR LMTD: CPT

## 2024-12-07 PROCEDURE — APPSS30 APP SPLIT SHARED TIME 16-30 MINUTES: Performed by: NURSE PRACTITIONER

## 2024-12-07 RX ADMIN — ENOXAPARIN SODIUM 30 MG: 100 INJECTION SUBCUTANEOUS at 09:05

## 2024-12-07 RX ADMIN — LISINOPRIL 40 MG: 20 TABLET ORAL at 07:49

## 2024-12-07 RX ADMIN — Medication 10 ML: at 21:14

## 2024-12-07 RX ADMIN — HYDROCHLOROTHIAZIDE 25 MG: 25 TABLET ORAL at 07:50

## 2024-12-07 RX ADMIN — ACETAZOLAMIDE 250 MG: 250 TABLET ORAL at 09:04

## 2024-12-07 RX ADMIN — FERROUS SULFATE TAB 325 MG (65 MG ELEMENTAL FE) 325 MG: 325 (65 FE) TAB at 20:58

## 2024-12-07 RX ADMIN — IPRATROPIUM BROMIDE AND ALBUTEROL SULFATE 1 DOSE: .5; 2.5 SOLUTION RESPIRATORY (INHALATION) at 16:39

## 2024-12-07 RX ADMIN — ARFORMOTEROL TARTRATE: 15 SOLUTION RESPIRATORY (INHALATION) at 16:39

## 2024-12-07 RX ADMIN — FUROSEMIDE 40 MG: 10 INJECTION, SOLUTION INTRAMUSCULAR; INTRAVENOUS at 07:50

## 2024-12-07 RX ADMIN — PREDNISONE 50 MG: 20 TABLET ORAL at 09:05

## 2024-12-07 RX ADMIN — ENOXAPARIN SODIUM 30 MG: 100 INJECTION SUBCUTANEOUS at 20:58

## 2024-12-07 RX ADMIN — Medication 10 ML: at 07:51

## 2024-12-07 RX ADMIN — ACETAMINOPHEN 650 MG: 325 TABLET ORAL at 16:09

## 2024-12-07 RX ADMIN — ARFORMOTEROL TARTRATE: 15 SOLUTION RESPIRATORY (INHALATION) at 05:54

## 2024-12-07 RX ADMIN — PREDNISONE 50 MG: 20 TABLET ORAL at 03:06

## 2024-12-07 ASSESSMENT — PAIN - FUNCTIONAL ASSESSMENT: PAIN_FUNCTIONAL_ASSESSMENT: ACTIVITIES ARE NOT PREVENTED

## 2024-12-07 ASSESSMENT — PAIN SCALES - GENERAL
PAINLEVEL_OUTOF10: 0
PAINLEVEL_OUTOF10: 2

## 2024-12-07 ASSESSMENT — PAIN DESCRIPTION - DESCRIPTORS: DESCRIPTORS: ACHING;DISCOMFORT

## 2024-12-07 ASSESSMENT — PAIN DESCRIPTION - LOCATION: LOCATION: HEAD

## 2024-12-08 PROBLEM — I50.21 ACUTE HFREF (HEART FAILURE WITH REDUCED EJECTION FRACTION) (HCC): Status: ACTIVE | Noted: 2024-12-06

## 2024-12-08 PROBLEM — I42.8 NICM (NONISCHEMIC CARDIOMYOPATHY) (HCC): Status: ACTIVE | Noted: 2024-12-08

## 2024-12-08 LAB
ANION GAP SERPL CALCULATED.3IONS-SCNC: 13 MMOL/L (ref 7–16)
BUN SERPL-MCNC: 23 MG/DL (ref 6–20)
CALCIUM SERPL-MCNC: 9.2 MG/DL (ref 8.6–10.2)
CHLORIDE SERPL-SCNC: 103 MMOL/L (ref 98–107)
CO2 SERPL-SCNC: 24 MMOL/L (ref 22–29)
CREAT SERPL-MCNC: 1.1 MG/DL (ref 0.5–1)
FERRITIN SERPL-MCNC: 38 NG/ML
GFR, ESTIMATED: 60 ML/MIN/1.73M2
GLUCOSE SERPL-MCNC: 96 MG/DL (ref 74–99)
IRON SATN MFR SERPL: 6 % (ref 15–50)
IRON SATN MFR SERPL: 9 % (ref 15–50)
IRON SERPL-MCNC: 22 UG/DL (ref 37–145)
IRON SERPL-MCNC: 36 UG/DL (ref 37–145)
POTASSIUM SERPL-SCNC: 3.7 MMOL/L (ref 3.5–5)
SODIUM SERPL-SCNC: 140 MMOL/L (ref 132–146)
TIBC SERPL-MCNC: 381 UG/DL (ref 250–450)
TIBC SERPL-MCNC: 395 UG/DL (ref 250–450)

## 2024-12-08 PROCEDURE — 2580000003 HC RX 258: Performed by: NURSE PRACTITIONER

## 2024-12-08 PROCEDURE — 6370000000 HC RX 637 (ALT 250 FOR IP): Performed by: NURSE PRACTITIONER

## 2024-12-08 PROCEDURE — 94640 AIRWAY INHALATION TREATMENT: CPT

## 2024-12-08 PROCEDURE — 36415 COLL VENOUS BLD VENIPUNCTURE: CPT

## 2024-12-08 PROCEDURE — 83540 ASSAY OF IRON: CPT

## 2024-12-08 PROCEDURE — 2060000000 HC ICU INTERMEDIATE R&B

## 2024-12-08 PROCEDURE — 6370000000 HC RX 637 (ALT 250 FOR IP): Performed by: INTERNAL MEDICINE

## 2024-12-08 PROCEDURE — 99233 SBSQ HOSP IP/OBS HIGH 50: CPT | Performed by: INTERNAL MEDICINE

## 2024-12-08 PROCEDURE — 80048 BASIC METABOLIC PNL TOTAL CA: CPT

## 2024-12-08 PROCEDURE — 83550 IRON BINDING TEST: CPT

## 2024-12-08 PROCEDURE — APPSS30 APP SPLIT SHARED TIME 16-30 MINUTES: Performed by: NURSE PRACTITIONER

## 2024-12-08 PROCEDURE — 94660 CPAP INITIATION&MGMT: CPT

## 2024-12-08 PROCEDURE — 6360000002 HC RX W HCPCS: Performed by: NURSE PRACTITIONER

## 2024-12-08 PROCEDURE — 99223 1ST HOSP IP/OBS HIGH 75: CPT | Performed by: INTERNAL MEDICINE

## 2024-12-08 PROCEDURE — 93005 ELECTROCARDIOGRAM TRACING: CPT | Performed by: NURSE PRACTITIONER

## 2024-12-08 PROCEDURE — 82728 ASSAY OF FERRITIN: CPT

## 2024-12-08 PROCEDURE — 6360000002 HC RX W HCPCS: Performed by: INTERNAL MEDICINE

## 2024-12-08 RX ORDER — FUROSEMIDE 10 MG/ML
40 INJECTION INTRAMUSCULAR; INTRAVENOUS 2 TIMES DAILY
Status: DISCONTINUED | OUTPATIENT
Start: 2024-12-08 | End: 2024-12-12 | Stop reason: HOSPADM

## 2024-12-08 RX ORDER — CARVEDILOL 6.25 MG/1
6.25 TABLET ORAL 2 TIMES DAILY WITH MEALS
Status: DISCONTINUED | OUTPATIENT
Start: 2024-12-08 | End: 2024-12-12 | Stop reason: HOSPADM

## 2024-12-08 RX ADMIN — EMPAGLIFLOZIN 10 MG: 10 TABLET, FILM COATED ORAL at 17:38

## 2024-12-08 RX ADMIN — ARFORMOTEROL TARTRATE: 15 SOLUTION RESPIRATORY (INHALATION) at 16:41

## 2024-12-08 RX ADMIN — ENOXAPARIN SODIUM 30 MG: 100 INJECTION SUBCUTANEOUS at 08:07

## 2024-12-08 RX ADMIN — ACETAZOLAMIDE 250 MG: 250 TABLET ORAL at 08:07

## 2024-12-08 RX ADMIN — ACETAMINOPHEN 650 MG: 325 TABLET ORAL at 17:55

## 2024-12-08 RX ADMIN — Medication 10 ML: at 21:01

## 2024-12-08 RX ADMIN — HYDROCHLOROTHIAZIDE 25 MG: 25 TABLET ORAL at 08:07

## 2024-12-08 RX ADMIN — ACETAMINOPHEN 650 MG: 325 TABLET ORAL at 00:29

## 2024-12-08 RX ADMIN — FERROUS SULFATE TAB 325 MG (65 MG ELEMENTAL FE) 325 MG: 325 (65 FE) TAB at 20:59

## 2024-12-08 RX ADMIN — ARFORMOTEROL TARTRATE: 15 SOLUTION RESPIRATORY (INHALATION) at 05:36

## 2024-12-08 RX ADMIN — FUROSEMIDE 40 MG: 10 INJECTION, SOLUTION INTRAMUSCULAR; INTRAVENOUS at 08:07

## 2024-12-08 RX ADMIN — CARVEDILOL 6.25 MG: 6.25 TABLET, FILM COATED ORAL at 17:38

## 2024-12-08 RX ADMIN — ENOXAPARIN SODIUM 30 MG: 100 INJECTION SUBCUTANEOUS at 20:59

## 2024-12-08 RX ADMIN — CARVEDILOL 6.25 MG: 6.25 TABLET, FILM COATED ORAL at 11:49

## 2024-12-08 RX ADMIN — LISINOPRIL 40 MG: 20 TABLET ORAL at 08:07

## 2024-12-08 RX ADMIN — FUROSEMIDE 40 MG: 10 INJECTION, SOLUTION INTRAMUSCULAR; INTRAVENOUS at 17:38

## 2024-12-08 RX ADMIN — Medication 10 ML: at 08:07

## 2024-12-08 ASSESSMENT — PAIN DESCRIPTION - LOCATION: LOCATION: HEAD

## 2024-12-08 ASSESSMENT — PAIN SCALES - GENERAL: PAINLEVEL_OUTOF10: 2

## 2024-12-08 ASSESSMENT — PAIN - FUNCTIONAL ASSESSMENT: PAIN_FUNCTIONAL_ASSESSMENT: ACTIVITIES ARE NOT PREVENTED

## 2024-12-08 ASSESSMENT — PAIN DESCRIPTION - DESCRIPTORS: DESCRIPTORS: ACHING;DISCOMFORT;DULL

## 2024-12-08 NOTE — PLAN OF CARE
Problem: Chronic Conditions and Co-morbidities  Goal: Patient's chronic conditions and co-morbidity symptoms are monitored and maintained or improved  Outcome: Progressing  Flowsheets (Taken 12/7/2024 2000)  Care Plan - Patient's Chronic Conditions and Co-Morbidity Symptoms are Monitored and Maintained or Improved:   Monitor and assess patient's chronic conditions and comorbid symptoms for stability, deterioration, or improvement   Collaborate with multidisciplinary team to address chronic and comorbid conditions and prevent exacerbation or deterioration   Update acute care plan with appropriate goals if chronic or comorbid symptoms are exacerbated and prevent overall improvement and discharge     Problem: Discharge Planning  Goal: Discharge to home or other facility with appropriate resources  Outcome: Progressing  Flowsheets (Taken 12/7/2024 2000)  Discharge to home or other facility with appropriate resources:   Arrange for needed discharge resources and transportation as appropriate   Identify barriers to discharge with patient and caregiver   Identify discharge learning needs (meds, wound care, etc)   Refer to discharge planning if patient needs post-hospital services based on physician order or complex needs related to functional status, cognitive ability or social support system     Problem: Safety - Adult  Goal: Free from fall injury  Outcome: Progressing  Flowsheets (Taken 12/7/2024 2000)  Free From Fall Injury:   Instruct family/caregiver on patient safety   Based on caregiver fall risk screen, instruct family/caregiver to ask for assistance with transferring infant if caregiver noted to have fall risk factors     Problem: ABCDS Injury Assessment  Goal: Absence of physical injury  Outcome: Progressing  Flowsheets (Taken 12/7/2024 2000)  Absence of Physical Injury: Implement safety measures based on patient assessment

## 2024-12-09 LAB
ALBUMIN SERPL-MCNC: 4.1 G/DL (ref 3.5–5.2)
ALP SERPL-CCNC: 124 U/L (ref 35–104)
ALT SERPL-CCNC: 12 U/L (ref 0–32)
ANION GAP SERPL CALCULATED.3IONS-SCNC: 10 MMOL/L (ref 7–16)
AST SERPL-CCNC: 13 U/L (ref 0–31)
BILIRUB DIRECT SERPL-MCNC: <0.2 MG/DL (ref 0–0.3)
BILIRUB INDIRECT SERPL-MCNC: ABNORMAL MG/DL (ref 0–1)
BILIRUB SERPL-MCNC: 0.2 MG/DL (ref 0–1.2)
BUN SERPL-MCNC: 27 MG/DL (ref 6–20)
CALCIUM SERPL-MCNC: 9 MG/DL (ref 8.6–10.2)
CHLORIDE SERPL-SCNC: 98 MMOL/L (ref 98–107)
CO2 SERPL-SCNC: 27 MMOL/L (ref 22–29)
CREAT SERPL-MCNC: 1.3 MG/DL (ref 0.5–1)
EKG ATRIAL RATE: 80 BPM
EKG P AXIS: 54 DEGREES
EKG P-R INTERVAL: 144 MS
EKG Q-T INTERVAL: 398 MS
EKG QRS DURATION: 84 MS
EKG QTC CALCULATION (BAZETT): 459 MS
EKG R AXIS: 0 DEGREES
EKG T AXIS: 89 DEGREES
EKG VENTRICULAR RATE: 80 BPM
GFR, ESTIMATED: 50 ML/MIN/1.73M2
GLUCOSE BLD-MCNC: 97 MG/DL (ref 74–99)
GLUCOSE SERPL-MCNC: 102 MG/DL (ref 74–99)
MAGNESIUM SERPL-MCNC: 2.6 MG/DL (ref 1.6–2.6)
POTASSIUM SERPL-SCNC: 3.6 MMOL/L (ref 3.5–5)
PROT SERPL-MCNC: 7.5 G/DL (ref 6.4–8.3)
SODIUM SERPL-SCNC: 135 MMOL/L (ref 132–146)
T4 FREE SERPL-MCNC: 1 NG/DL (ref 0.9–1.7)
TSH SERPL DL<=0.05 MIU/L-ACNC: 1.97 UIU/ML (ref 0.27–4.2)

## 2024-12-09 PROCEDURE — 36415 COLL VENOUS BLD VENIPUNCTURE: CPT

## 2024-12-09 PROCEDURE — 2060000000 HC ICU INTERMEDIATE R&B

## 2024-12-09 PROCEDURE — 94640 AIRWAY INHALATION TREATMENT: CPT

## 2024-12-09 PROCEDURE — 6360000002 HC RX W HCPCS: Performed by: INTERNAL MEDICINE

## 2024-12-09 PROCEDURE — 2580000003 HC RX 258: Performed by: NURSE PRACTITIONER

## 2024-12-09 PROCEDURE — 6370000000 HC RX 637 (ALT 250 FOR IP): Performed by: INTERNAL MEDICINE

## 2024-12-09 PROCEDURE — 93010 ELECTROCARDIOGRAM REPORT: CPT | Performed by: INTERNAL MEDICINE

## 2024-12-09 PROCEDURE — 82248 BILIRUBIN DIRECT: CPT

## 2024-12-09 PROCEDURE — 6360000002 HC RX W HCPCS: Performed by: NURSE PRACTITIONER

## 2024-12-09 PROCEDURE — 83735 ASSAY OF MAGNESIUM: CPT

## 2024-12-09 PROCEDURE — 84439 ASSAY OF FREE THYROXINE: CPT

## 2024-12-09 PROCEDURE — 80053 COMPREHEN METABOLIC PANEL: CPT

## 2024-12-09 PROCEDURE — 84443 ASSAY THYROID STIM HORMONE: CPT

## 2024-12-09 PROCEDURE — 6370000000 HC RX 637 (ALT 250 FOR IP): Performed by: NURSE PRACTITIONER

## 2024-12-09 PROCEDURE — 82947 ASSAY GLUCOSE BLOOD QUANT: CPT

## 2024-12-09 PROCEDURE — 99233 SBSQ HOSP IP/OBS HIGH 50: CPT | Performed by: INTERNAL MEDICINE

## 2024-12-09 RX ORDER — TORSEMIDE 20 MG/1
20 TABLET ORAL DAILY
Qty: 30 TABLET | Refills: 2 | Status: SHIPPED | OUTPATIENT
Start: 2024-12-09

## 2024-12-09 RX ORDER — SUMATRIPTAN 50 MG/1
50 TABLET, FILM COATED ORAL
Status: DISCONTINUED | OUTPATIENT
Start: 2024-12-10 | End: 2024-12-12 | Stop reason: HOSPADM

## 2024-12-09 RX ORDER — SUMATRIPTAN 50 MG/1
50 TABLET, FILM COATED ORAL
Status: DISCONTINUED | OUTPATIENT
Start: 2024-12-09 | End: 2024-12-12 | Stop reason: HOSPADM

## 2024-12-09 RX ORDER — CARVEDILOL 6.25 MG/1
6.25 TABLET ORAL 2 TIMES DAILY
Qty: 60 TABLET | Refills: 3 | Status: SHIPPED | OUTPATIENT
Start: 2024-12-09

## 2024-12-09 RX ADMIN — FUROSEMIDE 40 MG: 10 INJECTION, SOLUTION INTRAMUSCULAR; INTRAVENOUS at 16:28

## 2024-12-09 RX ADMIN — ARFORMOTEROL TARTRATE: 15 SOLUTION RESPIRATORY (INHALATION) at 06:53

## 2024-12-09 RX ADMIN — ENOXAPARIN SODIUM 30 MG: 100 INJECTION SUBCUTANEOUS at 21:11

## 2024-12-09 RX ADMIN — CARVEDILOL 6.25 MG: 6.25 TABLET, FILM COATED ORAL at 16:28

## 2024-12-09 RX ADMIN — PROCHLORPERAZINE EDISYLATE 10 MG: 5 INJECTION INTRAMUSCULAR; INTRAVENOUS at 20:09

## 2024-12-09 RX ADMIN — ENOXAPARIN SODIUM 30 MG: 100 INJECTION SUBCUTANEOUS at 09:08

## 2024-12-09 RX ADMIN — LISINOPRIL 40 MG: 20 TABLET ORAL at 09:09

## 2024-12-09 RX ADMIN — Medication 10 ML: at 09:09

## 2024-12-09 RX ADMIN — SUMATRIPTAN SUCCINATE 50 MG: 50 TABLET ORAL at 18:47

## 2024-12-09 RX ADMIN — FUROSEMIDE 40 MG: 10 INJECTION, SOLUTION INTRAMUSCULAR; INTRAVENOUS at 09:09

## 2024-12-09 RX ADMIN — ARFORMOTEROL TARTRATE: 15 SOLUTION RESPIRATORY (INHALATION) at 18:20

## 2024-12-09 RX ADMIN — CARVEDILOL 6.25 MG: 6.25 TABLET, FILM COATED ORAL at 09:09

## 2024-12-09 RX ADMIN — Medication 10 ML: at 20:10

## 2024-12-09 RX ADMIN — ACETAZOLAMIDE 250 MG: 250 TABLET ORAL at 09:09

## 2024-12-09 RX ADMIN — EMPAGLIFLOZIN 10 MG: 10 TABLET, FILM COATED ORAL at 09:08

## 2024-12-09 RX ADMIN — ACETAMINOPHEN 650 MG: 325 TABLET ORAL at 14:27

## 2024-12-09 ASSESSMENT — PAIN SCALES - GENERAL
PAINLEVEL_OUTOF10: 4
PAINLEVEL_OUTOF10: 8
PAINLEVEL_OUTOF10: 9
PAINLEVEL_OUTOF10: 9
PAINLEVEL_OUTOF10: 3

## 2024-12-09 ASSESSMENT — PAIN DESCRIPTION - LOCATION
LOCATION: HEAD

## 2024-12-09 ASSESSMENT — PAIN DESCRIPTION - DESCRIPTORS
DESCRIPTORS: ACHING

## 2024-12-09 NOTE — PLAN OF CARE
Problem: Chronic Conditions and Co-morbidities  Goal: Patient's chronic conditions and co-morbidity symptoms are monitored and maintained or improved  12/9/2024 1309 by Rajni Sandoval RN  Outcome: Progressing  12/9/2024 0520 by Ghulam Alvarado RN  Outcome: Progressing  Flowsheets (Taken 12/8/2024 2000)  Care Plan - Patient's Chronic Conditions and Co-Morbidity Symptoms are Monitored and Maintained or Improved:   Monitor and assess patient's chronic conditions and comorbid symptoms for stability, deterioration, or improvement   Collaborate with multidisciplinary team to address chronic and comorbid conditions and prevent exacerbation or deterioration   Update acute care plan with appropriate goals if chronic or comorbid symptoms are exacerbated and prevent overall improvement and discharge     Problem: Discharge Planning  Goal: Discharge to home or other facility with appropriate resources  12/9/2024 1309 by Rajni Sandoval RN  Outcome: Progressing  12/9/2024 0520 by Ghulam Alvarado RN  Outcome: Progressing  Flowsheets (Taken 12/8/2024 2000)  Discharge to home or other facility with appropriate resources:   Identify barriers to discharge with patient and caregiver   Arrange for needed discharge resources and transportation as appropriate   Identify discharge learning needs (meds, wound care, etc)   Refer to discharge planning if patient needs post-hospital services based on physician order or complex needs related to functional status, cognitive ability or social support system     Problem: Safety - Adult  Goal: Free from fall injury  12/9/2024 1309 by Rajni Sandoval RN  Outcome: Progressing  12/9/2024 0520 by Ghulam Alvarado RN  Outcome: Progressing  Flowsheets (Taken 12/8/2024 2000)  Free From Fall Injury:   Instruct family/caregiver on patient safety   Based on caregiver fall risk screen, instruct family/caregiver to ask for assistance with transferring infant if caregiver noted to have fall risk

## 2024-12-09 NOTE — PLAN OF CARE
Problem: Chronic Conditions and Co-morbidities  Goal: Patient's chronic conditions and co-morbidity symptoms are monitored and maintained or improved  Outcome: Progressing  Flowsheets (Taken 12/8/2024 2000)  Care Plan - Patient's Chronic Conditions and Co-Morbidity Symptoms are Monitored and Maintained or Improved:   Monitor and assess patient's chronic conditions and comorbid symptoms for stability, deterioration, or improvement   Collaborate with multidisciplinary team to address chronic and comorbid conditions and prevent exacerbation or deterioration   Update acute care plan with appropriate goals if chronic or comorbid symptoms are exacerbated and prevent overall improvement and discharge     Problem: Discharge Planning  Goal: Discharge to home or other facility with appropriate resources  Outcome: Progressing  Flowsheets (Taken 12/8/2024 2000)  Discharge to home or other facility with appropriate resources:   Identify barriers to discharge with patient and caregiver   Arrange for needed discharge resources and transportation as appropriate   Identify discharge learning needs (meds, wound care, etc)   Refer to discharge planning if patient needs post-hospital services based on physician order or complex needs related to functional status, cognitive ability or social support system     Problem: Safety - Adult  Goal: Free from fall injury  Outcome: Progressing  Flowsheets (Taken 12/8/2024 2000)  Free From Fall Injury:   Instruct family/caregiver on patient safety   Based on caregiver fall risk screen, instruct family/caregiver to ask for assistance with transferring infant if caregiver noted to have fall risk factors     Problem: ABCDS Injury Assessment  Goal: Absence of physical injury  Outcome: Progressing  Flowsheets (Taken 12/8/2024 2000)  Absence of Physical Injury: Implement safety measures based on patient assessment

## 2024-12-10 LAB
ANION GAP SERPL CALCULATED.3IONS-SCNC: 15 MMOL/L (ref 7–16)
BNP SERPL-MCNC: 49 PG/ML (ref 0–450)
BUN SERPL-MCNC: 27 MG/DL (ref 6–20)
CALCIUM SERPL-MCNC: 9.2 MG/DL (ref 8.6–10.2)
CHLORIDE SERPL-SCNC: 101 MMOL/L (ref 98–107)
CO2 SERPL-SCNC: 22 MMOL/L (ref 22–29)
CREAT SERPL-MCNC: 1.3 MG/DL (ref 0.5–1)
GFR, ESTIMATED: 53 ML/MIN/1.73M2
GLUCOSE SERPL-MCNC: 128 MG/DL (ref 74–99)
MAGNESIUM SERPL-MCNC: 2.7 MG/DL (ref 1.6–2.6)
POTASSIUM SERPL-SCNC: 3.8 MMOL/L (ref 3.5–5)
SODIUM SERPL-SCNC: 138 MMOL/L (ref 132–146)
TROPONIN I SERPL HS-MCNC: 15 NG/L (ref 0–9)
TROPONIN I SERPL HS-MCNC: 19 NG/L (ref 0–9)

## 2024-12-10 PROCEDURE — 6370000000 HC RX 637 (ALT 250 FOR IP): Performed by: NURSE PRACTITIONER

## 2024-12-10 PROCEDURE — 6360000002 HC RX W HCPCS: Performed by: NURSE PRACTITIONER

## 2024-12-10 PROCEDURE — 6370000000 HC RX 637 (ALT 250 FOR IP): Performed by: INTERNAL MEDICINE

## 2024-12-10 PROCEDURE — 83880 ASSAY OF NATRIURETIC PEPTIDE: CPT

## 2024-12-10 PROCEDURE — 36415 COLL VENOUS BLD VENIPUNCTURE: CPT

## 2024-12-10 PROCEDURE — 6370000000 HC RX 637 (ALT 250 FOR IP)

## 2024-12-10 PROCEDURE — 2580000003 HC RX 258: Performed by: NURSE PRACTITIONER

## 2024-12-10 PROCEDURE — 93005 ELECTROCARDIOGRAM TRACING: CPT | Performed by: INTERNAL MEDICINE

## 2024-12-10 PROCEDURE — 6360000002 HC RX W HCPCS: Performed by: INTERNAL MEDICINE

## 2024-12-10 PROCEDURE — 2060000000 HC ICU INTERMEDIATE R&B

## 2024-12-10 PROCEDURE — 83735 ASSAY OF MAGNESIUM: CPT

## 2024-12-10 PROCEDURE — 80048 BASIC METABOLIC PNL TOTAL CA: CPT

## 2024-12-10 PROCEDURE — 99233 SBSQ HOSP IP/OBS HIGH 50: CPT | Performed by: INTERNAL MEDICINE

## 2024-12-10 PROCEDURE — 94640 AIRWAY INHALATION TREATMENT: CPT

## 2024-12-10 PROCEDURE — 84484 ASSAY OF TROPONIN QUANT: CPT

## 2024-12-10 RX ORDER — LIDOCAINE 4 G/G
PATCH TOPICAL
Status: COMPLETED
Start: 2024-12-10 | End: 2024-12-11

## 2024-12-10 RX ORDER — LIDOCAINE 4 G/G
1 PATCH TOPICAL DAILY
Status: COMPLETED | OUTPATIENT
Start: 2024-12-10 | End: 2024-12-11

## 2024-12-10 RX ADMIN — Medication 10 ML: at 20:49

## 2024-12-10 RX ADMIN — Medication 10 ML: at 08:58

## 2024-12-10 RX ADMIN — FUROSEMIDE 40 MG: 10 INJECTION, SOLUTION INTRAMUSCULAR; INTRAVENOUS at 08:25

## 2024-12-10 RX ADMIN — ARFORMOTEROL TARTRATE: 15 SOLUTION RESPIRATORY (INHALATION) at 05:33

## 2024-12-10 RX ADMIN — ENOXAPARIN SODIUM 30 MG: 100 INJECTION SUBCUTANEOUS at 20:48

## 2024-12-10 RX ADMIN — ENOXAPARIN SODIUM 30 MG: 100 INJECTION SUBCUTANEOUS at 08:25

## 2024-12-10 RX ADMIN — SUMATRIPTAN SUCCINATE 50 MG: 50 TABLET ORAL at 05:10

## 2024-12-10 RX ADMIN — EMPAGLIFLOZIN 10 MG: 10 TABLET, FILM COATED ORAL at 08:24

## 2024-12-10 RX ADMIN — FERROUS SULFATE TAB 325 MG (65 MG ELEMENTAL FE) 325 MG: 325 (65 FE) TAB at 20:48

## 2024-12-10 RX ADMIN — LISINOPRIL 40 MG: 20 TABLET ORAL at 08:24

## 2024-12-10 RX ADMIN — IPRATROPIUM BROMIDE AND ALBUTEROL SULFATE 1 DOSE: .5; 2.5 SOLUTION RESPIRATORY (INHALATION) at 05:33

## 2024-12-10 RX ADMIN — ACETAZOLAMIDE 250 MG: 250 TABLET ORAL at 09:58

## 2024-12-10 RX ADMIN — CARVEDILOL 6.25 MG: 6.25 TABLET, FILM COATED ORAL at 16:30

## 2024-12-10 RX ADMIN — CARVEDILOL 6.25 MG: 6.25 TABLET, FILM COATED ORAL at 08:24

## 2024-12-10 RX ADMIN — ARFORMOTEROL TARTRATE: 15 SOLUTION RESPIRATORY (INHALATION) at 17:58

## 2024-12-10 ASSESSMENT — PAIN DESCRIPTION - LOCATION
LOCATION: HEAD

## 2024-12-10 ASSESSMENT — PAIN SCALES - GENERAL
PAINLEVEL_OUTOF10: 5
PAINLEVEL_OUTOF10: 0
PAINLEVEL_OUTOF10: 5
PAINLEVEL_OUTOF10: 7
PAINLEVEL_OUTOF10: 1

## 2024-12-10 ASSESSMENT — PAIN DESCRIPTION - DESCRIPTORS
DESCRIPTORS: ACHING
DESCRIPTORS: SHARP
DESCRIPTORS: ACHING

## 2024-12-10 NOTE — CONSULTS
[facility]ree [facility]   Inpatient CHF Nurse Navigator Consult      Cardiologist: Dr. Spencer Almaguer NAIN Morales is a 49 y.o. (1975) female with a history of HFmrEF, most recent EF:  Lab Results   Component Value Date    LVEF 45 12/07/2024    LVEFMODE Echo 12/07/2024       Patient was awake and alert, laying in bed during the consultation and is agreeable to heart failure education. She was engaged and asked appropriate questions throughout the education session.  Patient is agreeable to symptom monitoring, daily weights, and following a low sodium diet and follow up appointments with PCP, [facility] and establishing with the CHF clinic.       Barriers identified during consult contributing to HF Hospitalization:  [x] Limited medication adherence   [x] Poor health literacy, education regarding HF medications provided   [] Pill box provided to patient  [] Difficulty affording medications  [] Difficulty obtaining/ managing medications  [] Prescription assistance information given     [x] Not weighing themselves daily  [x] Weight log provided for easy monitoring  [x] Scale provided     [x] Not following low sodium diet  [] Food insecurity   [x] 2 gram sodium diet education provided   [] Low sodium recipes provided  [] Sodium free seasoning provided   [] Low sodium meal delivery options given to patient  [x] Dietician consulted     [] Lack of transportation to appointments     [] Depression, given chronic illness  [] Primary team notified     [] Goals of care need addressed  [] Palliative care consulted     [] CHF CHW consulted, to assist with n/a    Chart Reviewed:  Diet: ADULT DIET; Regular; Low Sodium (2 gm)   Daily Weights: Patient Vitals for the past 96 hrs (Last 3 readings):   Weight   12/10/24 0600 136 kg (299 lb 12.8 oz)   12/09/24 0600 (!) 141.1 kg (311 lb)   12/08/24 0600 (!) 141.1 kg (311 lb)     I/O:   Intake/Output Summary (Last 24 hours) at 12/10/2024 1831  Last data filed at 
denies numbness, tingling, tremors.   Skin: denies rashes or itching.   : denies hematuria, dysuria   GI: denies vomiting, diarrhea   Psych: denies mood changed, anxiety, depression.  all others negative.    Physical Exam   BP (!) 153/108   Pulse 83   Temp 97.9 °F (36.6 °C) (Oral)   Resp 18   Ht 1.727 m (5' 8\")   Wt (!) 141.1 kg (311 lb)   LMP 12/05/2024   SpO2 93%   BMI 47.29 kg/m²   Constitutional: Oriented to person, place, and time. Well-developed and well-nourished. No distress.    Head: Normocephalic and atraumatic.   Eyes: EOM are normal. Pupils are equal, round, and reactive to light.   Neck: Normal range of motion. Neck supple.  Positive hepatojugular reflux. Carotid bruit is not present. No tracheal deviation present. No thyromegaly present.   Cardiovascular: Normal rate, regular rhythm, normal heart sounds and intact distal pulses.  Exam reveals no gallop and no friction rub.  No murmur heard.  Pulmonary/Chest: Effort normal and breath sounds normal. No respiratory distress. No wheezes. No rales. No tenderness.   Abdominal: Soft. Bowel sounds are normal. No distension and no mass. No tenderness. No rebound and no guarding.   Musculoskeletal: Normal range of motion.  1-2+ edema and no tenderness.   Lymphadenopathy:   No cervical adenopathy. No groin adenopathy.  Neurological: Alert and oriented to person, place, and time.   Skin: Skin is warm and dry. No rash noted. Not diaphoretic. No erythema.   Psychiatric: Normal mood and affect. Behavior is normal.     CBC:   Lab Results   Component Value Date/Time    WBC 8.3 12/06/2024 11:15 AM    RBC 5.00 12/06/2024 11:15 AM    HGB 10.4 12/06/2024 11:15 AM    HCT 35.7 12/06/2024 11:15 AM    MCV 71.4 12/06/2024 11:15 AM    MCH 20.8 12/06/2024 11:15 AM    MCHC 29.1 12/06/2024 11:15 AM    RDW 22.2 12/06/2024 11:15 AM     07/02/2024 11:46 AM    MPV 10.8 12/06/2024 11:15 AM     BMP:   Lab Results   Component Value Date/Time     12/08/2024 07:40 AM

## 2024-12-10 NOTE — PLAN OF CARE
Patient's chart updated to reflect:        - HF care plan, HF education points and HF discharge instructions.  -Orders: 2 gram sodium diet, daily weights, I/O.  -PCP and cardiology follow up appointments to be scheduled within 7 days of hospital discharge.  -CHF education session will be provided to the patient prior to hospital discharge.    most recent EF:  Lab Results   Component Value Date    LVEF 45 12/07/2024    LVEFMODE Echo 12/07/2024       Abril Boyd, RN MSN,RN  Heart Failure Navigator    No future appointments.

## 2024-12-10 NOTE — FLOWSHEET NOTE
Patient was experiencing chest pain. Refer to progress note. States that it is residing and is now sleeping; easy to arouse.

## 2024-12-10 NOTE — PLAN OF CARE
Problem: Chronic Conditions and Co-morbidities  Goal: Patient's chronic conditions and co-morbidity symptoms are monitored and maintained or improved  12/10/2024 0919 by Willig, Samantha, RN  Outcome: Progressing  Flowsheets (Taken 12/8/2024 2000 by Ghulam Alvarado RN)  Care Plan - Patient's Chronic Conditions and Co-Morbidity Symptoms are Monitored and Maintained or Improved:   Monitor and assess patient's chronic conditions and comorbid symptoms for stability, deterioration, or improvement   Collaborate with multidisciplinary team to address chronic and comorbid conditions and prevent exacerbation or deterioration   Update acute care plan with appropriate goals if chronic or comorbid symptoms are exacerbated and prevent overall improvement and discharge  12/10/2024 0038 by Devika Quan RN  Outcome: Progressing     Problem: Discharge Planning  Goal: Discharge to home or other facility with appropriate resources  12/10/2024 0038 by Devika Quan RN  Outcome: Progressing     Problem: Safety - Adult  Goal: Free from fall injury  12/10/2024 0919 by Willig, Samantha, RN  Outcome: Progressing  Flowsheets (Taken 12/8/2024 2000 by Ghulam Alvarado RN)  Free From Fall Injury:   Instruct family/caregiver on patient safety   Based on caregiver fall risk screen, instruct family/caregiver to ask for assistance with transferring infant if caregiver noted to have fall risk factors  12/10/2024 0038 by Devika Quan RN  Outcome: Progressing     Problem: ABCDS Injury Assessment  Goal: Absence of physical injury  12/10/2024 0038 by Devika Quan RN  Outcome: Progressing     Problem: Pain  Goal: Verbalizes/displays adequate comfort level or baseline comfort level  12/10/2024 0919 by Willig, Samantha, RN  Outcome: Progressing  Flowsheets (Taken 12/10/2024 0919)  Verbalizes/displays adequate comfort level or baseline comfort level: Encourage patient to monitor pain and request assistance  12/10/2024 0038 by

## 2024-12-10 NOTE — PLAN OF CARE
Problem: Chronic Conditions and Co-morbidities  Goal: Patient's chronic conditions and co-morbidity symptoms are monitored and maintained or improved  12/10/2024 0038 by Devika Quan RN  Outcome: Progressing  12/9/2024 1309 by Rajni Sandoval RN  Outcome: Progressing     Problem: Discharge Planning  Goal: Discharge to home or other facility with appropriate resources  12/10/2024 0038 by Devika Quan RN  Outcome: Progressing  12/9/2024 1309 by Rajni Sandoval RN  Outcome: Progressing     Problem: Safety - Adult  Goal: Free from fall injury  12/10/2024 0038 by Devika Quan RN  Outcome: Progressing  12/9/2024 1309 by Rajni Sandoval RN  Outcome: Progressing     Problem: ABCDS Injury Assessment  Goal: Absence of physical injury  12/10/2024 0038 by Devika Quan RN  Outcome: Progressing  12/9/2024 1309 by Rajni Sandoval RN  Outcome: Progressing     Problem: Pain  Goal: Verbalizes/displays adequate comfort level or baseline comfort level  Outcome: Progressing

## 2024-12-11 LAB
ANION GAP SERPL CALCULATED.3IONS-SCNC: 12 MMOL/L (ref 7–16)
BUN SERPL-MCNC: 30 MG/DL (ref 6–20)
CALCIUM SERPL-MCNC: 8.8 MG/DL (ref 8.6–10.2)
CHLORIDE SERPL-SCNC: 103 MMOL/L (ref 98–107)
CO2 SERPL-SCNC: 23 MMOL/L (ref 22–29)
CREAT SERPL-MCNC: 1.2 MG/DL (ref 0.5–1)
EKG ATRIAL RATE: 84 BPM
EKG P AXIS: 50 DEGREES
EKG P-R INTERVAL: 138 MS
EKG Q-T INTERVAL: 398 MS
EKG QRS DURATION: 84 MS
EKG QTC CALCULATION (BAZETT): 470 MS
EKG R AXIS: 4 DEGREES
EKG T AXIS: 93 DEGREES
EKG VENTRICULAR RATE: 84 BPM
GFR, ESTIMATED: 53 ML/MIN/1.73M2
GLUCOSE SERPL-MCNC: 95 MG/DL (ref 74–99)
MAGNESIUM SERPL-MCNC: 2.6 MG/DL (ref 1.6–2.6)
POTASSIUM SERPL-SCNC: 3.5 MMOL/L (ref 3.5–5)
SODIUM SERPL-SCNC: 138 MMOL/L (ref 132–146)

## 2024-12-11 PROCEDURE — 80048 BASIC METABOLIC PNL TOTAL CA: CPT

## 2024-12-11 PROCEDURE — 94660 CPAP INITIATION&MGMT: CPT

## 2024-12-11 PROCEDURE — 6370000000 HC RX 637 (ALT 250 FOR IP): Performed by: NURSE PRACTITIONER

## 2024-12-11 PROCEDURE — 36415 COLL VENOUS BLD VENIPUNCTURE: CPT

## 2024-12-11 PROCEDURE — 6360000002 HC RX W HCPCS: Performed by: NURSE PRACTITIONER

## 2024-12-11 PROCEDURE — 83735 ASSAY OF MAGNESIUM: CPT

## 2024-12-11 PROCEDURE — 2060000000 HC ICU INTERMEDIATE R&B

## 2024-12-11 PROCEDURE — 6370000000 HC RX 637 (ALT 250 FOR IP): Performed by: INTERNAL MEDICINE

## 2024-12-11 PROCEDURE — 94640 AIRWAY INHALATION TREATMENT: CPT

## 2024-12-11 PROCEDURE — 2580000003 HC RX 258: Performed by: NURSE PRACTITIONER

## 2024-12-11 PROCEDURE — 93010 ELECTROCARDIOGRAM REPORT: CPT | Performed by: INTERNAL MEDICINE

## 2024-12-11 RX ADMIN — IPRATROPIUM BROMIDE AND ALBUTEROL SULFATE 1 DOSE: .5; 2.5 SOLUTION RESPIRATORY (INHALATION) at 05:27

## 2024-12-11 RX ADMIN — Medication 10 ML: at 10:13

## 2024-12-11 RX ADMIN — CARVEDILOL 6.25 MG: 6.25 TABLET, FILM COATED ORAL at 10:00

## 2024-12-11 RX ADMIN — ENOXAPARIN SODIUM 30 MG: 100 INJECTION SUBCUTANEOUS at 20:46

## 2024-12-11 RX ADMIN — LISINOPRIL 40 MG: 20 TABLET ORAL at 10:00

## 2024-12-11 RX ADMIN — Medication 10 ML: at 20:46

## 2024-12-11 RX ADMIN — EMPAGLIFLOZIN 10 MG: 10 TABLET, FILM COATED ORAL at 10:00

## 2024-12-11 RX ADMIN — CARVEDILOL 6.25 MG: 6.25 TABLET, FILM COATED ORAL at 16:45

## 2024-12-11 RX ADMIN — FERROUS SULFATE TAB 325 MG (65 MG ELEMENTAL FE) 325 MG: 325 (65 FE) TAB at 20:46

## 2024-12-11 RX ADMIN — ENOXAPARIN SODIUM 30 MG: 100 INJECTION SUBCUTANEOUS at 10:00

## 2024-12-11 RX ADMIN — ARFORMOTEROL TARTRATE: 15 SOLUTION RESPIRATORY (INHALATION) at 05:26

## 2024-12-11 RX ADMIN — ARFORMOTEROL TARTRATE: 15 SOLUTION RESPIRATORY (INHALATION) at 19:51

## 2024-12-11 RX ADMIN — ACETAZOLAMIDE 250 MG: 250 TABLET ORAL at 10:00

## 2024-12-11 ASSESSMENT — PAIN SCALES - GENERAL
PAINLEVEL_OUTOF10: 0

## 2024-12-11 NOTE — PLAN OF CARE
Problem: Chronic Conditions and Co-morbidities  Goal: Patient's chronic conditions and co-morbidity symptoms are monitored and maintained or improved  Outcome: Progressing     Problem: Discharge Planning  Goal: Discharge to home or other facility with appropriate resources  Outcome: Progressing     Problem: Safety - Adult  Goal: Free from fall injury  Outcome: Progressing     Problem: ABCDS Injury Assessment  Goal: Absence of physical injury  Outcome: Progressing     Problem: Pain  Goal: Verbalizes/displays adequate comfort level or baseline comfort level  Outcome: Progressing  Flowsheets (Taken 12/11/2024 0021)  Verbalizes/displays adequate comfort level or baseline comfort level: Encourage patient to monitor pain and request assistance

## 2024-12-11 NOTE — PROGRESS NOTES
[facility] Hospitalist   Progress Note    Admitting Date and Time: 12/6/2024 10:43 AM  Admit Dx: Acute decompensated heart failure (HCC) [I50.9]  Dyspnea, unspecified type [R06.00]  New onset of congestive heart failure (HCC) [I50.9]  Acute congestive heart failure, unspecified heart failure type (HCC) [I50.9]      Subjective:    12/7: Pt seen & examined with Attending. Awake, A&O, sitting off side of bed eating lunch. Pt states that she is breathing much better today and has been urinating large volumes. Denies CP. No needs at this time.   Limited ECHO does reveal reduced EF as compared to a few months ago.   Stable overnight. No other overnight issues reported.     ROS: Denies fever, chills, cp, sob, n/v, HA unless stated above.     Objective:    BP (!) 147/81   Pulse 93   Temp 98.1 °F (36.7 °C) (Oral)   Resp 22   Ht 1.727 m (5' 8\")   Wt (!) 141.2 kg (311 lb 3.2 oz)   LMP 12/05/2024   SpO2 96%   BMI 47.32 kg/m²     PHYSICAL EXAM  Constitutional: No fever, chills, diaphoresis  Skin: No visible skin rash or erythema, no visible skin breakdown  HEENT: Unremarkable; mucous membranes are moist  Neck: No JVD, lymphadenopathy, thyromegaly  Cardiovascular: Normal rate, normal rhythm to auscultation. S1, S2 normal.  No S3 or rubs appreciated. No carotid bruits appreciated.  Respiratory: Faint crackles in bilat bases, otherwise clear with good air movement. No respiratory distress. Currently on room air. Slight conversational dyspnea.   Gastrointestinal: Soft, non-tender, non-distended. Active bowel sounds  Genitourinary: No CVA tenderness  Extremities: No clubbing or cyanosis. No edema  Neurological: Awake, alert, oriented x 3.  No evidence of focal motor or sensory deficits  Psychological: Appropriate affect.     Assessment & Plan:    Acute decompensated heart failure: July 2024 ECHO with EF of 50-55%, grade I DD. CXR with cardiomegaly, pulm vasc congestion, BNP elevated. Limited ECHO obtained, EF 
       [facility] Hospitalist   Progress Note    Admitting Date and Time: 12/6/2024 10:43 AM  Admit Dx: Acute decompensated heart failure (HCC) [I50.9]  Dyspnea, unspecified type [R06.00]  New onset of congestive heart failure (HCC) [I50.9]  Acute congestive heart failure, unspecified heart failure type (HCC) [I50.9]    Subjective:    Pt feeling better today overall. The chest/back pain is better. I had ordered GI cocktail last night but she was not given it as she had an emesis.     Per RN: doing better overall today.       aluminum & magnesium hydroxide-simethicone (MAALOX) 30 mL, lidocaine viscous hcl (XYLOCAINE) 5 mL (GI COCKTAIL)   Oral Once    [Held by provider] furosemide  40 mg IntraVENous BID    carvedilol  6.25 mg Oral BID WC    empagliflozin  10 mg Oral Daily    [Held by provider] DULoxetine  60 mg Oral Daily    ferrous sulfate  325 mg Oral QHS    lisinopril  40 mg Oral Daily    arformoterol 15 mcg-budesonide 0.25 mg neb solution   Nebulization BID RT    sodium chloride flush  5-40 mL IntraVENous 2 times per day    enoxaparin  30 mg SubCUTAneous BID    acetaZOLAMIDE  250 mg Oral Daily     SUMAtriptan, 50 mg, Q2H PRN  SUMAtriptan, 50 mg, Q2H PRN  ipratropium 0.5 mg-albuterol 2.5 mg, 1 Dose, Q4H PRN  sodium chloride flush, 5-40 mL, PRN  sodium chloride, , PRN  polyethylene glycol, 17 g, Daily PRN  acetaminophen, 650 mg, Q6H PRN   Or  acetaminophen, 650 mg, Q6H PRN  sulfur hexafluoride microspheres, 2 mL, ONCE PRN  prochlorperazine, 10 mg, Q6H PRN         Objective:    /80   Pulse 81   Temp 97.9 °F (36.6 °C) (Oral)   Resp 19   Ht 1.727 m (5' 8\")   Wt 136 kg (299 lb 12.8 oz)   LMP 12/05/2024   SpO2 97%   BMI 45.58 kg/m²   General Appearance: alert and oriented to person, place and time and in no acute distress  Skin: warm and dry  Head: normocephalic and atraumatic  Eyes: pupils equal, round, and reactive to light, extraocular eye movements intact, conjunctivae normal  Neck: neck 
       [facility] Hospitalist   Progress Note    Admitting Date and Time: 12/6/2024 10:43 AM  Admit Dx: Acute decompensated heart failure (HCC) [I50.9]  Dyspnea, unspecified type [R06.00]  New onset of congestive heart failure (HCC) [I50.9]  Acute congestive heart failure, unspecified heart failure type (HCC) [I50.9]      Subjective:    12/7: Pt seen & examined with Attending. Awake, A&O, sitting off side of bed eating lunch. Pt states that she is breathing much better today and has been urinating large volumes. Denies CP. No needs at this time.   Limited ECHO does reveal reduced EF as compared to a few months ago.   Stable overnight. No other overnight issues reported.     12/8: Pt awake, A&O, lying in bed in no apparent distress. States she is breathing much better, continues to void large volume. Discussed decreased EF as compared to July and advised her of cardiology and CHF nurse consults. Denies CP, SOB. No needs at this time.     ROS: Denies fever, chills, cp, sob, n/v, HA unless stated above.     Objective:    BP (!) 153/108   Pulse 83   Temp 97.9 °F (36.6 °C) (Oral)   Resp 18   Ht 1.727 m (5' 8\")   Wt (!) 141.1 kg (311 lb)   LMP 12/05/2024   SpO2 93%   BMI 47.29 kg/m²     PHYSICAL EXAM  Constitutional: No fever, chills, diaphoresis  Skin: No visible skin rash or erythema, no visible skin breakdown  HEENT: Unremarkable; mucous membranes are moist  Neck: No JVD, lymphadenopathy, thyromegaly  Cardiovascular: Normal rate, normal rhythm to auscultation. S1, S2 normal.  No S3 or rubs appreciated. No carotid bruits appreciated.  Respiratory: Faint crackles in bilat bases, otherwise clear with good air movement. No respiratory distress. Currently on room air. Gastrointestinal: Soft, non-tender, non-distended. Active bowel sounds  Genitourinary: No CVA tenderness  Extremities: No clubbing or cyanosis. No edema  Neurological: Awake, alert, oriented x 3.  No evidence of focal motor or sensory 
       [facility] Hospitalist   Progress Note    Admitting Date and Time: 12/6/2024 10:43 AM  Admit Dx: Acute decompensated heart failure (HCC) [I50.9]  Dyspnea, unspecified type [R06.00]  New onset of congestive heart failure (HCC) [I50.9]  Acute congestive heart failure, unspecified heart failure type (HCC) [I50.9]    Subjective:    Pt complaining of headache. She has history of migraines and typical takes Nurtec for this at home.       furosemide  40 mg IntraVENous BID    carvedilol  6.25 mg Oral BID WC    empagliflozin  10 mg Oral Daily    [Held by provider] DULoxetine  60 mg Oral Daily    ferrous sulfate  325 mg Oral QHS    lisinopril  40 mg Oral Daily    arformoterol 15 mcg-budesonide 0.25 mg neb solution   Nebulization BID RT    sodium chloride flush  5-40 mL IntraVENous 2 times per day    enoxaparin  30 mg SubCUTAneous BID    acetaZOLAMIDE  250 mg Oral Daily     ipratropium 0.5 mg-albuterol 2.5 mg, 1 Dose, Q4H PRN  sodium chloride flush, 5-40 mL, PRN  sodium chloride, , PRN  polyethylene glycol, 17 g, Daily PRN  acetaminophen, 650 mg, Q6H PRN   Or  acetaminophen, 650 mg, Q6H PRN  sulfur hexafluoride microspheres, 2 mL, ONCE PRN  prochlorperazine, 10 mg, Q6H PRN         Objective:    /87   Pulse 68   Temp 98.4 °F (36.9 °C)   Resp 16   Ht 1.727 m (5' 8\")   Wt (!) 141.1 kg (311 lb)   LMP 12/05/2024   SpO2 94%   BMI 47.29 kg/m²   General Appearance: alert and oriented to person, place and time but in mild distress secondary to headache.  Skin: warm and dry  Head: normocephalic and atraumatic  Eyes: pupils equal, round, and reactive to light, extraocular eye movements intact, conjunctivae normal  Neck: neck supple and non tender without mass   Pulmonary/Chest: clear to auscultation bilaterally- no wheezes, rales or rhonchi, normal air movement, no respiratory distress  Cardiovascular: normal rate, normal S1 and S2 and no carotid bruits  Abdomen: soft, non-tender, non-distended, normal 
    INPATIENT CARDIOLOGY FOLLOW-UP    Name: Rosina Morales    Age: 49 y.o.    Date of Admission: 12/6/2024 10:43 AM    Date of Service: 12/10/2024    Primary Cardiologist: Dr. Palmer    Chief Complaint: Follow-up for acute decompensated heart failure, new onset systolic    Interim History:  No new overnight cardiac complaints. Currently with no complaints of CP, SOB, palpitations, dizziness, or lightheadedness. SR on telemetry.  Feeling better in terms of breathing.  Headache is better as well.  Urine output and accurately documented  Review of Systems:   Negative except as described above    Problem List:  Patient Active Problem List   Diagnosis    Iron deficiency anemia    Ulcerative pancolitis    Essential hypertension    Severe obesity (BMI >= 40)    Calculus of gallbladder    Intractable migraine without aura and without status migrainosus    Vitamin D deficiency    Sleep apnea    Bradycardia    Venous stasis    Moderate persistent asthma with exacerbation    Chronic pain syndrome    Chronic right-sided low back pain with right-sided sciatica    Hypertriglyceridemia    Prediabetes    Anxiety    Pseudotumor cerebri    Acute depression    Asthma with acute exacerbation    Abnormal stress test    Acute HFrEF (heart failure with reduced ejection fraction) (HCC)    New onset of congestive heart failure (HCC)    NICM (nonischemic cardiomyopathy) (HCC)       Current Medications:    Current Facility-Administered Medications:     SUMAtriptan (IMITREX) tablet 50 mg, 50 mg, Oral, Q2H PRN, Grabiel Ruiz MD, 50 mg at 12/09/24 1847    SUMAtriptan (IMITREX) tablet 50 mg, 50 mg, Oral, Q2H PRN, Grabiel Ruiz MD, 50 mg at 12/10/24 0510    furosemide (LASIX) injection 40 mg, 40 mg, IntraVENous, BID, Randall Yarbrough MD, 40 mg at 12/10/24 0825    carvedilol (COREG) tablet 6.25 mg, 6.25 mg, Oral, BID TAM, Flor Larsen APRN - NP, 6.25 mg at 12/10/24 0824    empagliflozin (JARDIANCE) tablet 10 mg, 10 mg, Oral, Daily, 
.4 Eyes Skin Assessment     NAME:  Rosina Morales  YOB: 1975  MEDICAL RECORD NUMBER:  [id number]    The patient is being assessed for  Admission    I agree that at least one RN has performed a thorough Head to Toe Skin Assessment on the patient. ALL assessment sites listed below have been assessed.      Areas assessed by both nurses:    Head, Face, Ears, Shoulders, Back, Chest, Arms, Elbows, Hands, Sacrum. Buttock, Coccyx, Ischium, Legs. Feet and Heels, and Under Medical Devices         Does the Patient have a Wound? No noted wound(s)       Bird Prevention initiated by RN: Yes  Wound Care Orders initiated by RN: No    Pressure Injury (Stage 3,4, Unstageable, DTI, NWPT, and Complex wounds) if present, place Wound referral order by RN under : No    New Ostomies, if present place, Ostomy referral order under : No     Nurse 1 eSignature: Electronically signed by Twin Pool RN on 12/7/24 at 8:09 AM EST    **SHARE this note so that the co-signing nurse can place an eSignature**    Nurse 2 eSignature: {Esignature:[id number]}   
CLINICAL PHARMACY NOTE: MEDS TO BEDS    Total # of Prescriptions Filled: 1   The following medications were delivered to the patient:  Jardiance 10 mg    Additional Documentation:    
CLINICAL PHARMACY NOTE: MEDS TO BEDS    Total # of Prescriptions Filled: 2   The following medications were delivered to the patient:  Carvedilol 6.25 mg  Torsemide 20 mg    Additional Documentation:    
Patient called nurse into the room for sudden onset sternal chest pain that radiates into her right upper chest. Patient describes pain as \"poking\" sensation. Denies n/v, sob, neuro symptoms, or referred pain. Lung and heart sounds clear and regular. Pain upon palpation to the right upper chest area. VSS. Doctor notified and EKG obtained.   
Patient now complains that her chest pain has come back. C/o sternal chest pain that radiates into the right upper chest and referred pain in her back and jaw. Dr. Ruiz aware and referred me to cardiology.     Upon assessment, patient found to have a lump with pain upon palpation in the thoracic spine area.    Dr. Yarbrough notified and ordered a lidocaine patch for the patient. We are still waiting for lab results.     Patient denies pain at this time.   
*      Plan:       Acute HFrEF, uncontrolled primary hypertension  -Echocardiogram shows mildly reduced ejection fraction.    -Patient was diuresed with IV Lasix on admission, which we are continuing.  Cardiology increased to 40 mg IV bid.  -She appears close to euvolemic at this time, therefore will initiate beta-blocker.    -Given uncontrolled hypertension, she was started carvedilol 6.25 mg p.o. twice daily in addition to her lisinopril and HCTZ she takes at home.  -Can probably transition to oral Lasix in the next day or 2.    -Consult heart failure nurse coordinator as would benefit from close follow-up with CHF clinic after discharge.    -Consulted cardiology for further workup and recommendations.     Atypical chest pain  -Symptoms sound more musculoskeletal.  In fact, cardiology ordered lidocaine patch for her.  EKG with some more prominent changes and initial troponin slightly elevated at 19.  Repeat troponin is pending..    Pseudotumor cerebri  -continue home Diamox     Migraine headache  -patient uses Nurtec for acute migraine but that is non-formulary here. Reviewing her dispense history she has taken Imitrex in the past so yesterday 12/9 given trial of Imitrex 50 mg with repeat dose in 2 hours if needed.     Moderate persistent asthma   -continue ICS/LABA     Prediabetes  -given concurrent CHF, Jardiance 10 mg p.o. daily was added.     Morbid obesity with BMI 45.58  -discussed gradual diet changes and gradually initiating exercise.  -Patient notes that she used to walk quite a bit, however her CHF symptoms have been limiting her with this.      Disposition  -Continue to monitor response to the above treatments but anticipate will require least 24  hours for medical optimization to ensure safe discharge.       Case discussed with RN  at bedside.     NOTE: This report was transcribed using voice recognition software. Every effort was made to ensure accuracy; however, inadvertent computerized transcription 
deficiency.  Ferritin remains low.  Initiate IV iron therapy.  5.  Will need outpatient sleep study evaluation.  6.  If able to ambulate without symptoms, should be stable for discharge from a cardiac standpoint.  Will benefit from outpatient follow-up with the congestive heart failure clinic.  Follow-up with Dr. Palmer in 2 to 3 weeks after discharge.    Greater than 50 minutes was spent counseling the patient, reviewing the rationale for the above recommendations and reviewing the patient's current medication list, problem list and results of all previously ordered testing.      Randall Yarbrough MD, [facility]    NOTE: This report was transcribed using voice recognition software. Every effort was made to ensure accuracy; however, inadvertent computerized transcription errors may be present.

## 2024-12-12 VITALS
HEIGHT: 68 IN | TEMPERATURE: 97.9 F | SYSTOLIC BLOOD PRESSURE: 92 MMHG | HEART RATE: 78 BPM | DIASTOLIC BLOOD PRESSURE: 68 MMHG | WEIGHT: 293 LBS | RESPIRATION RATE: 13 BRPM | BODY MASS INDEX: 44.41 KG/M2 | OXYGEN SATURATION: 95 %

## 2024-12-12 PROBLEM — J45.909 UNCOMPLICATED ASTHMA: Status: ACTIVE | Noted: 2024-12-12

## 2024-12-12 LAB
ANION GAP SERPL CALCULATED.3IONS-SCNC: 12 MMOL/L (ref 7–16)
BNP SERPL-MCNC: <36 PG/ML (ref 0–450)
BUN SERPL-MCNC: 29 MG/DL (ref 6–20)
CALCIUM SERPL-MCNC: 8.9 MG/DL (ref 8.6–10.2)
CHLORIDE SERPL-SCNC: 100 MMOL/L (ref 98–107)
CO2 SERPL-SCNC: 24 MMOL/L (ref 22–29)
CREAT SERPL-MCNC: 1.3 MG/DL (ref 0.5–1)
GFR, ESTIMATED: 50 ML/MIN/1.73M2
GLUCOSE SERPL-MCNC: 100 MG/DL (ref 74–99)
MAGNESIUM SERPL-MCNC: 2.8 MG/DL (ref 1.6–2.6)
POTASSIUM SERPL-SCNC: 3.8 MMOL/L (ref 3.5–5)
SODIUM SERPL-SCNC: 136 MMOL/L (ref 132–146)

## 2024-12-12 PROCEDURE — 94640 AIRWAY INHALATION TREATMENT: CPT

## 2024-12-12 PROCEDURE — 6370000000 HC RX 637 (ALT 250 FOR IP): Performed by: NURSE PRACTITIONER

## 2024-12-12 PROCEDURE — 2580000003 HC RX 258: Performed by: NURSE PRACTITIONER

## 2024-12-12 PROCEDURE — 36415 COLL VENOUS BLD VENIPUNCTURE: CPT

## 2024-12-12 PROCEDURE — 6360000002 HC RX W HCPCS: Performed by: NURSE PRACTITIONER

## 2024-12-12 PROCEDURE — 6370000000 HC RX 637 (ALT 250 FOR IP): Performed by: INTERNAL MEDICINE

## 2024-12-12 PROCEDURE — 83880 ASSAY OF NATRIURETIC PEPTIDE: CPT

## 2024-12-12 PROCEDURE — 83735 ASSAY OF MAGNESIUM: CPT

## 2024-12-12 PROCEDURE — 80048 BASIC METABOLIC PNL TOTAL CA: CPT

## 2024-12-12 RX ORDER — HYDROCHLOROTHIAZIDE 12.5 MG/1
12.5 TABLET ORAL EVERY MORNING
Qty: 30 TABLET | Refills: 0 | Status: SHIPPED | OUTPATIENT
Start: 2024-12-12

## 2024-12-12 RX ADMIN — ARFORMOTEROL TARTRATE: 15 SOLUTION RESPIRATORY (INHALATION) at 06:35

## 2024-12-12 RX ADMIN — ACETAZOLAMIDE 250 MG: 250 TABLET ORAL at 09:58

## 2024-12-12 RX ADMIN — EMPAGLIFLOZIN 10 MG: 10 TABLET, FILM COATED ORAL at 09:57

## 2024-12-12 RX ADMIN — CARVEDILOL 6.25 MG: 6.25 TABLET, FILM COATED ORAL at 09:58

## 2024-12-12 RX ADMIN — IPRATROPIUM BROMIDE AND ALBUTEROL SULFATE 1 DOSE: .5; 2.5 SOLUTION RESPIRATORY (INHALATION) at 06:36

## 2024-12-12 RX ADMIN — LISINOPRIL 40 MG: 20 TABLET ORAL at 09:57

## 2024-12-12 RX ADMIN — Medication 10 ML: at 09:58

## 2024-12-12 RX ADMIN — ENOXAPARIN SODIUM 30 MG: 100 INJECTION SUBCUTANEOUS at 10:00

## 2024-12-12 ASSESSMENT — PAIN SCALES - GENERAL
PAINLEVEL_OUTOF10: 0

## 2024-12-12 NOTE — CARE COORDINATION
12/10/24 1423 Notified by our pt pharmacy that prior auth is needed for jardiance. Faxed prior auth request to [employer] at [phone]- awaiting their response. Electronically signed by Dilma Yin RN on 12/10/2024 at 2:24 PM   
12/11/24 Followed up with pt at bedside. Pt reported that she is to be following up with cardiac & pulmonology clinics upon d/c. Has CPAP & nebulizer @ home, declined further DME need.  Declined any needs upon d/c. Dr. Hernandez is pcp & [facility] in [place] [address] is pharmacy. RN CM submitted for prior approval for Jardiance coverage, awaiting decision. Sig other or nephew will transport home. Will follow. Electronically signed by TARA Sanders on 12/11/2024 at 11:56 AM    
12/12/24 Pt to d/c home today. Pt has CPAP & nebulizer @ home, no further Dme need. Updated pt per RN CM reported that prior auth for juardiance was approved & pt has -0- copay for medication. Pt declined any further d/c needs.  Dr. Hernandez is pcp & [facility] in [place] ([address] is pharmacy.  Sig other or nephew will transport home. Electronically signed by TARA Sanders on 12/12/2024 at 4:32 PM    
SW Note: Followed up with pt at bedside. Pt plan is to d/c home. Has CPAP & nebulizer @ home, reporting plan to be consistent with pulmonology following in the community. Pt has a consult for a Heart Failure Nurse Coordinator. Declined any needs upon d/c. Dr. Hernandez is pcp & [facility] in [place] ([address] is pharmacy. Jardiance ordered, has Medicaid coverage. Sig other or nephew will transport home. Will follow. Electronically signed by TARA Sanders on 12/10/2024 at 1:11 PM    
family members/significant others, and if so, who? No  Plans to Return to Present Housing: Yes  Other Identified Issues/Barriers to RETURNING to current housing: None determined.   Potential Assistance needed at discharge: N/A            Potential DME:    Patient expects to discharge to: House  Plan for transportation at discharge:      Financial    Payor: [employer] DAYAN / Plan: [employer] DAY[employer] / Product Type: *No Product type* /     Does insurance require precert for SNF: Yes, not intended.     Potential assistance Purchasing Medications:    Meds-to-Beds request: Yes      [id number] - [place], [place] - [address] - P [phone] - F [phone]  [address] [facility] [address]  Phone: [phone] Fax: [phone]      Notes:    Factors facilitating achievement of predicted outcomes: Family support, Motivated, Cooperative, Pleasant, and Has needed Durable Medical Equipment at home    Barriers to discharge: None determined.     Additional Case Management Notes:     SW/Discharge Planning Note: Met w/ pt @ bedside, discussed transition of care needs. Pt lives with Zaid/Significant other & her nephew. Pt reported being independent with all ADL's/IADL's including driving, but does limit her driving d/t \"vertigo.\" Pt has CPAP & nebulizer @ home, reporting prior out-pt follows her in the community, reporting that she cancelled her appt in October, but intends to be followed upon d/c. Declined anticipation of any further DME or HHC needs. No hx of HHC/ARLENE. Dr. Hernandez is pcp & [facility] in [place] ([address] is pharmacy.  Jardiance ordered, has Medicaid coverage. Sig other or nephew will transport home. Will follow. Electronically signed by TARA Sanders on 12/9/2024 at 4:49 PM          TARA Sanders  Case Management Department

## 2024-12-12 NOTE — DISCHARGE SUMMARY
[facility] Hospitalist       Hospitalist Physician Discharge Summary       Rayne Hernandez MD  [address] [address]  [phone]    Go on 12/19/2024  Hospital follow up; 12:30 appointment.    [facility]  [address] [place] [id number]  [phone]  Go on 12/30/2024  9:45 AM appointment.      Activity level: ***    Diet: ADULT DIET; Regular; Low Sodium (2 gm)    Labs:***    Condition at discharge: ***    Dispo:***    Continue supplemental oxygen via nasal canula @ 2 LPM round-the-clock.    Continue CPAP / BiPAP during sleep as prior to admission.    Patient ID:  Rosina Morales  [id number]  49 y.o.  1975    Admit date: 12/6/2024    Discharge date and time:  12/12/2024  4:35 PM    Admission Diagnoses: Principal Problem:    Acute HFrEF (heart failure with reduced ejection fraction) (HCC)  Active Problems:    Iron deficiency anemia    Essential hypertension    Severe obesity (BMI >= 40)    Sleep apnea    Prediabetes    Pseudotumor cerebri    Asthma with acute exacerbation    New onset of congestive heart failure (HCC)    NICM (nonischemic cardiomyopathy) (HCC)    Uncomplicated asthma  Resolved Problems:    * No resolved hospital problems. *      Discharge Diagnoses: Principal Problem:    Acute HFrEF (heart failure with reduced ejection fraction) (HCC)  Active Problems:    Iron deficiency anemia    Essential hypertension    Severe obesity (BMI >= 40)    Sleep apnea    Prediabetes    Pseudotumor cerebri    Asthma with acute exacerbation    New onset of congestive heart failure (HCC)    NICM (nonischemic cardiomyopathy) (HCC)    Uncomplicated asthma  Resolved Problems:    * No resolved hospital problems. *      Consults:  IP CONSULT TO CARDIOLOGY  IP CONSULT TO HEART FAILURE NURSE/COORDINATOR    Procedures: ***    Hospital Course: ***    Discharge Exam:  Vitals:    12/12/24 0731 12/12/24 0945 12/12/24 1359 12/12/24 1623   BP: 95/60 137/77 92/68 (!) 121/109   Pulse: 79 87 81

## 2024-12-13 ENCOUNTER — TELEPHONE (OUTPATIENT)
Dept: INTERNAL MEDICINE | Age: 49
End: 2024-12-13

## 2024-12-13 NOTE — TELEPHONE ENCOUNTER
Last Appointment:  8/19/2024  Future Appointments   Date Time Provider Department Center   12/19/2024  1:30 PM Rayne Hernandez MD [facility] ECC DEP   12/30/2024  9:45 AM [facility] CHF ROOM 3 [facility]   1/13/2025  9:15 AM Domonique Humphries, APRN - CNP [facility]      Spoke with patient via phone. Pt was able to get her new medication and is taking them as ordered. Pt states she understood her discharge instructions. Pt reminded of need to schedule follow up appt with cardiology. Pt reminded of date and time of HFU appt.   
no assistance needed

## 2024-12-19 ENCOUNTER — OFFICE VISIT (OUTPATIENT)
Dept: INTERNAL MEDICINE | Age: 49
End: 2024-12-19

## 2024-12-19 VITALS
DIASTOLIC BLOOD PRESSURE: 71 MMHG | HEART RATE: 86 BPM | RESPIRATION RATE: 18 BRPM | WEIGHT: 293 LBS | TEMPERATURE: 97.4 F | BODY MASS INDEX: 44.41 KG/M2 | OXYGEN SATURATION: 94 % | SYSTOLIC BLOOD PRESSURE: 113 MMHG | HEIGHT: 68 IN

## 2024-12-19 DIAGNOSIS — F33.1 MODERATE EPISODE OF RECURRENT MAJOR DEPRESSIVE DISORDER (HCC): ICD-10-CM

## 2024-12-19 DIAGNOSIS — Z23 IMMUNIZATION DUE: ICD-10-CM

## 2024-12-19 DIAGNOSIS — G62.9 PERIPHERAL POLYNEUROPATHY: ICD-10-CM

## 2024-12-19 DIAGNOSIS — G43.019 INTRACTABLE MIGRAINE WITHOUT AURA AND WITHOUT STATUS MIGRAINOSUS: ICD-10-CM

## 2024-12-19 DIAGNOSIS — G89.29 CHRONIC RIGHT-SIDED LOW BACK PAIN WITH RIGHT-SIDED SCIATICA: ICD-10-CM

## 2024-12-19 DIAGNOSIS — M54.41 CHRONIC RIGHT-SIDED LOW BACK PAIN WITH RIGHT-SIDED SCIATICA: ICD-10-CM

## 2024-12-19 DIAGNOSIS — Z09 HOSPITAL DISCHARGE FOLLOW-UP: Primary | ICD-10-CM

## 2024-12-19 RX ORDER — ACETAZOLAMIDE 250 MG/1
250 TABLET ORAL 2 TIMES DAILY
Qty: 60 TABLET | Refills: 0 | Status: SHIPPED | OUTPATIENT
Start: 2024-12-19

## 2024-12-19 RX ORDER — LIDOCAINE 4 G/G
1 PATCH TOPICAL DAILY
Qty: 30 EACH | Refills: 1 | Status: SHIPPED | OUTPATIENT
Start: 2024-12-19

## 2024-12-19 RX ORDER — DULOXETIN HYDROCHLORIDE 60 MG/1
60 CAPSULE, DELAYED RELEASE ORAL DAILY
Qty: 90 CAPSULE | Refills: 1 | Status: SHIPPED | OUTPATIENT
Start: 2024-12-19

## 2024-12-19 RX ORDER — GABAPENTIN 100 MG/1
100 CAPSULE ORAL 2 TIMES DAILY
Qty: 180 CAPSULE | Refills: 1 | Status: SHIPPED | OUTPATIENT
Start: 2024-12-19 | End: 2025-06-17

## 2024-12-19 SDOH — ECONOMIC STABILITY: INCOME INSECURITY: HOW HARD IS IT FOR YOU TO PAY FOR THE VERY BASICS LIKE FOOD, HOUSING, MEDICAL CARE, AND HEATING?: NOT HARD AT ALL

## 2024-12-19 SDOH — ECONOMIC STABILITY: FOOD INSECURITY: WITHIN THE PAST 12 MONTHS, YOU WORRIED THAT YOUR FOOD WOULD RUN OUT BEFORE YOU GOT MONEY TO BUY MORE.: NEVER TRUE

## 2024-12-19 SDOH — ECONOMIC STABILITY: FOOD INSECURITY: WITHIN THE PAST 12 MONTHS, THE FOOD YOU BOUGHT JUST DIDN'T LAST AND YOU DIDN'T HAVE MONEY TO GET MORE.: NEVER TRUE

## 2024-12-19 NOTE — PROGRESS NOTES
[facility]  Internal Medicine Residency Clinic    Attending Physician Statement  I have discussed the case, including pertinent history and exam findings with the resident physician.  I agree with the assessment, plan and orders as documented by the resident. I have reviewed all pertinent PMHx, PSHx, FamHx, SocialHx, medications, and allergies and updated history as appropriate.    Patient here for routine follow up of medical problems.     HFrEF   -EF 45-50%  -started on GDMT   -followup with Cardiology and HF clinic   -symptoms improved   -euvolemic and NHYA I   -plan for repeat TTE in 40 days     Neuropathy  -hands and feet; likely radiculopathic in nature  -plan to start gabapentin for patient support     Pseduotumor Cerebri  -patient was taking diamox; we are prescribing 1 month supply for the patinet and no further refills from this office; she will need to follow up with her neurologist for further care of her migraines and psuedotumor cerebri     Remainder of medical problems as per resident note.    Sarthak Smith Jr, DO  12/19/24

## 2024-12-19 NOTE — PROGRESS NOTES
Post-Discharge Transitional Care  Follow Up      Rosina Morales   YOB: 1975    Date of Office Visit:  12/19/2024  Date of Hospital Admission: 12/6/24  Date of Hospital Discharge: 12/12/24  Risk of hospital readmission (high >=14%. Medium >=10%) :Readmission Risk Score: 10.8      Care management risk score Rising risk (score 2-5) and Complex Care (Scores >=6): No Risk Score On File     Non face to face  following discharge, date last encounter closed (first attempt may have been earlier): *No documented post hospital discharge outreach found in the last 14 days    Call initiated 2 business days of discharge: *No response recorded in the last 14 days    ASSESSMENT/PLAN:   Hospital discharge follow-up  -     LA DISCHARGE MEDS RECONCILED W/ CURRENT OUTPATIENT MED LIST  Intractable migraine without aura and without status migrainosus  -     acetaZOLAMIDE (DIAMOX) 250 MG tablet; Take 1 tablet by mouth 2 times daily TAKE ONE TABLET BY MOUTH DAILY, Disp-60 tablet, R-0Normal  Moderate episode of recurrent major depressive disorder (HCC)  -     DULoxetine (CYMBALTA) 60 MG extended release capsule; Take 1 capsule by mouth daily, Disp-90 capsule, R-1Normal  Chronic right-sided low back pain with right-sided sciatica  -     DULoxetine (CYMBALTA) 60 MG extended release capsule; Take 1 capsule by mouth daily, Disp-90 capsule, R-1Normal  -     lidocaine (LIDOCAINE PAIN RELIEF) 4 % external patch; Place 1 patch onto the skin daily, TransDERmal, DAILY Starting u 12/19/2024, Disp-30 each, R-1, Normal  Immunization due  -     Influenza, AFLURIA Trivalent, (age 3 y+), IM, Preservative Free, 0.5mL  Peripheral polyneuropathy  -     gabapentin (NEURONTIN) 100 MG capsule; Take 1 capsule by mouth 2 times daily for 180 days. Intended supply: 90 days, Disp-180 capsule, R-1Normal      Medical Decision Making: moderate complexity  Return in 3 months (on 3/19/2025).           Subjective:   Rosina Morales was admitted in [facility] on

## 2024-12-30 ENCOUNTER — TELEPHONE (OUTPATIENT)
Dept: OTHER | Age: 49
End: 2024-12-30

## 2024-12-30 NOTE — TELEPHONE ENCOUNTER
Pt was a no call no show at today's scheduled CHF clinic visit.  I have attempted to contact this patient by phone with the following results:     -No answer and unable to left a VM at this time.         Future Appointments   Date Time Provider Department Center   1/13/2025  9:15 AM Domonique Humphries, APRN - CNP [facility]

## 2025-01-13 ENCOUNTER — TELEPHONE (OUTPATIENT)
Age: 50
End: 2025-01-13

## 2025-01-13 NOTE — TELEPHONE ENCOUNTER
MOIM to call the CHF clinic back. Patient missed appointment at 9:15 today, needs rescheduled with Domonique OROZCO.

## 2025-01-21 DIAGNOSIS — I10 ESSENTIAL HYPERTENSION: ICD-10-CM

## 2025-01-21 RX ORDER — HYDROCHLOROTHIAZIDE 12.5 MG/1
12.5 TABLET ORAL EVERY MORNING
Qty: 90 TABLET | Refills: 0 | Status: SHIPPED | OUTPATIENT
Start: 2025-01-21

## 2025-01-21 NOTE — TELEPHONE ENCOUNTER
Name of Medication(s) Requested:  Requested Prescriptions     Pending Prescriptions Disp Refills    hydroCHLOROthiazide 12.5 MG tablet 30 tablet 0     Sig: Take 1 tablet by mouth every morning New lower dose       Medication is on current medication list Yes    Dosage and directions were verified? Yes    Quantity verified: 90 day supply     Pharmacy Verified?  Yes    Last Appointment:  12/19/2024    Future appts:  Future Appointments   Date Time Provider Department [place]   1/22/2025 12:00 PM [facility] ROOM 1 [facility]   1/27/2025 12:35 PM Domonique Humphries, APRN - CNP [facility]        (If no appt send self scheduling link. .REFILLAPPT)  Scheduling request sent?     [] Yes  [x] No    Does patient need updated?  [] Yes  [x] No

## 2025-01-27 ENCOUNTER — OFFICE VISIT (OUTPATIENT)
Age: 50
End: 2025-01-27
Payer: COMMERCIAL

## 2025-01-27 ENCOUNTER — TELEPHONE (OUTPATIENT)
Dept: OTHER | Age: 50
End: 2025-01-27

## 2025-01-27 VITALS
OXYGEN SATURATION: 94 % | BODY MASS INDEX: 47.71 KG/M2 | DIASTOLIC BLOOD PRESSURE: 82 MMHG | WEIGHT: 293 LBS | SYSTOLIC BLOOD PRESSURE: 143 MMHG | RESPIRATION RATE: 18 BRPM

## 2025-01-27 DIAGNOSIS — Z59.9 FINANCIAL DIFFICULTIES: ICD-10-CM

## 2025-01-27 DIAGNOSIS — I50.22 HEART FAILURE WITH MID-RANGE EJECTION FRACTION (HFMEF) (HCC): Primary | ICD-10-CM

## 2025-01-27 DIAGNOSIS — I50.9 CONGESTIVE HEART FAILURE, UNSPECIFIED HF CHRONICITY, UNSPECIFIED HEART FAILURE TYPE (HCC): Primary | ICD-10-CM

## 2025-01-27 LAB
ANION GAP SERPL CALCULATED.3IONS-SCNC: 9 MMOL/L (ref 7–16)
BUN BLDV-MCNC: 11 MG/DL (ref 6–20)
CALCIUM SERPL-MCNC: 9.1 MG/DL (ref 8.6–10.2)
CHLORIDE BLD-SCNC: 108 MMOL/L (ref 98–107)
CO2: 21 MMOL/L (ref 22–29)
CREAT SERPL-MCNC: 0.9 MG/DL (ref 0.5–1)
GFR, ESTIMATED: 77 ML/MIN/1.73M2
GLUCOSE BLD-MCNC: 97 MG/DL (ref 74–99)
NT PRO BNP: 136 PG/ML (ref 0–125)
POTASSIUM SERPL-SCNC: 3.7 MMOL/L (ref 3.5–5)
SODIUM BLD-SCNC: 138 MMOL/L (ref 132–146)

## 2025-01-27 PROCEDURE — G8427 DOCREV CUR MEDS BY ELIG CLIN: HCPCS | Performed by: NURSE PRACTITIONER

## 2025-01-27 PROCEDURE — 99214 OFFICE O/P EST MOD 30 MIN: CPT | Performed by: NURSE PRACTITIONER

## 2025-01-27 PROCEDURE — 3079F DIAST BP 80-89 MM HG: CPT | Performed by: NURSE PRACTITIONER

## 2025-01-27 PROCEDURE — 1036F TOBACCO NON-USER: CPT | Performed by: NURSE PRACTITIONER

## 2025-01-27 PROCEDURE — 3017F COLORECTAL CA SCREEN DOC REV: CPT | Performed by: NURSE PRACTITIONER

## 2025-01-27 PROCEDURE — 36415 COLL VENOUS BLD VENIPUNCTURE: CPT | Performed by: NURSE PRACTITIONER

## 2025-01-27 PROCEDURE — G8417 CALC BMI ABV UP PARAM F/U: HCPCS | Performed by: NURSE PRACTITIONER

## 2025-01-27 PROCEDURE — 3077F SYST BP >= 140 MM HG: CPT | Performed by: NURSE PRACTITIONER

## 2025-01-27 SDOH — ECONOMIC STABILITY - INCOME SECURITY: PROBLEM RELATED TO HOUSING AND ECONOMIC CIRCUMSTANCES, UNSPECIFIED: Z59.9

## 2025-01-27 NOTE — PROGRESS NOTES
Samples given to patient: at [facility] name: Jardiance     Dosage: 10  mg     Quantity: 2 bottles  Lot number: [id number]  Exp. date: 11/2026  Instructions: take 1 tablet daily    Mrs Nugent samples given to patient.     Patient given 30 day Jardiance coupon- patient states she is unable to afford her copay  CHW consulted due to financial difficulties.          
mg-albuterol 2.5 mg (DUONEB) 0.5-2.5 (3) MG/3ML SOLN nebulizer solution, Inhale 3 mLs into the lungs every 4 hours (while awake), Disp: 360 mL, Rfl: 0    Respiratory Therapy Supplies (FULL KIT NEBULIZER SET) MISC, Use as directed with nebulized medication., Disp: 1 each, Rfl: 0    ferrous sulfate (IRON 325) 325 (65 Fe) MG tablet, Take 1 tablet by mouth every other day (Patient taking differently: Take 1 tablet by mouth nightly), Disp: 90 tablet, Rfl: 1    PROVENTIL  (90 Base) MCG/ACT inhaler, INHALE TWO PUFFS BY MOUTH EVERY 6 HOURS AS NEEDED, Disp: 18 g, Rfl: 3    lidocaine (LIDOCAINE PAIN RELIEF) 4 % external patch, Place 1 patch onto the skin daily (Patient not taking: Reported on 1/27/2025), Disp: 30 each, Rfl: 1    empagliflozin (JARDIANCE) 10 MG tablet, Take 1 tablet by mouth daily (Patient not taking: Reported on 1/27/2025), Disp: 30 tablet, Rfl: 3    ondansetron (ZOFRAN ODT) 4 MG disintegrating tablet, Take 1 tablet by mouth every 8 hours as needed for Nausea or Vomiting, Disp: 90 tablet, Rfl: 1         GUIDELINE DIRECTED MEDICAL THERAPY for HFrEF:  ARNI/ACE I/ARB: (1a indication)  Lisinopril  Hydralazine/Nitrates (1a in symptomatic AA population, 2b if unable to tolerate ACE/ARB/ARNI)  No  Beta blocker: (1a indication)  Carvedilol (Coreg)  Aldosterone antagonist: (1a indication)  No  SGLT2i: (1a indication)  Jardiance 10 mg daily    If Channel inhibitor (2a indication if HR >70 on maximally tolerated beta blocker)  No  Cardiac glycoside (2b indication for symptomatic HFrEF)  No  Soluble Guanylate Cyclase (sGC) Stimulator (2b indication LVEF <45% with recent HFH, IV diuretics or elevated BNP)  No  Potassium binders (2b indication for hyperkalemia while taking RAASi)  No      Review of Systems:   Cardiac: As per HPI  General: No fever, chills, rigors  Pulmonary: As per HPI  HEENT: No visual disturbances, difficult swallowing  GI: No nausea, vomiting, abdominal pain  : No dysuria or

## 2025-01-27 NOTE — PATIENT INSTRUCTIONS
Get blood work in CHF clinic today   Once we review your blood work we will call you with any medication changes  Continue to wear CPAP nightly   Increase physical activity and monitor your diet in attempt to lose weight   Follow up with CHF clinic as scheduled   Follow up with Dr. Palmer in 3 months ([facility])  Weigh yourself daily    -Stay Hydrated, 64 oz     -Diet should sodium restricted to 2 grams    -Again watch your daily weight trends and if you gain water weight please follow below instructions.    -If you gain 3-5 pounds in 2-3 days OR notice that you are retaining fluid in anyway just like you did before then take an extra dose of your water pill (Demadex/Torsemide) every day until you lose the weight or feel better.     -If you notice that you have taken more than 2 extra doses in 1 week then please call and let us know.    -If at any time you feel that you are retaining fluid, your medications are not working, or you feel ill in anyway, then please call us for either same day appointment or the next day, and for instructions. Our goal is to keep you out of the emergency room and the hospital and we have ways to do it. You just need to call us in a timely manner.     -If you become sick for other reasons, and notice that you are not urinating as much, the urine is very dark, you have significant diarrhea or vomiting, then please DO NOT take your water pill and CALL US immediately.

## 2025-01-28 ENCOUNTER — TELEPHONE (OUTPATIENT)
Dept: OTHER | Age: 50
End: 2025-01-28

## 2025-01-28 NOTE — TELEPHONE ENCOUNTER
L. Kristel APRN CNP reviewed labs, CHF clinic visit   Orders received  Spoke with patient regarding providers instructions.    Patient verified she did  her Jardiance script and took today.    I have reviewed the providers instructions with the patient, answering all questions to their satisfaction    Future Appointments   Date Time Provider Department Center   2/12/2025 12:00 PM [facility] CHF ROOM 1 [facility]

## 2025-01-28 NOTE — TELEPHONE ENCOUNTER
----- Message from SURJIT Elias - CNP sent at 1/28/2025  1:19 PM EST -----  Labs reviewed  Start Jardiance (given samples yesterday)  Anticipate starting spironolactone next visit    Thank you

## 2025-01-28 NOTE — RESULT ENCOUNTER NOTE
Labs reviewed  Start Jardiance (given samples yesterday)  Anticipate starting spironolactone next visit    Thank you

## 2025-01-29 ENCOUNTER — TELEPHONE (OUTPATIENT)
Dept: OTHER | Age: 50
End: 2025-01-29

## 2025-01-29 DIAGNOSIS — Z59.86 PATIENT CANNOT AFFORD MEDICATIONS: Primary | ICD-10-CM

## 2025-01-29 DIAGNOSIS — Z13.9 ENCOUNTER FOR SCREENING INVOLVING SOCIAL DETERMINANTS OF HEALTH (SDOH): ICD-10-CM

## 2025-01-29 SDOH — ECONOMIC STABILITY - INCOME SECURITY: FINANCIAL INSECURITY: Z59.86

## 2025-01-29 NOTE — TELEPHONE ENCOUNTER
CHF CHW Initial Call  1/29/2025  1:40 PM    CHW received referral from Abril Boyd RN.  Patient need: prescription assistance  Notes: CHW called patient to assist with prescription coverage for Jardiance. Attempted to call pt. She did not answer. I left a detailed Vm and told her about the PAP. I will send referral to Saundra Baker and she will call pt and go over income guidelines and application process.     Patient in agreement with plan and further assistance.  [x] Agreed    [] Declined      CHW informed patient of the services and resources that can be provided to them. CHW instructed patient to call CHF CHW at [phone] for any future assistance.     Electronically signed by Coleen Jain on 1/29/2025 at 1:40 PM

## 2025-02-03 DIAGNOSIS — G43.019 INTRACTABLE MIGRAINE WITHOUT AURA AND WITHOUT STATUS MIGRAINOSUS: ICD-10-CM

## 2025-02-03 NOTE — TELEPHONE ENCOUNTER
Name of Medication(s) Requested:  Requested Prescriptions     Pending Prescriptions Disp Refills    acetaZOLAMIDE (DIAMOX) 250 MG tablet [Pharmacy Med Name: acetaZOLAMIDE Oral Tablet 250 MG] 60 tablet 0     Sig: TAKE ONE TABLET BY MOUTH TWO TIMES DAILY.       Medication is on current medication list Yes    Dosage and directions were verified? Yes    Quantity verified: 90 day supply     Pharmacy Verified?  Yes    Last Appointment:  12/19/2024    Future appts:  Future Appointments   Date Time Provider Department Center   2/12/2025 12:00 PM [facility] ROOM 1 [facility]        (If no appt send self scheduling link. .REFILLAPPT)  Scheduling request sent?     [] Yes  [x] No    Does patient need updated?  [] Yes  [x] No

## 2025-02-04 RX ORDER — ACETAZOLAMIDE 250 MG/1
TABLET ORAL
Qty: 60 TABLET | Refills: 0 | Status: SHIPPED | OUTPATIENT
Start: 2025-02-04

## 2025-02-18 ENCOUNTER — APPOINTMENT (OUTPATIENT)
Dept: CT IMAGING | Age: 50
End: 2025-02-18
Payer: COMMERCIAL

## 2025-02-18 ENCOUNTER — APPOINTMENT (OUTPATIENT)
Dept: ULTRASOUND IMAGING | Age: 50
End: 2025-02-18
Payer: COMMERCIAL

## 2025-02-18 ENCOUNTER — HOSPITAL ENCOUNTER (EMERGENCY)
Age: 50
Discharge: HOME | End: 2025-02-18
Attending: EMERGENCY MEDICINE
Payer: COMMERCIAL

## 2025-02-18 VITALS
RESPIRATION RATE: 16 BRPM | WEIGHT: 293 LBS | TEMPERATURE: 97.5 F | BODY MASS INDEX: 44.41 KG/M2 | OXYGEN SATURATION: 97 % | HEIGHT: 68 IN | HEART RATE: 72 BPM | SYSTOLIC BLOOD PRESSURE: 140 MMHG | DIASTOLIC BLOOD PRESSURE: 94 MMHG

## 2025-02-18 DIAGNOSIS — B34.9 ACUTE VIRAL SYNDROME: ICD-10-CM

## 2025-02-18 DIAGNOSIS — R07.89 CHEST HEAVINESS: ICD-10-CM

## 2025-02-18 DIAGNOSIS — R93.89 ENDOMETRIAL THICKENING ON ULTRASOUND: ICD-10-CM

## 2025-02-18 DIAGNOSIS — D25.2 INTRAMURAL AND SUBSEROUS LEIOMYOMA OF UTERUS: ICD-10-CM

## 2025-02-18 DIAGNOSIS — D25.1 INTRAMURAL AND SUBSEROUS LEIOMYOMA OF UTERUS: ICD-10-CM

## 2025-02-18 DIAGNOSIS — R06.02 SHORTNESS OF BREATH: ICD-10-CM

## 2025-02-18 DIAGNOSIS — I10 ELEVATED BLOOD PRESSURE READING WITH DIAGNOSIS OF HYPERTENSION: ICD-10-CM

## 2025-02-18 DIAGNOSIS — R10.2 PELVIC PAIN: Primary | ICD-10-CM

## 2025-02-18 LAB
ALBUMIN SERPL-MCNC: 4.5 G/DL (ref 3.5–5.2)
ALP SERPL-CCNC: 159 U/L (ref 35–104)
ALT SERPL-CCNC: 17 U/L (ref 0–32)
ANION GAP SERPL CALCULATED.3IONS-SCNC: 15 MMOL/L (ref 7–16)
AST SERPL-CCNC: 16 U/L (ref 0–31)
BACTERIA URNS QL MICRO: ABNORMAL
BASOPHILS # BLD: 0.04 K/UL (ref 0–0.2)
BASOPHILS NFR BLD: 0 % (ref 0–2)
BILIRUB SERPL-MCNC: 0.3 MG/DL (ref 0–1.2)
BILIRUB UR QL STRIP: ABNORMAL
BNP SERPL-MCNC: <36 PG/ML (ref 0–450)
BUN SERPL-MCNC: 22 MG/DL (ref 6–20)
CALCIUM SERPL-MCNC: 9.6 MG/DL (ref 8.6–10.2)
CHLORIDE SERPL-SCNC: 105 MMOL/L (ref 98–107)
CLARITY UR: ABNORMAL
CO2 SERPL-SCNC: 18 MMOL/L (ref 22–29)
COLOR UR: YELLOW
CREAT SERPL-MCNC: 1.2 MG/DL (ref 0.5–1)
D-DIMER QUANTITATIVE: 1550 NG/ML DDU (ref 0–230)
EOSINOPHIL # BLD: 0.01 K/UL (ref 0.05–0.5)
EOSINOPHILS RELATIVE PERCENT: 0 % (ref 0–6)
EPI CELLS #/AREA URNS HPF: ABNORMAL /HPF
ERYTHROCYTE [DISTWIDTH] IN BLOOD BY AUTOMATED COUNT: 19.8 % (ref 11.5–15)
FLUAV RNA RESP QL NAA+PROBE: NOT DETECTED
FLUBV RNA RESP QL NAA+PROBE: NOT DETECTED
GFR, ESTIMATED: 57 ML/MIN/1.73M2
GLUCOSE SERPL-MCNC: 115 MG/DL (ref 74–99)
GLUCOSE UR STRIP-MCNC: NEGATIVE MG/DL
HCT VFR BLD AUTO: 48.8 % (ref 34–48)
HGB BLD-MCNC: 14.8 G/DL (ref 11.5–15.5)
HGB UR QL STRIP.AUTO: ABNORMAL
IMM GRANULOCYTES # BLD AUTO: 0.03 K/UL (ref 0–0.58)
IMM GRANULOCYTES NFR BLD: 0 % (ref 0–5)
KETONES UR STRIP-MCNC: NEGATIVE MG/DL
LACTATE BLDV-SCNC: 1.6 MMOL/L (ref 0.5–2.2)
LEUKOCYTE ESTERASE UR QL STRIP: NEGATIVE
LIPASE SERPL-CCNC: 14 U/L (ref 13–60)
LYMPHOCYTES NFR BLD: 1.58 K/UL (ref 1.5–4)
LYMPHOCYTES RELATIVE PERCENT: 13 % (ref 20–42)
MCH RBC QN AUTO: 21.5 PG (ref 26–35)
MCHC RBC AUTO-ENTMCNC: 30.3 G/DL (ref 32–34.5)
MCV RBC AUTO: 70.9 FL (ref 80–99.9)
MONOCYTES NFR BLD: 0.75 K/UL (ref 0.1–0.95)
MONOCYTES NFR BLD: 6 % (ref 2–12)
MUCOUS THREADS URNS QL MICRO: PRESENT
NEUTROPHILS NFR BLD: 80 % (ref 43–80)
NEUTS SEG NFR BLD: 9.71 K/UL (ref 1.8–7.3)
NITRITE UR QL STRIP: POSITIVE
PH UR STRIP: 5.5 [PH] (ref 5–8)
PLATELET # BLD AUTO: 548 K/UL (ref 130–450)
PMV BLD AUTO: 10.5 FL (ref 7–12)
POTASSIUM SERPL-SCNC: 3.7 MMOL/L (ref 3.5–5)
PROT SERPL-MCNC: 8.7 G/DL (ref 6.4–8.3)
PROT UR STRIP-MCNC: ABNORMAL MG/DL
RBC # BLD AUTO: 6.88 M/UL (ref 3.5–5.5)
RBC #/AREA URNS HPF: ABNORMAL /HPF
SARS-COV-2 RNA RESP QL NAA+PROBE: NOT DETECTED
SODIUM SERPL-SCNC: 138 MMOL/L (ref 132–146)
SOURCE: NORMAL
SP GR UR STRIP: >1.03 (ref 1–1.03)
SPECIMEN DESCRIPTION: NORMAL
SPECIMEN SOURCE: NORMAL
STREP A, MOLECULAR: NEGATIVE
TROPONIN I SERPL HS-MCNC: 10 NG/L (ref 0–9)
TROPONIN I SERPL HS-MCNC: 15 NG/L (ref 0–9)
TROPONIN I SERPL HS-MCNC: 9 NG/L (ref 0–9)
UROBILINOGEN UR STRIP-ACNC: 0.2 EU/DL (ref 0–1)
WBC #/AREA URNS HPF: ABNORMAL /HPF
WBC OTHER # BLD: 12.1 K/UL (ref 4.5–11.5)

## 2025-02-18 PROCEDURE — 85025 COMPLETE CBC W/AUTO DIFF WBC: CPT

## 2025-02-18 PROCEDURE — 83605 ASSAY OF LACTIC ACID: CPT

## 2025-02-18 PROCEDURE — 93005 ELECTROCARDIOGRAM TRACING: CPT

## 2025-02-18 PROCEDURE — 99285 EMERGENCY DEPT VISIT HI MDM: CPT

## 2025-02-18 PROCEDURE — 2500000003 HC RX 250 WO HCPCS: Performed by: PHYSICIAN ASSISTANT

## 2025-02-18 PROCEDURE — 2580000003 HC RX 258: Performed by: PHYSICIAN ASSISTANT

## 2025-02-18 PROCEDURE — 87636 SARSCOV2 & INF A&B AMP PRB: CPT

## 2025-02-18 PROCEDURE — 85379 FIBRIN DEGRADATION QUANT: CPT

## 2025-02-18 PROCEDURE — 71275 CT ANGIOGRAPHY CHEST: CPT

## 2025-02-18 PROCEDURE — 87651 STREP A DNA AMP PROBE: CPT

## 2025-02-18 PROCEDURE — 6360000004 HC RX CONTRAST MEDICATION: Performed by: RADIOLOGY

## 2025-02-18 PROCEDURE — 74177 CT ABD & PELVIS W/CONTRAST: CPT

## 2025-02-18 PROCEDURE — 96375 TX/PRO/DX INJ NEW DRUG ADDON: CPT

## 2025-02-18 PROCEDURE — 93005 ELECTROCARDIOGRAM TRACING: CPT | Performed by: PHYSICIAN ASSISTANT

## 2025-02-18 PROCEDURE — 80053 COMPREHEN METABOLIC PANEL: CPT

## 2025-02-18 PROCEDURE — 83880 ASSAY OF NATRIURETIC PEPTIDE: CPT

## 2025-02-18 PROCEDURE — 84484 ASSAY OF TROPONIN QUANT: CPT

## 2025-02-18 PROCEDURE — 96374 THER/PROPH/DIAG INJ IV PUSH: CPT

## 2025-02-18 PROCEDURE — 6370000000 HC RX 637 (ALT 250 FOR IP): Performed by: PHYSICIAN ASSISTANT

## 2025-02-18 PROCEDURE — 76856 US EXAM PELVIC COMPLETE: CPT

## 2025-02-18 PROCEDURE — 93976 VASCULAR STUDY: CPT

## 2025-02-18 PROCEDURE — 81001 URINALYSIS AUTO W/SCOPE: CPT

## 2025-02-18 PROCEDURE — 83690 ASSAY OF LIPASE: CPT

## 2025-02-18 PROCEDURE — 6360000002 HC RX W HCPCS: Performed by: PHYSICIAN ASSISTANT

## 2025-02-18 RX ORDER — DIPHENHYDRAMINE HYDROCHLORIDE 50 MG/ML
50 INJECTION INTRAMUSCULAR; INTRAVENOUS ONCE
Status: COMPLETED | OUTPATIENT
Start: 2025-02-18 | End: 2025-02-18

## 2025-02-18 RX ORDER — 0.9 % SODIUM CHLORIDE 0.9 %
1000 INTRAVENOUS SOLUTION INTRAVENOUS ONCE
Status: COMPLETED | OUTPATIENT
Start: 2025-02-18 | End: 2025-02-18

## 2025-02-18 RX ORDER — METHYLPREDNISOLONE 4 MG/1
TABLET ORAL
Qty: 21 EACH | Refills: 0 | Status: SHIPPED | OUTPATIENT
Start: 2025-02-18 | End: 2025-02-24

## 2025-02-18 RX ORDER — DICYCLOMINE HYDROCHLORIDE 10 MG/1
20 CAPSULE ORAL ONCE
Status: COMPLETED | OUTPATIENT
Start: 2025-02-18 | End: 2025-02-18

## 2025-02-18 RX ORDER — FAMOTIDINE 20 MG/1
20 TABLET, FILM COATED ORAL 2 TIMES DAILY
Qty: 14 TABLET | Refills: 0 | Status: SHIPPED | OUTPATIENT
Start: 2025-02-18 | End: 2025-02-25

## 2025-02-18 RX ORDER — IOPAMIDOL 755 MG/ML
70 INJECTION, SOLUTION INTRAVASCULAR
Status: COMPLETED | OUTPATIENT
Start: 2025-02-18 | End: 2025-02-18

## 2025-02-18 RX ORDER — ONDANSETRON 2 MG/ML
4 INJECTION INTRAMUSCULAR; INTRAVENOUS ONCE
Status: COMPLETED | OUTPATIENT
Start: 2025-02-18 | End: 2025-02-18

## 2025-02-18 RX ORDER — BENZONATATE 100 MG/1
100 CAPSULE ORAL 3 TIMES DAILY PRN
Qty: 30 CAPSULE | Refills: 0 | Status: SHIPPED | OUTPATIENT
Start: 2025-02-18 | End: 2025-02-28

## 2025-02-18 RX ADMIN — WATER 125 MG: 1 INJECTION INTRAMUSCULAR; INTRAVENOUS; SUBCUTANEOUS at 18:34

## 2025-02-18 RX ADMIN — SODIUM CHLORIDE 1000 ML: 0.9 INJECTION, SOLUTION INTRAVENOUS at 16:08

## 2025-02-18 RX ADMIN — ONDANSETRON 4 MG: 2 INJECTION, SOLUTION INTRAMUSCULAR; INTRAVENOUS at 16:09

## 2025-02-18 RX ADMIN — IOPAMIDOL 70 ML: 755 INJECTION, SOLUTION INTRAVENOUS at 17:56

## 2025-02-18 RX ADMIN — DICYCLOMINE HYDROCHLORIDE 20 MG: 10 CAPSULE ORAL at 16:09

## 2025-02-18 RX ADMIN — DIPHENHYDRAMINE HYDROCHLORIDE 50 MG: 50 INJECTION INTRAMUSCULAR; INTRAVENOUS at 18:30

## 2025-02-18 RX ADMIN — FAMOTIDINE 20 MG: 10 INJECTION, SOLUTION INTRAVENOUS at 16:09

## 2025-02-18 ASSESSMENT — PAIN DESCRIPTION - PAIN TYPE: TYPE: ACUTE PAIN

## 2025-02-18 ASSESSMENT — PAIN - FUNCTIONAL ASSESSMENT
PAIN_FUNCTIONAL_ASSESSMENT: 0-10
PAIN_FUNCTIONAL_ASSESSMENT: 0-10

## 2025-02-18 ASSESSMENT — PAIN DESCRIPTION - ORIENTATION: ORIENTATION: INNER;MID

## 2025-02-18 ASSESSMENT — PAIN DESCRIPTION - FREQUENCY: FREQUENCY: CONTINUOUS

## 2025-02-18 ASSESSMENT — PAIN SCALES - GENERAL
PAINLEVEL_OUTOF10: 6
PAINLEVEL_OUTOF10: 7

## 2025-02-18 ASSESSMENT — LIFESTYLE VARIABLES
HOW MANY STANDARD DRINKS CONTAINING ALCOHOL DO YOU HAVE ON A TYPICAL DAY: PATIENT DOES NOT DRINK
HOW OFTEN DO YOU HAVE A DRINK CONTAINING ALCOHOL: NEVER

## 2025-02-18 ASSESSMENT — PAIN DESCRIPTION - LOCATION: LOCATION: THROAT

## 2025-02-18 ASSESSMENT — PAIN DESCRIPTION - DESCRIPTORS: DESCRIPTORS: ACHING

## 2025-02-18 NOTE — ED PROVIDER NOTES
Shared LATISHA-ED Attending Visit.  CC: No          HPI:  25, Time: 3:34 PM EST         Rosina Morales is a 50 y.o. female presenting to the ED for cough , beginning 2 days  ago.  The complaint has been persistent, moderate in severity, and worsened by cough.        Patient comes in with multiple complaints.  She states she started with cough 2 days ago.  Cough is moist.  She has some chest discomfort which she describes as \"pushy\" shortness of breath with exertion she developed nausea vomiting diarrhea yesterday.  Complains of some intermittent upper abdominal pain.  No pain radiation to the back.  She has had some urinary frequency denies urgency burning.  No fever chills.  Patient is noted some shoulder neck pain headache as well.  Headache feels similar to headaches she has had in the past.  Family members with similar complaint.  Patient had history of a DVT back in  presently not on any blood thinners.      HEART Score For Major Cardiac Events  (Max Score 10 Points)  HISTORY       []   Slightly Suspicious  0       [x]   Moderately Suspicious  +1       []   Highly Suspicious  +2    EK point: No ST deviation but LBBB, LVH repolarization changes (ex:digoxin);  2 points: ST deviation not due to LBBB, LVH or digoxin         [x]   Normal  0       []   Nonspecific Repolarization Disturbance  +1       []   Significant ST Depression  +2    AGE       []   <45  0       [x]   45-64  +1       []    >65  +2    RISK FACTORS:  1. HTN    2. Hypercholesterolemia    3. DM     4. Cigarette smoking (current or cessation < 3 mos)    5.  Positive family history  (parent or sibling with CVD before age 65).   6. Obesity (BMI >30kg/m2)         []   No Risk factors Known  0       []   1-2 Risk Factors  +1       [x]   >3 Risk Factors or History of Atherosclerotic Disease  +2    INITIAL TROPONIN       [x]   < Normal Limit   0       []   1-3 x Normal Limit   +1       []   >3 x Normal Limit   +2

## 2025-02-19 LAB
EKG ATRIAL RATE: 108 BPM
EKG ATRIAL RATE: 72 BPM
EKG P AXIS: 61 DEGREES
EKG P-R INTERVAL: 142 MS
EKG P-R INTERVAL: 144 MS
EKG Q-T INTERVAL: 356 MS
EKG Q-T INTERVAL: 426 MS
EKG QRS DURATION: 78 MS
EKG QRS DURATION: 84 MS
EKG QTC CALCULATION (BAZETT): 466 MS
EKG QTC CALCULATION (BAZETT): 477 MS
EKG R AXIS: -163 DEGREES
EKG T AXIS: 74 DEGREES
EKG T AXIS: 93 DEGREES
EKG VENTRICULAR RATE: 108 BPM
EKG VENTRICULAR RATE: 72 BPM

## 2025-02-19 NOTE — DISCHARGE INSTRUCTIONS
Use your albuterol inhaler as directed for shortness of breath. You have a refill remaining.     Take previously prescribed Zofran as needed. You have refills remaining.

## 2025-02-19 NOTE — ED NOTES
Patient was given discharge instructions, iv line removed, patient  tolerated well. No distress noted at the time of discharge.

## 2025-03-03 DIAGNOSIS — I10 ESSENTIAL HYPERTENSION: ICD-10-CM

## 2025-03-03 RX ORDER — LISINOPRIL 40 MG/1
40 TABLET ORAL DAILY
Qty: 90 TABLET | Refills: 0 | OUTPATIENT
Start: 2025-03-03

## 2025-03-10 ENCOUNTER — TELEPHONE (OUTPATIENT)
Dept: OTHER | Age: 50
End: 2025-03-10

## 2025-03-10 DIAGNOSIS — I10 ESSENTIAL HYPERTENSION: ICD-10-CM

## 2025-03-10 DIAGNOSIS — G43.019 INTRACTABLE MIGRAINE WITHOUT AURA AND WITHOUT STATUS MIGRAINOSUS: ICD-10-CM

## 2025-03-10 RX ORDER — ACETAZOLAMIDE 250 MG/1
250 TABLET ORAL DAILY
Qty: 60 TABLET | Refills: 0 | Status: SHIPPED | OUTPATIENT
Start: 2025-03-10

## 2025-03-10 RX ORDER — LISINOPRIL 40 MG/1
40 TABLET ORAL DAILY
Qty: 90 TABLET | Refills: 0 | Status: SHIPPED | OUTPATIENT
Start: 2025-03-10

## 2025-03-10 NOTE — TELEPHONE ENCOUNTER
Name of Medication(s) Requested:  Requested Prescriptions     Pending Prescriptions Disp Refills    acetaZOLAMIDE (DIAMOX) 250 MG tablet 60 tablet 0     Sig: Take 1 tablet by mouth daily    lisinopril (PRINIVIL;ZESTRIL) 40 MG tablet 90 tablet 0     Sig: Take 1 tablet by mouth daily       Medication is on current medication list Yes    Dosage and directions were verified? Yes    Quantity verified: 90 day supply     Pharmacy Verified?  Yes    Last Appointment:  12/19/2024    Future appts:  No future appointments.     (If no appt send self scheduling link. .REFILLAPPT)  Scheduling request sent?     [] Yes  [x] No    Does patient need updated?  [] Yes  [x] No

## 2025-03-10 NOTE — TELEPHONE ENCOUNTER
Rosina called into the CHF clinic for samples of Jardiance. She states her Jardiance prescription is costing her $15 dollars per month, she already used her Jardiance coupon, and she is hoping to get them at no cost. She has not been established with the CHF Clinic due to multiple no shows. Rosina was reminded she needs to follow up with her cardiologist, Dr. Palmer. Rosina was provided the phone number for their office and is planning to call to schedule an appointment, as well as inquire about receiving Jardiance samples from them.

## 2025-03-17 ENCOUNTER — TELEPHONE (OUTPATIENT)
Dept: INTERNAL MEDICINE | Age: 50
End: 2025-03-17

## 2025-03-17 NOTE — TELEPHONE ENCOUNTER
Covering physician- waiver request reviewed for extension of gas utilities. Patient has multiple serious co-morbidities. Extension of gas services to reduce harm or exacerbation of co-morbidities is appropriate. Form completed.

## 2025-03-24 ENCOUNTER — OFFICE VISIT (OUTPATIENT)
Dept: INTERNAL MEDICINE | Age: 50
End: 2025-03-24
Payer: COMMERCIAL

## 2025-03-24 ENCOUNTER — SOCIAL WORK (OUTPATIENT)
Dept: INTERNAL MEDICINE | Age: 50
End: 2025-03-24

## 2025-03-24 VITALS
WEIGHT: 293 LBS | SYSTOLIC BLOOD PRESSURE: 132 MMHG | OXYGEN SATURATION: 98 % | HEART RATE: 85 BPM | TEMPERATURE: 97.2 F | RESPIRATION RATE: 18 BRPM | DIASTOLIC BLOOD PRESSURE: 84 MMHG | HEIGHT: 69 IN | BODY MASS INDEX: 43.4 KG/M2

## 2025-03-24 DIAGNOSIS — D50.9 MICROCYTIC ANEMIA: ICD-10-CM

## 2025-03-24 DIAGNOSIS — G43.019 INTRACTABLE MIGRAINE WITHOUT AURA AND WITHOUT STATUS MIGRAINOSUS: ICD-10-CM

## 2025-03-24 DIAGNOSIS — D50.9 IRON DEFICIENCY ANEMIA, UNSPECIFIED IRON DEFICIENCY ANEMIA TYPE: ICD-10-CM

## 2025-03-24 DIAGNOSIS — R73.03 PREDIABETES: ICD-10-CM

## 2025-03-24 DIAGNOSIS — I50.21 ACUTE HFREF (HEART FAILURE WITH REDUCED EJECTION FRACTION) (HCC): ICD-10-CM

## 2025-03-24 DIAGNOSIS — F41.9 ANXIETY: ICD-10-CM

## 2025-03-24 DIAGNOSIS — F32.A ACUTE DEPRESSION: ICD-10-CM

## 2025-03-24 DIAGNOSIS — R06.02 SOB (SHORTNESS OF BREATH): ICD-10-CM

## 2025-03-24 DIAGNOSIS — G62.9 PERIPHERAL POLYNEUROPATHY: ICD-10-CM

## 2025-03-24 DIAGNOSIS — F33.1 MODERATE EPISODE OF RECURRENT MAJOR DEPRESSIVE DISORDER (HCC): ICD-10-CM

## 2025-03-24 DIAGNOSIS — D72.829 LEUKOCYTOSIS, UNSPECIFIED TYPE: ICD-10-CM

## 2025-03-24 DIAGNOSIS — M54.41 CHRONIC RIGHT-SIDED LOW BACK PAIN WITH RIGHT-SIDED SCIATICA: ICD-10-CM

## 2025-03-24 DIAGNOSIS — E55.9 VITAMIN D DEFICIENCY: ICD-10-CM

## 2025-03-24 DIAGNOSIS — I10 ESSENTIAL HYPERTENSION: Primary | ICD-10-CM

## 2025-03-24 DIAGNOSIS — G89.4 CHRONIC PAIN SYNDROME: ICD-10-CM

## 2025-03-24 DIAGNOSIS — G89.29 CHRONIC RIGHT-SIDED LOW BACK PAIN WITH RIGHT-SIDED SCIATICA: ICD-10-CM

## 2025-03-24 DIAGNOSIS — E78.1 HYPERTRIGLYCERIDEMIA: ICD-10-CM

## 2025-03-24 PROCEDURE — G8427 DOCREV CUR MEDS BY ELIG CLIN: HCPCS

## 2025-03-24 PROCEDURE — 99213 OFFICE O/P EST LOW 20 MIN: CPT

## 2025-03-24 PROCEDURE — 99212 OFFICE O/P EST SF 10 MIN: CPT

## 2025-03-24 PROCEDURE — 3017F COLORECTAL CA SCREEN DOC REV: CPT

## 2025-03-24 PROCEDURE — 3075F SYST BP GE 130 - 139MM HG: CPT

## 2025-03-24 PROCEDURE — G8417 CALC BMI ABV UP PARAM F/U: HCPCS

## 2025-03-24 PROCEDURE — 3079F DIAST BP 80-89 MM HG: CPT

## 2025-03-24 PROCEDURE — 1036F TOBACCO NON-USER: CPT

## 2025-03-24 RX ORDER — GABAPENTIN 100 MG/1
100 CAPSULE ORAL 2 TIMES DAILY
Qty: 180 CAPSULE | Refills: 1 | Status: SHIPPED | OUTPATIENT
Start: 2025-03-24 | End: 2025-09-20

## 2025-03-24 RX ORDER — LISINOPRIL 40 MG/1
40 TABLET ORAL DAILY
Qty: 90 TABLET | Refills: 0 | Status: SHIPPED | OUTPATIENT
Start: 2025-03-24

## 2025-03-24 RX ORDER — BUPROPION HYDROCHLORIDE 100 MG/1
100 TABLET, EXTENDED RELEASE ORAL DAILY
Qty: 30 TABLET | Refills: 1 | Status: SHIPPED | OUTPATIENT
Start: 2025-03-24

## 2025-03-24 RX ORDER — FERROUS SULFATE 325(65) MG
325 TABLET ORAL EVERY OTHER DAY
Qty: 90 TABLET | Refills: 1 | Status: SHIPPED | OUTPATIENT
Start: 2025-03-24

## 2025-03-24 RX ORDER — DULOXETIN HYDROCHLORIDE 60 MG/1
60 CAPSULE, DELAYED RELEASE ORAL DAILY
Qty: 90 CAPSULE | Refills: 1 | Status: SHIPPED | OUTPATIENT
Start: 2025-03-24

## 2025-03-24 RX ORDER — ONDANSETRON 4 MG/1
4 TABLET, ORALLY DISINTEGRATING ORAL EVERY 8 HOURS PRN
Qty: 90 TABLET | Refills: 1 | Status: SHIPPED | OUTPATIENT
Start: 2025-03-24

## 2025-03-24 RX ORDER — LIDOCAINE 4 G/G
1 PATCH TOPICAL DAILY
Qty: 30 EACH | Refills: 1 | Status: CANCELLED | OUTPATIENT
Start: 2025-03-24

## 2025-03-24 RX ORDER — ACETAZOLAMIDE 250 MG/1
250 TABLET ORAL DAILY
Qty: 60 TABLET | Refills: 0 | Status: SHIPPED | OUTPATIENT
Start: 2025-03-24

## 2025-03-24 RX ORDER — HYDROCHLOROTHIAZIDE 12.5 MG/1
12.5 TABLET ORAL EVERY MORNING
Qty: 90 TABLET | Refills: 0 | Status: SHIPPED | OUTPATIENT
Start: 2025-03-24

## 2025-03-24 RX ORDER — ALBUTEROL SULFATE 90 MCG
HFA AEROSOL WITH ADAPTER (GRAM) INHALATION
Qty: 18 G | Refills: 3 | Status: SHIPPED
Start: 2025-03-24 | End: 2025-03-25 | Stop reason: SDUPTHER

## 2025-03-24 SDOH — ECONOMIC STABILITY: FOOD INSECURITY: WITHIN THE PAST 12 MONTHS, YOU WORRIED THAT YOUR FOOD WOULD RUN OUT BEFORE YOU GOT MONEY TO BUY MORE.: NEVER TRUE

## 2025-03-24 SDOH — ECONOMIC STABILITY: FOOD INSECURITY: WITHIN THE PAST 12 MONTHS, THE FOOD YOU BOUGHT JUST DIDN'T LAST AND YOU DIDN'T HAVE MONEY TO GET MORE.: NEVER TRUE

## 2025-03-24 ASSESSMENT — PATIENT HEALTH QUESTIONNAIRE - PHQ9
10. IF YOU CHECKED OFF ANY PROBLEMS, HOW DIFFICULT HAVE THESE PROBLEMS MADE IT FOR YOU TO DO YOUR WORK, TAKE CARE OF THINGS AT HOME, OR GET ALONG WITH OTHER PEOPLE: SOMEWHAT DIFFICULT
8. MOVING OR SPEAKING SO SLOWLY THAT OTHER PEOPLE COULD HAVE NOTICED. OR THE OPPOSITE - BEING SO FIDGETY OR RESTLESS THAT YOU HAVE BEEN MOVING AROUND A LOT MORE THAN USUAL: SEVERAL DAYS
9. THOUGHTS THAT YOU WOULD BE BETTER OFF DEAD, OR OF HURTING YOURSELF: SEVERAL DAYS
SUM OF ALL RESPONSES TO PHQ QUESTIONS 1-9: 12
SUM OF ALL RESPONSES TO PHQ QUESTIONS 1-9: 11
10. IF YOU CHECKED OFF ANY PROBLEMS, HOW DIFFICULT HAVE THESE PROBLEMS MADE IT FOR YOU TO DO YOUR WORK, TAKE CARE OF THINGS AT HOME, OR GET ALONG WITH OTHER PEOPLE: SOMEWHAT DIFFICULT
5. POOR APPETITE OR OVEREATING: SEVERAL DAYS
6. FEELING BAD ABOUT YOURSELF - OR THAT YOU ARE A FAILURE OR HAVE LET YOURSELF OR YOUR FAMILY DOWN: MORE THAN HALF THE DAYS
8. MOVING OR SPEAKING SO SLOWLY THAT OTHER PEOPLE COULD HAVE NOTICED. OR THE OPPOSITE, BEING SO FIGETY OR RESTLESS THAT YOU HAVE BEEN MOVING AROUND A LOT MORE THAN USUAL: SEVERAL DAYS
4. FEELING TIRED OR HAVING LITTLE ENERGY: SEVERAL DAYS
SUM OF ALL RESPONSES TO PHQ QUESTIONS 1-9: 12
6. FEELING BAD ABOUT YOURSELF - OR THAT YOU ARE A FAILURE OR HAVE LET YOURSELF OR YOUR FAMILY DOWN: MORE THAN HALF THE DAYS
3. TROUBLE FALLING OR STAYING ASLEEP: SEVERAL DAYS
9. THOUGHTS THAT YOU WOULD BE BETTER OFF DEAD, OR OF HURTING YOURSELF: SEVERAL DAYS
SUM OF ALL RESPONSES TO PHQ QUESTIONS 1-9: 12
3. TROUBLE FALLING OR STAYING ASLEEP: SEVERAL DAYS
SUM OF ALL RESPONSES TO PHQ QUESTIONS 1-9: 12
2. FEELING DOWN, DEPRESSED OR HOPELESS: MORE THAN HALF THE DAYS
7. TROUBLE CONCENTRATING ON THINGS, SUCH AS READING THE NEWSPAPER OR WATCHING TELEVISION: SEVERAL DAYS
4. FEELING TIRED OR HAVING LITTLE ENERGY: SEVERAL DAYS
5. POOR APPETITE OR OVEREATING: SEVERAL DAYS
2. FEELING DOWN, DEPRESSED OR HOPELESS: MORE THAN HALF THE DAYS
1. LITTLE INTEREST OR PLEASURE IN DOING THINGS: MORE THAN HALF THE DAYS
1. LITTLE INTEREST OR PLEASURE IN DOING THINGS: MORE THAN HALF THE DAYS
7. TROUBLE CONCENTRATING ON THINGS, SUCH AS READING THE NEWSPAPER OR WATCHING TELEVISION: SEVERAL DAYS

## 2025-03-24 ASSESSMENT — COLUMBIA-SUICIDE SEVERITY RATING SCALE - C-SSRS
6. IN YOUR LIFETIME, HAVE YOU EVER DONE ANYTHING, STARTED TO DO ANYTHING, OR PREPARED TO DO ANYTHING TO END YOUR LIFE?: NO
2. IN THE PAST MONTH, HAVE YOU ACTUALLY HAD ANY THOUGHTS OF KILLING YOURSELF?: NO
1. IN THE PAST MONTH, HAVE YOU WISHED YOU WERE DEAD OR WISHED YOU COULD GO TO SLEEP AND NOT WAKE UP?: YES

## 2025-03-24 ASSESSMENT — LIFESTYLE VARIABLES
HOW OFTEN DO YOU HAVE A DRINK CONTAINING ALCOHOL: 4 OR MORE TIMES A WEEK
HOW MANY STANDARD DRINKS CONTAINING ALCOHOL DO YOU HAVE ON A TYPICAL DAY: 5 OR 6

## 2025-03-24 NOTE — ASSESSMENT & PLAN NOTE
Well-controlled, continue current medication  Abnormal blood work on recent ED visit, repeat blood work prior to follow-up    Orders:    hydroCHLOROthiazide 12.5 MG tablet; Take 1 tablet by mouth every morning New lower dose    lisinopril (PRINIVIL;ZESTRIL) 40 MG tablet; Take 1 tablet by mouth daily    Basic Metabolic Panel; Future    CBC with Auto Differential; Future

## 2025-03-24 NOTE — PATIENT INSTRUCTIONS
Dear Rosina Morales,        Thank you for coming to your appointment today.       Please make sure to do the following:  - Continue your medications as listed.  - Get labs drawn before our next follow up.  - Referrals have been made to [facility]:  If you do not hear from the office in 1 week, please call the number listed.  - Call Cardiology at [phone] to reschedule your appointment.   - We will see each other again in 2 weeks.       Have a great day!        Sincerely,  Martha Leiva, DO   3/24/2025  4:10 PM

## 2025-03-24 NOTE — PROGRESS NOTES
[facility]  Internal Medicine Residency Clinic    Attending Physician Statement  I have discussed the case, including pertinent history and exam findings with the resident physician. I agree with the assessment, plan and orders as documented by the resident. I have reviewed the relevant PMHx, PSHx, FamHx, SocialHx, medications, and allergies and updated history as appropriate.    Case discussed with PGY 3  Data reviewed in detail  Patient with nonischemic dilated cardiomyopathy  Going through depression currently  Add Wellbutrin  Reassess in couple of weeks  Baseline labs recommended including TSH    Remainder of medical problems as per resident note.    Eric Baires MD  3/24/2025 4:30 PM

## 2025-03-24 NOTE — ASSESSMENT & PLAN NOTE
Orders:    [facility] Referral to Social Work (Primary Care Only)    Martha Rashid LISW, Counseling Services, [facility]    buPROPion (WELLBUTRIN SR) 100 MG extended release tablet; Take 1 tablet by mouth daily

## 2025-03-24 NOTE — ASSESSMENT & PLAN NOTE
Continue Cymbalta    Orders:    DULoxetine (CYMBALTA) 60 MG extended release capsule; Take 1 capsule by mouth daily

## 2025-03-24 NOTE — PROGRESS NOTES
Rosina Morales (:  1975) is a 50 y.o. 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 and does not believe in suicide as she thinks she will go to hell, does have protective factor and emotional response, indicates no intent to follow through on these thoughts and that these thoughts are very distressing  Ties exacerbation to when she quit her job in May due to emotional stressors that she was experiencing at that time, 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, suffered from more aggressive commentary from clients that significantly impacted her mental health  She states that she worked for all her life since she was a teenager, twice her self-worth to being a contributing member of society and her unemployment has worsened her feelings  She agrees to referral for counseling today to Martha Kessler  She discussed her concerns/SDOH with Catina  Will trial Wellbutrin 100 mg daily in addition to her already prescribed Cymbalta  No history of seizure, no history of alcohol abuse or withdrawal  Wellbutrin may also additionally help with weight loss which patient wishes to achieve    Orders:    DULoxetine (CYMBALTA) 60 MG extended release capsule; Take 1 capsule by mouth daily    [facility] Referral to Social Work (Primary Care Only)    Martha Rashid LISW, Counseling Services, [facility]    buPROPion (WELLBUTRIN SR) 100 MG extended release tablet; Take 1 tablet by mouth daily    TSH; Future    Essential hypertension  Well-controlled, continue current medication  Abnormal blood work on recent ED visit,

## 2025-03-24 NOTE — ASSESSMENT & PLAN NOTE
History of HFrEF, EF 40%  Previously followed with Dr. Yarbrough, has not followed up recently  Provided with number today to follow-up with Dr. Syed  Lab work to complete before next visit    Orders:    Basic Metabolic Panel; Future    CBC with Auto Differential; Future

## 2025-03-24 NOTE — ASSESSMENT & PLAN NOTE
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, suffered from more aggressive commentary from clients that significantly impacted her mental health  She states that she worked for all her life since she was a teenager, twice her self-worth to being a contributing member of society and her unemployment has worsened her feelings  She agrees to referral for counseling today to [facility] Kessler  She discussed her concerns/SDOH with Kelly oDnaldson  Will trial Wellbutrin 100 mg daily in addition to her already prescribed Cymbalta  No history of seizure, no history of alcohol abuse or withdrawal  Wellbutrin may also additionally help with weight loss which patient wishes to achieve    Orders:    DULoxetine (CYMBALTA) 60 MG extended release capsule; Take 1 capsule by mouth daily    [facility] Referral to Social Work (Primary Care Only)    Martha Rashid LISW, Counseling Services, [facility]    buPROPion (WELLBUTRIN SR) 100 MG extended release tablet; Take 1 tablet by mouth daily    TSH; Future

## 2025-03-24 NOTE — ASSESSMENT & PLAN NOTE
Continue iron supplementation    Orders:    ferrous sulfate (IRON 325) 325 (65 Fe) MG tablet; Take 1 tablet by mouth every other day    CBC with Auto Differential; Future

## 2025-03-24 NOTE — PROGRESS NOTES
Social work consult during 3.24.25 IM visit for positive SDOH screen related to transportation, housing stability, and utilities.    Met with pt during IM appt; pt open, engaged and polite.  Pt is single, has no children.  Has been 12 year relationship with Zaid and endorses a safe and supportive relationship. Pt, Zaid and pt's 30 yo nephew reside together in rented home for past 2 years. They are both employed. Pt denies any issues with losing housing yet having difficult with utilities.  Note past medical emergency forms for gas and electric , lastly being 3.21.25.   Reviewed TCAP services with pt and high encouraged to apply prior to 3.31.25 for possible emergency assistance and provided pt with written information re: services and processes.    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  Pt has high school degree as well as auto and travel planner certificates.  Pt presently working with [employer] ([employer]) and has applications in process for job.      Pt endorses increased alcohol use in January and February, consuming vodka 3 -4 times per week.  Pt reports past treatment for anxiety when lived in different community  and treatment for depression thru IM office.  Pt endorses feelings at times of being disappointed when wakes up and feeling better off being dead.  Dr Leiva had discussion w pt prior to LSW.  Pt denies active suicidal ideations or plans as has protective factor of her emily as does not believe in suicide . Note several attempts by  LISW to reach pt with no return calls  LSW provided info for safety plan of 988 line and 211 which pt familiar with and has called 211 in past.  Denies any present concerns or thoughts and is aware to go to nearest ED if there is a change of thoughts./plans. Aware [facility] LISW will reach out to further discuss level of care for alcohol and BH

## 2025-03-24 NOTE — ASSESSMENT & PLAN NOTE
Patient states that she has been having some headaches  Advised to follow-up with neurology  Continue current medications  May be related to depression exacerbation    Orders:    acetaZOLAMIDE (DIAMOX) 250 MG tablet; Take 1 tablet by mouth daily    ondansetron (ZOFRAN ODT) 4 MG disintegrating tablet; Take 1 tablet by mouth every 8 hours as needed for Nausea or Vomiting

## 2025-03-25 ENCOUNTER — TELEPHONE (OUTPATIENT)
Dept: INTERNAL MEDICINE | Age: 50
End: 2025-03-25

## 2025-03-25 RX ORDER — ALBUTEROL SULFATE 90 UG/1
INHALANT RESPIRATORY (INHALATION)
Qty: 18 G | Refills: 3 | Status: SHIPPED | OUTPATIENT
Start: 2025-03-25

## 2025-03-25 NOTE — TELEPHONE ENCOUNTER
DARSHANW left message for pt related to referral. LISW provided contact information for return call.

## 2025-03-28 ENCOUNTER — TELEPHONE (OUTPATIENT)
Dept: INTERNAL MEDICINE | Age: 50
End: 2025-03-28

## 2025-03-28 NOTE — TELEPHONE ENCOUNTER
Pt brought social security card for staff to copy and LSW faxed and confirmed completed Medicaid application to [facility] today.  LSW informed pt by phone and mailed copy of application with fax confirmation

## 2025-04-11 ENCOUNTER — OFFICE VISIT (OUTPATIENT)
Age: 50
End: 2025-04-11
Payer: COMMERCIAL

## 2025-04-11 DIAGNOSIS — F33.1 MODERATE EPISODE OF RECURRENT MAJOR DEPRESSIVE DISORDER (HCC): Primary | ICD-10-CM

## 2025-04-11 PROCEDURE — 90791 PSYCH DIAGNOSTIC EVALUATION: CPT | Performed by: SOCIAL WORKER

## 2025-04-11 NOTE — PROGRESS NOTES
are well controlled at this time.  She will also benefit from brief and solution-focused consultation to address cognitive and behavioral interventions for depression symptoms. Pt reports triggers to depression involve her unemployment and some relationship stressors. Pt would like to work on skills to to reduce depression symptoms.  Rosina was in agreement with recommendations.          3/24/2025     2:47 PM 7/17/2024     2:37 PM 3/23/2023     1:14 PM 5/5/2022     1:54 PM 1/11/2021     8:48 AM 11/14/2020    12:37 PM   PHQ Scores   PHQ2 Score 4  2 4 3 2 0   PHQ9 Score 12  11 16 15 2 0       Patient-reported     Interpretation of Total Score Depression Severity: 1-4 = Minimal depression, 5-9 = Mild depression, 10-14 = Moderate depression, 15-19 = Moderately severe depression, 20-27 = Severe depression    How often pt has had thoughts of death or hurting self (if PHQ positive for depression):            No data to display              Interpretation of JOELLE-7 score: 5-9 = mild anxiety, 10-14 = moderate anxiety, 15+ = severe anxiety. Recommend referral to behavioral health for scores 10 or greater.      DIAGNOSIS  Rosina was seen today for depression.    Diagnoses and all orders for this visit:    Moderate episode of recurrent major depressive disorder (HCC)          INTERVENTION  Provided education, Established rapport, [place]-setting to identify pt's primary goals for [facility] visit / overall health, Supportive techniques, and Provided Psychoeducation re: depression      PLAN  Pt to be seen in two weeks   Pt to identify goals for treatment      INTERACTIVE COMPLEXITY  Is interactive complexity present?  No  Reason:  N/A  Additional Supporting Information:  N/A       Electronically signed by EVERTON Moeller on 4/11/25 at 1:01 PM EDT

## 2025-04-21 ENCOUNTER — OFFICE VISIT (OUTPATIENT)
Dept: INTERNAL MEDICINE | Age: 50
End: 2025-04-21
Payer: COMMERCIAL

## 2025-04-21 VITALS
HEART RATE: 95 BPM | WEIGHT: 293 LBS | TEMPERATURE: 98.2 F | DIASTOLIC BLOOD PRESSURE: 78 MMHG | HEIGHT: 69 IN | RESPIRATION RATE: 18 BRPM | OXYGEN SATURATION: 98 % | SYSTOLIC BLOOD PRESSURE: 114 MMHG | BODY MASS INDEX: 43.4 KG/M2

## 2025-04-21 DIAGNOSIS — R73.03 PREDIABETES: ICD-10-CM

## 2025-04-21 DIAGNOSIS — F33.1 MODERATE EPISODE OF RECURRENT MAJOR DEPRESSIVE DISORDER (HCC): ICD-10-CM

## 2025-04-21 DIAGNOSIS — R53.83 FATIGUE, UNSPECIFIED TYPE: ICD-10-CM

## 2025-04-21 DIAGNOSIS — G43.019 INTRACTABLE MIGRAINE WITHOUT AURA AND WITHOUT STATUS MIGRAINOSUS: ICD-10-CM

## 2025-04-21 DIAGNOSIS — G47.30 SLEEP APNEA, UNSPECIFIED TYPE: Primary | ICD-10-CM

## 2025-04-21 DIAGNOSIS — D50.9 IRON DEFICIENCY ANEMIA, UNSPECIFIED IRON DEFICIENCY ANEMIA TYPE: ICD-10-CM

## 2025-04-21 DIAGNOSIS — I42.8 NICM (NONISCHEMIC CARDIOMYOPATHY) (HCC): ICD-10-CM

## 2025-04-21 DIAGNOSIS — I10 ESSENTIAL HYPERTENSION: ICD-10-CM

## 2025-04-21 LAB
ANION GAP SERPL CALCULATED.3IONS-SCNC: 13 MMOL/L (ref 7–16)
BASOPHILS ABSOLUTE: 0.04 K/UL (ref 0–0.2)
BASOPHILS RELATIVE PERCENT: 0 % (ref 0–2)
BUN BLDV-MCNC: 15 MG/DL (ref 6–20)
CALCIUM SERPL-MCNC: 9.6 MG/DL (ref 8.6–10)
CHLORIDE BLD-SCNC: 107 MMOL/L (ref 98–107)
CO2: 20 MMOL/L (ref 22–29)
CREAT SERPL-MCNC: 1 MG/DL (ref 0.5–1)
EOSINOPHILS ABSOLUTE: 0.14 K/UL (ref 0.05–0.5)
EOSINOPHILS RELATIVE PERCENT: 1 % (ref 0–6)
GFR, ESTIMATED: 69 ML/MIN/1.73M2
GLUCOSE BLD-MCNC: 96 MG/DL (ref 74–99)
HCT VFR BLD CALC: 43 % (ref 34–48)
HEMOGLOBIN: 13 G/DL (ref 11.5–15.5)
IMMATURE GRANULOCYTES %: 1 % (ref 0–5)
IMMATURE GRANULOCYTES ABSOLUTE: 0.05 K/UL (ref 0–0.58)
LYMPHOCYTES ABSOLUTE: 2.2 K/UL (ref 1.5–4)
LYMPHOCYTES RELATIVE PERCENT: 22 % (ref 20–42)
MCH RBC QN AUTO: 24.2 PG (ref 26–35)
MCHC RBC AUTO-ENTMCNC: 30.2 G/DL (ref 32–34.5)
MCV RBC AUTO: 79.9 FL (ref 80–99.9)
MONOCYTES ABSOLUTE: 0.75 K/UL (ref 0.1–0.95)
MONOCYTES RELATIVE PERCENT: 8 % (ref 2–12)
NEUTROPHILS ABSOLUTE: 6.67 K/UL (ref 1.8–7.3)
NEUTROPHILS RELATIVE PERCENT: 68 % (ref 43–80)
PDW BLD-RTO: 20.1 % (ref 11.5–15)
PLATELET # BLD: 380 K/UL (ref 130–450)
PMV BLD AUTO: 11.1 FL (ref 7–12)
POTASSIUM SERPL-SCNC: 4 MMOL/L (ref 3.4–4.5)
RBC # BLD: 5.38 M/UL (ref 3.5–5.5)
RBC # BLD: ABNORMAL 10*6/UL
SODIUM BLD-SCNC: 140 MMOL/L (ref 136–145)
TSH SERPL DL<=0.05 MIU/L-ACNC: 1.62 UIU/ML (ref 0.27–4.2)
WBC # BLD: 9.9 K/UL (ref 4.5–11.5)

## 2025-04-21 PROCEDURE — 99213 OFFICE O/P EST LOW 20 MIN: CPT

## 2025-04-21 PROCEDURE — 36415 COLL VENOUS BLD VENIPUNCTURE: CPT

## 2025-04-21 PROCEDURE — 99212 OFFICE O/P EST SF 10 MIN: CPT

## 2025-04-21 PROCEDURE — 3074F SYST BP LT 130 MM HG: CPT

## 2025-04-21 PROCEDURE — 3078F DIAST BP <80 MM HG: CPT

## 2025-04-21 RX ORDER — BUPROPION HYDROCHLORIDE 100 MG/1
100 TABLET, EXTENDED RELEASE ORAL DAILY
Qty: 30 TABLET | Refills: 1 | Status: SHIPPED | OUTPATIENT
Start: 2025-04-21

## 2025-04-21 RX ORDER — ACETAZOLAMIDE 250 MG/1
250 TABLET ORAL DAILY
Qty: 60 TABLET | Refills: 0 | Status: SHIPPED | OUTPATIENT
Start: 2025-04-21

## 2025-04-21 RX ORDER — HYDROCHLOROTHIAZIDE 12.5 MG/1
12.5 TABLET ORAL EVERY MORNING
Qty: 90 TABLET | Refills: 0 | Status: SHIPPED | OUTPATIENT
Start: 2025-04-21

## 2025-04-21 ASSESSMENT — ENCOUNTER SYMPTOMS
COUGH: 0
BLOOD IN STOOL: 0
DIARRHEA: 0
NAUSEA: 0
ABDOMINAL PAIN: 0
SHORTNESS OF BREATH: 0
VOMITING: 0
BACK PAIN: 0
CONSTIPATION: 0

## 2025-04-21 NOTE — PATIENT INSTRUCTIONS
Dear Rosina Morales,    Thank you for coming to your appointment at [facility].    You were seen for a 2 week follow-up regarding your medication addition of Wellbutrin. We will be obtaining repeat labs today. Please continue medications as directed. We recommend that you schedule to see the CHF clinic and cardiologist at your convenience.     Cardiology: [phone]   CHF clinic: [phone]     Please make sure to do the following:    - Continue medications as listed and contact our office if medications are unavailable at the pharmacy.    - Complete any ordered lab work as instructed.    - If a referral was placed to another doctor's office, please contact our office in 5 business days if you have not heard from that office regarding the referral.    - If any imaging test was ordered at today's visit (ultrasound, CT scan, or MRI), expect a phone call to schedule in the next 5 business days. You may also call [facility] Imaging Scheduling department at [phone] to schedule.    Contact our office if you develop any new or worsening symptoms or if you have any questions regarding today's visit.    We aim to address your healthcare needs to your satisfaction!    Sincerely,  Hira Smith DO  4/21/2025  2:17 PM

## 2025-04-21 NOTE — PROGRESS NOTES
Rosina Morales (:  1975) is a 50 y.o. female,Established patient, here for evaluation of the following chief complaint(s):  Follow-up and Fatigue         Assessment & Plan  Moderate episode of recurrent major depressive disorder (HCC)   Chronic, at goal (stable), continue current treatment plan  - Continue wellbutrin and cymbalta  - Continue regular counseling sessions with Martha  Orders:    buPROPion (WELLBUTRIN SR) 100 MG extended release tablet; Take 1 tablet by mouth daily    Intractable migraine without aura and without status migrainosus   Chronic, at goal (stable), continue current treatment plan    Orders:    acetaZOLAMIDE (DIAMOX) 250 MG tablet; Take 1 tablet by mouth daily    Essential hypertension   Chronic, at goal (stable), continue current treatment plan    Orders:    hydroCHLOROthiazide 12.5 MG tablet; Take 1 tablet by mouth every morning New lower dose    Sleep apnea, unspecified type   Chronic, at goal (stable), continue current treatment plan  -Nightly CPAP usageContinue nightly CPAP usage    Prediabetes   Chronic, at goal (stable), Diet control and lifestyle modifications    Orders:    Basic Metabolic Panel    NICM (nonischemic cardiomyopathy) (HCC)   Chronic, at goal (stable), continue current treatment plan  - Follow-up with cardiologist and CHF clinic        Iron deficiency anemia, unspecified iron deficiency anemia type   Chronic, at goal (stable), continue current treatment plan    Orders:    CBC with Auto Differential    Fatigue, unspecified type   Chronic, not at goal (unstable), Follow CBC, CMP, TSH  - Recommended follow-up with sleep medicine   - Continue CPAP usage  - Discussed proper sleep hygiene     Orders:    TSH      Return in about 3 months (around 2025).       Subjective   HPI  50-year-old female with past medical history of HTN, nonischemic cardiomyopathy, HFrEF, migraine, MDD, anxiety presents for 2-week follow-up. Patient is doing well on Wellbutrin. She was able

## 2025-04-21 NOTE — ASSESSMENT & PLAN NOTE
Chronic, at goal (stable), continue current treatment plan    Orders:    acetaZOLAMIDE (DIAMOX) 250 MG tablet; Take 1 tablet by mouth daily

## 2025-04-21 NOTE — ASSESSMENT & PLAN NOTE
Chronic, at goal (stable), continue current treatment plan  - Continue wellbutrin and cymbalta  - Continue regular counseling sessions with Martha  Orders:    buPROPion (WELLBUTRIN SR) 100 MG extended release tablet; Take 1 tablet by mouth daily

## 2025-04-21 NOTE — PROGRESS NOTES
[facility]  Internal Medicine Residency Clinic    Attending Physician Statement  I have discussed the case, including pertinent history and exam findings with the resident physician.  I agree with the assessment, plan and orders as documented by the resident. I have reviewed all pertinent PMHx, PSHx, FamHx, SocialHx, medications, and allergies and updated history as appropriate.    Patient here for routine follow up of medical problems.  Patient is mainly here for a 2 week f/u for depression. Patient has seen EVERTON Cooper for counseling and has been tolerating med rx with wellbutrin and is improved. She will be starting a new job soon which she is looking forward to. Her other chronic med problems are stable/controlled. Ha s also c/o fatigue, TSH and CBC have ash ordered.     Remainder of medical problems as per resident note.    Hunter Vasques, DO  4/21/25

## 2025-04-21 NOTE — PROGRESS NOTES
Pt received venipuncture blood draw left arm. Tolerated well with no adverse reactions. Sent one green and one lavender tube to the lab.

## 2025-04-21 NOTE — ASSESSMENT & PLAN NOTE
Chronic, at goal (stable), continue current treatment plan  -Nightly CPAP usageContinue nightly CPAP usage

## 2025-04-21 NOTE — ASSESSMENT & PLAN NOTE
Chronic, at goal (stable), continue current treatment plan    Orders:    hydroCHLOROthiazide 12.5 MG tablet; Take 1 tablet by mouth every morning New lower dose

## 2025-04-21 NOTE — ASSESSMENT & PLAN NOTE
Chronic, at goal (stable), Diet control and lifestyle modifications    Orders:    Basic Metabolic Panel

## 2025-04-21 NOTE — ASSESSMENT & PLAN NOTE
Chronic, at goal (stable), continue current treatment plan    Orders:    CBC with Auto Differential

## 2025-04-21 NOTE — ASSESSMENT & PLAN NOTE
Chronic, at goal (stable), continue current treatment plan  - Follow-up with cardiologist and CHF clinic

## 2025-04-25 ENCOUNTER — OFFICE VISIT (OUTPATIENT)
Age: 50
End: 2025-04-25
Payer: COMMERCIAL

## 2025-04-25 DIAGNOSIS — F33.1 MODERATE EPISODE OF RECURRENT MAJOR DEPRESSIVE DISORDER (HCC): Primary | ICD-10-CM

## 2025-04-25 PROCEDURE — 90832 PSYTX W PT 30 MINUTES: CPT | Performed by: SOCIAL WORKER

## 2025-04-25 NOTE — PROGRESS NOTES
ADULT BEHAVIORAL HEALTH FOLLOW UP  Martha Checo,MSW,LISW      Visit Date: 4/25/2025   Time of appointment:  1:05   Time spent with Patient: 30 minutes.   This is patient's third appointment.    Reason for Consult:  Depression     Referring Provider/PCP:    No ref. provider found  Rayne Hernandez MD      Pt provided informed consent for the behavioral health program. Discussed with patient model of service to include the limits of confidentiality (i.e. abuse reporting, suicide intervention, etc.) and short-term intervention focused approach.  Pt indicated understanding.    SUBJECTIVE  Rosina is a 50 y.o. female who presents for follow up of depression.        MENTAL STATUS EXAM  Mood was euthymic with congruent affect.   Suicidal ideation was denied.   Homicidal ideation was denied.   Hygiene was good .  Dress was neat.   Behavior was Within Normal Limits with No self-report of difficulties ambulating.   Attitude was Cooperative, Engageable, and Friendly.  Eye-contact was good.  Speech: rate - WNL, rhythm - WNL, volume - WNL.  Verbalizations were goal directed.  Thought processes were intact and goal-oriented without evidence of delusions, hallucinations, obsessions, or drew; with little cognitive distortions.   Associations were characterized by intact cognitive processes.  Pt was oriented to person, place, time, and general circumstances;  recent:  good.  Insight and judgment were estimated to be good, AEB, a good  understanding of cyclical maladaptive patterns, and the ability to use insight to inform behavior change.       ASSESSMENT  Rosina Morales presented to the appointment today for evaluation and treatment of symptoms of depression.  She is currently deemed no risk to herself or others and meets criteria for depression. Pt reports she is excited because she recently gained employment at [employer] and will start working part-time around 25-30 hours a week.     Pt reports uncle passed away which this has brought

## 2025-07-07 ENCOUNTER — OFFICE VISIT (OUTPATIENT)
Age: 50
End: 2025-07-07
Payer: COMMERCIAL

## 2025-07-07 VITALS
OXYGEN SATURATION: 96 % | SYSTOLIC BLOOD PRESSURE: 154 MMHG | RESPIRATION RATE: 15 BRPM | BODY MASS INDEX: 43.4 KG/M2 | HEART RATE: 72 BPM | HEIGHT: 69 IN | TEMPERATURE: 97.5 F | DIASTOLIC BLOOD PRESSURE: 73 MMHG | WEIGHT: 293 LBS

## 2025-07-07 DIAGNOSIS — G43.019 INTRACTABLE MIGRAINE WITHOUT AURA AND WITHOUT STATUS MIGRAINOSUS: ICD-10-CM

## 2025-07-07 DIAGNOSIS — D50.9 IRON DEFICIENCY ANEMIA, UNSPECIFIED IRON DEFICIENCY ANEMIA TYPE: ICD-10-CM

## 2025-07-07 DIAGNOSIS — M54.41 CHRONIC RIGHT-SIDED LOW BACK PAIN WITH RIGHT-SIDED SCIATICA: ICD-10-CM

## 2025-07-07 DIAGNOSIS — I10 ESSENTIAL HYPERTENSION: ICD-10-CM

## 2025-07-07 DIAGNOSIS — G62.9 PERIPHERAL POLYNEUROPATHY: ICD-10-CM

## 2025-07-07 DIAGNOSIS — F33.1 MODERATE EPISODE OF RECURRENT MAJOR DEPRESSIVE DISORDER (HCC): ICD-10-CM

## 2025-07-07 DIAGNOSIS — G89.29 CHRONIC RIGHT-SIDED LOW BACK PAIN WITH RIGHT-SIDED SCIATICA: ICD-10-CM

## 2025-07-07 PROCEDURE — 3078F DIAST BP <80 MM HG: CPT

## 2025-07-07 PROCEDURE — 99214 OFFICE O/P EST MOD 30 MIN: CPT

## 2025-07-07 PROCEDURE — 3077F SYST BP >= 140 MM HG: CPT

## 2025-07-07 RX ORDER — HYDROCHLOROTHIAZIDE 12.5 MG/1
12.5 TABLET ORAL EVERY MORNING
Qty: 90 TABLET | Refills: 0 | Status: SHIPPED | OUTPATIENT
Start: 2025-07-07

## 2025-07-07 RX ORDER — ACETAZOLAMIDE 250 MG/1
250 TABLET ORAL DAILY
Qty: 60 TABLET | Refills: 0 | Status: SHIPPED | OUTPATIENT
Start: 2025-07-07

## 2025-07-07 RX ORDER — FERROUS SULFATE 325(65) MG
325 TABLET ORAL EVERY OTHER DAY
Qty: 90 TABLET | Refills: 1 | Status: SHIPPED | OUTPATIENT
Start: 2025-07-07

## 2025-07-07 RX ORDER — GABAPENTIN 100 MG/1
100 CAPSULE ORAL 2 TIMES DAILY
Qty: 180 CAPSULE | Refills: 1 | Status: SHIPPED | OUTPATIENT
Start: 2025-07-07 | End: 2026-01-03

## 2025-07-07 RX ORDER — BUPROPION HYDROCHLORIDE 100 MG/1
100 TABLET, EXTENDED RELEASE ORAL DAILY
Qty: 30 TABLET | Refills: 1 | Status: SHIPPED | OUTPATIENT
Start: 2025-07-07 | End: 2025-07-07

## 2025-07-07 RX ORDER — ONDANSETRON 4 MG/1
4 TABLET, ORALLY DISINTEGRATING ORAL EVERY 8 HOURS PRN
Qty: 90 TABLET | Refills: 1 | Status: SHIPPED | OUTPATIENT
Start: 2025-07-07

## 2025-07-07 RX ORDER — DULOXETIN HYDROCHLORIDE 60 MG/1
60 CAPSULE, DELAYED RELEASE ORAL DAILY
Qty: 90 CAPSULE | Refills: 1 | Status: SHIPPED | OUTPATIENT
Start: 2025-07-07

## 2025-07-07 RX ORDER — BUPROPION HYDROCHLORIDE 100 MG/1
100 TABLET, EXTENDED RELEASE ORAL DAILY
Qty: 30 TABLET | Refills: 2 | Status: SHIPPED | OUTPATIENT
Start: 2025-07-07

## 2025-07-07 ASSESSMENT — ENCOUNTER SYMPTOMS: BACK PAIN: 1

## 2025-07-07 NOTE — PROGRESS NOTES
[facility]  Internal Medicine Residency Clinic    Attending Physician Statement  I have discussed the case, including pertinent history and exam findings with the resident physician.I have seen and examined the patient and the key elements of the encounter have been performed by me. I agree with the assessment, plan and orders as documented by the resident. I have reviewed the relevant PMHx, PSHx, FamHx, SocialHx, medications, and allergies and updated history as appropriate.    Patient presents for routine follow up of medical problems.   Patient was seen at bedside with PGY 1  50-year-old woman here essentially because she ran out of certain meds  Encouraged to follow-up with neurology for her headaches  Continue current medical management otherwise  Close follow-up with PCP recommended for preventative care needs  Remainder of medical problems as per resident note.        Eric Baires MD  7/7/2025 2:08 PM

## 2025-07-07 NOTE — PROGRESS NOTES
Rosina Morales (:  1975) is a 50 y.o. female,Established patient, here for evaluation of the following chief complaint(s):  No chief complaint on file.      Assessment & Plan    Fatigue, Unspecified type  Chronic, stable.  -Recommended follow-up with sleep medicine.  -Continue CPAP usage.  -Discussed proper sleep hygiene    Moderate episode of recurrent major depressive disorder(HCC)  -Ran out of Wellbutrin 3 weeks ago  -Has been more anxious and restless without the medication  -Sent refills today, advised to contact office before running out of medication  -Continue Wellbutrin and Cymbalta  -Continue regular counseling sessions with Martha    Intractable migraine without aura and without status migrainosus  -Chronic, stable, continue current medication.  -Follow up with Neurologist.     Essential hypertension  -BP today 154/73, states home BP is better controlled.   -She was not taking her lisinopril for past 1 week, as she heard from relative about reactions, discussed side effects, patient agrees to continue.   -Continue Carvedilol 6.25 BID, Lisinopril 40mg daily. Hydrochlorothiazide 12.5mg every morning.    Prediabetes  -Chronic, last A1c on 24 was 5.7  -Repeat A1c    NonIschemic Cardiomyopathy (HCC)  Chronic, at goal(stable), continue current treatment plan  -Follow up with cardiologist and CHF clinic    Iron Deficiency anemia  -Chronic, at goal, continue ferrous sulfate 325 daily.    Sleep apnea, unspecified type  -Chronic, pro is on nightly CPAP, but has not been using for last 3 weeks, as she lost her nose mask, aware to contact sleep medicine clinic to reorder.   -Also suggested that she make a follow up appointment with them.     Chronic right-sided low back pain with right sided sciatica  -Continue Cymbalta    Peripheral polyneuropathy  -Stable, Continue gabapentin  No follow-ups on file.    Subjective       HPI    50 year old female with past medical history of HTN, nonischemic

## 2025-07-07 NOTE — ASSESSMENT & PLAN NOTE
Orders:    hydroCHLOROthiazide 12.5 MG tablet; Take 1 tablet by mouth every morning New lower dose

## 2025-08-16 DIAGNOSIS — G43.019 INTRACTABLE MIGRAINE WITHOUT AURA AND WITHOUT STATUS MIGRAINOSUS: ICD-10-CM

## 2025-08-18 RX ORDER — ACETAZOLAMIDE 250 MG/1
250 TABLET ORAL DAILY
Qty: 60 TABLET | Refills: 0 | Status: SHIPPED | OUTPATIENT
Start: 2025-08-18

## 2025-08-20 ENCOUNTER — TELEPHONE (OUTPATIENT)
Age: 50
End: 2025-08-20

## 2025-08-21 ENCOUNTER — TELEPHONE (OUTPATIENT)
Age: 50
End: 2025-08-21

## 2025-08-21 ENCOUNTER — OFFICE VISIT (OUTPATIENT)
Age: 50
End: 2025-08-21
Payer: COMMERCIAL

## 2025-08-21 VITALS
RESPIRATION RATE: 18 BRPM | DIASTOLIC BLOOD PRESSURE: 87 MMHG | BODY MASS INDEX: 43.4 KG/M2 | TEMPERATURE: 97.4 F | OXYGEN SATURATION: 98 % | HEART RATE: 72 BPM | HEIGHT: 69 IN | SYSTOLIC BLOOD PRESSURE: 149 MMHG | WEIGHT: 293 LBS

## 2025-08-21 DIAGNOSIS — G43.019 INTRACTABLE MIGRAINE WITHOUT AURA AND WITHOUT STATUS MIGRAINOSUS: Primary | ICD-10-CM

## 2025-08-21 PROCEDURE — 99213 OFFICE O/P EST LOW 20 MIN: CPT

## 2025-08-21 PROCEDURE — 3077F SYST BP >= 140 MM HG: CPT

## 2025-08-21 PROCEDURE — 3079F DIAST BP 80-89 MM HG: CPT

## 2025-08-21 RX ORDER — RIMEGEPANT SULFATE 75 MG/75MG
75 TABLET, ORALLY DISINTEGRATING ORAL DAILY PRN
Qty: 9 TABLET | Refills: 0 | Status: SHIPPED | OUTPATIENT
Start: 2025-08-21

## 2025-08-21 ASSESSMENT — ENCOUNTER SYMPTOMS
COUGH: 0
CHEST TIGHTNESS: 0
VOMITING: 0
WHEEZING: 0
ABDOMINAL PAIN: 0
NAUSEA: 1

## 2025-08-26 ENCOUNTER — TELEPHONE (OUTPATIENT)
Age: 50
End: 2025-08-26

## 2025-08-26 DIAGNOSIS — G43.019 INTRACTABLE MIGRAINE WITHOUT AURA AND WITHOUT STATUS MIGRAINOSUS: Primary | ICD-10-CM

## 2025-08-29 ENCOUNTER — TELEPHONE (OUTPATIENT)
Dept: NEUROLOGY | Age: 50
End: 2025-08-29

## 2025-08-29 RX ORDER — PROMETHAZINE HYDROCHLORIDE 25 MG/1
25 TABLET ORAL DAILY
Qty: 6 TABLET | Refills: 0 | Status: SHIPPED | OUTPATIENT
Start: 2025-08-29 | End: 2025-09-04

## 2025-08-29 RX ORDER — SUMATRIPTAN 50 MG/1
50 TABLET, FILM COATED ORAL
Qty: 9 TABLET | Refills: 0 | Status: SHIPPED | OUTPATIENT
Start: 2025-08-29

## 2025-08-29 RX ORDER — PREDNISONE 20 MG/1
TABLET ORAL
Qty: 12 TABLET | Refills: 0 | Status: SHIPPED | OUTPATIENT
Start: 2025-08-29 | End: 2025-09-04

## (undated) DEVICE — PACK SURG CARDIAC CATH

## (undated) DEVICE — ANGIOGRAPHIC CATHETER: Brand: EXPO™

## (undated) DEVICE — CANNULA NSL CANN NSL L25FT TBNG AD O2 SUP SFT UC

## (undated) DEVICE — GLIDESHEATH SLENDER ACCESS KIT: Brand: GLIDESHEATH SLENDER

## (undated) DEVICE — DEVICE COMPR LNG 27 CM VASC BND

## (undated) DEVICE — KIT ANGIO W/ AT P65 PREM HND CTRL FOR CNTRST DEL ANGIOTOUCH

## (undated) DEVICE — PAD, DEFIB, ADULT, RADIOTRAN, PHYSIO, LO: Brand: MEDLINE

## (undated) DEVICE — RADIFOCUS OPTITORQUE ANGIOGRAPHIC CATHETER: Brand: OPTITORQUE

## (undated) DEVICE — GUIDEWIRE VASC L260CM 0.035IN J TIP L3MM PTFE FIX COR NAMIC

## (undated) DEVICE — KIT MFLD ISOLATN NACL CNTRST PRT TBNG SPIK W/ PRSS TRNSDUC